# Patient Record
Sex: FEMALE | Race: WHITE | NOT HISPANIC OR LATINO | Employment: PART TIME | URBAN - NONMETROPOLITAN AREA
[De-identification: names, ages, dates, MRNs, and addresses within clinical notes are randomized per-mention and may not be internally consistent; named-entity substitution may affect disease eponyms.]

---

## 2019-07-10 ENCOUNTER — TELEPHONE (OUTPATIENT)
Dept: FAMILY MEDICINE CLINIC | Facility: CLINIC | Age: 54
End: 2019-07-10

## 2019-07-10 NOTE — TELEPHONE ENCOUNTER
Pt needs refills    lisinopril  40 mg  1x daily  prozak     20 mg  Daily or higher  zanax       0 5 mg  PRN      Carl Dates      Thank you

## 2019-07-11 DIAGNOSIS — I10 ESSENTIAL HYPERTENSION, BENIGN: Primary | ICD-10-CM

## 2019-07-11 DIAGNOSIS — F33.0 MAJOR DEPRESSIVE DISORDER, RECURRENT EPISODE, MILD (HCC): ICD-10-CM

## 2019-07-11 RX ORDER — FLUOXETINE 20 MG/1
20 TABLET, FILM COATED ORAL DAILY
Qty: 30 TABLET | Refills: 5 | Status: SHIPPED | OUTPATIENT
Start: 2019-07-11 | End: 2020-01-06 | Stop reason: SDUPTHER

## 2019-07-11 RX ORDER — LISINOPRIL 40 MG/1
40 TABLET ORAL DAILY
Qty: 30 TABLET | Refills: 5 | Status: SHIPPED | OUTPATIENT
Start: 2019-07-11 | End: 2020-01-06 | Stop reason: SDUPTHER

## 2020-01-06 DIAGNOSIS — J45.909 ASTHMA, UNSPECIFIED ASTHMA SEVERITY, UNSPECIFIED WHETHER COMPLICATED, UNSPECIFIED WHETHER PERSISTENT: Primary | ICD-10-CM

## 2020-01-06 DIAGNOSIS — F33.0 MAJOR DEPRESSIVE DISORDER, RECURRENT EPISODE, MILD (HCC): ICD-10-CM

## 2020-01-06 DIAGNOSIS — I10 ESSENTIAL HYPERTENSION, BENIGN: ICD-10-CM

## 2020-01-06 RX ORDER — FLUOXETINE 20 MG/1
20 TABLET, FILM COATED ORAL DAILY
Qty: 30 TABLET | Refills: 0 | Status: SHIPPED | OUTPATIENT
Start: 2020-01-06 | End: 2020-02-19 | Stop reason: SDUPTHER

## 2020-01-06 RX ORDER — LISINOPRIL 40 MG/1
40 TABLET ORAL DAILY
Qty: 30 TABLET | Refills: 0 | Status: SHIPPED | OUTPATIENT
Start: 2020-01-06 | End: 2020-02-19 | Stop reason: SDUPTHER

## 2020-01-06 RX ORDER — ALBUTEROL SULFATE 90 UG/1
2 AEROSOL, METERED RESPIRATORY (INHALATION) AS NEEDED
COMMUNITY
End: 2020-01-06 | Stop reason: SDUPTHER

## 2020-01-06 RX ORDER — ALBUTEROL SULFATE 90 UG/1
2 AEROSOL, METERED RESPIRATORY (INHALATION) AS NEEDED
Qty: 1 INHALER | Refills: 0 | Status: SHIPPED | OUTPATIENT
Start: 2020-01-06 | End: 2020-01-10

## 2020-01-08 DIAGNOSIS — J45.909 ASTHMA, UNSPECIFIED ASTHMA SEVERITY, UNSPECIFIED WHETHER COMPLICATED, UNSPECIFIED WHETHER PERSISTENT: ICD-10-CM

## 2020-01-10 DIAGNOSIS — J45.909 ASTHMA, UNSPECIFIED ASTHMA SEVERITY, UNSPECIFIED WHETHER COMPLICATED, UNSPECIFIED WHETHER PERSISTENT: ICD-10-CM

## 2020-01-10 RX ORDER — ALBUTEROL SULFATE 90 UG/1
2 AEROSOL, METERED RESPIRATORY (INHALATION) EVERY 4 HOURS PRN
Qty: 1 INHALER | Refills: 0 | Status: SHIPPED | OUTPATIENT
Start: 2020-01-10 | End: 2020-02-27 | Stop reason: SDUPTHER

## 2020-01-10 RX ORDER — ALBUTEROL SULFATE 90 UG/1
2 AEROSOL, METERED RESPIRATORY (INHALATION) EVERY 4 HOURS PRN
Qty: 9 G | Refills: 0 | Status: SHIPPED | OUTPATIENT
Start: 2020-01-10 | End: 2020-01-10 | Stop reason: SDUPTHER

## 2020-02-05 DIAGNOSIS — F33.0 MAJOR DEPRESSIVE DISORDER, RECURRENT EPISODE, MILD (HCC): ICD-10-CM

## 2020-02-05 DIAGNOSIS — I10 ESSENTIAL HYPERTENSION, BENIGN: ICD-10-CM

## 2020-02-05 DIAGNOSIS — J45.909 ASTHMA, UNSPECIFIED ASTHMA SEVERITY, UNSPECIFIED WHETHER COMPLICATED, UNSPECIFIED WHETHER PERSISTENT: ICD-10-CM

## 2020-02-05 RX ORDER — LISINOPRIL 40 MG/1
40 TABLET ORAL DAILY
Qty: 30 TABLET | Refills: 5 | Status: CANCELLED | OUTPATIENT
Start: 2020-02-05

## 2020-02-05 RX ORDER — ALBUTEROL SULFATE 90 UG/1
2 AEROSOL, METERED RESPIRATORY (INHALATION) EVERY 4 HOURS PRN
Qty: 1 INHALER | Refills: 4 | Status: CANCELLED | OUTPATIENT
Start: 2020-02-05

## 2020-02-05 RX ORDER — FLUOXETINE 20 MG/1
20 TABLET, FILM COATED ORAL DAILY
Qty: 30 TABLET | Refills: 5 | Status: CANCELLED | OUTPATIENT
Start: 2020-02-05

## 2020-02-06 ENCOUNTER — TELEPHONE (OUTPATIENT)
Dept: FAMILY MEDICINE CLINIC | Facility: CLINIC | Age: 55
End: 2020-02-06

## 2020-02-19 DIAGNOSIS — I10 ESSENTIAL HYPERTENSION, BENIGN: ICD-10-CM

## 2020-02-19 DIAGNOSIS — F33.0 MAJOR DEPRESSIVE DISORDER, RECURRENT EPISODE, MILD (HCC): ICD-10-CM

## 2020-02-19 RX ORDER — LISINOPRIL 40 MG/1
40 TABLET ORAL DAILY
Qty: 15 TABLET | Refills: 0 | Status: SHIPPED | OUTPATIENT
Start: 2020-02-19 | End: 2020-02-27 | Stop reason: SDUPTHER

## 2020-02-19 RX ORDER — FLUOXETINE 20 MG/1
20 TABLET, FILM COATED ORAL DAILY
Qty: 15 TABLET | Refills: 0 | Status: SHIPPED | OUTPATIENT
Start: 2020-02-19 | End: 2020-02-27 | Stop reason: SDUPTHER

## 2020-02-27 ENCOUNTER — OFFICE VISIT (OUTPATIENT)
Dept: FAMILY MEDICINE CLINIC | Facility: CLINIC | Age: 55
End: 2020-02-27

## 2020-02-27 VITALS
SYSTOLIC BLOOD PRESSURE: 148 MMHG | BODY MASS INDEX: 31.6 KG/M2 | HEART RATE: 81 BPM | HEIGHT: 68 IN | DIASTOLIC BLOOD PRESSURE: 90 MMHG | WEIGHT: 208.5 LBS | OXYGEN SATURATION: 94 %

## 2020-02-27 DIAGNOSIS — Z12.39 SCREENING FOR BREAST CANCER: ICD-10-CM

## 2020-02-27 DIAGNOSIS — Z12.11 SCREENING FOR COLON CANCER: ICD-10-CM

## 2020-02-27 DIAGNOSIS — J45.909 ASTHMA, UNSPECIFIED ASTHMA SEVERITY, UNSPECIFIED WHETHER COMPLICATED, UNSPECIFIED WHETHER PERSISTENT: ICD-10-CM

## 2020-02-27 DIAGNOSIS — Z12.4 SCREENING FOR CERVICAL CANCER: ICD-10-CM

## 2020-02-27 DIAGNOSIS — F33.0 MAJOR DEPRESSIVE DISORDER, RECURRENT EPISODE, MILD (HCC): ICD-10-CM

## 2020-02-27 DIAGNOSIS — I10 ESSENTIAL HYPERTENSION, BENIGN: Primary | ICD-10-CM

## 2020-02-27 PROCEDURE — 3080F DIAST BP >= 90 MM HG: CPT | Performed by: FAMILY MEDICINE

## 2020-02-27 PROCEDURE — 3008F BODY MASS INDEX DOCD: CPT | Performed by: FAMILY MEDICINE

## 2020-02-27 PROCEDURE — 1036F TOBACCO NON-USER: CPT | Performed by: FAMILY MEDICINE

## 2020-02-27 PROCEDURE — 3077F SYST BP >= 140 MM HG: CPT | Performed by: FAMILY MEDICINE

## 2020-02-27 PROCEDURE — 99213 OFFICE O/P EST LOW 20 MIN: CPT | Performed by: FAMILY MEDICINE

## 2020-02-27 RX ORDER — LISINOPRIL 40 MG/1
40 TABLET ORAL DAILY
Qty: 90 TABLET | Refills: 3 | Status: SHIPPED | OUTPATIENT
Start: 2020-02-27 | End: 2021-02-23

## 2020-02-27 RX ORDER — ALBUTEROL SULFATE 90 UG/1
2 AEROSOL, METERED RESPIRATORY (INHALATION) EVERY 4 HOURS PRN
Qty: 1 INHALER | Refills: 1 | Status: SHIPPED | OUTPATIENT
Start: 2020-02-27 | End: 2022-03-03 | Stop reason: SDUPTHER

## 2020-02-27 RX ORDER — FLUOXETINE 20 MG/1
20 TABLET, FILM COATED ORAL DAILY
Qty: 90 TABLET | Refills: 3 | Status: SHIPPED | OUTPATIENT
Start: 2020-02-27 | End: 2021-02-23

## 2020-02-27 RX ORDER — AMLODIPINE BESYLATE 5 MG/1
5 TABLET ORAL DAILY
Qty: 90 TABLET | Refills: 3 | Status: SHIPPED | OUTPATIENT
Start: 2020-02-27 | End: 2021-02-23

## 2020-02-27 NOTE — PATIENT INSTRUCTIONS
Low-Sodium Diet   AMBULATORY CARE:   A low-sodium diet  limits foods that are high in sodium (salt)  You will need to follow a low-sodium diet if you have high blood pressure, kidney disease, or heart failure  You may also need to follow this diet if you have a condition that is causing your body to retain (hold) extra fluid  You may need to limit the amount of sodium you eat to 1,500 mg  Ask your healthcare provider how much sodium you can have each day  How to use food labels to choose foods that are low in sodium:  Read food labels to find the amount of sodium they contain  The amount of sodium is listed in milligrams (mg)  The % Daily Value (DV) column tells you how much of your daily needs are met by 1 serving of the food for each nutrient listed  Choose foods that have less than 5% of the DV of sodium  These foods are considered low in sodium  Foods that have 20% or more of the DV of sodium are considered high in sodium  Some food labels may also list any of the following terms that tell you about the sodium content in the food:  · Sodium-free:  Less than 5 mg in each serving    · Very low sodium:  35 mg of sodium or less in each serving    · Low sodium:  140 mg of sodium or less in each serving    · Reduced sodium: At least 25% less sodium in each serving than the regular type    · Light in sodium:  50% less sodium in each serving    · Unsalted or no added salt:  No extra salt is added during processing (the food may still contain sodium)  Foods to avoid:  Salty foods are high in sodium   You should avoid the following:  · Processed foods:      ¨ Mixes for cornbread, biscuits, cake, and pudding     ¨ Instant foods, such as potatoes, cereals, noodles, and rice     ¨ Packaged foods, such as bread stuffing, rice and pasta mixes, snack dip mixes, and macaroni and cheese     ¨ Canned foods, such as canned vegetables, soups, broths, sauces, and vegetable or tomato juice    ¨ Snack foods, such as salted chips, popcorn, pretzels, pork rinds, salted crackers, and salted nuts    ¨ Frozen foods, such as dinners, entrees, vegetables with sauces, and breaded meats    ¨ Sauerkraut, pickled vegetables, and other foods prepared in brine    · Meats and cheeses:      ¨ Smoked or cured meat, such as corned beef, hull, ham, hot dogs, and sausage    ¨ Canned meats or spreads, such as potted meats, sardines, anchovies, and imitation seafood    ¨ Deli or lunch meats, such as bologna, ham, turkey, and roast beef    ¨ Processed cheese, such as American cheese and cheese spreads    · Condiments, sauces, and seasonings:      ¨ Salt (¼ teaspoon of salt contains 575 mg of sodium)    ¨ Seasonings made with salt, such as garlic salt, celery salt, onion salt, and seasoned salt    ¨ Regular soy sauce, barbecue sauce, teriyaki sauce, steak sauce, Worcestershire sauce, and most flavored vinegars    ¨ Canned gravy and mixes     ¨ Regular condiments, such as mustard, ketchup, and salad dressings    ¨ Pickles and olives    ¨ Meat tenderizers and monosodium glutamate (MSG)  Foods to include:  Read the food label to find the exact amount of sodium in each serving  · Bread and cereal:  Try to choose breads with less than 80 mg of sodium per serving  ¨ Bread, roll, matthew, tortilla, or unsalted crackers  ¨ Ready-to-eat cereals with less than 5% DV of sodium (examples include shredded wheat and puffed rice)    ¨ Pasta    · Vegetables and fruits:      ¨ Unsalted fresh, frozen, or canned vegetables    ¨ Fresh, frozen, or canned fruits    ¨ Fruit juice    · Dairy:  One serving has about 150 mg of sodium  ¨ Milk, all types    ¨ Yogurt    ¨ Hard cheese, such as cheddar, Swiss, [employer], or mozzarella    · Meat and other protein foods:  Some raw meats may have added sodium       ¨ Plain meats, fish, and poultry     ¨ Eggs    · Other foods:      ¨ Homemade pudding    ¨ Unsalted nuts, popcorn, or pretzels    ¨ Unsalted butter or margarine  Ways to decrease sodium:   · Add spices and herbs to foods instead of salt during cooking  Use salt-free seasonings to add flavor to foods  Examples include onion powder, garlic powder, basil, romero powder, paprika, and parsley  Try lemon or lime juice or vinegar to give foods a tart flavor  Use hot peppers, pepper, or cayenne pepper to add a spicy flavor to foods  · Do not keep a salt shaker at your kitchen table  This may help keep you from adding salt to food at the table  It may take time to get used to enjoying the natural flavor of food instead of adding salt  Talk to your healthcare provider before you use salt substitutes  Some salt substitutes have a high amount of potassium and need to be avoided if you have kidney disease  · Choose low-sodium foods at restaurants  Meals from restaurants are often high in sodium  Some restaurants have nutrition information on the menu that tells you the amount of sodium in their foods  If possible, ask for your food to be prepared with less, or no salt  · Shop for unsalted or low-sodium foods and snacks at the grocery store  Examples include unsalted or low-sodium broths, soups, and canned vegetables  Choose fresh or frozen vegetables instead  Choose unsalted nuts or seeds or fresh fruits or vegetables as snacks  Read food labels and choose salt-free, very low-sodium, or low-sodium foods  © 2017 [address] Information is for End User's use only and may not be sold, redistributed or otherwise used for commercial purposes  All illustrations and images included in CareNotes® are the copyrighted property of A D A M , Inc  or Chiki Puente  The above information is an  only  It is not intended as medical advice for individual conditions or treatments  Talk to your doctor, nurse or pharmacist before following any medical regimen to see if it is safe and effective for you

## 2020-02-27 NOTE — PROGRESS NOTES
Assessment/Plan:    Essential hypertension, benign  Patient reports she has been compliant with treatment for her hypertension  However, her blood pressure is noted to be elevated  I am going to add amlodipine to her regiment  She will begin 5 mg daily, in addition to the lisinopril 40 mg daily  I refilled her medication  I encouraged the patient to lose weight and follow a low-sodium diet  I have also encouraged the patient to begin an exercise program   I also discussed trying to get healthcare insurance  I told her her daughter can help her by checking online  I suggested a look into medical assistance  The patient only works 20 hours per week  She can also look into [employer]  Asthma  Patient has asthma  She did quit smoking 2 years ago  I encouraged her never to restart smoking  I refilled her inhaler  Major depressive disorder, recurrent episode, mild ([facility] [address] )  Patient has depression  Currently stable  She would like to continue her current dose of fluoxetine  Diagnoses and all orders for this visit:    Essential hypertension, benign  -     lisinopril (ZESTRIL) 40 mg tablet; Take 1 tablet (40 mg total) by mouth daily  -     amLODIPine (NORVASC) 5 mg tablet; Take 1 tablet (5 mg total) by mouth daily    Screening for breast cancer  -     Mammo screening bilateral w 3d & cad; Future    Screening for colon cancer  -     Cancel: Ambulatory referral to Gastroenterology; Future  -     Ambulatory referral to Gastroenterology; Future    Screening for cervical cancer  -     Cancel: Ambulatory referral to Gynecology; Future  -     Ambulatory referral to Gynecology; Future    Major depressive disorder, recurrent episode, mild (HCC)  -     FLUoxetine (PROzac) 20 MG tablet; Take 1 tablet (20 mg total) by mouth daily    Asthma, unspecified asthma severity, unspecified whether complicated, unspecified whether persistent  -     albuterol (PROVENTIL HFA,VENTOLIN HFA) 90 mcg/act inhaler;  Inhale 2 puffs every 4 (four) hours as needed for wheezing    Other orders  -     Omeprazole Magnesium (PRILOSEC OTC PO); Take 1 tablet by mouth daily        BMI Counseling: Body mass index is 31 7 kg/m²  The BMI is above normal  Nutrition recommendations include decreasing portion sizes, encouraging healthy choices of fruits and vegetables, decreasing fast food intake, consuming healthier snacks, limiting drinks that contain sugar and moderation in carbohydrate intake  Exercise recommendations include exercising 3-5 times per week  No pharmacotherapy was ordered  Depression Screening and Follow-up Plan: Patient's depression screening was positive with a PHQ-2 score of 4  Their PHQ-9 score was 19  Patient assessed for underlying major depression  Brief counseling provided and recommend additional follow-up/re-evaluation next office visit  Subjective:      Patient ID: Laurie Verdugo is a 49 y o  female  This is a 76-year-old white female with a known history of hypertension, asthma, and depression  She has no healthcare insurance  She presents to the office today for her routine checkup  It has been over 1 year since her last visit  Patient admits to having gained some weight since her last visit  She reports compliance with her medication  She tells me she did not run out of her blood pressure medication  She is working at [email or web address] approximately 20 hours per week  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  The following portions of the patient's history were reviewed and updated as appropriate: allergies, current medications, past family history, past medical history, past social history, past surgical history and problem list     Review of Systems   Cardiovascular: Negative for chest pain, palpitations and leg swelling  Gastrointestinal: Negative for abdominal distention, abdominal pain, blood in stool, constipation, diarrhea, nausea and vomiting     Psychiatric/Behavioral: Positive for dysphoric mood (Reports mild depressed mood, even with the antidepressant  )  Negative for sleep disturbance and suicidal ideas  The patient is not nervous/anxious  Objective:      /90 (BP Location: Left arm, Patient Position: Sitting, Cuff Size: Adult)   Pulse 81   Ht 5' 8" (1 727 m)   Wt 94 6 kg (208 lb 8 oz)   SpO2 94%   BMI 31 70 kg/m²          Physical Exam   Constitutional:   This is a 52-year-old white female who appears her stated age  She is neat in appearance, well-nourished, and in no distress  HENT:   Head: Normocephalic and atraumatic  Right Ear: External ear normal    Left Ear: External ear normal    Mouth/Throat: Oropharynx is clear and moist  No oropharyngeal exudate  Tympanic membranes are clear   Eyes: Pupils are equal, round, and reactive to light  Conjunctivae are normal  No scleral icterus  Neck: Neck supple  No tracheal deviation present  No thyromegaly present  Cardiovascular: Normal rate, regular rhythm and normal heart sounds  Exam reveals no gallop and no friction rub  No murmur heard  Pulmonary/Chest: Effort normal and breath sounds normal  No stridor  No respiratory distress  She has no wheezes  She has no rales  Abdominal: Soft  Bowel sounds are normal  She exhibits no distension and no mass  There is no tenderness  There is no guarding  There was no organomegaly noted   Lymphadenopathy:     She has no cervical adenopathy  Skin: Rash (Rashes present unchanged and involves the neck, trunk, and extremities) noted  Psychiatric: She has a normal mood and affect  Her behavior is normal  Judgment and thought content normal    Vitals reviewed      extremities:  Without cyanosis, clubbing, or edema

## 2020-02-28 NOTE — ASSESSMENT & PLAN NOTE
Patient reports she has been compliant with treatment for her hypertension  However, her blood pressure is noted to be elevated  I am going to add amlodipine to her regiment  She will begin 5 mg daily, in addition to the lisinopril 40 mg daily  I refilled her medication  I encouraged the patient to lose weight and follow a low-sodium diet  I have also encouraged the patient to begin an exercise program   I also discussed trying to get healthcare insurance  I told her her daughter can help her by checking online  I suggested a look into medical assistance  The patient only works 20 hours per week  She can also look into [employer]

## 2020-02-28 NOTE — ASSESSMENT & PLAN NOTE
Patient has asthma  She did quit smoking 2 years ago  I encouraged her never to restart smoking  I refilled her inhaler

## 2020-02-28 NOTE — ASSESSMENT & PLAN NOTE
Patient has depression  Currently stable  She would like to continue her current dose of fluoxetine

## 2020-02-29 ENCOUNTER — HOSPITAL ENCOUNTER (OUTPATIENT)
Dept: MAMMOGRAPHY | Facility: HOSPITAL | Age: 55
Discharge: HOME | End: 2020-02-29
Payer: COMMERCIAL

## 2020-02-29 VITALS — WEIGHT: 208 LBS | BODY MASS INDEX: 29.78 KG/M2 | HEIGHT: 70 IN

## 2020-02-29 DIAGNOSIS — Z12.39 SCREENING FOR BREAST CANCER: ICD-10-CM

## 2020-02-29 PROCEDURE — 77067 SCR MAMMO BI INCL CAD: CPT

## 2020-02-29 PROCEDURE — 77063 BREAST TOMOSYNTHESIS BI: CPT

## 2020-03-13 ENCOUNTER — HOSPITAL ENCOUNTER (OUTPATIENT)
Dept: ULTRASOUND IMAGING | Facility: HOSPITAL | Age: 55
Discharge: HOME | End: 2020-03-13
Payer: COMMERCIAL

## 2020-03-13 DIAGNOSIS — R92.8 ABNORMAL MAMMOGRAM: ICD-10-CM

## 2020-03-13 PROCEDURE — 76642 ULTRASOUND BREAST LIMITED: CPT

## 2020-04-27 ENCOUNTER — TRANSCRIBE ORDERS (OUTPATIENT)
Dept: ADMINISTRATIVE | Facility: HOSPITAL | Age: 55
End: 2020-04-27

## 2020-04-27 ENCOUNTER — TELEPHONE (OUTPATIENT)
Dept: INTERVENTIONAL RADIOLOGY/VASCULAR | Facility: HOSPITAL | Age: 55
End: 2020-04-27

## 2020-04-27 DIAGNOSIS — R92.8 ABNORMAL MAMMOGRAM: Primary | ICD-10-CM

## 2020-04-28 ENCOUNTER — HOSPITAL ENCOUNTER (OUTPATIENT)
Dept: INTERVENTIONAL RADIOLOGY/VASCULAR | Facility: HOSPITAL | Age: 55
Discharge: HOME | End: 2020-04-28
Admitting: RADIOLOGY

## 2020-04-28 DIAGNOSIS — R92.8 ABNORMAL MAMMOGRAM: ICD-10-CM

## 2020-04-28 PROCEDURE — 88364 INSITU HYBRIDIZATION (FISH): CPT | Performed by: PATHOLOGY

## 2020-04-28 PROCEDURE — 38505 NEEDLE BIOPSY LYMPH NODES: CPT

## 2020-04-28 PROCEDURE — 38505 NEEDLE BIOPSY LYMPH NODES: CPT | Performed by: RADIOLOGY

## 2020-04-28 PROCEDURE — 88305 TISSUE EXAM BY PATHOLOGIST: CPT | Performed by: PATHOLOGY

## 2020-04-28 PROCEDURE — 88342 IMHCHEM/IMCYTCHM 1ST ANTB: CPT | Performed by: PATHOLOGY

## 2020-04-28 PROCEDURE — 88185 FLOWCYTOMETRY/TC ADD-ON: CPT

## 2020-04-28 PROCEDURE — 88365 INSITU HYBRIDIZATION (FISH): CPT | Performed by: PATHOLOGY

## 2020-04-28 PROCEDURE — 76942 ECHO GUIDE FOR BIOPSY: CPT

## 2020-04-28 PROCEDURE — 88341 IMHCHEM/IMCYTCHM EA ADD ANTB: CPT | Performed by: PATHOLOGY

## 2020-04-28 PROCEDURE — 76942 ECHO GUIDE FOR BIOPSY: CPT | Performed by: RADIOLOGY

## 2020-04-28 PROCEDURE — 88184 FLOWCYTOMETRY/ TC 1 MARKER: CPT | Performed by: FAMILY MEDICINE

## 2020-04-28 RX ORDER — LIDOCAINE HYDROCHLORIDE 10 MG/ML
INJECTION, SOLUTION EPIDURAL; INFILTRATION; INTRACAUDAL; PERINEURAL CODE/TRAUMA/SEDATION MEDICATION
Status: COMPLETED | OUTPATIENT
Start: 2020-04-28 | End: 2020-04-28

## 2020-04-28 RX ADMIN — LIDOCAINE HYDROCHLORIDE 10 ML: 10 INJECTION, SOLUTION EPIDURAL; INFILTRATION; INTRACAUDAL; PERINEURAL at 08:45

## 2020-05-01 LAB — SCAN RESULT: NORMAL

## 2021-02-23 DIAGNOSIS — I10 ESSENTIAL HYPERTENSION, BENIGN: ICD-10-CM

## 2021-02-23 DIAGNOSIS — F33.0 MAJOR DEPRESSIVE DISORDER, RECURRENT EPISODE, MILD (HCC): ICD-10-CM

## 2021-02-23 RX ORDER — AMLODIPINE BESYLATE 5 MG/1
TABLET ORAL
Qty: 90 TABLET | Refills: 0 | Status: SHIPPED | OUTPATIENT
Start: 2021-02-23 | End: 2021-03-01 | Stop reason: DRUGHIGH

## 2021-02-23 RX ORDER — FLUOXETINE 20 MG/1
TABLET, FILM COATED ORAL
Qty: 90 TABLET | Refills: 0 | Status: SHIPPED | OUTPATIENT
Start: 2021-02-23 | End: 2021-03-01 | Stop reason: DRUGHIGH

## 2021-02-23 RX ORDER — LISINOPRIL 40 MG/1
TABLET ORAL
Qty: 90 TABLET | Refills: 0 | Status: SHIPPED | OUTPATIENT
Start: 2021-02-23 | End: 2021-05-19 | Stop reason: SDUPTHER

## 2021-03-01 ENCOUNTER — OFFICE VISIT (OUTPATIENT)
Dept: FAMILY MEDICINE CLINIC | Facility: CLINIC | Age: 56
End: 2021-03-01

## 2021-03-01 VITALS
HEIGHT: 68 IN | TEMPERATURE: 98.1 F | HEART RATE: 75 BPM | BODY MASS INDEX: 31.54 KG/M2 | DIASTOLIC BLOOD PRESSURE: 90 MMHG | WEIGHT: 208.1 LBS | SYSTOLIC BLOOD PRESSURE: 144 MMHG | OXYGEN SATURATION: 98 %

## 2021-03-01 DIAGNOSIS — F33.0 MAJOR DEPRESSIVE DISORDER, RECURRENT EPISODE, MILD (HCC): ICD-10-CM

## 2021-03-01 DIAGNOSIS — Z12.31 ENCOUNTER FOR SCREENING MAMMOGRAM FOR MALIGNANT NEOPLASM OF BREAST: ICD-10-CM

## 2021-03-01 DIAGNOSIS — I10 ESSENTIAL HYPERTENSION, BENIGN: Primary | ICD-10-CM

## 2021-03-01 PROCEDURE — 99213 OFFICE O/P EST LOW 20 MIN: CPT | Performed by: FAMILY MEDICINE

## 2021-03-01 RX ORDER — FLUOXETINE HYDROCHLORIDE 40 MG/1
40 CAPSULE ORAL DAILY
Qty: 90 CAPSULE | Refills: 3 | Status: SHIPPED | OUTPATIENT
Start: 2021-03-01 | End: 2022-03-03 | Stop reason: ALTCHOICE

## 2021-03-01 RX ORDER — AMLODIPINE BESYLATE 10 MG/1
10 TABLET ORAL DAILY
Qty: 90 TABLET | Refills: 3 | Status: SHIPPED | OUTPATIENT
Start: 2021-03-01 | End: 2022-03-03 | Stop reason: SDUPTHER

## 2021-03-01 NOTE — PROGRESS NOTES
Assessment/Plan:    Essential hypertension, benign   Patient has hypertension  Her blood pressure was elevated  She will continue lisinopril 40 mg daily  Increase amlodipine to 10 mg daily  I asked the patient to try to follow a low-sodium diet  She needs to try to lose weight and exercise  I asked the patient to check her blood pressure daily  Major depressive disorder, recurrent episode, mild ([facility] [address] )    Patient has mild depression which is persistent  I increased her fluoxetine to 40 mg daily  Patient denies any suicidal ideations  Diagnoses and all orders for this visit:    Essential hypertension, benign  -     amLODIPine (NORVASC) 10 mg tablet; Take 1 tablet (10 mg total) by mouth daily    Major depressive disorder, recurrent episode, mild ([facility])  -     FLUoxetine (PROzac) 40 MG capsule; Take 1 capsule (40 mg total) by mouth daily    Encounter for screening mammogram for malignant neoplasm of breast  -     Mammo screening bilateral w 3d & cad; Future          Subjective:      Patient ID: Roxy Segura is a 48 y o  female  This is a 78-year-old white female who presents to the office today for her routine checkup  She reports she still has depression  She admits to a sleep disturbance  She has history of hypertension  She does check her blood pressure occasionally at home  She tells me it has been high at home as well  She reports she has been compliant with her medication  She still works at [employer]  She has no healthcare insurance  The following portions of the patient's history were reviewed and updated as appropriate: allergies, current medications, past family history, past medical history, past social history, past surgical history and problem list     Review of Systems   Cardiovascular: Negative for chest pain, palpitations and leg swelling  Gastrointestinal: Negative for abdominal distention, abdominal pain, blood in stool, constipation, diarrhea and nausea  Psychiatric/Behavioral: Positive for dysphoric mood and sleep disturbance  Objective:      /90 (BP Location: Left arm, Patient Position: Sitting, Cuff Size: Large)   Pulse 75   Temp 98 1 °F (36 7 °C) (Tympanic)   Ht 5' 8" (1 727 m)   Wt 94 4 kg (208 lb 1 6 oz)   SpO2 98%   BMI 31 64 kg/m²          Physical Exam  Vitals signs reviewed  Constitutional:       Comments: This is a 60-year-old white female who appears her stated age  She is in no apparent distress   HENT:      Head: Normocephalic and atraumatic  Right Ear: Tympanic membrane, ear canal and external ear normal       Left Ear: Tympanic membrane, ear canal and external ear normal       Mouth/Throat:      Mouth: Mucous membranes are moist       Pharynx: Oropharynx is clear  No oropharyngeal exudate or posterior oropharyngeal erythema  Eyes:      General: No scleral icterus  Right eye: No discharge  Left eye: No discharge  Conjunctiva/sclera: Conjunctivae normal       Pupils: Pupils are equal, round, and reactive to light  Neck:      Musculoskeletal: Neck supple  Cardiovascular:      Rate and Rhythm: Normal rate and regular rhythm  Heart sounds: Normal heart sounds  No murmur  No friction rub  No gallop  Pulmonary:      Effort: Pulmonary effort is normal  No respiratory distress  Breath sounds: Normal breath sounds  No stridor  No wheezing, rhonchi or rales  Abdominal:      General: Bowel sounds are normal  There is no distension  Palpations: Abdomen is soft  There is no mass  Tenderness: There is no abdominal tenderness  There is no guarding  Lymphadenopathy:      Cervical: No cervical adenopathy  Skin:     Findings: Rash present  Psychiatric:         Mood and Affect: Mood normal          Behavior: Behavior normal          Thought Content:  Thought content normal          Judgment: Judgment normal          Extremities: Without cyanosis, clubbing, or edema

## 2021-03-01 NOTE — PATIENT INSTRUCTIONS
Low-Sodium Diet   AMBULATORY CARE:   A low-sodium diet  limits foods that are high in sodium (salt)  You will need to follow a low-sodium diet if you have high blood pressure, kidney disease, or heart failure  You may also need to follow this diet if you have a condition that is causing your body to retain (hold) extra fluid  You may need to limit the amount of sodium you eat in a day to 1,500 to 2,000 mg  Ask your healthcare provider how much sodium you can have each day  How to use food labels to choose foods that are low in sodium:  Read food labels to find the amount of sodium they contain  The amount of sodium is listed in milligrams (mg)  The % Daily Value (DV) column tells you how much of your daily needs are met by 1 serving of the food for each nutrient listed  Choose foods that have less than 5% of the DV of sodium  These foods are considered low in sodium  Foods that have 20% or more of the DV of sodium are considered high in sodium  Some food labels may also list any of the following terms that tell you about the sodium content in the food:  · Sodium-free:  Less than 5 mg in each serving    · Very low sodium:  35 mg of sodium or less in each serving    · Low sodium:  140 mg of sodium or less in each serving    · Reduced sodium: At least 25% less sodium in each serving than the regular type    · Light in sodium:  50% less sodium in each serving    · Unsalted or no added salt:  No extra salt is added during processing (the food may still contain sodium)     Foods to avoid:  Salty foods are high in sodium  You should avoid the following:  · Processed foods:      ? Mixes for cornbread, biscuits, cake, and pudding     ? Instant foods, such as potatoes, cereals, noodles, and rice     ? Packaged foods, such as bread stuffing, rice and pasta mixes, snack dip mixes, and macaroni and cheese     ? Canned foods, such as canned vegetables, soups, broths, sauces, and vegetable or tomato juice    ?  Snack foods, such as salted chips, popcorn, pretzels, pork rinds, salted crackers, and salted nuts    ? Frozen foods, such as dinners, entrees, vegetables with sauces, and breaded meats    ? Sauerkraut, pickled vegetables, and other foods prepared in brine    · Meats and cheeses:      ? Smoked or cured meat, such as corned beef, hull, ham, hot dogs, and sausage    ? Canned meats or spreads, such as potted meats, sardines, anchovies, and imitation seafood    ? Deli or lunch meats, such as bologna, ham, turkey, and roast beef    ? Processed cheese, such as American cheese and cheese spreads    · Condiments, sauces, and seasonings:      ? Salt (¼ teaspoon of salt contains 575 mg of sodium)    ? Seasonings made with salt, such as garlic salt, celery salt, onion salt, and seasoned salt    ? Regular soy sauce, barbecue sauce, teriyaki sauce, steak sauce, Worcestershire sauce, and most flavored vinegars    ? Canned gravy and mixes     ? Regular condiments, such as mustard, ketchup, and salad dressings    ? Pickles and olives    ? Meat tenderizers and monosodium glutamate (MSG)    Foods to include:  Read the food label to find the exact amount of sodium in each serving  · Bread and cereal:  Try to choose breads with less than 80 mg of sodium per serving  ? Bread, roll, matthew, tortilla, or unsalted crackers  ? Ready-to-eat cereals with less than 5% DV of sodium (examples include shredded wheat and puffed rice)    ? Pasta    · Vegetables and fruits:      ? Unsalted fresh, frozen, or canned vegetables    ? Fresh, frozen, or canned fruits    ? Fruit juice    · Dairy:  One serving has about 150 mg of sodium  ? Milk, all types    ? Yogurt    ? Hard cheese, such as cheddar, Swiss, [employer], or mozzarella    · Meat and other protein foods:  Some raw meats may have added sodium  ? Plain meats, fish, and poultry     ? Eggs    · Other foods:      ? Homemade pudding    ? Unsalted nuts, popcorn, or pretzels    ?  Unsalted butter or margarine    Ways to decrease sodium:   · Add spices and herbs to foods instead of salt during cooking  Use salt-free seasonings to add flavor to foods  Examples include onion powder, garlic powder, basil, romero powder, paprika, and parsley  Try lemon or lime juice or vinegar to give foods a tart flavor  Use hot peppers, pepper, or cayenne pepper to add a spicy flavor  · Do not keep a salt shaker at your kitchen table  This may help keep you from adding salt to food at the table  A teaspoon of salt has 2,300 mg of sodium  It may take time to get used to enjoying the natural flavor of food instead of adding salt  Talk to your healthcare provider before you use salt substitutes  Some salt substitutes have a high amount of potassium and need to be avoided if you have kidney disease  · Choose low-sodium foods at restaurants  Meals from restaurants are often high in sodium  Some restaurants have nutrition information on the menu that tells you the amount of sodium in their foods  If possible, ask for your food to be prepared with less, or no salt  · Shop for unsalted or low-sodium foods and snacks at the grocery store  Examples include unsalted or low-sodium broths, soups, and canned vegetables  Choose fresh or frozen vegetables instead  Choose unsalted nuts or seeds or fresh fruits or vegetables as snacks  Read food labels and choose salt-free, very low-sodium, or low-sodium foods  © Copyright [address] Information is for End User's use only and may not be sold, redistributed or otherwise used for commercial purposes  All illustrations and images included in CareNotes® are the copyrighted property of A D A M  Inc  or [facility] Ashok Vivas   The above information is an  only  It is not intended as medical advice for individual conditions or treatments  Talk to your doctor, nurse or pharmacist before following any medical regimen to see if it is safe and effective for you

## 2021-03-02 NOTE — ASSESSMENT & PLAN NOTE
Patient has hypertension  Her blood pressure was elevated  She will continue lisinopril 40 mg daily  Increase amlodipine to 10 mg daily  I asked the patient to try to follow a low-sodium diet  She needs to try to lose weight and exercise  I asked the patient to check her blood pressure daily

## 2021-03-02 NOTE — ASSESSMENT & PLAN NOTE
Patient has mild depression which is persistent  I increased her fluoxetine to 40 mg daily  Patient denies any suicidal ideations

## 2021-05-14 ENCOUNTER — HOSPITAL ENCOUNTER (OUTPATIENT)
Dept: MAMMOGRAPHY | Facility: HOSPITAL | Age: 56
Discharge: HOME | End: 2021-05-14

## 2021-05-14 VITALS — BODY MASS INDEX: 31.52 KG/M2 | WEIGHT: 208 LBS | HEIGHT: 68 IN

## 2021-05-14 DIAGNOSIS — Z12.31 ENCOUNTER FOR SCREENING MAMMOGRAM FOR MALIGNANT NEOPLASM OF BREAST: ICD-10-CM

## 2021-05-14 PROCEDURE — 77067 SCR MAMMO BI INCL CAD: CPT

## 2021-05-14 PROCEDURE — 77063 BREAST TOMOSYNTHESIS BI: CPT

## 2021-05-19 DIAGNOSIS — I10 ESSENTIAL HYPERTENSION, BENIGN: ICD-10-CM

## 2021-05-19 RX ORDER — LISINOPRIL 40 MG/1
40 TABLET ORAL DAILY
Qty: 90 TABLET | Refills: 2 | Status: SHIPPED | OUTPATIENT
Start: 2021-05-19 | End: 2022-02-24 | Stop reason: SDUPTHER

## 2021-09-02 ENCOUNTER — OFFICE VISIT (OUTPATIENT)
Dept: FAMILY MEDICINE CLINIC | Facility: CLINIC | Age: 56
End: 2021-09-02

## 2021-09-02 VITALS
SYSTOLIC BLOOD PRESSURE: 140 MMHG | OXYGEN SATURATION: 98 % | DIASTOLIC BLOOD PRESSURE: 88 MMHG | WEIGHT: 195.5 LBS | HEART RATE: 77 BPM | HEIGHT: 68 IN | TEMPERATURE: 98.4 F | BODY MASS INDEX: 29.63 KG/M2

## 2021-09-02 DIAGNOSIS — I10 ESSENTIAL HYPERTENSION, BENIGN: Primary | ICD-10-CM

## 2021-09-02 DIAGNOSIS — F33.0 MAJOR DEPRESSIVE DISORDER, RECURRENT EPISODE, MILD (HCC): ICD-10-CM

## 2021-09-02 DIAGNOSIS — J45.909 ASTHMA, UNSPECIFIED ASTHMA SEVERITY, UNSPECIFIED WHETHER COMPLICATED, UNSPECIFIED WHETHER PERSISTENT: ICD-10-CM

## 2021-09-02 PROCEDURE — 99213 OFFICE O/P EST LOW 20 MIN: CPT | Performed by: FAMILY MEDICINE

## 2021-09-02 NOTE — PROGRESS NOTES
Assessment/Plan:    Essential hypertension, benign   Patient has hypertension  I recheck her blood pressure and found to be borderline at 140/88  She is now on amlodipine 10 mg daily in addition to  Lisinopril 40 mg daily  I am  questioning whether she may have a component of white coat hypertension  I am going to have the patient begin doing ambulatory blood pressure readings  After 2 weeks, she will drop off her blood pressures to the office  If her blood pressures look like they are running high, I am going to have no recourse but to add another agent to her regiment  Patient has no healthcare insurance and therefore, has not had any blood work done  If I have to start her on a diuretic, she will need to get blood work done periodically  a urine dipstick was done in the office today which was negative  I advised the patient she should schedule an appointment to see an optometrist to have her eyes refracted    Major depressive disorder, recurrent episode, mild ([facility] [address] )   The patient will continue fluoxetine 40 mg daily  Diagnoses and all orders for this visit:    Essential hypertension, benign    Asthma, unspecified asthma severity, unspecified whether complicated, unspecified whether persistent    Major depressive disorder, recurrent episode, mild ([address] )          Subjective:      Patient ID: Tereso Molina is a 55 y o  female  Patient is a 60-year-old white female who presents to the office today for her routine checkup  The patient tells me that she occasionally checks her blood pressures at home  She does not check it often  She tells me sometimes it is good and sometimes it is high  She has been trying to watch her diet and she lost 13 lb since her last visit  She reports compliance with her medication    She tries to watch her sodium intake      The following portions of the patient's history were reviewed and updated as appropriate: allergies, current medications, past family history, past medical history, past social history, past surgical history and problem list     Review of Systems   Eyes: Positive for visual disturbance  Cardiovascular: Negative for chest pain, palpitations and leg swelling  Gastrointestinal: Negative for abdominal distention, abdominal pain, blood in stool, constipation and diarrhea  Psychiatric/Behavioral: Positive for sleep disturbance  Negative for dysphoric mood  Objective:      /88   Pulse 77   Temp 98 4 °F (36 9 °C) (Tympanic)   Ht 5' 8" (1 727 m)   Wt 88 7 kg (195 lb 8 oz)   SpO2 98%   BMI 29 73 kg/m²          Physical Exam  Vitals reviewed  HENT:      Head: Normocephalic and atraumatic  Right Ear: Tympanic membrane, ear canal and external ear normal  There is no impacted cerumen  Left Ear: Tympanic membrane, ear canal and external ear normal  There is no impacted cerumen  Mouth/Throat:      Mouth: Mucous membranes are moist       Pharynx: Oropharynx is clear  No oropharyngeal exudate or posterior oropharyngeal erythema  Eyes:      General: No scleral icterus  Right eye: No discharge  Left eye: No discharge  Conjunctiva/sclera: Conjunctivae normal       Pupils: Pupils are equal, round, and reactive to light  Cardiovascular:      Rate and Rhythm: Normal rate and regular rhythm  Heart sounds: Normal heart sounds  No murmur heard  No friction rub  No gallop  Pulmonary:      Effort: Pulmonary effort is normal  No respiratory distress  Breath sounds: Normal breath sounds  No stridor  No wheezing, rhonchi or rales  Abdominal:      General: Bowel sounds are normal  There is no distension  Palpations: Abdomen is soft  There is no mass  Tenderness: There is no abdominal tenderness  There is no guarding  Musculoskeletal:      Cervical back: Neck supple  Lymphadenopathy:      Cervical: No cervical adenopathy  Skin:     Findings: Rash present     Psychiatric:         Mood and Affect: Mood normal          Behavior: Behavior normal          Thought Content:  Thought content normal          Judgment: Judgment normal         Extremities: Without cyanosis, clubbing, or edema

## 2021-09-02 NOTE — ASSESSMENT & PLAN NOTE
Patient has hypertension  I recheck her blood pressure and found to be borderline at 140/88  She is now on amlodipine 10 mg daily in addition to  Lisinopril 40 mg daily  I am  questioning whether she may have a component of white coat hypertension  I am going to have the patient begin doing ambulatory blood pressure readings  After 2 weeks, she will drop off her blood pressures to the office  If her blood pressures look like they are running high, I am going to have no recourse but to add another agent to her regiment  Patient has no healthcare insurance and therefore, has not had any blood work done  If I have to start her on a diuretic, she will need to get blood work done periodically  a urine dipstick was done in the office today which was negative    I advised the patient she should schedule an appointment to see an optometrist to have her eyes refracted

## 2021-09-02 NOTE — PATIENT INSTRUCTIONS
COVID-19 Vaccine (2 Dose)   AMBULATORY CARE:   What you need to know about COVID-19 2-dose vaccines: The COVID-19 vaccine is a shot given to help prevent infection caused by the novel coronavirus  The vaccine does not contain the virus that causes COVID-19  This means you are not at risk for getting COVID-19 from the vaccine  Instead, the vaccine teaches your immune system to recognize the virus and produce antibodies to fight it  Several vaccines have emergency use authorization  Your healthcare provider will tell you when a vaccine is available to you  How the vaccine is given:  The vaccine is given as a shot in your shoulder area  Currently, 2 doses are needed for full protection  Make an appointment to get the second dose at the time recommended by your healthcare provider  Wait to get the vaccine if:   · You are sick or have a fever  · You took acetaminophen or ibuprofen the day you are to get the shot  · You had COVID-19 and received monoclonal antibodies or convalescent plasma  Wait at least 90 days after the end of treatment to get the vaccine  Before you get the vaccine, tell your healthcare provider if:   · You have thrombocytopenia, a blood clotting disorder, or are taking blood thinning medicines  · Your immune system is weakened from medicines such as chemotherapy or steroids  · You know or think you are pregnant  Your provider may recommend you get the vaccine during pregnancy if you are at high risk for COVID-19  He or she may instead want you to wait until after your baby is born  Do not get a COVID-19 vaccine until you and your provider decide it is right for you  · You are breastfeeding  · You have an allergy to any component (part) of the vaccine  · You have a history of allergies  After you get the vaccine: You will be considered fully vaccinated 2 weeks after you get the second dose    This is how long your immune system needs to build a response strong enough to protect you  The following are guidelines to follow when you are fully vaccinated :  · Continue being careful until experts are sure a fully vaccinated person cannot get infected or pass the virus to others  Wear a face covering and stay 6 feet away from others when you are in public  Remember to wash your hands often  · It is considered safe to gather indoors with others who are also fully vaccinated  Face coverings and social distancing are not needed in this case  · If you are exposed to the virus, you do not need to isolate or get tested unless you develop symptoms  Risks of the vaccine: This is a new vaccine  All side effects may not be known  · You may have an allergic reaction to the vaccine  · You may not be able to get a second dose if you had a severe allergic reaction to the first dose  It may be life-threatening  · The vaccine may cause mild symptoms, such as a fever, chills, headache, and muscle aches  · The area where you got the shot may be swollen, sore, or tender  This is usually mild and goes away in a few hours  · The vaccine may not be as helpful if your immune system is weak  · You may still get infected with the coronavirus after you receive the vaccine  Call your local emergency number (911 in the [address] [place] [address],[address]) if:   · You have signs of a severe allergic reaction, such as trouble breathing, hives, or wheezing  · Your mouth and throat are swollen  · You have chest pain or your heart is beating faster than normal for you  · Your face is red or swollen  · You have hives that spread over your body  Call your doctor or the place where you got the vaccine if:   · You have a fever  · You have any other signs or symptoms that concern you or do not go away  · You have increased pain, redness, or swelling around the area where the shot was given  · You have questions or concerns about the COVID-19 vaccine      Apply a warm compress  to the area where the shot was given to decrease pain and swelling  It may also help to move your arm around  Other ways to prevent coronavirus infection:  Droplets are the most common way all coronaviruses spread  The virus spreads from person to person through talking, coughing, and sneezing  The best way to prevent infection is to avoid anyone who is infected, but this can be hard to do  An infected person can spread the virus before signs or symptoms begin, or even if signs or symptoms never develop  Continue to do the following to keep yourself and others safe, even if you have had the vaccination:     · A face covering  is recommended when you are in public, such as grocery stores, pharmacies, and on mass transit  You may live in an area that has a face covering mandate  This means you must wear a covering in public  Check the laws in your area before you leave your home  Bring extra coverings with you  · Follow worldwide, national, and local social distancing guidelines  Social distancing means avoiding close physical contact so the virus cannot spread from one person to another  Keep at least 6 feet (2 meters) between you and others who do not live in your house  Also keep this distance from anyone who comes to your home, such as someone making a delivery  · Wash your hands often throughout the day  Use hand  that contains alcohol if soap and water are not available  Do not touch your eyes, nose, or mouth without washing your hands first  Teach children how to wash their hands and use hand   Follow up with your doctor as directed:  Write down your questions so you remember to ask them during your visits  © Copyright [employer] 2021 Information is for End User's use only and may not be sold, redistributed or otherwise used for commercial purposes   All illustrations and images included in CareNotes® are the copyrighted property of [employer]  or [employer]   The above information is an  only  It is not intended as medical advice for individual conditions or treatments  Talk to your doctor, nurse or pharmacist before following any medical regimen to see if it is safe and effective for you

## 2022-02-24 DIAGNOSIS — I10 ESSENTIAL HYPERTENSION, BENIGN: ICD-10-CM

## 2022-02-24 RX ORDER — LISINOPRIL 40 MG/1
40 TABLET ORAL DAILY
Qty: 90 TABLET | Refills: 0 | Status: SHIPPED | OUTPATIENT
Start: 2022-02-24 | End: 2022-03-03 | Stop reason: SDUPTHER

## 2022-03-03 ENCOUNTER — OFFICE VISIT (OUTPATIENT)
Dept: FAMILY MEDICINE CLINIC | Facility: CLINIC | Age: 57
End: 2022-03-03

## 2022-03-03 VITALS
BODY MASS INDEX: 31.3 KG/M2 | WEIGHT: 206.5 LBS | HEART RATE: 79 BPM | HEIGHT: 68 IN | DIASTOLIC BLOOD PRESSURE: 80 MMHG | TEMPERATURE: 96.9 F | SYSTOLIC BLOOD PRESSURE: 136 MMHG | OXYGEN SATURATION: 98 %

## 2022-03-03 DIAGNOSIS — Z12.31 ENCOUNTER FOR SCREENING MAMMOGRAM FOR MALIGNANT NEOPLASM OF BREAST: ICD-10-CM

## 2022-03-03 DIAGNOSIS — F33.0 MAJOR DEPRESSIVE DISORDER, RECURRENT EPISODE, MILD (HCC): ICD-10-CM

## 2022-03-03 DIAGNOSIS — I10 ESSENTIAL HYPERTENSION, BENIGN: Primary | ICD-10-CM

## 2022-03-03 DIAGNOSIS — R53.83 OTHER FATIGUE: ICD-10-CM

## 2022-03-03 DIAGNOSIS — J45.909 ASTHMA, UNSPECIFIED ASTHMA SEVERITY, UNSPECIFIED WHETHER COMPLICATED, UNSPECIFIED WHETHER PERSISTENT: ICD-10-CM

## 2022-03-03 PROCEDURE — 99214 OFFICE O/P EST MOD 30 MIN: CPT | Performed by: FAMILY MEDICINE

## 2022-03-03 RX ORDER — LISINOPRIL 40 MG/1
40 TABLET ORAL DAILY
Qty: 90 TABLET | Refills: 3 | Status: SHIPPED | OUTPATIENT
Start: 2022-03-03

## 2022-03-03 RX ORDER — ALBUTEROL SULFATE 90 UG/1
2 AEROSOL, METERED RESPIRATORY (INHALATION) EVERY 4 HOURS PRN
Qty: 18 G | Refills: 2 | Status: SHIPPED | OUTPATIENT
Start: 2022-03-03

## 2022-03-03 RX ORDER — BUPROPION HYDROCHLORIDE 150 MG/1
150 TABLET ORAL EVERY MORNING
Qty: 30 TABLET | Refills: 5 | Status: SHIPPED | OUTPATIENT
Start: 2022-03-03 | End: 2022-03-17 | Stop reason: SINTOL

## 2022-03-03 RX ORDER — AMLODIPINE BESYLATE 10 MG/1
10 TABLET ORAL DAILY
Qty: 90 TABLET | Refills: 3 | Status: SHIPPED | OUTPATIENT
Start: 2022-03-03

## 2022-03-03 NOTE — ASSESSMENT & PLAN NOTE
Asthma is currently stable  She has mild intermittent asthma  I refilled her prescription for albuterol inhaler to use as needed    She reports no sequelae from her COVID-19 virus infection

## 2022-03-03 NOTE — PROGRESS NOTES
Assessment/Plan:    Essential hypertension, benign  Patient has hypertension  Patient has fair blood pressure control  I recheck her blood pressure myself today and found to be 138/80  I refilled her prescription for lisinopril 40 mg daily and amlodipine 10 mg daily  I note the patient gained 11 lb since her last visit  I counseled the patient regarding weight loss and exercise  I suggested she start a walking program     Asthma  Asthma is currently stable  She has mild intermittent asthma  I refilled her prescription for albuterol inhaler to use as needed  She reports no sequelae from her COVID-19 virus infection    Major depressive disorder, recurrent episode, mild ([facility] [address] )  Patient has depression, despite taking 40 mg of fluoxetine  I suspect her chronic fatigue is secondary to her depression as well  We discussed this  She needs to get out and walk  I talked to her about getting a job again  She needs to find some purpose in her life  We discussed changing the Prozac to another agent  She was agreeable  I am going to start her on bupropion  mg once a day  We will consider increasing this to 300 mg once a day, depending on her clinical response    Other fatigue  Patient has no healthcare insurance  She reports fatigue  I recommended we at least check some labs  I will try to keep a to a bare minimum for the patient  I ordered a CBC with diff, TSH, and a BMP  Diagnoses and all orders for this visit:    Essential hypertension, benign  -     amLODIPine (NORVASC) 10 mg tablet; Take 1 tablet (10 mg total) by mouth daily  -     lisinopril (ZESTRIL) 40 mg tablet; Take 1 tablet (40 mg total) by mouth daily  -     Basic metabolic panel; Future    Asthma, unspecified asthma severity, unspecified whether complicated, unspecified whether persistent  -     albuterol (PROVENTIL HFA,VENTOLIN HFA) 90 mcg/act inhaler;  Inhale 2 puffs every 4 (four) hours as needed for wheezing    Major depressive disorder, recurrent episode, mild (H[facility])  -     buPROPion (Wellbutrin XL) 150 mg 24 hr tablet; Take 1 tablet (150 mg total) by mouth every morning    Other fatigue  -     TSH, 3rd generation with Free T4 reflex; Future  -     CBC and differential; Future    Encounter for screening mammogram for malignant neoplasm of breast  -     Mammo screening bilateral w 3d & cad; Future        BMI Counseling: Body mass index is 31 4 kg/m²  The BMI is above normal  Nutrition recommendations include reducing portion sizes and decreasing overall calorie intake  Exercise recommendations include exercising 3-5 times per week  Depression Screening Follow-up Plan: Patient's depression screening was positive with a PHQ-2 score of   Their PHQ-9 score was 13  Patient assessed for underlying major depression  They have no active suicidal ideations  Brief counseling provided and recommend additional follow-up/re-evaluation next office visit  Subjective:      Patient ID: Mayuri Jalloh is a 55 y o  female  Patient is a 60-year-old white female who presents to the office today for her routine checkup  The patient's main complaint today is fatigue  She tells me she simply has no energy  We discussed her work hours  She tells me she is no longer working  She quit work in November  She now takes care of her 6year-old grandson  Her grandson does attend school  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  In the summer, she will be required to watch him all day long  Patient tells me after she takes him to the bus stop, she typically comes home and sleeps  She believe she had COVID infection back in September  All of her coworkers had [employer]  She never got tested  She was ill  She does not think her fatigue is anything to do with her COVID infection  She admits to feeling depressed, despite taking Prozac 40 mg daily    She does not exercise  The following portions of the patient's history were reviewed and updated as appropriate: allergies, current medications, past family history, past medical history, past social history, past surgical history and problem list     Review of Systems   Constitutional: Positive for fatigue  Cardiovascular: Negative for chest pain, palpitations and leg swelling  Gastrointestinal: Positive for diarrhea  Negative for abdominal distention and abdominal pain  Psychiatric/Behavioral: Positive for dysphoric mood and sleep disturbance  Negative for suicidal ideas  Objective:      /80 (BP Location: Left arm, Patient Position: Sitting, Cuff Size: Large)   Pulse 79   Temp (!) 96 9 °F (36 1 °C) (Tympanic)   Ht 5' 8" (1 727 m)   Wt 93 7 kg (206 lb 8 oz)   SpO2 98%   BMI 31 40 kg/m²          Physical Exam  Vitals reviewed  Constitutional:       Comments: Patient is a 20-year-old white female who appears her stated age  She is obese and in no apparent distress  HENT:      Head: Normocephalic and atraumatic  Right Ear: Tympanic membrane, ear canal and external ear normal  There is no impacted cerumen  Left Ear: Tympanic membrane, ear canal and external ear normal  There is no impacted cerumen  Mouth/Throat:      Mouth: Mucous membranes are moist       Pharynx: Oropharynx is clear  No oropharyngeal exudate or posterior oropharyngeal erythema  Eyes:      General: No scleral icterus  Right eye: No discharge  Left eye: No discharge  Conjunctiva/sclera: Conjunctivae normal       Pupils: Pupils are equal, round, and reactive to light  Neck:      Vascular: No carotid bruit  Comments: There is no thyromegaly  Cardiovascular:      Rate and Rhythm: Normal rate and regular rhythm  Heart sounds: Normal heart sounds  No murmur heard  No friction rub  No gallop  Pulmonary:      Effort: Pulmonary effort is normal  No respiratory distress        Breath sounds: Normal breath sounds  No stridor  No wheezing, rhonchi or rales  Abdominal:      General: Bowel sounds are normal  There is no distension  Palpations: Abdomen is soft  There is no mass  Tenderness: There is no abdominal tenderness  There is no guarding  Comments: No organomegaly   Musculoskeletal:      Cervical back: Neck supple  Lymphadenopathy:      Cervical: No cervical adenopathy  Psychiatric:         Mood and Affect: Mood normal          Behavior: Behavior normal          Thought Content:  Thought content normal          Judgment: Judgment normal       Comments: Patient does have a blunted affect       extremities:  Without cyanosis, clubbing, or edema

## 2022-03-03 NOTE — PATIENT INSTRUCTIONS
Low-Sodium Diet   AMBULATORY CARE:   A low-sodium diet  limits foods that are high in sodium (salt)  You will need to follow a low-sodium diet if you have high blood pressure, kidney disease, or heart failure  You may also need to follow this diet if you have a condition that is causing your body to retain (hold) extra fluid  You may need to limit the amount of sodium you eat in a day to 1,500 to 2,000 mg  Ask your healthcare provider how much sodium you can have each day  How to use food labels to choose foods that are low in sodium:  Read food labels to find the amount of sodium they contain  The amount of sodium is listed in milligrams (mg)  The % Daily Value (DV) column tells you how much of your daily needs are met by 1 serving of the food for each nutrient listed  Choose foods that have less than 5% of the DV of sodium  These foods are considered low in sodium  Foods that have 20% or more of the DV of sodium are considered high in sodium  Some food labels may also list any of the following terms that tell you about the sodium content in the food:  · Sodium-free:  Less than 5 mg in each serving    · Very low sodium:  35 mg of sodium or less in each serving    · Low sodium:  140 mg of sodium or less in each serving    · Reduced sodium: At least 25% less sodium in each serving than the regular type    · Light in sodium:  50% less sodium in each serving    · Unsalted or no added salt:  No extra salt is added during processing (the food may still contain sodium)       Foods to avoid:  Salty foods are high in sodium  You should avoid the following:  · Processed foods:      ? Mixes for cornbread, biscuits, cake, and pudding     ? Instant foods, such as potatoes, cereals, noodles, and rice     ? Packaged foods, such as bread stuffing, rice and pasta mixes, snack dip mixes, and macaroni and cheese     ? Canned foods, such as canned vegetables, soups, broths, sauces, and vegetable or tomato juice    ?  Snack foods, such as salted chips, popcorn, pretzels, pork rinds, salted crackers, and salted nuts    ? Frozen foods, such as dinners, entrees, vegetables with sauces, and breaded meats    ? Sauerkraut, pickled vegetables, and other foods prepared in brine    · Meats and cheeses:      ? Smoked or cured meat, such as corned beef, hull, ham, hot dogs, and sausage    ? Canned meats or spreads, such as potted meats, sardines, anchovies, and imitation seafood    ? Deli or lunch meats, such as bologna, ham, turkey, and roast beef    ? Processed cheese, such as American cheese and cheese spreads    · Condiments, sauces, and seasonings:      ? Salt (¼ teaspoon of salt contains 575 mg of sodium)    ? Seasonings made with salt, such as garlic salt, celery salt, onion salt, and seasoned salt    ? Regular soy sauce, barbecue sauce, teriyaki sauce, steak sauce, Worcestershire sauce, and most flavored vinegars    ? Canned gravy and mixes     ? Regular condiments, such as mustard, ketchup, and salad dressings    ? Pickles and olives    ? Meat tenderizers and monosodium glutamate (MSG)    Foods to include:  Read the food label to find the exact amount of sodium in each serving  · Bread and cereal:  Try to choose breads with less than 80 mg of sodium per serving  ? Bread, roll, matthew, tortilla, or unsalted crackers  ? Ready-to-eat cereals with less than 5% DV of sodium (examples include shredded wheat and puffed rice)    ? Pasta    · Vegetables and fruits:      ? Unsalted fresh, frozen, or canned vegetables    ? Fresh, frozen, or canned fruits    ? Fruit juice    · Dairy:  One serving has about 150 mg of sodium  ? Milk, all types    ? Yogurt    ? Hard cheese, such as cheddar, Swiss, [employer], or mozzarella    · Meat and other protein foods:  Some raw meats may have added sodium  ? Plain meats, fish, and poultry     ? Eggs    · Other foods:      ? Homemade pudding    ? Unsalted nuts, popcorn, or pretzels    ?  Unsalted butter or margarine    Ways to decrease sodium:   · Add spices and herbs to foods instead of salt during cooking  Use salt-free seasonings to add flavor to foods  Examples include onion powder, garlic powder, basil, romero powder, paprika, and parsley  Try lemon or lime juice or vinegar to give foods a tart flavor  Use hot peppers, pepper, or cayenne pepper to add a spicy flavor  · Do not keep a salt shaker at your kitchen table  This may help keep you from adding salt to food at the table  A teaspoon of salt has 2,300 mg of sodium  It may take time to get used to enjoying the natural flavor of food instead of adding salt  Talk to your healthcare provider before you use salt substitutes  Some salt substitutes have a high amount of potassium and need to be avoided if you have kidney disease  · Choose low-sodium foods at restaurants  Meals from restaurants are often high in sodium  Some restaurants have nutrition information on the menu that tells you the amount of sodium in their foods  If possible, ask for your food to be prepared with less, or no salt  · Shop for unsalted or low-sodium foods and snacks at the grocery store  Examples include unsalted or low-sodium broths, soups, and canned vegetables  Choose fresh or frozen vegetables instead  Choose unsalted nuts or seeds or fresh fruits or vegetables as snacks  Read food labels and choose salt-free, very low-sodium, or low-sodium foods  © Copyright [employer] 2022 Information is for End User's use only and may not be sold, redistributed or otherwise used for commercial purposes  All illustrations and images included in CareNotes® are the copyrighted property of [employer]  or [employer]   The above information is an  only  It is not intended as medical advice for individual conditions or treatments  Talk to your doctor, nurse or pharmacist before following any medical regimen to see if it is safe and effective for you

## 2022-03-03 NOTE — ASSESSMENT & PLAN NOTE
Patient has no healthcare insurance  She reports fatigue  I recommended we at least check some labs  I will try to keep a to a bare minimum for the patient  I ordered a CBC with diff, TSH, and a BMP

## 2022-03-03 NOTE — ASSESSMENT & PLAN NOTE
Patient has depression, despite taking 40 mg of fluoxetine  I suspect her chronic fatigue is secondary to her depression as well  We discussed this  She needs to get out and walk  I talked to her about getting a job again  She needs to find some purpose in her life  We discussed changing the Prozac to another agent  She was agreeable  I am going to start her on bupropion  mg once a day    We will consider increasing this to 300 mg once a day, depending on her clinical response

## 2022-03-03 NOTE — ASSESSMENT & PLAN NOTE
Patient has hypertension  Patient has fair blood pressure control  I recheck her blood pressure myself today and found to be 138/80  I refilled her prescription for lisinopril 40 mg daily and amlodipine 10 mg daily  I note the patient gained 11 lb since her last visit  I counseled the patient regarding weight loss and exercise    I suggested she start a walking program

## 2022-03-09 ENCOUNTER — TELEPHONE (OUTPATIENT)
Dept: FAMILY MEDICINE CLINIC | Facility: CLINIC | Age: 57
End: 2022-03-09

## 2022-03-09 NOTE — TELEPHONE ENCOUNTER
Left message about Free Mammo Screening dates for [address] [place] [address] Breast care  And their scheduling number to call

## 2022-03-16 ENCOUNTER — APPOINTMENT (OUTPATIENT)
Dept: LAB | Facility: HOSPITAL | Age: 57
End: 2022-03-16
Attending: FAMILY MEDICINE

## 2022-03-16 DIAGNOSIS — I10 ESSENTIAL HYPERTENSION, BENIGN: ICD-10-CM

## 2022-03-16 DIAGNOSIS — R53.83 OTHER FATIGUE: ICD-10-CM

## 2022-03-16 LAB
ANION GAP SERPL CALCULATED.3IONS-SCNC: 10 MMOL/L (ref 4–13)
BASOPHILS # BLD AUTO: 0.02 THOUSANDS/ΜL (ref 0–0.1)
BASOPHILS NFR BLD AUTO: 1 % (ref 0–1)
BUN SERPL-MCNC: 14 MG/DL (ref 5–25)
CALCIUM SERPL-MCNC: 9.3 MG/DL (ref 8.3–10.1)
CHLORIDE SERPL-SCNC: 105 MMOL/L (ref 100–108)
CO2 SERPL-SCNC: 26 MMOL/L (ref 21–32)
CREAT SERPL-MCNC: 0.73 MG/DL (ref 0.6–1.3)
EOSINOPHIL # BLD AUTO: 0.15 THOUSAND/ΜL (ref 0–0.61)
EOSINOPHIL NFR BLD AUTO: 3 % (ref 0–6)
ERYTHROCYTE [DISTWIDTH] IN BLOOD BY AUTOMATED COUNT: 12.3 % (ref 11.6–15.1)
GFR SERPL CREATININE-BSD FRML MDRD: 93 ML/MIN/1.73SQ M
GLUCOSE P FAST SERPL-MCNC: 86 MG/DL (ref 65–99)
HCT VFR BLD AUTO: 38.9 % (ref 34.8–46.1)
HGB BLD-MCNC: 13.1 G/DL (ref 11.5–15.4)
IMM GRANULOCYTES # BLD AUTO: 0.01 THOUSAND/UL (ref 0–0.2)
IMM GRANULOCYTES NFR BLD AUTO: 0 % (ref 0–2)
LYMPHOCYTES # BLD AUTO: 1.47 THOUSANDS/ΜL (ref 0.6–4.47)
LYMPHOCYTES NFR BLD AUTO: 34 % (ref 14–44)
MCH RBC QN AUTO: 29.8 PG (ref 26.8–34.3)
MCHC RBC AUTO-ENTMCNC: 33.7 G/DL (ref 31.4–37.4)
MCV RBC AUTO: 89 FL (ref 82–98)
MONOCYTES # BLD AUTO: 0.41 THOUSAND/ΜL (ref 0.17–1.22)
MONOCYTES NFR BLD AUTO: 9 % (ref 4–12)
NEUTROPHILS # BLD AUTO: 2.32 THOUSANDS/ΜL (ref 1.85–7.62)
NEUTS SEG NFR BLD AUTO: 53 % (ref 43–75)
NRBC BLD AUTO-RTO: 0 /100 WBCS
PLATELET # BLD AUTO: 303 THOUSANDS/UL (ref 149–390)
PMV BLD AUTO: 9.5 FL (ref 8.9–12.7)
POTASSIUM SERPL-SCNC: 3.5 MMOL/L (ref 3.5–5.3)
RBC # BLD AUTO: 4.39 MILLION/UL (ref 3.81–5.12)
SODIUM SERPL-SCNC: 141 MMOL/L (ref 136–145)
TSH SERPL DL<=0.05 MIU/L-ACNC: 1.54 UIU/ML (ref 0.36–3.74)
WBC # BLD AUTO: 4.38 THOUSAND/UL (ref 4.31–10.16)

## 2022-03-16 PROCEDURE — 85025 COMPLETE CBC W/AUTO DIFF WBC: CPT

## 2022-03-16 PROCEDURE — 36415 COLL VENOUS BLD VENIPUNCTURE: CPT

## 2022-03-16 PROCEDURE — 84443 ASSAY THYROID STIM HORMONE: CPT

## 2022-03-16 PROCEDURE — 80048 BASIC METABOLIC PNL TOTAL CA: CPT

## 2022-03-17 ENCOUNTER — TELEPHONE (OUTPATIENT)
Dept: FAMILY MEDICINE CLINIC | Facility: CLINIC | Age: 57
End: 2022-03-17

## 2022-03-17 DIAGNOSIS — F33.0 MAJOR DEPRESSIVE DISORDER, RECURRENT EPISODE, MILD (HCC): Primary | ICD-10-CM

## 2022-03-17 RX ORDER — FLUOXETINE HYDROCHLORIDE 20 MG/1
20 CAPSULE ORAL DAILY
Qty: 14 CAPSULE | Refills: 0 | Status: SHIPPED | OUTPATIENT
Start: 2022-03-17 | End: 2022-04-01 | Stop reason: DRUGHIGH

## 2022-03-17 NOTE — TELEPHONE ENCOUNTER
Pt stats she wants to stop taking BuPROPion 150mg  Stats she is having muscle aches, feeling irritable and brief chest pains      Pt asking if she can go back on FLUoxetine 20 mg

## 2022-03-17 NOTE — PROGRESS NOTES
I spoke to the patient  He was having increased anxiety while on the bupropion  She wants to go back on Prozac  I started her back on Prozac at 20 mg daily  I will have her take this dose for 2 weeks and then I will increase her back to the 40 mg dose

## 2022-04-01 ENCOUNTER — TELEPHONE (OUTPATIENT)
Dept: FAMILY MEDICINE CLINIC | Facility: CLINIC | Age: 57
End: 2022-04-01

## 2022-04-01 NOTE — TELEPHONE ENCOUNTER
PT CALLED ASKING FOR A REFILL OF HER FLUOXETINE  SHE WOULD LIKE IT INCREASED TO 40 MG NOW AS PER YOUR CONVERSATION  SHE WOULD ALSO LIKE A 90 DAY SUPPLY SENT TO [facility] IN [place]

## 2022-05-20 ENCOUNTER — HOSPITAL ENCOUNTER (OUTPATIENT)
Dept: MAMMOGRAPHY | Facility: HOSPITAL | Age: 57
Discharge: HOME | End: 2022-05-20

## 2022-05-20 VITALS — WEIGHT: 215 LBS | HEIGHT: 68 IN | BODY MASS INDEX: 32.58 KG/M2

## 2022-05-20 DIAGNOSIS — Z12.31 ENCOUNTER FOR SCREENING MAMMOGRAM FOR MALIGNANT NEOPLASM OF BREAST: ICD-10-CM

## 2022-05-20 PROCEDURE — 77067 SCR MAMMO BI INCL CAD: CPT

## 2022-05-20 PROCEDURE — 77063 BREAST TOMOSYNTHESIS BI: CPT

## 2022-08-01 ENCOUNTER — DOCUMENTATION (OUTPATIENT)
Dept: FAMILY MEDICINE CLINIC | Facility: CLINIC | Age: 57
End: 2022-08-01

## 2022-09-30 ENCOUNTER — CONSULT (OUTPATIENT)
Dept: FAMILY MEDICINE CLINIC | Facility: CLINIC | Age: 57
End: 2022-09-30

## 2022-09-30 DIAGNOSIS — H25.9 AGE-RELATED CATARACT OF BOTH EYES, UNSPECIFIED AGE-RELATED CATARACT TYPE: Primary | ICD-10-CM

## 2022-09-30 DIAGNOSIS — I10 ESSENTIAL HYPERTENSION, BENIGN: ICD-10-CM

## 2022-09-30 DIAGNOSIS — F33.0 MAJOR DEPRESSIVE DISORDER, RECURRENT EPISODE, MILD (HCC): ICD-10-CM

## 2022-09-30 DIAGNOSIS — J45.909 ASTHMA, UNSPECIFIED ASTHMA SEVERITY, UNSPECIFIED WHETHER COMPLICATED, UNSPECIFIED WHETHER PERSISTENT: ICD-10-CM

## 2022-09-30 PROCEDURE — 99214 OFFICE O/P EST MOD 30 MIN: CPT | Performed by: FAMILY MEDICINE

## 2022-09-30 RX ORDER — PREDNISOLONE ACETATE 10 MG/ML
SUSPENSION/ DROPS OPHTHALMIC
COMMUNITY
Start: 2022-09-29

## 2022-09-30 RX ORDER — OFLOXACIN 3 MG/ML
SOLUTION/ DROPS OPHTHALMIC
COMMUNITY
Start: 2022-09-29

## 2022-10-05 VITALS
OXYGEN SATURATION: 99 % | TEMPERATURE: 97.1 F | BODY MASS INDEX: 31.39 KG/M2 | HEART RATE: 75 BPM | HEIGHT: 68 IN | SYSTOLIC BLOOD PRESSURE: 128 MMHG | DIASTOLIC BLOOD PRESSURE: 82 MMHG | WEIGHT: 207.1 LBS

## 2022-10-05 PROBLEM — H25.9 AGE-RELATED CATARACT OF BOTH EYES: Status: ACTIVE | Noted: 2022-10-05

## 2022-10-05 NOTE — ASSESSMENT & PLAN NOTE
I did recheck her blood pressure today and found her blood pressure to be 128/82  I will have the patient continue her current regiment

## 2022-10-05 NOTE — PROGRESS NOTES
Name: Izzy Stallworth      : 1965      MRN: [id number]  Encounter Provider: Eliseo Pedraza DO  Encounter Date: 2022   Encounter department: [facility]    Assessment & Plan     1  Age-related cataract of both eyes, unspecified age-related cataract type  Assessment & Plan:  The patient presented to the office today for preop medical consultation for cataract surgery  I completed her forms and cleared the patient for her surgery  A copy of her form was faxed to Dr Rizwana Sanabria  I did discussed with the patient her lack of healthcare  I do not know how she has pain for this surgery  I would suspect that she would qualify for medical assistance  I discussed this with her  I recommended that she apply  I did offer to put in a consultation with the  to try to help her get some type of insurance but she declined  She tells me she will contact medical assistance on her own  2  Essential hypertension, benign  Assessment & Plan:  I did recheck her blood pressure today and found her blood pressure to be 128/82  I will have the patient continue her current regiment  3  Major depressive disorder, recurrent episode, mild (HCC)  Assessment & Plan:  Depression is currently stable  The patient will continue fluoxetine 40 mg daily      4  Asthma, unspecified asthma severity, unspecified whether complicated, unspecified whether persistent  Assessment & Plan:  Patient has asthma which is stable  She will continue albuterol inhaler p r n  Subjective      This is a 56-year-old white female presents to the office today for preoperative medical clearance  The patient is scheduled undergo cataract surgery  She is scheduled for left eye cataract surgery on  by Dr Radha Rapp  She will have right eye cataract surgery on   The patient is unemployed  She tells me she baby-sits her grandchildren  She has no healthcare insurance        Review of Systems   Constitutional: Negative for activity change and appetite change  Respiratory: Negative for cough and shortness of breath  Cardiovascular: Negative for chest pain, palpitations and leg swelling  Gastrointestinal: Negative for abdominal distention, abdominal pain, blood in stool, constipation, diarrhea and nausea  Current Outpatient Medications on File Prior to Visit   Medication Sig    albuterol (PROVENTIL HFA,VENTOLIN HFA) 90 mcg/act inhaler Inhale 2 puffs every 4 (four) hours as needed for wheezing    amLODIPine (NORVASC) 10 mg tablet Take 1 tablet (10 mg total) by mouth daily    FLUoxetine (PROzac) 40 MG capsule Take 1 capsule (40 mg total) by mouth daily    lisinopril (ZESTRIL) 40 mg tablet Take 1 tablet (40 mg total) by mouth daily    ofloxacin (OCUFLOX) 0 3 % ophthalmic solution INSTILL 1 DROP 4 TIMES DAILY INTO THE LEFT EYE START 3 DAYS PRIOR     (REFER TO PRESCRIPTION NOTES)   Omeprazole Magnesium (PRILOSEC OTC PO) Take 1 tablet by mouth daily    prednisoLONE acetate (PRED FORTE) 1 % ophthalmic suspension PUT 1 DROP INTO LEFT EYE 6 TIMES DAILY TO BE STARTED AFTER SURGERY IS COMPLETE  LEFT EYE SX 8/8/22       Objective     /82   Pulse 75   Temp (!) 97 1 °F (36 2 °C) (Tympanic)   Ht 5' 8" (1 727 m)   Wt 93 9 kg (207 lb 1 6 oz)   SpO2 99%   BMI 31 49 kg/m²     Physical Exam  Vitals reviewed  Constitutional:       Comments: This is a 60-year-old white female who appears her stated age  She is pleasant, cooperative, and in no distress  HENT:      Head: Normocephalic and atraumatic  Right Ear: Tympanic membrane, ear canal and external ear normal  There is no impacted cerumen  Left Ear: Tympanic membrane, ear canal and external ear normal       Mouth/Throat:      Mouth: Mucous membranes are moist       Pharynx: Oropharynx is clear  No oropharyngeal exudate or posterior oropharyngeal erythema  Eyes:      General: No scleral icterus          Right eye: No discharge  Left eye: No discharge  Conjunctiva/sclera: Conjunctivae normal       Pupils: Pupils are equal, round, and reactive to light  Neck:      Vascular: No carotid bruit  Comments: There is no thyromegaly  Cardiovascular:      Rate and Rhythm: Normal rate and regular rhythm  Heart sounds: Normal heart sounds  No murmur heard  No friction rub  No gallop  Pulmonary:      Effort: Pulmonary effort is normal  No respiratory distress  Breath sounds: Normal breath sounds  No stridor  No wheezing, rhonchi or rales  Abdominal:      General: Bowel sounds are normal  There is no distension  Palpations: Abdomen is soft  There is no mass  Tenderness: There is no abdominal tenderness  There is no guarding  Comments: There was no hepatosplenomegaly   Musculoskeletal:      Cervical back: Neck supple  Lymphadenopathy:      Cervical: No cervical adenopathy  Psychiatric:         Mood and Affect: Mood normal          Behavior: Behavior normal          Thought Content:  Thought content normal          Judgment: Judgment normal      Extremities:  Without cyanosis, clubbing, or edema  Joy Needy, DO

## 2022-10-05 NOTE — ASSESSMENT & PLAN NOTE
The patient presented to the office today for preop medical consultation for cataract surgery  I completed her forms and cleared the patient for her surgery  A copy of her form was faxed to Dr Kruse Fraction  I did discussed with the patient her lack of healthcare  I do not know how she has pain for this surgery  I would suspect that she would qualify for medical assistance  I discussed this with her  I recommended that she apply  I did offer to put in a consultation with the  to try to help her get some type of insurance but she declined  She tells me she will contact medical assistance on her own

## 2023-06-23 DIAGNOSIS — J45.909 ASTHMA, UNSPECIFIED ASTHMA SEVERITY, UNSPECIFIED WHETHER COMPLICATED, UNSPECIFIED WHETHER PERSISTENT: ICD-10-CM

## 2023-06-23 DIAGNOSIS — I10 ESSENTIAL HYPERTENSION, BENIGN: ICD-10-CM

## 2023-06-23 DIAGNOSIS — F33.0 MAJOR DEPRESSIVE DISORDER, RECURRENT EPISODE, MILD (HCC): ICD-10-CM

## 2023-06-23 RX ORDER — FLUOXETINE HYDROCHLORIDE 40 MG/1
40 CAPSULE ORAL DAILY
Qty: 90 CAPSULE | Refills: 0 | OUTPATIENT
Start: 2023-06-23

## 2023-06-23 RX ORDER — LISINOPRIL 40 MG/1
40 TABLET ORAL DAILY
Qty: 90 TABLET | Refills: 0 | OUTPATIENT
Start: 2023-06-23

## 2023-06-23 RX ORDER — ALBUTEROL SULFATE 90 UG/1
2 AEROSOL, METERED RESPIRATORY (INHALATION) EVERY 4 HOURS PRN
Qty: 18 G | Refills: 0 | OUTPATIENT
Start: 2023-06-23

## 2023-06-23 RX ORDER — AMLODIPINE BESYLATE 10 MG/1
10 TABLET ORAL DAILY
Qty: 90 TABLET | Refills: 0 | OUTPATIENT
Start: 2023-06-23

## 2023-07-05 ENCOUNTER — OFFICE VISIT (OUTPATIENT)
Dept: FAMILY MEDICINE CLINIC | Facility: CLINIC | Age: 58
End: 2023-07-05

## 2023-07-05 VITALS
HEART RATE: 90 BPM | TEMPERATURE: 97.7 F | DIASTOLIC BLOOD PRESSURE: 100 MMHG | SYSTOLIC BLOOD PRESSURE: 152 MMHG | OXYGEN SATURATION: 99 % | WEIGHT: 226.6 LBS | HEIGHT: 68 IN | BODY MASS INDEX: 34.34 KG/M2

## 2023-07-05 DIAGNOSIS — I10 ESSENTIAL HYPERTENSION, BENIGN: ICD-10-CM

## 2023-07-05 DIAGNOSIS — F33.0 MAJOR DEPRESSIVE DISORDER, RECURRENT EPISODE, MILD (HCC): Primary | ICD-10-CM

## 2023-07-05 DIAGNOSIS — J45.20 MILD INTERMITTENT ASTHMA WITHOUT COMPLICATION: ICD-10-CM

## 2023-07-05 DIAGNOSIS — F41.0 PANIC DISORDER: ICD-10-CM

## 2023-07-05 PROCEDURE — 99214 OFFICE O/P EST MOD 30 MIN: CPT | Performed by: FAMILY MEDICINE

## 2023-07-05 RX ORDER — FLUOXETINE HYDROCHLORIDE 20 MG/1
20 CAPSULE ORAL DAILY
Qty: 30 CAPSULE | Refills: 0 | Status: SHIPPED | OUTPATIENT
Start: 2023-07-05

## 2023-07-05 RX ORDER — ALPRAZOLAM 0.5 MG/1
0.5 TABLET ORAL 3 TIMES DAILY PRN
Qty: 20 TABLET | Refills: 0 | Status: SHIPPED | OUTPATIENT
Start: 2023-07-05

## 2023-07-05 RX ORDER — LISINOPRIL 10 MG/1
10 TABLET ORAL DAILY
Qty: 30 TABLET | Refills: 0 | Status: SHIPPED | OUTPATIENT
Start: 2023-07-05

## 2023-07-05 RX ORDER — AMLODIPINE BESYLATE 5 MG/1
5 TABLET ORAL DAILY
Qty: 30 TABLET | Refills: 0 | Status: SHIPPED | OUTPATIENT
Start: 2023-07-05
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
## 2023-07-05 NOTE — ASSESSMENT & PLAN NOTE
Patient has depression as well as panic attacks. Again, she was on 40 mg of Prozac before. I cannot start her on the 40 mg dose right off the bat. I started her on Prozac 20 mg once a day. I will increase the dose after 4 weeks.

## 2023-07-05 NOTE — ASSESSMENT & PLAN NOTE
Patient has uncontrolled hypertension secondary to medication noncompliance. I explained to the patient that she was taking amlodipine 10 mg daily and lisinopril 40 mg daily. These are high doses. She cannot start on this dose immediately. I am going to start her on amlodipine 5 mg daily and lisinopril 10 mg daily and titrate the dose up after a month. Patient will call me for her refills. She was advised to try to follow a low-sodium diet and try to lose weight.
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
## 2023-07-05 NOTE — ASSESSMENT & PLAN NOTE
Requested a prescription for something to take as needed for the panic attacks until the fluoxetine kicks in. I queried the [place] prescription drug monitoring program.  There were no red flags and it was safe to proceed. I filled a prescription for her for alprazolam 0.5 mg to take as needed. Patient was advised that she should not drive after taking this medication. She should also never combine this medication with alcohol.

## 2023-08-03 DIAGNOSIS — J45.909 ASTHMA, UNSPECIFIED ASTHMA SEVERITY, UNSPECIFIED WHETHER COMPLICATED, UNSPECIFIED WHETHER PERSISTENT: ICD-10-CM

## 2023-08-03 DIAGNOSIS — F33.0 MAJOR DEPRESSIVE DISORDER, RECURRENT EPISODE, MILD (HCC): ICD-10-CM

## 2023-08-03 DIAGNOSIS — I10 ESSENTIAL HYPERTENSION, BENIGN: ICD-10-CM

## 2023-08-03 DIAGNOSIS — F41.0 PANIC DISORDER: ICD-10-CM

## 2023-08-03 RX ORDER — LISINOPRIL 10 MG/1
10 TABLET ORAL DAILY
Qty: 90 TABLET | Refills: 1 | Status: SHIPPED | OUTPATIENT
Start: 2023-08-03

## 2023-08-03 RX ORDER — ALBUTEROL SULFATE 90 UG/1
2 AEROSOL, METERED RESPIRATORY (INHALATION) EVERY 4 HOURS PRN
Qty: 18 G | Refills: 2 | Status: SHIPPED | OUTPATIENT
Start: 2023-08-03

## 2023-08-03 RX ORDER — ALPRAZOLAM 0.5 MG/1
0.5 TABLET ORAL 3 TIMES DAILY PRN
Qty: 60 TABLET | Refills: 5 | Status: SHIPPED | OUTPATIENT
Start: 2023-08-03

## 2023-08-03 RX ORDER — FLUOXETINE HYDROCHLORIDE 20 MG/1
20 CAPSULE ORAL DAILY
Qty: 90 CAPSULE | Refills: 1 | Status: SHIPPED | OUTPATIENT
Start: 2023-08-03

## 2023-08-03 RX ORDER — AMLODIPINE BESYLATE 5 MG/1
5 TABLET ORAL DAILY
Qty: 90 TABLET | Refills: 1 | Status: SHIPPED | OUTPATIENT
Start: 2023-08-03

## 2023-08-03 NOTE — PROGRESS NOTES
The [place] prescription drug monitoring program was queried. There were no red flags. Safe to proceed.

## 2023-08-07 ENCOUNTER — PATIENT MESSAGE (OUTPATIENT)
Dept: FAMILY MEDICINE CLINIC | Facility: CLINIC | Age: 58
End: 2023-08-07

## 2023-10-05 ENCOUNTER — OFFICE VISIT (OUTPATIENT)
Dept: FAMILY MEDICINE CLINIC | Facility: CLINIC | Age: 58
End: 2023-10-05

## 2023-10-05 ENCOUNTER — TELEPHONE (OUTPATIENT)
Dept: FAMILY MEDICINE CLINIC | Facility: CLINIC | Age: 58
End: 2023-10-05

## 2023-10-05 VITALS
WEIGHT: 212.6 LBS | SYSTOLIC BLOOD PRESSURE: 146 MMHG | OXYGEN SATURATION: 98 % | HEIGHT: 68 IN | BODY MASS INDEX: 32.22 KG/M2 | HEART RATE: 71 BPM | DIASTOLIC BLOOD PRESSURE: 76 MMHG | TEMPERATURE: 97.3 F

## 2023-10-05 DIAGNOSIS — F33.0 MAJOR DEPRESSIVE DISORDER, RECURRENT EPISODE, MILD (HCC): Primary | ICD-10-CM

## 2023-10-05 DIAGNOSIS — Z59.9 FINANCIAL DIFFICULTIES: ICD-10-CM

## 2023-10-05 DIAGNOSIS — I10 ESSENTIAL HYPERTENSION, BENIGN: ICD-10-CM

## 2023-10-05 DIAGNOSIS — K52.9 GASTROENTERITIS: ICD-10-CM

## 2023-10-05 DIAGNOSIS — Z12.31 ENCOUNTER FOR SCREENING MAMMOGRAM FOR MALIGNANT NEOPLASM OF BREAST: ICD-10-CM

## 2023-10-05 PROCEDURE — 99214 OFFICE O/P EST MOD 30 MIN: CPT | Performed by: FAMILY MEDICINE

## 2023-10-05 RX ORDER — AMLODIPINE BESYLATE 10 MG/1
10 TABLET ORAL DAILY
Qty: 90 TABLET | Refills: 3 | Status: SHIPPED | OUTPATIENT
Start: 2023-10-05

## 2023-10-05 RX ORDER — FLUOXETINE HYDROCHLORIDE 40 MG/1
40 CAPSULE ORAL DAILY
Qty: 90 CAPSULE | Refills: 1 | Status: SHIPPED | OUTPATIENT
Start: 2023-10-05

## 2023-10-05 SDOH — ECONOMIC STABILITY - INCOME SECURITY: PROBLEM RELATED TO HOUSING AND ECONOMIC CIRCUMSTANCES, UNSPECIFIED: Z59.9

## 2023-10-05 NOTE — TELEPHONE ENCOUNTER
Spoke with Gold Juarez at Radiology and she will call the patient to schedule a mammogram appointment

## 2023-10-05 NOTE — PROGRESS NOTES
Name: Jaki Chaudhry      : 1965      MRN: [id number]  Encounter Provider: Erica Guajardo DO  Encounter Date: 10/5/2023   Encounter department: [facility] PRIMARY CARE    Assessment & Plan     1. Major depressive disorder, recurrent episode, mild ([address] )  Assessment & Plan:  Patient has persistent depression. I increased her Prozac to 40 mg daily. He denies any suicidal ideations. Orders:  -     FLUoxetine (PROzac) 40 MG capsule; Take 1 capsule (40 mg total) by mouth daily    2. Essential hypertension, benign  Assessment & Plan:  Patient has hypertension. Blood pressure is still elevated. I increased her amlodipine to 10 mg daily. Continue lisinopril 10 mg daily. I advised the patient to follow a low-sodium diet. I do note she lost 14 pounds since her last visit. I asked her to continue to try to lose weight. Orders:  -     amLODIPine (NORVASC) 10 mg tablet; Take 1 tablet (10 mg total) by mouth daily    3. Encounter for screening mammogram for malignant neoplasm of breast  -     Mammo screening bilateral w 3d & cad; Future; Expected date: 10/05/2023    4. Gastroenteritis  Assessment & Plan:  Patient has symptoms compatible with gastroenteritis. However, with her underlying depression, and the fact that she looks well, makes me think that she may have irritable bowel syndrome. I offered to prescribe her something for nausea but she declined. 5. Financial difficulties  Assessment & Plan:  I spent a long time discussing the patient's lack of healthcare insurance with her as well as her financial difficulties. I offered to place a referral with . I do feel the patient would likely qualify for medical assistance. I told her that the  can certainly help her with this application. After a lengthy discussion, the patient declined and states she will try to do it on her own.              Subjective      This patient is a 25-year-old white female who presents to the office today for a follow-up visit. Her main complaint today is nausea and vomiting. She tells me it comes and goes and she has had this now over the past few days. She has tried Dramamine as well as ginger ale. She tells me that sometimes, the Dramamine works. Sometimes, it does not. Review of Systems   Respiratory:  Negative for cough and shortness of breath. Cardiovascular:  Negative for chest pain, palpitations and leg swelling. Gastrointestinal:  Positive for diarrhea, nausea and vomiting. Negative for abdominal distention, abdominal pain, blood in stool and constipation. Psychiatric/Behavioral:  Positive for dysphoric mood. Current Outpatient Medications on File Prior to Visit   Medication Sig    albuterol (PROVENTIL HFA,VENTOLIN HFA) 90 mcg/act inhaler Inhale 2 puffs every 4 (four) hours as needed for wheezing    ALPRAZolam (XANAX) 0.5 mg tablet Take 1 tablet (0.5 mg total) by mouth 3 (three) times a day as needed (panic attack)    lisinopril (ZESTRIL) 10 mg tablet Take 1 tablet (10 mg total) by mouth daily    Omeprazole Magnesium (PRILOSEC OTC PO) Take 1 tablet by mouth daily       Objective     /76   Pulse 71   Temp (!) 97.3 °F (36.3 °C) (Tympanic)   Ht 5' 8" (1.727 m)   Wt 96.4 kg (212 lb 9.6 oz)   SpO2 98%   BMI 32.33 kg/m²     Physical Exam  Vitals reviewed. Constitutional:       Comments: Patient is a 81-year-old white female who appears her stated age. The patient is nonseptic in appearance and in no apparent distress   HENT:      Head: Normocephalic and atraumatic. Right Ear: Tympanic membrane, ear canal and external ear normal. There is no impacted cerumen. Left Ear: Tympanic membrane, ear canal and external ear normal. There is no impacted cerumen. Mouth/Throat:      Mouth: Mucous membranes are moist.      Pharynx: Oropharynx is clear. No oropharyngeal exudate or posterior oropharyngeal erythema. Eyes:      General: No scleral icterus. Right eye: No discharge. Left eye: No discharge. Conjunctiva/sclera: Conjunctivae normal.      Pupils: Pupils are equal, round, and reactive to light. Cardiovascular:      Rate and Rhythm: Normal rate and regular rhythm. Heart sounds: Normal heart sounds. No murmur heard. No friction rub. No gallop. Pulmonary:      Effort: Pulmonary effort is normal. No respiratory distress. Breath sounds: Normal breath sounds. No stridor. No wheezing, rhonchi or rales. Abdominal:      General: Bowel sounds are normal. There is no distension. Palpations: Abdomen is soft. There is no mass. Tenderness: There is no abdominal tenderness. There is no guarding or rebound. Comments: There was no hepatosplenomegaly   Musculoskeletal:      Cervical back: Neck supple. Lymphadenopathy:      Cervical: No cervical adenopathy. Skin:     Findings: Rash present. Psychiatric:         Mood and Affect: Mood normal.         Behavior: Behavior normal.         Thought Content:  Thought content normal.         Judgment: Judgment normal.     Extremities: Without cyanosis, clubbing, or edema  Fiona Pineda DO

## 2023-10-14 PROBLEM — K52.9 GASTROENTERITIS: Status: ACTIVE | Noted: 2023-10-14

## 2023-10-14 PROBLEM — Z59.9 FINANCIAL DIFFICULTIES: Status: ACTIVE | Noted: 2023-10-14

## 2023-10-14 NOTE — ASSESSMENT & PLAN NOTE
Patient has persistent depression. I increased her Prozac to 40 mg daily. He denies any suicidal ideations.

## 2023-10-14 NOTE — ASSESSMENT & PLAN NOTE
I spent a long time discussing the patient's lack of healthcare insurance with her as well as her financial difficulties. I offered to place a referral with . I do feel the patient would likely qualify for medical assistance. I told her that the  can certainly help her with this application. After a lengthy discussion, the patient declined and states she will try to do it on her own.

## 2023-10-14 NOTE — ASSESSMENT & PLAN NOTE
Patient has symptoms compatible with gastroenteritis. However, with her underlying depression, and the fact that she looks well, makes me think that she may have irritable bowel syndrome. I offered to prescribe her something for nausea but she declined.

## 2023-10-14 NOTE — ASSESSMENT & PLAN NOTE
Patient has hypertension. Blood pressure is still elevated. I increased her amlodipine to 10 mg daily. Continue lisinopril 10 mg daily. I advised the patient to follow a low-sodium diet. I do note she lost 14 pounds since her last visit. I asked her to continue to try to lose weight.

## 2023-12-13 PROBLEM — K52.9 GASTROENTERITIS: Status: RESOLVED | Noted: 2023-10-14 | Resolved: 2023-12-13

## 2024-01-10 ENCOUNTER — OFFICE VISIT (OUTPATIENT)
Dept: FAMILY MEDICINE CLINIC | Facility: CLINIC | Age: 59
End: 2024-01-10

## 2024-01-10 VITALS
SYSTOLIC BLOOD PRESSURE: 134 MMHG | HEART RATE: 81 BPM | OXYGEN SATURATION: 98 % | HEIGHT: 68 IN | BODY MASS INDEX: 31.13 KG/M2 | WEIGHT: 205.4 LBS | DIASTOLIC BLOOD PRESSURE: 68 MMHG | TEMPERATURE: 96.7 F

## 2024-01-10 DIAGNOSIS — I10 ESSENTIAL HYPERTENSION, BENIGN: Primary | ICD-10-CM

## 2024-01-10 DIAGNOSIS — Z59.9 FINANCIAL DIFFICULTIES: ICD-10-CM

## 2024-01-10 DIAGNOSIS — R11.0 NAUSEA: ICD-10-CM

## 2024-01-10 DIAGNOSIS — F33.0 MAJOR DEPRESSIVE DISORDER, RECURRENT EPISODE, MILD (HCC): ICD-10-CM

## 2024-01-10 PROCEDURE — 99214 OFFICE O/P EST MOD 30 MIN: CPT | Performed by: FAMILY MEDICINE

## 2024-01-10 SDOH — ECONOMIC STABILITY - INCOME SECURITY: PROBLEM RELATED TO HOUSING AND ECONOMIC CIRCUMSTANCES, UNSPECIFIED: Z59.9

## 2024-01-10 NOTE — PROGRESS NOTES
Name: Ewa Hernandes      : 1965      MRN: [id number]  Encounter Provider: Rudolph Shaffer DO  Encounter Date: 1/10/2024   Encounter department: [facility] PRIMARY CARE    Assessment & Plan     1. Essential hypertension, benign  Assessment & Plan:  Patient's blood pressure is now well-controlled.  I am going to have the patient continue amlodipine 10 mg daily and lisinopril 10 mg daily.  I advised the patient to follow a low-sodium diet.  Patient will return to the office in 6 months.      2. Major depressive disorder, recurrent episode, mild (HCC)  Assessment & Plan:  Patient has depression.  She reports improvement.  I am going to have the patient continue Prozac 40 mg daily.  She may continue alprazolam as needed.      3. Financial difficulties  Assessment & Plan:  Again offered to refer the patient to social service to help her try to get healthcare insurance.  I suspect the patient would qualify for medical assistance.  Unfortunately, the patient declines.  She does not want any help from the .  She tells me she will look into securing healthcare insurance on her own.  I asked the patient to try to get her daughter to help her.  She needs to have healthcare insurance so that we can run the necessary test to further evaluate her symptoms.      4. Nausea  Assessment & Plan:  Patient has nausea.  Symptoms seem to be related to gastroesophageal reflux disease.  However, I do think patient really should have a complete GI workup.  We again discussed this.  I note she lost 7 pounds since her last visit.  Unfortunately, the patient still does not have healthcare insurance.  I think she should have a complete GI workup but she declined.  I asked the patient to try taking the omeprazole 1/2-hour before breakfast and 1/2-hour before supper.  If symptoms persist the patient needs to contact me.          Depression Screening and Follow-up Plan: Patient's depression screening was positive with  "a PHQ-9 score of 14. Patient with underlying depression and was advised to continue current medications as prescribed.         Subjective      This patient is a 58-year-old white female who presents to the office today for her follow-up visit.  The patient reports compliance with her medication.  She tells me her depression is much better.  She continues to experience nausea.  She found that if she takes omeprazole twice a day that her symptoms resolved.  She tried cutting back to once a day and the symptoms came back.  Patient still has no healthcare insurance.  She did not try to obtain health insurance as previously instructed.      Review of Systems   Constitutional:  Negative for activity change and appetite change.   Respiratory:  Negative for cough and shortness of breath.    Cardiovascular:  Negative for chest pain, palpitations and leg swelling.   Gastrointestinal:  Positive for nausea. Negative for abdominal distention, abdominal pain, blood in stool, diarrhea and vomiting.       Current Outpatient Medications on File Prior to Visit   Medication Sig    albuterol (PROVENTIL HFA,VENTOLIN HFA) 90 mcg/act inhaler Inhale 2 puffs every 4 (four) hours as needed for wheezing    ALPRAZolam (XANAX) 0.5 mg tablet Take 1 tablet (0.5 mg total) by mouth 3 (three) times a day as needed (panic attack)    amLODIPine (NORVASC) 10 mg tablet Take 1 tablet (10 mg total) by mouth daily    FLUoxetine (PROzac) 40 MG capsule Take 1 capsule (40 mg total) by mouth daily    lisinopril (ZESTRIL) 10 mg tablet Take 1 tablet (10 mg total) by mouth daily    Omeprazole Magnesium (PRILOSEC OTC PO) Take 1 tablet by mouth daily       Objective     /68   Pulse 81   Temp (!) 96.7 °F (35.9 °C) (Tympanic)   Ht 5' 8\" (1.727 m)   Wt 93.2 kg (205 lb 6.4 oz)   SpO2 98%   BMI 31.23 kg/m²     Physical Exam  Vitals reviewed.   Constitutional:       Comments: Patient is a 58-year-old white female who appears her stated age.  She is pleasant, " cooperative, and in no distress.   HENT:      Head: Normocephalic and atraumatic.      Right Ear: Tympanic membrane, ear canal and external ear normal. There is no impacted cerumen.      Left Ear: Tympanic membrane, ear canal and external ear normal. There is no impacted cerumen.      Mouth/Throat:      Mouth: Mucous membranes are moist.      Pharynx: Oropharynx is clear. No oropharyngeal exudate or posterior oropharyngeal erythema.   Eyes:      General: No scleral icterus.        Right eye: No discharge.         Left eye: No discharge.      Conjunctiva/sclera: Conjunctivae normal.      Pupils: Pupils are equal, round, and reactive to light.   Neck:      Comments: There is no thyromegaly  Cardiovascular:      Rate and Rhythm: Normal rate and regular rhythm.      Heart sounds: Normal heart sounds. No murmur heard.     No friction rub. No gallop.   Pulmonary:      Effort: Pulmonary effort is normal. No respiratory distress.      Breath sounds: Normal breath sounds. No stridor. No wheezing, rhonchi or rales.   Abdominal:      General: Bowel sounds are normal. There is no distension.      Palpations: Abdomen is soft. There is no mass.      Tenderness: There is no abdominal tenderness. There is no guarding.      Comments: No hepatosplenomegaly   Musculoskeletal:      Cervical back: Neck supple.   Lymphadenopathy:      Cervical: No cervical adenopathy.   Psychiatric:         Mood and Affect: Mood normal.         Behavior: Behavior normal.         Thought Content: Thought content normal.         Judgment: Judgment normal.     Extremities: Without cyanosis, clubbing, or edema    Rudolph Shaffer DO

## 2024-01-11 NOTE — ASSESSMENT & PLAN NOTE
Patient has depression.  She reports improvement.  I am going to have the patient continue Prozac 40 mg daily.  She may continue alprazolam as needed.

## 2024-01-11 NOTE — ASSESSMENT & PLAN NOTE
Patient has nausea.  Symptoms seem to be related to gastroesophageal reflux disease.  However, I do think patient really should have a complete GI workup.  We again discussed this.  I note she lost 7 pounds since her last visit.  Unfortunately, the patient still does not have healthcare insurance.  I think she should have a complete GI workup but she declined.  I asked the patient to try taking the omeprazole 1/2-hour before breakfast and 1/2-hour before supper.  If symptoms persist the patient needs to contact me.

## 2024-01-11 NOTE — ASSESSMENT & PLAN NOTE
Again offered to refer the patient to social service to help her try to get healthcare insurance.  I suspect the patient would qualify for medical assistance.  Unfortunately, the patient declines.  She does not want any help from the .  She tells me she will look into securing healthcare insurance on her own.  I asked the patient to try to get her daughter to help her.  She needs to have healthcare insurance so that we can run the necessary test to further evaluate her symptoms.

## 2024-01-11 NOTE — ASSESSMENT & PLAN NOTE
Patient's blood pressure is now well-controlled.  I am going to have the patient continue amlodipine 10 mg daily and lisinopril 10 mg daily.  I advised the patient to follow a low-sodium diet.  Patient will return to the office in 6 months.

## 2024-02-08 DIAGNOSIS — F41.0 PANIC DISORDER: ICD-10-CM

## 2024-02-08 DIAGNOSIS — J45.909 ASTHMA, UNSPECIFIED ASTHMA SEVERITY, UNSPECIFIED WHETHER COMPLICATED, UNSPECIFIED WHETHER PERSISTENT: ICD-10-CM

## 2024-02-08 DIAGNOSIS — I10 ESSENTIAL HYPERTENSION, BENIGN: ICD-10-CM

## 2024-02-09 RX ORDER — ALBUTEROL SULFATE 90 UG/1
2 AEROSOL, METERED RESPIRATORY (INHALATION) EVERY 4 HOURS PRN
Qty: 18 G | Refills: 1 | Status: SHIPPED | OUTPATIENT
Start: 2024-02-09

## 2024-02-09 RX ORDER — ALPRAZOLAM 0.5 MG/1
0.5 TABLET ORAL 3 TIMES DAILY PRN
Qty: 60 TABLET | Refills: 5 | Status: SHIPPED | OUTPATIENT
Start: 2024-02-09

## 2024-02-09 RX ORDER — LISINOPRIL 10 MG/1
10 TABLET ORAL DAILY
Qty: 90 TABLET | Refills: 1 | Status: SHIPPED | OUTPATIENT
Start: 2024-02-09

## 2024-02-21 PROBLEM — Z12.39 SCREENING FOR BREAST CANCER: Status: RESOLVED | Noted: 2020-02-27 | Resolved: 2024-02-21

## 2024-02-21 PROBLEM — Z12.4 SCREENING FOR CERVICAL CANCER: Status: RESOLVED | Noted: 2020-02-27 | Resolved: 2024-02-21

## 2024-02-21 PROBLEM — Z12.11 SCREENING FOR COLON CANCER: Status: RESOLVED | Noted: 2020-02-27 | Resolved: 2024-02-21

## 2024-03-28 DIAGNOSIS — F33.0 MAJOR DEPRESSIVE DISORDER, RECURRENT EPISODE, MILD (HCC): ICD-10-CM

## 2024-03-28 RX ORDER — FLUOXETINE HYDROCHLORIDE 40 MG/1
40 CAPSULE ORAL DAILY
Qty: 90 CAPSULE | Refills: 0 | Status: SHIPPED | OUTPATIENT
Start: 2024-03-28

## 2024-04-12 DIAGNOSIS — J45.909 ASTHMA, UNSPECIFIED ASTHMA SEVERITY, UNSPECIFIED WHETHER COMPLICATED, UNSPECIFIED WHETHER PERSISTENT: ICD-10-CM

## 2024-04-12 RX ORDER — ALBUTEROL SULFATE 90 UG/1
AEROSOL, METERED RESPIRATORY (INHALATION)
Qty: 18 G | Refills: 2 | Status: SHIPPED | OUTPATIENT
Start: 2024-04-12

## 2024-05-24 DIAGNOSIS — J45.909 ASTHMA, UNSPECIFIED ASTHMA SEVERITY, UNSPECIFIED WHETHER COMPLICATED, UNSPECIFIED WHETHER PERSISTENT: ICD-10-CM

## 2024-05-24 RX ORDER — ALBUTEROL SULFATE 90 UG/1
AEROSOL, METERED RESPIRATORY (INHALATION)
Qty: 18 G | Refills: 0 | Status: SHIPPED | OUTPATIENT
Start: 2024-05-24

## 2024-06-23 DIAGNOSIS — F33.0 MAJOR DEPRESSIVE DISORDER, RECURRENT EPISODE, MILD (HCC): ICD-10-CM

## 2024-06-23 RX ORDER — FLUOXETINE HYDROCHLORIDE 40 MG/1
40 CAPSULE ORAL DAILY
Qty: 90 CAPSULE | Refills: 1 | Status: SHIPPED | OUTPATIENT
Start: 2024-06-23

## 2024-07-17 ENCOUNTER — OFFICE VISIT (OUTPATIENT)
Dept: FAMILY MEDICINE CLINIC | Facility: CLINIC | Age: 59
End: 2024-07-17

## 2024-07-17 VITALS
BODY MASS INDEX: 29.66 KG/M2 | WEIGHT: 195.7 LBS | TEMPERATURE: 97.7 F | DIASTOLIC BLOOD PRESSURE: 75 MMHG | HEIGHT: 68 IN | OXYGEN SATURATION: 99 % | HEART RATE: 81 BPM | SYSTOLIC BLOOD PRESSURE: 131 MMHG

## 2024-07-17 DIAGNOSIS — J45.20 MILD INTERMITTENT ASTHMA WITHOUT COMPLICATION: ICD-10-CM

## 2024-07-17 DIAGNOSIS — F33.0 MAJOR DEPRESSIVE DISORDER, RECURRENT EPISODE, MILD (HCC): ICD-10-CM

## 2024-07-17 DIAGNOSIS — I10 ESSENTIAL HYPERTENSION, BENIGN: Primary | ICD-10-CM

## 2024-07-17 PROCEDURE — 99214 OFFICE O/P EST MOD 30 MIN: CPT | Performed by: FAMILY MEDICINE

## 2024-07-17 NOTE — PROGRESS NOTES
Ambulatory Visit  Name: Ewa Hernandes      : 1965      MRN: [id number]  Encounter Provider: Rudolph Shaffer DO  Encounter Date: 2024   Encounter department: [facility] PRIMARY CARE    Assessment & Plan   1. Essential hypertension, benign  Assessment & Plan:  Patient has hypertension which is well-controlled.  I congratulated her on her 10 pound weight loss.  Patient will continue amlodipine 10 mg daily and lisinopril 10 mg daily.  2. Major depressive disorder, recurrent episode, mild (HCC)  Assessment & Plan:  Depression is currently well-controlled.  Anxiety is stable.  Patient will continue fluoxetine 40 mg daily and alprazolam 0.5 mg 3 times per day as needed.  3. Mild intermittent asthma without complication  Assessment & Plan:  Asthma is currently stable.  Patient will continue albuterol inhaler as needed.  She is using her albuterol inhaler daily.  She is likely a candidate for ICS.  However, patient does not have insurance so we will continue her on albuterol inhaler at this time.       History of Present Illness     The patient is a 58-year-old white female who presents to the office today for her 6-month checkup.  Patient is doing well.  She tells me that a few weeks ago she was having problems with her asthma.  She was awakening frequently at night and having to use her inhaler.  After allergy season, her asthma settled down again.  She currently finds she uses her rescue inhaler about once a day.  She tells me she keeps busy by watching her grandchildren.  She does not formally exercise.  She does not work.  She has been compliant with her medication.  She has a little bit of anxiety and denies any depression.  Overall she feels her symptoms are controlled with her medication.        Review of Systems   Respiratory:  Positive for wheezing.    Cardiovascular:  Negative for chest pain, palpitations and leg swelling.   Gastrointestinal:  Positive for abdominal pain. Negative for blood  "in stool, constipation and diarrhea.        Patient does occasionally get epigastric abdominal pain.  She notes an intolerance to dairy food as well.   Psychiatric/Behavioral:  Negative for dysphoric mood and sleep disturbance. The patient is nervous/anxious.        Objective     /75   Pulse 81   Temp 97.7 °F (36.5 °C) (Tympanic)   Ht 5' 8\" (1.727 m)   Wt 88.8 kg (195 lb 11.2 oz)   SpO2 99%   BMI 29.76 kg/m²     Physical Exam  Vitals reviewed.   Constitutional:       Comments: The patient is a 58-year-old white female who appears her stated age.  She is pleasant, cooperative, and in no distress.   HENT:      Head: Normocephalic and atraumatic.      Right Ear: Tympanic membrane, ear canal and external ear normal. There is no impacted cerumen.      Left Ear: Tympanic membrane, ear canal and external ear normal. There is no impacted cerumen.      Mouth/Throat:      Mouth: Mucous membranes are moist.      Pharynx: Oropharynx is clear. No oropharyngeal exudate or posterior oropharyngeal erythema.   Eyes:      General: No scleral icterus.        Right eye: No discharge.         Left eye: No discharge.      Conjunctiva/sclera: Conjunctivae normal.      Pupils: Pupils are equal, round, and reactive to light.   Cardiovascular:      Rate and Rhythm: Normal rate and regular rhythm.      Heart sounds: Normal heart sounds. No murmur heard.     No friction rub. No gallop.   Pulmonary:      Effort: Pulmonary effort is normal. No respiratory distress.      Breath sounds: Normal breath sounds. No stridor. No wheezing, rhonchi or rales.   Abdominal:      General: Bowel sounds are normal. There is no distension.      Palpations: Abdomen is soft. There is no mass.      Tenderness: There is no abdominal tenderness. There is no guarding.   Musculoskeletal:      Cervical back: Neck supple.   Lymphadenopathy:      Cervical: No cervical adenopathy.   Psychiatric:         Mood and Affect: Mood normal.         Behavior: Behavior " normal.         Thought Content: Thought content normal.         Judgment: Judgment normal.     Extremities: Without cyanosis, clubbing, or edema    Administrative Statements

## 2024-07-17 NOTE — ASSESSMENT & PLAN NOTE
Depression is currently well-controlled.  Anxiety is stable.  Patient will continue fluoxetine 40 mg daily and alprazolam 0.5 mg 3 times per day as needed.

## 2024-07-17 NOTE — ASSESSMENT & PLAN NOTE
Asthma is currently stable.  Patient will continue albuterol inhaler as needed.  She is using her albuterol inhaler daily.  She is likely a candidate for ICS.  However, patient does not have insurance so we will continue her on albuterol inhaler at this time.

## 2024-07-17 NOTE — ASSESSMENT & PLAN NOTE
Patient has hypertension which is well-controlled.  I congratulated her on her 10 pound weight loss.  Patient will continue amlodipine 10 mg daily and lisinopril 10 mg daily.

## 2024-07-30 DIAGNOSIS — I10 ESSENTIAL HYPERTENSION, BENIGN: ICD-10-CM

## 2024-07-31 RX ORDER — LISINOPRIL 10 MG/1
10 TABLET ORAL DAILY
Qty: 30 TABLET | Refills: 0 | Status: SHIPPED | OUTPATIENT
Start: 2024-07-31

## 2024-08-28 DIAGNOSIS — I10 ESSENTIAL HYPERTENSION, BENIGN: ICD-10-CM

## 2024-08-28 RX ORDER — LISINOPRIL 10 MG/1
10 TABLET ORAL DAILY
Qty: 30 TABLET | Refills: 5 | Status: SHIPPED | OUTPATIENT
Start: 2024-08-28

## 2024-09-17 DIAGNOSIS — I10 ESSENTIAL HYPERTENSION, BENIGN: ICD-10-CM

## 2024-09-18 DIAGNOSIS — F41.0 PANIC DISORDER: ICD-10-CM

## 2024-09-18 DIAGNOSIS — J45.909 ASTHMA, UNSPECIFIED ASTHMA SEVERITY, UNSPECIFIED WHETHER COMPLICATED, UNSPECIFIED WHETHER PERSISTENT: ICD-10-CM

## 2024-09-18 RX ORDER — AMLODIPINE BESYLATE 10 MG/1
10 TABLET ORAL DAILY
Qty: 90 TABLET | Refills: 1 | Status: SHIPPED | OUTPATIENT
Start: 2024-09-18

## 2024-09-19 RX ORDER — ALBUTEROL SULFATE 90 UG/1
2 INHALANT RESPIRATORY (INHALATION) EVERY 4 HOURS PRN
Qty: 18 G | Refills: 0 | Status: SHIPPED | OUTPATIENT
Start: 2024-09-19

## 2024-09-19 RX ORDER — ALPRAZOLAM 0.5 MG
0.5 TABLET ORAL 3 TIMES DAILY PRN
Qty: 60 TABLET | Refills: 5 | Status: SHIPPED | OUTPATIENT
Start: 2024-09-19

## 2024-10-19 DIAGNOSIS — J45.909 ASTHMA, UNSPECIFIED ASTHMA SEVERITY, UNSPECIFIED WHETHER COMPLICATED, UNSPECIFIED WHETHER PERSISTENT: ICD-10-CM

## 2024-10-20 RX ORDER — ALBUTEROL SULFATE 90 UG/1
INHALANT RESPIRATORY (INHALATION)
Qty: 18 G | Refills: 1 | Status: SHIPPED | OUTPATIENT
Start: 2024-10-20

## 2024-11-18 DIAGNOSIS — J45.909 ASTHMA, UNSPECIFIED ASTHMA SEVERITY, UNSPECIFIED WHETHER COMPLICATED, UNSPECIFIED WHETHER PERSISTENT: ICD-10-CM

## 2024-11-19 RX ORDER — ALBUTEROL SULFATE 90 UG/1
INHALANT RESPIRATORY (INHALATION)
Qty: 18 G | Refills: 0 | Status: SHIPPED | OUTPATIENT
Start: 2024-11-19

## 2024-12-01 DIAGNOSIS — J45.909 ASTHMA, UNSPECIFIED ASTHMA SEVERITY, UNSPECIFIED WHETHER COMPLICATED, UNSPECIFIED WHETHER PERSISTENT: ICD-10-CM

## 2024-12-03 RX ORDER — ALBUTEROL SULFATE 90 UG/1
INHALANT RESPIRATORY (INHALATION)
Qty: 18 G | Refills: 2 | Status: SHIPPED | OUTPATIENT
Start: 2024-12-03

## 2024-12-14 DIAGNOSIS — F33.0 MAJOR DEPRESSIVE DISORDER, RECURRENT EPISODE, MILD (HCC): ICD-10-CM

## 2024-12-14 RX ORDER — FLUOXETINE 40 MG/1
40 CAPSULE ORAL DAILY
Qty: 90 CAPSULE | Refills: 0 | Status: SHIPPED | OUTPATIENT
Start: 2024-12-14

## 2025-01-14 DIAGNOSIS — J45.909 ASTHMA, UNSPECIFIED ASTHMA SEVERITY, UNSPECIFIED WHETHER COMPLICATED, UNSPECIFIED WHETHER PERSISTENT: ICD-10-CM

## 2025-01-15 RX ORDER — ALBUTEROL SULFATE 90 UG/1
INHALANT RESPIRATORY (INHALATION)
Qty: 18 G | Refills: 5 | Status: SHIPPED | OUTPATIENT
Start: 2025-01-15

## 2025-01-22 ENCOUNTER — TELEPHONE (OUTPATIENT)
Dept: OTHER | Facility: OTHER | Age: 60
End: 2025-01-22

## 2025-01-22 ENCOUNTER — OFFICE VISIT (OUTPATIENT)
Dept: FAMILY MEDICINE CLINIC | Facility: CLINIC | Age: 60
End: 2025-01-22

## 2025-01-22 VITALS
BODY MASS INDEX: 26.61 KG/M2 | SYSTOLIC BLOOD PRESSURE: 149 MMHG | TEMPERATURE: 97 F | DIASTOLIC BLOOD PRESSURE: 75 MMHG | HEIGHT: 68 IN | WEIGHT: 175.6 LBS | OXYGEN SATURATION: 95 % | HEART RATE: 107 BPM

## 2025-01-22 DIAGNOSIS — F41.0 PANIC DISORDER: ICD-10-CM

## 2025-01-22 DIAGNOSIS — J45.31 MILD PERSISTENT ASTHMA WITH (ACUTE) EXACERBATION: Primary | ICD-10-CM

## 2025-01-22 DIAGNOSIS — R19.7 DIARRHEA, UNSPECIFIED TYPE: ICD-10-CM

## 2025-01-22 DIAGNOSIS — F33.0 MAJOR DEPRESSIVE DISORDER, RECURRENT EPISODE, MILD (HCC): ICD-10-CM

## 2025-01-22 DIAGNOSIS — I10 ESSENTIAL HYPERTENSION, BENIGN: ICD-10-CM

## 2025-01-22 PROCEDURE — 99214 OFFICE O/P EST MOD 30 MIN: CPT | Performed by: FAMILY MEDICINE

## 2025-01-22 RX ORDER — PREDNISONE 10 MG/1
TABLET ORAL
Qty: 30 TABLET | Refills: 0 | Status: SHIPPED | OUTPATIENT
Start: 2025-01-22

## 2025-01-22 NOTE — ASSESSMENT & PLAN NOTE
Patient has mild persistent asthma.  She is currently having an acute exacerbation.  I started the patient on prednisone 10 mg tablets.  She will take 5 daily for 2 days, 4 daily for 2 days, 3 daily for 2 days, 2 daily for 2 days, 1 daily for 2 days and stop.  She will continue her albuterol inhaler every 4 hours as needed.  I gave the patient 3 sample packs of Trelegy 100/62.5/25.  She will use 1 puff daily.  She was advised to rinse her mouth out after each use.  Patient was advised to return to office if no improvement or worsens.  Orders:    predniSONE 10 mg tablet; Take 5 daily x 2 days, 4 daily x 2 days, 3 daily x 2 days, 2 daily x 2 days, 1 daily x 2 days, then stop

## 2025-01-22 NOTE — TELEPHONE ENCOUNTER
Patient called in due to having a missed call, she has an appointment tonight, shows it is still active. No notes found on a missed call.

## 2025-01-22 NOTE — ASSESSMENT & PLAN NOTE
Depression Screening Follow-up Plan: Patient's depression screening was positive with a PHQ-9 score of 15. Patient with underlying depression and was advised to continue current medications as prescribed.  Patient does have depression.  She is still symptomatic despite medication.  We discussed perhaps a change in her medication but the patient declined.  She tells me she thinks she is just feeling depressed because she has not been feeling well lately with her asthma.  She did not want to change medications or consider seeing a psychiatrist.

## 2025-01-22 NOTE — PROGRESS NOTES
Name: Ewa Hernandes      : 1965      MRN: [id number]  Encounter Provider: Rudolph Shaffer DO  Encounter Date: 2025   Encounter department: [facility] PRIMARY CARE  :  Assessment & Plan  Mild persistent asthma with (acute) exacerbation  Patient has mild persistent asthma.  She is currently having an acute exacerbation.  I started the patient on prednisone 10 mg tablets.  She will take 5 daily for 2 days, 4 daily for 2 days, 3 daily for 2 days, 2 daily for 2 days, 1 daily for 2 days and stop.  She will continue her albuterol inhaler every 4 hours as needed.  I gave the patient 3 sample packs of Trelegy 100/62.5/25.  She will use 1 puff daily.  She was advised to rinse her mouth out after each use.  Patient was advised to return to office if no improvement or worsens.  Orders:    predniSONE 10 mg tablet; Take 5 daily x 2 days, 4 daily x 2 days, 3 daily x 2 days, 2 daily x 2 days, 1 daily x 2 days, then stop    Essential hypertension, benign  Patient's blood pressure was noted to be elevated today.  I discussed that with her.  The patient does check her blood pressures periodically at home and has found them to be well-controlled.  I note that when she was last here in July her blood pressure was normal.  As such, I did not make any change with her medication.  Maybe because she is having acute asthma that her blood pressure is elevated.  I did tell the patient that I will reevaluate her blood pressure at the time of her next office visit.  Blood pressure remains elevated then were going to need to increase the dose of her lisinopril.         Major depressive disorder, recurrent episode, mild (HCC)  Depression Screening Follow-up Plan: Patient's depression screening was positive with a PHQ-9 score of 15. Patient with underlying depression and was advised to continue current medications as prescribed.  Patient does have depression.  She is still symptomatic despite medication.  We discussed perhaps  "a change in her medication but the patient declined.  She tells me she thinks she is just feeling depressed because she has not been feeling well lately with her asthma.  She did not want to change medications or consider seeing a psychiatrist.         Panic disorder  Patient will continue alprazolam 0.5 mg 3 times a day as needed for anxiety.         Diarrhea, unspecified type  Patient had a diarrheal illness about a month ago, likely viral.  However, she does get chronic diarrhea on and off.  She may simply have irritable bowel syndrome.  However, she really needs to have a screening colonoscopy.  We discussed this.  Patient declined.  She still does not have healthcare insurance.  It should also be noted that the patient never followed up and got the mammogram.                History of Present Illness   This is a 59-year-old white female who presents to the office today for routine checkup.  The patient's main complaint today is asthma.  Her asthma has been acting up now for a month.  She tells me she has been using her inhaler \"too much\".  In speaking with her it sounds like she may have had a viral illness a month ago that triggered it.  However, she has exacerbations every time she goes out into the cold now.  She tells me she has been coughing.  She even has to sit up at night to stop from coughing.  She tells me she has been coughing so much that she hurt her lower right ribs.  She still has not gotten any type of healthcare insurance.      Review of Systems   Constitutional:  Negative for chills and fever.   Respiratory:  Positive for cough, shortness of breath and wheezing.    Cardiovascular:  Negative for chest pain, palpitations and leg swelling.   Gastrointestinal:  Positive for diarrhea. Negative for abdominal distention, abdominal pain, blood in stool and nausea.   Psychiatric/Behavioral:  Positive for dysphoric mood. Negative for suicidal ideas.        Objective   /75   Pulse (!) 107   Temp " "(!) 97 °F (36.1 °C) (Tympanic)   Ht 5' 8\" (1.727 m)   Wt 79.7 kg (175 lb 9.6 oz)   SpO2 95%   BMI 26.70 kg/m²      Physical Exam  Vitals reviewed.   Constitutional:       Comments: This is a 59-year-old white female who appears her stated age.  The patient is nonseptic in appearance and in no apparent distress   HENT:      Head: Normocephalic and atraumatic.      Right Ear: Tympanic membrane, ear canal and external ear normal. There is no impacted cerumen.      Left Ear: Tympanic membrane, ear canal and external ear normal. There is no impacted cerumen.      Mouth/Throat:      Mouth: Mucous membranes are moist.      Pharynx: Oropharynx is clear. No oropharyngeal exudate or posterior oropharyngeal erythema.   Eyes:      General: No scleral icterus.        Right eye: No discharge.         Left eye: No discharge.      Conjunctiva/sclera: Conjunctivae normal.      Pupils: Pupils are equal, round, and reactive to light.   Cardiovascular:      Rate and Rhythm: Normal rate and regular rhythm.      Heart sounds: Normal heart sounds. No murmur heard.     No friction rub. No gallop.   Pulmonary:      Effort: Pulmonary effort is normal. No respiratory distress.      Breath sounds: No stridor. Wheezing (There was wheezing noted throughout) present. No rhonchi or rales.   Abdominal:      General: Bowel sounds are normal. There is no distension.      Palpations: Abdomen is soft. There is no mass.      Tenderness: There is no abdominal tenderness. There is no guarding.   Lymphadenopathy:      Cervical: No cervical adenopathy.   Skin:     Findings: Rash present.   Psychiatric:         Mood and Affect: Mood normal.         Behavior: Behavior normal.         Thought Content: Thought content normal.         Judgment: Judgment normal.     Extremities: Without cyanosis, clubbing, or edema    "

## 2025-01-23 NOTE — ASSESSMENT & PLAN NOTE
Patient's blood pressure was noted to be elevated today.  I discussed that with her.  The patient does check her blood pressures periodically at home and has found them to be well-controlled.  I note that when she was last here in July her blood pressure was normal.  As such, I did not make any change with her medication.  Maybe because she is having acute asthma that her blood pressure is elevated.  I did tell the patient that I will reevaluate her blood pressure at the time of her next office visit.  Blood pressure remains elevated then were going to need to increase the dose of her lisinopril.

## 2025-01-23 NOTE — ASSESSMENT & PLAN NOTE
Patient had a diarrheal illness about a month ago, likely viral.  However, she does get chronic diarrhea on and off.  She may simply have irritable bowel syndrome.  However, she really needs to have a screening colonoscopy.  We discussed this.  Patient declined.  She still does not have healthcare insurance.  It should also be noted that the patient never followed up and got the mammogram.

## 2025-02-04 ENCOUNTER — APPOINTMENT (EMERGENCY)
Dept: CT IMAGING | Facility: HOSPITAL | Age: 60
End: 2025-02-04

## 2025-02-04 ENCOUNTER — HOSPITAL ENCOUNTER (EMERGENCY)
Facility: HOSPITAL | Age: 60
End: 2025-02-04
Attending: EMERGENCY MEDICINE | Admitting: INTERNAL MEDICINE

## 2025-02-04 ENCOUNTER — HOSPITAL ENCOUNTER (INPATIENT)
Facility: HOSPITAL | Age: 60
LOS: 2 days | Discharge: HOME | DRG: 532 | End: 2025-02-06
Attending: OBSTETRICS & GYNECOLOGY | Admitting: OBSTETRICS & GYNECOLOGY
Payer: COMMERCIAL

## 2025-02-04 VITALS
HEART RATE: 79 BPM | DIASTOLIC BLOOD PRESSURE: 73 MMHG | RESPIRATION RATE: 22 BRPM | TEMPERATURE: 98.1 F | SYSTOLIC BLOOD PRESSURE: 134 MMHG | OXYGEN SATURATION: 94 %

## 2025-02-04 DIAGNOSIS — N83.202 CYSTS OF BOTH OVARIES: Primary | ICD-10-CM

## 2025-02-04 DIAGNOSIS — Z59.9 FINANCIAL DIFFICULTIES: ICD-10-CM

## 2025-02-04 DIAGNOSIS — N83.201 CYSTS OF BOTH OVARIES: Primary | ICD-10-CM

## 2025-02-04 DIAGNOSIS — R19.00 PELVIC MASS: Primary | ICD-10-CM

## 2025-02-04 DIAGNOSIS — E87.6 HYPOKALEMIA: ICD-10-CM

## 2025-02-04 DIAGNOSIS — E83.42 HYPOMAGNESEMIA: ICD-10-CM

## 2025-02-04 DIAGNOSIS — R10.9 ABDOMINAL PAIN: Primary | ICD-10-CM

## 2025-02-04 DIAGNOSIS — R18.0 MALIGNANT ASCITES: ICD-10-CM

## 2025-02-04 DIAGNOSIS — N83.209 CYST OF OVARY, UNSPECIFIED LATERALITY: ICD-10-CM

## 2025-02-04 DIAGNOSIS — R93.5 ABNORMAL CT OF THE ABDOMEN: ICD-10-CM

## 2025-02-04 PROBLEM — E87.20 LACTIC ACIDOSIS: Status: ACTIVE | Noted: 2025-02-04

## 2025-02-04 PROBLEM — R18.8 ASCITES: Status: ACTIVE | Noted: 2025-02-04

## 2025-02-04 PROBLEM — R79.89 ELEVATED PLATELET COUNT: Status: ACTIVE | Noted: 2025-02-04

## 2025-02-04 LAB
ALBUMIN SERPL BCG-MCNC: 3.7 G/DL (ref 3.5–5)
ALP SERPL-CCNC: 75 U/L (ref 34–104)
ALT SERPL W P-5'-P-CCNC: 9 U/L (ref 7–52)
ANION GAP SERPL CALCULATED.3IONS-SCNC: 14 MMOL/L (ref 4–13)
AST SERPL W P-5'-P-CCNC: 21 U/L (ref 13–39)
BACTERIA UR QL AUTO: NORMAL /HPF
BASOPHILS # BLD AUTO: 0.02 THOUSANDS/ΜL (ref 0–0.1)
BASOPHILS NFR BLD AUTO: 0 % (ref 0–1)
BILIRUB SERPL-MCNC: 1.09 MG/DL (ref 0.2–1)
BILIRUB UR QL STRIP: NEGATIVE
BUN SERPL-MCNC: 11 MG/DL (ref 5–25)
CALCIUM SERPL-MCNC: 8.6 MG/DL (ref 8.4–10.2)
CANCER AG125 SERPL-ACNC: 6483 U/ML (ref 0–35)
CHLORIDE SERPL-SCNC: 100 MMOL/L (ref 96–108)
CLARITY UR: ABNORMAL
CO2 SERPL-SCNC: 28 MMOL/L (ref 21–32)
COLOR UR: YELLOW
CREAT SERPL-MCNC: 0.77 MG/DL (ref 0.6–1.3)
EOSINOPHIL # BLD AUTO: 0.01 THOUSAND/ΜL (ref 0–0.61)
EOSINOPHIL NFR BLD AUTO: 0 % (ref 0–6)
ERYTHROCYTE [DISTWIDTH] IN BLOOD BY AUTOMATED COUNT: 14.2 % (ref 11.6–15.1)
FLUAV AG UPPER RESP QL IA.RAPID: NEGATIVE
FLUBV AG UPPER RESP QL IA.RAPID: NEGATIVE
GFR SERPL CREATININE-BSD FRML MDRD: 85 ML/MIN/1.73SQ M
GLUCOSE SERPL-MCNC: 113 MG/DL (ref 65–140)
GLUCOSE UR STRIP-MCNC: NEGATIVE MG/DL
HCT VFR BLD AUTO: 40.7 % (ref 34.8–46.1)
HGB BLD-MCNC: 13 G/DL (ref 11.5–15.4)
HGB UR QL STRIP.AUTO: ABNORMAL
IMM GRANULOCYTES # BLD AUTO: 0.06 THOUSAND/UL (ref 0–0.2)
IMM GRANULOCYTES NFR BLD AUTO: 0 % (ref 0–2)
KETONES UR STRIP-MCNC: NEGATIVE MG/DL
LACTATE SERPL-SCNC: 1.1 MMOL/L (ref 0.5–2)
LACTATE SERPL-SCNC: 2.6 MMOL/L (ref 0.5–2)
LEUKOCYTE ESTERASE UR QL STRIP: NEGATIVE
LIPASE SERPL-CCNC: 33 U/L (ref 11–82)
LYMPHOCYTES # BLD AUTO: 1.36 THOUSANDS/ΜL (ref 0.6–4.47)
LYMPHOCYTES NFR BLD AUTO: 8 % (ref 14–44)
MAGNESIUM SERPL-MCNC: 1.1 MG/DL (ref 1.9–2.7)
MCH RBC QN AUTO: 28.4 PG (ref 26.8–34.3)
MCHC RBC AUTO-ENTMCNC: 31.9 G/DL (ref 31.4–37.4)
MCV RBC AUTO: 89 FL (ref 82–98)
MONOCYTES # BLD AUTO: 1.49 THOUSAND/ΜL (ref 0.17–1.22)
MONOCYTES NFR BLD AUTO: 9 % (ref 4–12)
NEUTROPHILS # BLD AUTO: 13.43 THOUSANDS/ΜL (ref 1.85–7.62)
NEUTS SEG NFR BLD AUTO: 83 % (ref 43–75)
NITRITE UR QL STRIP: NEGATIVE
NON-SQ EPI CELLS URNS QL MICRO: NORMAL /HPF
NRBC BLD AUTO-RTO: 0 /100 WBCS
PH UR STRIP.AUTO: 7 [PH]
PLATELET # BLD AUTO: 426 THOUSANDS/UL (ref 149–390)
PMV BLD AUTO: 11.3 FL (ref 8.9–12.7)
POTASSIUM SERPL-SCNC: 3.1 MMOL/L (ref 3.5–5.3)
PROT SERPL-MCNC: 7.4 G/DL (ref 6.4–8.4)
PROT UR STRIP-MCNC: ABNORMAL MG/DL
RBC # BLD AUTO: 4.58 MILLION/UL (ref 3.81–5.12)
RBC #/AREA URNS AUTO: NORMAL /HPF
SARS-COV+SARS-COV-2 AG RESP QL IA.RAPID: NEGATIVE
SODIUM SERPL-SCNC: 142 MMOL/L (ref 135–147)
SP GR UR STRIP.AUTO: <1.005 (ref 1–1.03)
UROBILINOGEN UR STRIP-ACNC: <2 MG/DL
WBC # BLD AUTO: 16.37 THOUSAND/UL (ref 4.31–10.16)
WBC #/AREA URNS AUTO: NORMAL /HPF

## 2025-02-04 PROCEDURE — 83735 ASSAY OF MAGNESIUM: CPT | Performed by: PHYSICIAN ASSISTANT

## 2025-02-04 PROCEDURE — 96367 TX/PROPH/DG ADDL SEQ IV INF: CPT

## 2025-02-04 PROCEDURE — 96375 TX/PRO/DX INJ NEW DRUG ADDON: CPT

## 2025-02-04 PROCEDURE — 99285 EMERGENCY DEPT VISIT HI MDM: CPT | Performed by: PHYSICIAN ASSISTANT

## 2025-02-04 PROCEDURE — NC001 PR NO CHARGE: Performed by: STUDENT IN AN ORGANIZED HEALTH CARE EDUCATION/TRAINING PROGRAM

## 2025-02-04 PROCEDURE — 85025 COMPLETE CBC W/AUTO DIFF WBC: CPT | Performed by: PHYSICIAN ASSISTANT

## 2025-02-04 PROCEDURE — 36415 COLL VENOUS BLD VENIPUNCTURE: CPT | Performed by: PHYSICIAN ASSISTANT

## 2025-02-04 PROCEDURE — 81001 URINALYSIS AUTO W/SCOPE: CPT | Performed by: PHYSICIAN ASSISTANT

## 2025-02-04 PROCEDURE — 87804 INFLUENZA ASSAY W/OPTIC: CPT | Performed by: PHYSICIAN ASSISTANT

## 2025-02-04 PROCEDURE — 96368 THER/DIAG CONCURRENT INF: CPT

## 2025-02-04 PROCEDURE — 83690 ASSAY OF LIPASE: CPT | Performed by: PHYSICIAN ASSISTANT

## 2025-02-04 PROCEDURE — 74177 CT ABD & PELVIS W/CONTRAST: CPT

## 2025-02-04 PROCEDURE — 99284 EMERGENCY DEPT VISIT MOD MDM: CPT

## 2025-02-04 PROCEDURE — 87811 SARS-COV-2 COVID19 W/OPTIC: CPT | Performed by: PHYSICIAN ASSISTANT

## 2025-02-04 PROCEDURE — 80053 COMPREHEN METABOLIC PANEL: CPT | Performed by: PHYSICIAN ASSISTANT

## 2025-02-04 PROCEDURE — 83605 ASSAY OF LACTIC ACID: CPT | Performed by: PHYSICIAN ASSISTANT

## 2025-02-04 PROCEDURE — 96365 THER/PROPH/DIAG IV INF INIT: CPT

## 2025-02-04 PROCEDURE — 86304 IMMUNOASSAY TUMOR CA 125: CPT | Performed by: STUDENT IN AN ORGANIZED HEALTH CARE EDUCATION/TRAINING PROGRAM

## 2025-02-04 RX ORDER — HEPARIN SODIUM 5000 [USP'U]/ML
5000 INJECTION, SOLUTION INTRAVENOUS; SUBCUTANEOUS EVERY 8 HOURS SCHEDULED
Status: COMPLETED | OUTPATIENT
Start: 2025-02-04 | End: 2025-02-04

## 2025-02-04 RX ORDER — ACETAMINOPHEN 325 MG/1
975 TABLET ORAL EVERY 8 HOURS PRN
Status: DISCONTINUED | OUTPATIENT
Start: 2025-02-04 | End: 2025-02-06 | Stop reason: HOSPADM

## 2025-02-04 RX ORDER — POTASSIUM CHLORIDE 14.9 MG/ML
20 INJECTION INTRAVENOUS
Status: COMPLETED | OUTPATIENT
Start: 2025-02-04 | End: 2025-02-04

## 2025-02-04 RX ORDER — LISINOPRIL 10 MG/1
10 TABLET ORAL DAILY
Status: DISCONTINUED | OUTPATIENT
Start: 2025-02-05 | End: 2025-02-06 | Stop reason: HOSPADM

## 2025-02-04 RX ORDER — OXYCODONE HYDROCHLORIDE 5 MG/1
5 TABLET ORAL EVERY 4 HOURS PRN
Refills: 0 | Status: DISCONTINUED | OUTPATIENT
Start: 2025-02-04 | End: 2025-02-06 | Stop reason: HOSPADM

## 2025-02-04 RX ORDER — MAGNESIUM SULFATE HEPTAHYDRATE 40 MG/ML
4 INJECTION, SOLUTION INTRAVENOUS ONCE
Status: COMPLETED | OUTPATIENT
Start: 2025-02-04 | End: 2025-02-04

## 2025-02-04 RX ORDER — MORPHINE SULFATE 4 MG/ML
4 INJECTION, SOLUTION INTRAMUSCULAR; INTRAVENOUS ONCE
Status: COMPLETED | OUTPATIENT
Start: 2025-02-04 | End: 2025-02-04

## 2025-02-04 RX ORDER — AMLODIPINE BESYLATE 10 MG/1
10 TABLET ORAL DAILY
Status: DISCONTINUED | OUTPATIENT
Start: 2025-02-05 | End: 2025-02-06 | Stop reason: HOSPADM

## 2025-02-04 RX ORDER — ALBUTEROL SULFATE 90 UG/1
2 INHALANT RESPIRATORY (INHALATION) 4 TIMES DAILY
Status: DISCONTINUED | OUTPATIENT
Start: 2025-02-04 | End: 2025-02-06 | Stop reason: HOSPADM

## 2025-02-04 RX ORDER — FAMOTIDINE 20 MG/1
20 TABLET, FILM COATED ORAL 2 TIMES DAILY
Status: DISCONTINUED | OUTPATIENT
Start: 2025-02-04 | End: 2025-02-06 | Stop reason: HOSPADM

## 2025-02-04 RX ORDER — ONDANSETRON 2 MG/ML
4 INJECTION INTRAMUSCULAR; INTRAVENOUS ONCE
Status: COMPLETED | OUTPATIENT
Start: 2025-02-04 | End: 2025-02-04

## 2025-02-04 RX ORDER — ONDANSETRON 2 MG/ML
4 INJECTION INTRAMUSCULAR; INTRAVENOUS EVERY 8 HOURS PRN
Status: DISCONTINUED | OUTPATIENT
Start: 2025-02-04 | End: 2025-02-06 | Stop reason: HOSPADM

## 2025-02-04 RX ORDER — SODIUM CHLORIDE, SODIUM GLUCONATE, SODIUM ACETATE, POTASSIUM CHLORIDE, MAGNESIUM CHLORIDE, SODIUM PHOSPHATE, DIBASIC, AND POTASSIUM PHOSPHATE .53; .5; .37; .037; .03; .012; .00082 G/100ML; G/100ML; G/100ML; G/100ML; G/100ML; G/100ML; G/100ML
1000 INJECTION, SOLUTION INTRAVENOUS ONCE
Status: COMPLETED | OUTPATIENT
Start: 2025-02-04 | End: 2025-02-04

## 2025-02-04 RX ORDER — SODIUM CHLORIDE, SODIUM LACTATE, POTASSIUM CHLORIDE, CALCIUM CHLORIDE 600; 310; 30; 20 MG/100ML; MG/100ML; MG/100ML; MG/100ML
80 INJECTION, SOLUTION INTRAVENOUS CONTINUOUS
Status: DISCONTINUED | OUTPATIENT
Start: 2025-02-04 | End: 2025-02-06 | Stop reason: HOSPADM

## 2025-02-04 RX ADMIN — SODIUM CHLORIDE, SODIUM LACTATE, POTASSIUM CHLORIDE, AND CALCIUM CHLORIDE 80 ML/HR: .6; .31; .03; .02 INJECTION, SOLUTION INTRAVENOUS at 21:35

## 2025-02-04 RX ADMIN — SODIUM CHLORIDE, SODIUM GLUCONATE, SODIUM ACETATE, POTASSIUM CHLORIDE, MAGNESIUM CHLORIDE, SODIUM PHOSPHATE, DIBASIC, AND POTASSIUM PHOSPHATE 1000 ML: .53; .5; .37; .037; .03; .012; .00082 INJECTION, SOLUTION INTRAVENOUS at 11:53

## 2025-02-04 RX ADMIN — OXYCODONE 5 MG: 5 TABLET ORAL at 21:51

## 2025-02-04 RX ADMIN — IOHEXOL 100 ML: 350 INJECTION, SOLUTION INTRAVENOUS at 12:36

## 2025-02-04 RX ADMIN — MORPHINE SULFATE 4 MG: 4 INJECTION, SOLUTION INTRAMUSCULAR; INTRAVENOUS at 18:57

## 2025-02-04 RX ADMIN — ONDANSETRON 4 MG: 2 INJECTION INTRAMUSCULAR; INTRAVENOUS at 18:57

## 2025-02-04 RX ADMIN — HEPARIN SODIUM 5000 UNITS: 5000 INJECTION INTRAVENOUS; SUBCUTANEOUS at 21:52

## 2025-02-04 RX ADMIN — POTASSIUM CHLORIDE 20 MEQ: 200 INJECTION, SOLUTION INTRAVENOUS at 15:28

## 2025-02-04 RX ADMIN — MAGNESIUM SULFATE HEPTAHYDRATE 4 G: 40 INJECTION, SOLUTION INTRAVENOUS at 13:29

## 2025-02-04 RX ADMIN — FAMOTIDINE 20 MG: 20 TABLET, FILM COATED ORAL at 21:51

## 2025-02-04 RX ADMIN — POTASSIUM CHLORIDE 20 MEQ: 200 INJECTION, SOLUTION INTRAVENOUS at 13:05

## 2025-02-04 RX ADMIN — ONDANSETRON 4 MG: 2 INJECTION INTRAMUSCULAR; INTRAVENOUS at 11:53

## 2025-02-04 SDOH — ECONOMIC STABILITY - INCOME SECURITY: PROBLEM RELATED TO HOUSING AND ECONOMIC CIRCUMSTANCES, UNSPECIFIED: Z59.9

## 2025-02-04 NOTE — EMTALA/ACUTE CARE TRANSFER
[facility] EMERGENCY DEPARTMENT  [address] [facility] [place] [address]  Dept: [phone]      EMTALA TRANSFER CONSENT    NAME Ewa Hernandes                                         1965                              MRN [id number]    I have been informed of my rights regarding examination, treatment, and transfer   by Dr. Bethel Monreal PA-C / Ron Jimenez,*    Benefits:  Inpt GYN    Risks:  Transport Risks      Consent for Transfer:  I acknowledge that my medical condition has been evaluated and explained to me by the emergency department physician or other qualified medical person and/or my attending physician, who has recommended that I be transferred to the service of   Dr. Augusto De Souza at  [facility]. The above potential benefits of such transfer, the potential risks associated with such transfer, and the probable risks of not being transferred have been explained to me, and I fully understand them.  The doctor has explained that, in my case, the benefits of transfer outweigh the risks.  I agree to be transferred.    I authorize the performance of emergency medical procedures and treatments upon me in both transit and upon arrival at the receiving facility.  Additionally, I authorize the release of any and all medical records to the receiving facility and request they be transported with me, if possible.  I understand that the safest mode of transportation during a medical emergency is an ambulance and that the Hospital advocates the use of this mode of transport. Risks of traveling to the receiving facility by car, including absence of medical control, life sustaining equipment, such as oxygen, and medical personnel has been explained to me and I fully understand them.    (RONNIE CORRECT BOX BELOW)  [  ]  I consent to the stated transfer and to be transported by ambulance/helicopter.  [  ]  I consent to the stated transfer, but refuse transportation by ambulance and accept  full responsibility for my transportation by car.  I understand the risks of non-ambulance transfers and I exonerate the Hospital and its staff from any deterioration in my condition that results from this refusal.    X___________________________________________    DATE  25  TIME________  Signature of patient or legally responsible individual signing on patient behalf           RELATIONSHIP TO PATIENT_________________________          Provider Certification    NAME Ewa Hernandes                                         1965                              MRN [id number]    A medical screening exam was performed on the above named patient.  Based on the examination:    Condition Necessitating Transfer The primary encounter diagnosis was Abdominal pain. Diagnoses of Hypomagnesemia, Hypokalemia, and Abnormal CT of the abdomen were also pertinent to this visit.    Patient Condition:  Stable    Reason for Transfer:  GYN Oncology    Transfer Requirements: Facility  SLA   Space available and qualified personnel available for treatment as acknowledged by    Agreed to accept transfer and to provide appropriate medical treatment as acknowledged by          Appropriate medical records of the examination and treatment of the patient are provided at the time of transfer   STAFF INITIAL WHEN COMPLETED _______  Transfer will be performed by qualified personnel from    and appropriate transfer equipment as required, including the use of necessary and appropriate life support measures.    Provider Certification: I have examined the patient and explained the following risks and benefits of being transferred/refusing transfer to the patient/family:         Based on these reasonable risks and benefits to the patient and/or the unborn child(toby), and based upon the information available at the time of the patient’s examination, I certify that the medical benefits reasonably to be expected from the provision of appropriate  medical treatments at another medical facility outweigh the increasing risks, if any, to the individual’s medical condition, and in the case of labor to the unborn child, from effecting the transfer.    X____________________________________________ DATE 02/04/25        TIME_______      ORIGINAL - SEND TO MEDICAL RECORDS   COPY - SEND WITH PATIENT DURING TRANSFER

## 2025-02-04 NOTE — ED PROVIDER NOTES
Time reflects when diagnosis was documented in both MDM as applicable and the Disposition within this note       Time User Action Codes Description Comment    2/4/2025 12:55 PM Bethel Monreal Add [R10.9] Abdominal pain     2/4/2025 12:55 PM Bethel Monreal Add [E83.42] Hypomagnesemia     2/4/2025 12:55 PM Bethel Monreal Add [E87.6] Hypokalemia     2/4/2025  2:40 PM Bethel Monreal Add [R93.5] Abnormal CT of the abdomen           ED Disposition       ED Disposition   Transfer to Another Facility-In Network    Condition   --    Date/Time   Tue Feb 4, 2025  2:40 PM    Comment   Ewa Hernandes should be transferred out to [facility].               Assessment & Plan       Medical Decision Making  59-year-old female here for evaluation of abdominal pain nausea vomiting diarrhea and inability to keep down any liquids.  Differential diagnosis in this patient includes appendicitis, bowel obstruction, diverticulitis, mesenteric adenitis, viral gastroenteritis, acute kidney injury, electrolyte disturbance.    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  She also has profound magnesium deficiency this was replenished in the ER here.  After speaking with on-call gynecologic oncology recommend transfer to [place] under their service.  Patient agreeable.    Amount and/or Complexity of Data Reviewed  Labs: ordered. Decision-making details documented in ED Course.  Radiology: ordered.    Risk  Prescription drug management.        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        Medications   multi-electrolyte (ISOLYTE-S PH 7.4) bolus 1,000 mL (0 mL Intravenous Stopped 2/4/25 1300)   ondansetron (ZOFRAN) injection 4 mg (4 mg Intravenous Given 2/4/25 1153)   iohexol (OMNIPAQUE) 350 MG/ML injection (MULTI-DOSE) 100 mL (100 mL Intravenous Given 2/4/25 1236)   magnesium sulfate 4 g/100 mL IVPB (premix) 4 g (0 g Intravenous Stopped 2/4/25 1529)   potassium chloride 20 mEq IVPB (premix) (0 mEq Intravenous Stopped 2/4/25 1722)   ondansetron (ZOFRAN) injection 4 mg (4 mg Intravenous Given 2/4/25 1857)   morphine injection 4 mg (4 mg Intravenous Given 2/4/25 1857)       ED Risk Strat Scores                          SBIRT 20yo+      Flowsheet Row Most Recent Value   Initial Alcohol Screen: US AUDIT-C     1. How often do you have a drink containing alcohol? 0 Filed at: 02/04/2025 1132   2. How many drinks containing alcohol do you have on a typical day you are drinking?  0 Filed at: 02/04/2025 1132   3a. Male UNDER 65: How often do you have five or more drinks on one occasion? 0 Filed at: 02/04/2025 1132   3b. FEMALE Any Age, or MALE 65+: How often do you have 4 or more drinks on one occassion? 0 Filed at: 02/04/2025 1132   Audit-C Score 0 Filed at: 02/04/2025 1132   CORINE: How many times in the past year have you...    Used an illegal drug or used a prescription medication for non-medical reasons? Never Filed at: 02/04/2025 1132                            History of Present Illness       Chief Complaint   Patient presents with    Abdominal Pain     Pt states that she started with abd pain, N/V/D a few days ago- states she is unable to keep anything PO down at this time.        Past Medical History:   Diagnosis Date    Anxiety     Asthma     Depression     Eczema     Hypertension     Inflammatory bowel disease       Past Surgical History:   Procedure Laterality Date    BREAST CYST EXCISION Right     benign    CATARACT EXTRACTION, BILATERAL Bilateral     CHOLECYSTECTOMY      EYE SURGERY      IR  BIOPSY LYMPH NODE  2020      Family History   Problem Relation Age of Onset    Breast cancer Mother         in her 70's    Heart disease Mother     Glaucoma Mother     Parkinsonism Father     No Known Problems Sister     No Known Problems Daughter     No Known Problems Maternal Grandmother     No Known Problems Maternal Grandfather     No Known Problems Paternal Grandmother     No Known Problems Paternal Grandfather       Social History     Tobacco Use    Smoking status: Former     Current packs/day: 0.00     Average packs/day: 0.5 packs/day for 29.0 years (14.5 ttl pk-yrs)     Types: Cigarettes     Start date: 1982     Quit date:      Years since quittin.1     Passive exposure: Past    Smokeless tobacco: Never   Vaping Use    Vaping status: Never Used   Substance Use Topics    Alcohol use: Not Currently     Alcohol/week: 3.0 standard drinks of alcohol    Drug use: Never      E-Cigarette/Vaping    E-Cigarette Use Never User       E-Cigarette/Vaping Substances    Nicotine No     THC No     CBD No     Flavoring No     Other No     Unknown No       I have reviewed and agree with the history as documented.     This is a 59-year-old female presenting to the emergency department today for evaluation of abdominal pain bilaterally associated with nausea vomiting diarrhea inability to keep down any liquids solids or daily medication.  Symptom onset was about 3 days ago.  No fever.  Denies urinary urgency frequency burning.  Denies any black or red stool.  No blood in vomitus.  Vital signs reviewed here within reasonable limits.  Past surgical history positive for cholecystectomy.      Abdominal Pain  Associated symptoms: diarrhea, nausea and vomiting    Associated symptoms: no chest pain, no chills, no cough, no dysuria, no fever, no hematuria, no shortness of breath and no sore throat        Review of Systems   Constitutional:  Negative for chills and fever.   HENT:  Negative for ear pain and sore throat.     Eyes:  Negative for pain and visual disturbance.   Respiratory:  Negative for cough and shortness of breath.    Cardiovascular:  Negative for chest pain and palpitations.   Gastrointestinal:  Positive for abdominal pain, diarrhea, nausea and vomiting.   Genitourinary:  Negative for dysuria and hematuria.   Musculoskeletal:  Negative for arthralgias and back pain.   Skin:  Negative for color change and rash.   Neurological:  Negative for seizures and syncope.   All other systems reviewed and are negative.          Objective       ED Triage Vitals [02/04/25 1135]   Temperature Pulse Blood Pressure Respirations SpO2 Patient Position - Orthostatic VS   98.9 °F (37.2 °C) 91 116/72 15 96 % Sitting      Temp Source Heart Rate Source BP Location FiO2 (%) Pain Score    Temporal Monitor Right arm -- 9      Vitals      Date and Time Temp Pulse SpO2 Resp BP Pain Score FACES Pain Rating User   02/04/25 1857 -- -- -- -- -- 7 --    02/04/25 1830 98.1 °F (36.7 °C) 79 94 % 22 134/73 8 -- BW   02/04/25 1630 98.2 °F (36.8 °C) 80 94 % 20 123/68 8 -- BW   02/04/25 1517 -- -- -- -- -- 8 -- BW   02/04/25 1515 97.9 °F (36.6 °C) 83 94 % 22 124/74 8 -- BW   02/04/25 1400 98.6 °F (37 °C) 79 93 % 16 111/66 8 -- CLS   02/04/25 1300 -- 88 97 % 16 125/74 9 -- CLS   02/04/25 1135 98.9 °F (37.2 °C) 91 96 % 15 116/72 9 -- AB            Physical Exam  Vitals reviewed.   Constitutional:       General: She is not in acute distress.     Appearance: Normal appearance. She is not ill-appearing, toxic-appearing or diaphoretic.   HENT:      Head: Normocephalic and atraumatic.      Right Ear: External ear normal.      Left Ear: External ear normal.   Eyes:      General: No scleral icterus.        Right eye: No discharge.         Left eye: No discharge.      Extraocular Movements: Extraocular movements intact.      Conjunctiva/sclera: Conjunctivae normal.   Cardiovascular:      Rate and Rhythm: Normal rate.      Pulses: Normal pulses.   Pulmonary:       Effort: Pulmonary effort is normal. No respiratory distress.      Breath sounds: No stridor.   Abdominal:      General: There is distension. There are no signs of injury.      Palpations: There is no shifting dullness, fluid wave, hepatomegaly, splenomegaly, mass or pulsatile mass.      Tenderness: There is abdominal tenderness in the right upper quadrant, epigastric area and left upper quadrant.   Musculoskeletal:         General: No deformity or signs of injury.      Cervical back: Normal range of motion. No rigidity.   Skin:     General: Skin is warm.      Coloration: Skin is not jaundiced.      Findings: No lesion or rash.   Neurological:      General: No focal deficit present.      Mental Status: She is alert and oriented to person, place, and time. Mental status is at baseline.      Gait: Gait normal.   Psychiatric:         Mood and Affect: Mood normal.         Thought Content: Thought content normal.         Judgment: Judgment normal.         Results Reviewed       Procedure Component Value Units Date/Time     [[id number]] Collected: 02/04/25 1152    Lab Status: In process Specimen: Blood Updated: 02/04/25 1609    Lactic acid 2 Hours [[id number]]  (Normal) Collected: 02/04/25 1417    Lab Status: Final result Specimen: Blood from Arm, Right Updated: 02/04/25 1453     LACTIC ACID 1.1 mmol/L     Narrative:      Result may be elevated if tourniquet was used during collection.    Urine Microscopic [[id number]]  (Normal) Collected: 02/04/25 1302    Lab Status: Final result Specimen: Urine, Clean Catch Updated: 02/04/25 1342     RBC, UA 0-1 /hpf      WBC, UA 2-4 /hpf      Epithelial Cells Occasional /hpf      Bacteria, UA Occasional /hpf     UA (URINE) with reflex to Scope [[id number]]  (Abnormal) Collected: 02/04/25 1302    Lab Status: Final result Specimen: Urine, Clean Catch Updated: 02/04/25 1312     Color, UA Yellow     Clarity, UA Slightly Cloudy     Specific Gravity, UA <1.005     pH, UA 7.0     Leukocytes,  UA Negative     Nitrite, UA Negative     Protein, UA 30 (1+) mg/dl      Glucose, UA Negative mg/dl      Ketones, UA Negative mg/dl      Urobilinogen, UA <2.0 mg/dl      Bilirubin, UA Negative     Occult Blood, UA Trace    Lactic acid, plasma (w/reflex if result > 2.0) [[id number]]  (Abnormal) Collected: 02/04/25 1152    Lab Status: Final result Specimen: Blood from Arm, Left Updated: 02/04/25 1227     LACTIC ACID 2.6 mmol/L     Narrative:      Result may be elevated if tourniquet was used during collection.    Comprehensive metabolic panel [[id number]]  (Abnormal) Collected: 02/04/25 1152    Lab Status: Final result Specimen: Blood from Arm, Left Updated: 02/04/25 1226     Sodium 142 mmol/L      Potassium 3.1 mmol/L      Chloride 100 mmol/L      CO2 28 mmol/L      ANION GAP 14 mmol/L      BUN 11 mg/dL      Creatinine 0.77 mg/dL      Glucose 113 mg/dL      Calcium 8.6 mg/dL      AST 21 U/L      ALT 9 U/L      Alkaline Phosphatase 75 U/L      Total Protein 7.4 g/dL      Albumin 3.7 g/dL      Total Bilirubin 1.09 mg/dL      eGFR 85 ml/min/1.73sq m     Narrative:      National Kidney Disease Foundation guidelines for Chronic Kidney Disease (CKD):     Stage 1 with normal or high GFR (GFR > 90 mL/min/1.73 square meters)    Stage 2 Mild CKD (GFR = 60-89 mL/min/1.73 square meters)    Stage 3A Moderate CKD (GFR = 45-59 mL/min/1.73 square meters)    Stage 3B Moderate CKD (GFR = 30-44 mL/min/1.73 square meters)    Stage 4 Severe CKD (GFR = 15-29 mL/min/1.73 square meters)    Stage 5 End Stage CKD (GFR <15 mL/min/1.73 square meters)  Note: GFR calculation is accurate only with a steady state creatinine    Magnesium [[id number]]  (Abnormal) Collected: 02/04/25 1152    Lab Status: Final result Specimen: Blood from Arm, Left Updated: 02/04/25 1226     Magnesium 1.1 mg/dL     Lipase [[id number]]  (Normal) Collected: 02/04/25 1152    Lab Status: Final result Specimen: Blood from Arm, Left Updated: 02/04/25 1226     Lipase 33 u/L      FLU/COVID Rapid Antigen (30 min. TAT) - Preferred screening test in ED [[id number]]  (Normal) Collected: 02/04/25 1152    Lab Status: Final result Specimen: Nares from Nose Updated: 02/04/25 1224     SARS COV Rapid Antigen Negative     Influenza A Rapid Antigen Negative     Influenza B Rapid Antigen Negative    Narrative:      This test has been performed using the Quidel Pooja 2 FLU+SARS Antigen test under the Emergency Use Authorization (EUA). This test has been validated by the  and verified by the performing laboratory. The Pooja uses lateral flow immunofluorescent sandwich assay to detect SARS-COV, Influenza A and Influenza B Antigen.     The Quidel Pooja 2 SARS Antigen test does not differentiate between SARS-CoV and SARS-CoV-2.     Negative results are presumptive and may be confirmed with a molecular assay, if necessary, for patient management. Negative results do not rule out SARS-CoV-2 or influenza infection and should not be used as the sole basis for treatment or patient management decisions. A negative test result may occur if the level of antigen in a sample is below the limit of detection of this test.     Positive results are indicative of the presence of viral antigens, but do not rule out bacterial infection or co-infection with other viruses.     All test results should be used as an adjunct to clinical observations and other information available to the provider.    FOR PEDIATRIC PATIENTS - copy/paste COVID Guidelines URL to browser: [email or web address]    CBC and differential [[id number]]  (Abnormal) Collected: 02/04/25 1152    Lab Status: Final result Specimen: Blood from Arm, Left Updated: 02/04/25 1205     WBC 16.37 Thousand/uL      RBC 4.58 Million/uL      Hemoglobin 13.0 g/dL      Hematocrit 40.7 %      MCV 89 fL      MCH 28.4 pg      MCHC 31.9 g/dL      RDW 14.2 %      MPV 11.3 fL      Platelets 426 Thousands/uL      nRBC 0  /100 WBCs      Segmented % 83 %      Immature Grans % 0 %      Lymphocytes % 8 %      Monocytes % 9 %      Eosinophils Relative 0 %      Basophils Relative 0 %      Absolute Neutrophils 13.43 Thousands/µL      Absolute Immature Grans 0.06 Thousand/uL      Absolute Lymphocytes 1.36 Thousands/µL      Absolute Monocytes 1.49 Thousand/µL      Eosinophils Absolute 0.01 Thousand/µL      Basophils Absolute 0.02 Thousands/µL             CT abdomen pelvis with contrast   Final Interpretation by Rizwan Quesada MD (02/04 2473)      1. 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 Gynecology-oncology consultation recommended.      2. Mild direct extension of peritoneal disease into the lower-anterior mediastinum and epicardiac region. No hematogenous metastases demonstrated, though.      3.  Small bilateral pleural effusions (left greater than right).         I personally discussed this study with SELMA MEDEL on 2/4/2025 2:00 PM.      Resident: KURT DOLAN I, the attending radiologist, have reviewed the images and agree with the final report above.      Workstation performed: [id number]             Procedures    ED Medication and Procedure Management   Prior to Admission Medications   Prescriptions Last Dose Informant Patient Reported? Taking?   ALPRAZolam (XANAX) 0.5 mg tablet 2/3/2025 Self No Yes   Sig: Take 1 tablet (0.5 mg total) by mouth 3 (three) times a day as needed (panic attack)   FLUoxetine (PROzac) 40 MG capsule 2/3/2025 Self No Yes   Sig: Take 1 capsule by mouth once daily   Omeprazole Magnesium (PRILOSEC OTC PO) 2/3/2025 Self Yes Yes   Sig: Take 1 tablet by mouth 2 (two) times a day   albuterol (PROVENTIL HFA,VENTOLIN HFA) 90 mcg/act inhaler 2/3/2025 Self No Yes   Sig: INHALE 2 PUFFS BY MOUTH EVERY 4 HOURS AS NEEDED FOR WHEEZING   amLODIPine (NORVASC) 10 mg tablet 2/3/2025 Self No Yes   Sig: Take 1 tablet by mouth once daily   lisinopril (ZESTRIL)  10 mg tablet 2/3/2025 Self No Yes   Sig: Take 1 tablet by mouth once daily   predniSONE 10 mg tablet 2/3/2025  No Yes   Sig: Take 5 daily x 2 days, 4 daily x 2 days, 3 daily x 2 days, 2 daily x 2 days, 1 daily x 2 days, then stop      Facility-Administered Medications: None     Discharge Medication List as of 2/4/2025  7:24 PM        CONTINUE these medications which have NOT CHANGED    Details   albuterol (PROVENTIL HFA,VENTOLIN HFA) 90 mcg/act inhaler INHALE 2 PUFFS BY MOUTH EVERY 4 HOURS AS NEEDED FOR WHEEZING, Normal      ALPRAZolam (XANAX) 0.5 mg tablet Take 1 tablet (0.5 mg total) by mouth 3 (three) times a day as needed (panic attack), Starting Thu 9/19/2024, Normal      amLODIPine (NORVASC) 10 mg tablet Take 1 tablet by mouth once daily, Starting Wed 9/18/2024, Normal      FLUoxetine (PROzac) 40 MG capsule Take 1 capsule by mouth once daily, Starting Sat 12/14/2024, Normal      lisinopril (ZESTRIL) 10 mg tablet Take 1 tablet by mouth once daily, Starting Wed 8/28/2024, Normal      Omeprazole Magnesium (PRILOSEC OTC PO) Take 1 tablet by mouth 2 (two) times a day, Historical Med      predniSONE 10 mg tablet Take 5 daily x 2 days, 4 daily x 2 days, 3 daily x 2 days, 2 daily x 2 days, 1 daily x 2 days, then stop, Normal           No discharge procedures on file.  ED SEPSIS DOCUMENTATION   Time reflects when diagnosis was documented in both MDM as applicable and the Disposition within this note       Time User Action Codes Description Comment    2/4/2025 12:55 PM Bethel Monreal Add [R10.9] Abdominal pain     2/4/2025 12:55 PM Bethel Monreal Add [E83.42] Hypomagnesemia     2/4/2025 12:55 PM Bethel Monreal Add [E87.6] Hypokalemia     2/4/2025  2:40 PM Bethel Monreal Add [R93.5] Abnormal CT of the abdomen                  Bethel Monreal PA-C  02/04/25 1955

## 2025-02-05 ENCOUNTER — APPOINTMENT (INPATIENT)
Dept: CT IMAGING | Facility: HOSPITAL | Age: 60
DRG: 532 | End: 2025-02-05
Payer: COMMERCIAL

## 2025-02-05 ENCOUNTER — TRANSITIONAL CARE MANAGEMENT (OUTPATIENT)
Dept: FAMILY MEDICINE CLINIC | Facility: CLINIC | Age: 60
End: 2025-02-05

## 2025-02-05 ENCOUNTER — PATIENT OUTREACH (OUTPATIENT)
Dept: CASE MANAGEMENT | Facility: HOSPITAL | Age: 60
End: 2025-02-05

## 2025-02-05 DIAGNOSIS — I10 ESSENTIAL HYPERTENSION, BENIGN: ICD-10-CM

## 2025-02-05 PROBLEM — E83.42 HYPOMAGNESEMIA: Status: RESOLVED | Noted: 2025-02-04 | Resolved: 2025-02-05

## 2025-02-05 PROBLEM — K21.9 GERD (GASTROESOPHAGEAL REFLUX DISEASE): Status: ACTIVE | Noted: 2025-02-05

## 2025-02-05 LAB
ANION GAP SERPL CALCULATED.3IONS-SCNC: 9 MMOL/L (ref 4–13)
BASOPHILS # BLD AUTO: 0.03 THOUSANDS/ΜL (ref 0–0.1)
BASOPHILS NFR BLD AUTO: 0 % (ref 0–1)
BUN SERPL-MCNC: 10 MG/DL (ref 5–25)
CALCIUM SERPL-MCNC: 8.6 MG/DL (ref 8.4–10.2)
CEA SERPL-MCNC: 2 NG/ML (ref 0–3)
CHLORIDE SERPL-SCNC: 106 MMOL/L (ref 96–108)
CO2 SERPL-SCNC: 26 MMOL/L (ref 21–32)
CREAT SERPL-MCNC: 0.54 MG/DL (ref 0.6–1.3)
EOSINOPHIL # BLD AUTO: 0.05 THOUSAND/ΜL (ref 0–0.61)
EOSINOPHIL NFR BLD AUTO: 0 % (ref 0–6)
ERYTHROCYTE [DISTWIDTH] IN BLOOD BY AUTOMATED COUNT: 14.6 % (ref 11.6–15.1)
GFR SERPL CREATININE-BSD FRML MDRD: 104 ML/MIN/1.73SQ M
GLUCOSE SERPL-MCNC: 92 MG/DL (ref 65–140)
HCT VFR BLD AUTO: 35.9 % (ref 34.8–46.1)
HGB BLD-MCNC: 11.5 G/DL (ref 11.5–15.4)
IMM GRANULOCYTES # BLD AUTO: 0.04 THOUSAND/UL (ref 0–0.2)
IMM GRANULOCYTES NFR BLD AUTO: 0 % (ref 0–2)
INR PPP: 1.23 (ref 0.85–1.19)
LYMPHOCYTES # BLD AUTO: 1.37 THOUSANDS/ΜL (ref 0.6–4.47)
LYMPHOCYTES NFR BLD AUTO: 11 % (ref 14–44)
MAGNESIUM SERPL-MCNC: 2.1 MG/DL (ref 1.9–2.7)
MCH RBC QN AUTO: 28.1 PG (ref 26.8–34.3)
MCHC RBC AUTO-ENTMCNC: 32 G/DL (ref 31.4–37.4)
MCV RBC AUTO: 88 FL (ref 82–98)
MONOCYTES # BLD AUTO: 1.14 THOUSAND/ΜL (ref 0.17–1.22)
MONOCYTES NFR BLD AUTO: 9 % (ref 4–12)
NEUTROPHILS # BLD AUTO: 9.48 THOUSANDS/ΜL (ref 1.85–7.62)
NEUTS SEG NFR BLD AUTO: 80 % (ref 43–75)
NRBC BLD AUTO-RTO: 0 /100 WBCS
PHOSPHATE SERPL-MCNC: 3 MG/DL (ref 2.7–4.5)
PLATELET # BLD AUTO: 340 THOUSANDS/UL (ref 149–390)
PMV BLD AUTO: 12.2 FL (ref 8.9–12.7)
POTASSIUM SERPL-SCNC: 3.4 MMOL/L (ref 3.5–5.3)
PROTHROMBIN TIME: 15.6 SECONDS (ref 12.3–15)
RBC # BLD AUTO: 4.09 MILLION/UL (ref 3.81–5.12)
SODIUM SERPL-SCNC: 141 MMOL/L (ref 135–147)
WBC # BLD AUTO: 12.11 THOUSAND/UL (ref 4.31–10.16)

## 2025-02-05 PROCEDURE — 85025 COMPLETE CBC W/AUTO DIFF WBC: CPT

## 2025-02-05 PROCEDURE — 82378 CARCINOEMBRYONIC ANTIGEN: CPT

## 2025-02-05 PROCEDURE — 84100 ASSAY OF PHOSPHORUS: CPT

## 2025-02-05 PROCEDURE — 0DBU3ZX EXCISION OF OMENTUM, PERCUTANEOUS APPROACH, DIAGNOSTIC: ICD-10-PCS | Performed by: RADIOLOGY

## 2025-02-05 PROCEDURE — 99233 SBSQ HOSP IP/OBS HIGH 50: CPT | Performed by: OBSTETRICS & GYNECOLOGY

## 2025-02-05 PROCEDURE — 80048 BASIC METABOLIC PNL TOTAL CA: CPT

## 2025-02-05 PROCEDURE — 85610 PROTHROMBIN TIME: CPT | Performed by: PHYSICIAN ASSISTANT

## 2025-02-05 PROCEDURE — 0W9G3ZZ DRAINAGE OF PERITONEAL CAVITY, PERCUTANEOUS APPROACH: ICD-10-PCS | Performed by: RADIOLOGY

## 2025-02-05 PROCEDURE — 86301 IMMUNOASSAY TUMOR CA 19-9: CPT

## 2025-02-05 PROCEDURE — 71260 CT THORAX DX C+: CPT

## 2025-02-05 PROCEDURE — 83735 ASSAY OF MAGNESIUM: CPT

## 2025-02-05 PROCEDURE — NC001 PR NO CHARGE: Performed by: PHYSICIAN ASSISTANT

## 2025-02-05 RX ORDER — POTASSIUM CHLORIDE 29.8 MG/ML
40 INJECTION INTRAVENOUS ONCE
Status: DISCONTINUED | OUTPATIENT
Start: 2025-02-05 | End: 2025-02-05 | Stop reason: SDUPTHER

## 2025-02-05 RX ORDER — POTASSIUM CHLORIDE 14.9 MG/ML
20 INJECTION INTRAVENOUS ONCE
Status: COMPLETED | OUTPATIENT
Start: 2025-02-05 | End: 2025-02-05

## 2025-02-05 RX ORDER — LISINOPRIL 10 MG/1
10 TABLET ORAL DAILY
Qty: 30 TABLET | Refills: 5 | Status: SHIPPED | OUTPATIENT
Start: 2025-02-05

## 2025-02-05 RX ADMIN — IOHEXOL 85 ML: 350 INJECTION, SOLUTION INTRAVENOUS at 13:49

## 2025-02-05 RX ADMIN — ALBUTEROL SULFATE 2 PUFF: 90 AEROSOL, METERED RESPIRATORY (INHALATION) at 22:02

## 2025-02-05 RX ADMIN — OXYCODONE 5 MG: 5 TABLET ORAL at 07:55

## 2025-02-05 RX ADMIN — POTASSIUM CHLORIDE 20 MEQ: 14.9 INJECTION, SOLUTION INTRAVENOUS at 11:32

## 2025-02-05 RX ADMIN — POTASSIUM CHLORIDE 20 MEQ: 14.9 INJECTION, SOLUTION INTRAVENOUS at 08:06

## 2025-02-05 RX ADMIN — SODIUM CHLORIDE, SODIUM LACTATE, POTASSIUM CHLORIDE, AND CALCIUM CHLORIDE 80 ML/HR: .6; .31; .03; .02 INJECTION, SOLUTION INTRAVENOUS at 09:44

## 2025-02-05 RX ADMIN — OXYCODONE 5 MG: 5 TABLET ORAL at 20:06

## 2025-02-05 RX ADMIN — FAMOTIDINE 20 MG: 20 TABLET, FILM COATED ORAL at 17:15

## 2025-02-05 NOTE — CASE MANAGEMENT
Case Management Assessment & Discharge Planning Note    Patient name Ewa Hernandes  Location [facility] 2 /E2 -* MRN [id number]  : 1965 Date 2025       Current Admission Date: 2025  Current Admission Diagnosis:Ascites   Patient Active Problem List    Diagnosis Date Noted Date Diagnosed    GERD (gastroesophageal reflux disease) 2025     Hypokalemia 2025     Lactic acidosis 2025     Ovarian cystic mass 2025     Ascites 2025     Elevated platelet count 2025     Mild persistent asthma with (acute) exacerbation 2025     Diarrhea 2025     Nausea 01/10/2024     Financial difficulties 10/14/2023     Panic disorder 2023     Age-related cataract of both eyes 10/05/2022     Other fatigue 2022     Essential hypertension, benign 2020     Major depressive disorder, recurrent episode, mild (HCC) 2020     Asthma 2020       LOS (days): 1  Geometric Mean LOS (GMLOS) (days):   Days to GMLOS:     OBJECTIVE:  PATIENT READMITTED TO HOSPITAL  Risk of Unplanned Readmission Score: 11.52         Current admission status: Inpatient       Preferred Pharmacy:   [facility] - [address]  [address] [address]  Phone: [phone] Fax: [phone]    Primary Care Provider: Rudolph Shaffer DO    Primary Insurance: ENRIQUE MARIE PENDING  Secondary Insurance:     ASSESSMENT:  Active Health Care Proxies    There are no active Health Care Proxies on file.       Advance Directives  Does patient have a Health Care POA?: No  Was patient offered paperwork?: Yes (Provided POA/Advance Directives paperwork)  Does patient have Advance Directives?: No  Was patient offered paperwork?: Yes (Provided POA/Advance Directives paperwork)  Primary Contact: Diogo Slaughter (Significant Other)  [phone] (Home Phone)    Readmission Root Cause  30 Day Readmission: Yes  During your hospital stay, did  someone (provider, nurse, ) explain your care to you in a way you could understand?: Yes  Did you feel medically stable to leave the hospital?: Yes  Did you have reliable transportation to take you to your appointments?: Yes  During previous admission, was a post-acute recommendation made?: Yes  What post-acute resources were offered?: Other (Transfer from [facility] to [facility])  Patient was readmitted due to: intra-abdominal malignancy  Action Plan: transferred for [facility] to [facility] for gynecologic oncology service    Patient Information  Admitted from:: Facility  Mental Status: Alert  During Assessment patient was accompanied by: Spouse  Assessment information provided by:: Patient  Primary Caregiver: Self  Support Systems: Spouse/significant other  County of Residence: [place]  What city do you live in?: [place]  Home entry access options. Select all that apply.: No steps to enter home  Type of Current Residence: 2 story home  Living Arrangements: Lives w/ Spouse/significant other  Is patient a ?: No    Activities of Daily Living Prior to Admission  Functional Status: Independent  Completes ADLs independently?: Yes  Ambulates independently?: Yes  Does patient use assisted devices?: No  Does patient currently own DME?: Yes  What DME does the patient currently own?: Other (Comment) (inhaler)  Does patient have a history of Outpatient Therapy (PT/OT)?: No  Does the patient have a history of Short-Term Rehab?: No  Does patient have a history of HHC?: No  Does patient currently have HHC?: No    Patient Information Continued  Income Source: Employed  Does patient have prescription coverage?: No  Does patient receive dialysis treatments?: No  Does patient have a history of substance abuse?: No  Does patient have a history of Mental Health Diagnosis?: Yes (depression)  Is patient receiving treatment for mental health?: Yes (medication management through PCP)  Has patient received inpatient  treatment related to mental health in the last 2 years?: No    Means of Transportation  Means of Transport to Appts:: Drives Self    DISCHARGE DETAILS:    Discharge planning discussed with:: Patient  Freedom of Choice: Yes  Comments - Freedom of Choice: Pt declined CM making referral to [facility]     Contacts  Patient Contacts: Diogo Slaughter (Significant Other)  [phone] (Home Phone)    CM met with Pt to introduce self, explain role, complete CM assessment, and discuss DC planning.     Pt lives in 2 story home with SO. Pt is independent with ADLs. Pt drives. Pt does not use any DME except for an inhaler.     CM discussed with Pt having no insurance and asked if she was in the process of getting insurance. Pt states she is. CM offered making a referral to [facility]. Pt declined.     Pt does not anticipate any CM needs at time of DC. CM will continue to follow for DC planning needs    NOTE: It does appear that Pt is now MA Pending.

## 2025-02-05 NOTE — CONSULTS
e-Consult (IPC)  - Interventional Radiology  Ewa Hernandes 59 y.o. female MRN: [id number]  Unit/Bed#: E2 -01 Encounter: [id number]          Interventional Radiology has been consulted to evaluate Ewa Hernandes    We were consulted concerning this patient with b/l complex ovarian masses with associated omental caking and ascites.    Inpatient Consult to IR  Consult performed by: Emy Sawyer PA-C  Consult ordered by: Mariela Dunn MD        02/05/25    Assessment/Recommendation:   Patient is a 58 y/o female with HTN, asthma, GERd, depressive disorder who presented to the ED yesterday with abdominal pain N/V/D for a few days. Patient hemodynamically stable with leukocytosis. 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 The patient was transferred to [facility] for gyn onc evaluation. Patient with plan for possible expedited chemo pending biopsy. IR consulted to evaluate patient for omental biopsy and paracentesis.    -Case discussed with Dr. Sandoval  -Plan for IR paracentesis, followed by omental biopsy, timing pending IR schedule  -Tentative plan for today, but may be postponed  -Please keep NPO  -Patient needs INR. Target </= 1.5. Orders placed  -Remainder of care per primary team    UPDATE 1510: Due to urgent IR case requests, must postpone biopsy until tomorrow. Patient may eat today from IR perspective.  -NPO at midnight  -In order to optimize biopsy, will plan to do paracentesis just prior to biopsy, also tomorrow  -Please hold prophylactic subQ heparin tomorrow morning  -Please reach out to IR with any questions/concerns      11-20 minutes, >50% of the total time devoted to medical consultative verbal/EMR discussion between providers. Written report will be generated in the EMR.     Thank you for allowing Interventional Radiology to participate in the care of Ewa Hernandes. Please don't hesitate to call or  TigerText us with any questions.     Emy Sawyer PA-C

## 2025-02-05 NOTE — ASSESSMENT & PLAN NOTE
2/4/25 CT A/P:  Moderate volume abdominopelvic ascites with thickened appearance of the parietal peritoneum, right greater than left.      Plan   -IR consult for possible paracentesis pending

## 2025-02-05 NOTE — ASSESSMENT & PLAN NOTE
59 y.o. 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, pseudomyxoma, Mild direct extension of peritoneal disease into the lower-anterior mediastinum and epicardiac region     -02/05/25: transferred from [facility] to [place] for GynOnc consultation, possible inpatient paracentesis/biopsy as well as possible expedited chemotherapy    Plan:  - IR consult for paracentesis and possible omental biopsy for tissue sampling pending  - Labs: monitor daily labs, f/u CEA and CA 19-9  - F/u CT chest to complete staging  - FEN: NPO diet for poss biopsy, Zofran PRN for N/V  - Pain: Tylenol, oxycodone PRN  - Hx of financial difficulties: CM consulted

## 2025-02-05 NOTE — ASSESSMENT & PLAN NOTE
2/4/25 CT A/P:  Moderate volume abdominopelvic ascites with thickened appearance of the parietal peritoneum, right greater than left.     Plan for IR consultation for consideration of paracentesis and analysis of fluid  NPO at midnight for possible IR procedures tomorrow

## 2025-02-05 NOTE — ASSESSMENT & PLAN NOTE
Presented to [facility] ED w/ complaint of Diarrhea    Plan:   Monitor symptoms: 2/5 no diarrhea overnight  - Continue IV fluids while NPO for procedure

## 2025-02-05 NOTE — UTILIZATION REVIEW
Initial Clinical Review    Admission: Date/Time/Statement:   Admission Orders (From admission, onward)       Ordered        02/04/25 2041  INPATIENT ADMISSION  Once                          Orders Placed This Encounter   Procedures    INPATIENT ADMISSION     Standing Status:   Standing     Number of Occurrences:   1     Level of Care:   Med Surg [16]     Estimated length of stay:   More than 2 Midnights     Certification:   I certify that inpatient services are medically necessary for this patient for a duration of greater than two midnights. See H&P and MD Progress Notes for additional information about the patient's course of treatment.   2/4/2025 (7 hours)  [facility] Emergency Department  Abdominal pain  CA 6483, MAG 1.1, LA 2.6  Treated :iv Mag, iv Kcl, iv Zofran, iv Mso4, iv multi electrolyte 1L bolus.    Transfer to [facility] for higher level of care      Initial Presentation: 59 y.o. female received from [facility] ed for inpatient med surg admission to further evaluate and treat complex cystic ovarian masses, omental caking, ascites, and elevated  concerning for ovarian neoplasm. Abdomen is distended and rigid.  There very mild tenderness.    Plan includes:  Consult IR for paracentesis and possible omental biopsy.          Date: 2- 5-25    Day 2: inpatient med surg  Patient reports abdominal pain with nausea.  WBC 12.11.  On exam : distension. Plan for IR paracentesis today , followed by omental biopsy, timing pending IR schedule. Currently NPO.  INR target <=1.5.   received iv Kcl 20 meq x 2.     ED Treatment-Medication Administration - No Administrations Displayed (No Start Event Found)       None            Scheduled Medications:  albuterol, 2 puff, Inhalation, 4x Daily  amLODIPine, 10 mg, Oral, Daily  famotidine, 20 mg, Oral, BID  FLUoxetine, 40 mg, Oral, Daily  lisinopril, 10 mg, Oral, Daily      Continuous IV Infusions:  lactated ringers, 80 mL/hr, Intravenous,  Continuous      PRN Meds:  acetaminophen, 975 mg, Oral, Q8H PRN  morphine injection, 2 mg, Intravenous, Q4H PRN  ondansetron, 4 mg, Intravenous, Q8H PRN  oxyCODONE, 5 mg, Oral, Q4H PRN      ED Triage Vitals   Temperature Pulse Respirations Blood Pressure SpO2 Pain Score   02/04/25 2022 02/04/25 2022 02/04/25 2022 02/04/25 2022 02/04/25 2022 02/04/25 2024   99.1 °F (37.3 °C) 80 20 119/73 95 % 6     Weight (last 2 days)       Date/Time Weight    02/04/25 2024 79 (174.16)            Vital Signs (last 3 days)       Date/Time Temp Pulse Resp BP MAP (mmHg) SpO2 O2 Device [place] Coma Scale Score Pain    02/05/25 1044 98.1 °F (36.7 °C) 74 18 116/78 85 94 % -- -- --    02/05/25 0900 -- -- -- -- -- -- -- 15 --    02/05/25 0755 -- -- -- -- -- -- -- -- 7    02/05/25 0706 98.2 °F (36.8 °C) 76 14 117/76 85 98 % -- -- 7    02/04/25 2318 98.4 °F (36.9 °C) 79 20 123/71 79 96 % None (Room air) -- --    02/04/25 2151 -- -- -- -- -- -- -- -- 6    02/04/25 2024 -- -- -- -- -- -- -- 15 6    02/04/25 2022 99.1 °F (37.3 °C) 80 20 119/73 84 95 % None (Room air) -- --       Pertinent Labs/Diagnostic Test Results:     Radiology:    CT chest w contrast    (Results Pending)   IR INPATIENT Paracentesis    (Results Pending)   IR biopsy omentum    (Results Pending)     Cardiology:  No orders to display     GI:  No orders to display           Results from last 7 days   Lab Units 02/05/25  0503 02/04/25  1152   WBC Thousand/uL 12.11* 16.37*   HEMOGLOBIN g/dL 11.5 13.0   HEMATOCRIT % 35.9 40.7   PLATELETS Thousands/uL 340 426*   TOTAL NEUT ABS Thousands/µL 9.48* 13.43*         Results from last 7 days   Lab Units 02/05/25  0503 02/04/25  1152   SODIUM mmol/L 141 142   POTASSIUM mmol/L 3.4* 3.1*   CHLORIDE mmol/L 106 100   CO2 mmol/L 26 28   ANION GAP mmol/L 9 14*   BUN mg/dL 10 11   CREATININE mg/dL 0.54* 0.77   EGFR ml/min/1.73sq m 104 85   CALCIUM mg/dL 8.6 8.6   MAGNESIUM mg/dL 2.1 1.1*   PHOSPHORUS mg/dL 3.0  --      Results from last 7 days    Lab Units 02/04/25  1152   AST U/L 21   ALT U/L 9   ALK PHOS U/L 75   TOTAL PROTEIN g/dL 7.4   ALBUMIN g/dL 3.7   TOTAL BILIRUBIN mg/dL 1.09*         Results from last 7 days   Lab Units 02/05/25  0503 02/04/25  1152   GLUCOSE RANDOM mg/dL 92 113       Results from last 7 days   Lab Units 02/05/25  0902   PROTIME seconds 15.6*   INR  1.23*       Results from last 7 days   Lab Units 02/04/25  1417 02/04/25  1152   LACTIC ACID mmol/L 1.1 2.6*     Results from last 7 days   Lab Units 02/04/25  1152   LIPASE u/L 33         Results from last 7 days   Lab Units 02/05/25  0503   CEA ng/mL 2.0         Results from last 7 days   Lab Units 02/04/25  1302   CLARITY UA  Slightly Cloudy   COLOR UA  Yellow   SPEC GRAV UA  <1.005*   PH UA  7.0   GLUCOSE UA mg/dl Negative   KETONES UA mg/dl Negative   BLOOD UA  Trace*   PROTEIN UA mg/dl 30 (1+)*   NITRITE UA  Negative   BILIRUBIN UA  Negative   UROBILINOGEN UA (BE) mg/dl <2.0   LEUKOCYTES UA  Negative   WBC UA /hpf 2-4   RBC UA /hpf 0-1   BACTERIA UA /hpf Occasional   EPITHELIAL CELLS WET PREP /hpf Occasional     Past Medical History:   Diagnosis Date    Anxiety     Asthma     Depression     Eczema     Hypertension     Inflammatory bowel disease      Present on Admission:   Ascites   Asthma   Essential hypertension, benign   Ovarian cystic mass   Major depressive disorder, recurrent episode, mild (HCC)   Lactic acidosis   (Resolved) Hypomagnesemia   Diarrhea   Hypokalemia      Admitting Diagnosis: Ovarian cystic mass [N83.209]    Age/Sex: 59 y.o. female    Network Utilization Review Department  ATTENTION: Please call with any questions or concerns to [phone] and carefully listen to the prompts so that you are directed to the right person. All voicemails are confidential.   For Discharge needs, contact Care Management DC Support Team at [phone] opt. 2  Send all requests for admission clinical reviews, approved or denied determinations and any other requests to dedicated  fax number below belonging to the campus where the patient is receiving treatment. List of dedicated fax numbers for the Facilities:  FACILITY NAME UR FAX NUMBER   ADMISSION DENIALS (Administrative/Medical Necessity) [phone]   DISCHARGE SUPPORT TEAM (NETWORK) [phone]   PARENT CHILD HEALTH (Maternity/NICU/Pediatrics) [phone]   [facility] [phone]   [facility] [phone]   [facility] [phone]   [facility] [phone]   [facility] [phone]   [facility] [phone]   [facility] [phone]   [facility] [phone]   [facility] [phone]   [facility] [phone]   [facility] [phone]   [facility] [phone]

## 2025-02-05 NOTE — ASSESSMENT & PLAN NOTE
Potassium   Date Value Ref Range Status   02/05/2025 3.4 (L) 3.5 - 5.3 mmol/L Final   02/04/2025 3.1 (L) 3.5 - 5.3 mmol/L Final     Continue to replete PRN

## 2025-02-05 NOTE — H&P
For questions/concerns on this patient, please reach out to the following:  [facility]- GynOn Resident   Gyn Oncology H&P  Ewa Hernandes 59 y.o. female MRN: [id number]  Unit/Bed#: E2 -01 Encounter: [id number]    Assessment/Plan:    59 y.o. female with complex cystic ovarian masses, omental caking, ascites, and elevated  concerning for ovarian neoplasm. She was transferred from [facility] for expedited workup and treatment by gynecologic oncology.     Ovarian cystic mass  Assessment & Plan  59 y.o. female with complex cystic ovarian masses, omental caking, ascites, and markedly elevated  at 6483 concerning for ovarian neoplasm     - IR consult for paracentesis and possible omental biopsy for tissue sampling  - f/u CEA and CA 19-9  - CT chest to complete staging  - further treatment planning to come when diagnosis is made  - Zofran PRN for N/V  - Tylenol, oxycodone PRN for pain       GERD (gastroesophageal reflux disease)  Assessment & Plan  Pepcid BID    Ascites  Assessment & Plan  25 CT A/P:  Moderate volume abdominopelvic ascites with thickened appearance of the parietal peritoneum, right greater than left.     Plan for IR consultation for consideration of paracentesis and analysis of fluid  NPO at midnight for possible IR procedures tomorrow      Asthma  Assessment & Plan  Home albuterol ordered PRN    Essential hypertension, benign  Assessment & Plan  Home lisinopril and amlodipine ordered with hold parameters while inpatient             Subjective:    Ewa Hernandes is a 58 yo  who presented to the [facility] ED on 2025 for abdominal pain, nausea, vomiting, diarrhea, and inability to tolerate p.o.  CT scan was done and revealed complex cystic ovarian masses, omental caking, and ascites which were concerning for malignancy with a likely gynecologic source.  She was transferred to [facility] for gynecologic oncology evaluation and workup.  Currently she states her pain is well-controlled.  She  "received morphine prior to the ambulance ride and denies pain at this time.  Antiemetics have been effective in treating her nausea and vomiting and she has not vomited since arriving.  She understands that her imaging is concerning for cancer and that she will require further test to determine where that cancer started and what the best treatment plan will be.  She states the symptoms that brought her in yesterday had an insidious onset.  Over the last few months she has episodic bloating, abdominal pain, GI upset but always resolved so she thought nothing of them.  Yesterday things just got bad enough that she sought care.  She states she went through menopause years ago but does not remember the exact date.  She has 1 daughter which she delivered via vaginal delivery.   She endorses about 15 pound weight loss over the last few months and episodic bloating, early satiety, chills at night.    Objective:  /71 (BP Location: Right arm)   Pulse 79   Temp 98.4 °F (36.9 °C) (Oral)   Resp 20   Ht 5' 2\" (1.575 m)   Wt 79 kg (174 lb 2.6 oz)   SpO2 96%   BMI 31.85 kg/m²     No intake/output data recorded.  No intake/output data recorded.    Lab Results   Component Value Date    WBC 12.11 (H) 02/05/2025    HGB 11.5 02/05/2025    HCT 35.9 02/05/2025    MCV 88 02/05/2025     02/05/2025       Lab Results   Component Value Date    CALCIUM 8.6 02/04/2025    K 3.1 (L) 02/04/2025    CO2 28 02/04/2025     02/04/2025    BUN 11 02/04/2025    CREATININE 0.77 02/04/2025           Physical Exam  Vitals and nursing note reviewed.   Constitutional:       General: She is not in acute distress.     Appearance: Normal appearance. She is not ill-appearing.   HENT:      Head: Normocephalic and atraumatic.      Mouth/Throat:      Mouth: Mucous membranes are moist.      Pharynx: Oropharynx is clear.   Eyes:      General: No scleral icterus.     Extraocular Movements: Extraocular movements intact.   Cardiovascular:      " Rate and Rhythm: Normal rate.      Pulses: Normal pulses.   Pulmonary:      Effort: Pulmonary effort is normal. No respiratory distress.   Abdominal:      General: There is distension.      Tenderness: There is no guarding or rebound.      Comments: Abdomen is distended and rigid  There very mild tenderness   No masses palpable  Skin is dry and flaky with patchy erythema   Genitourinary:     Comments: deferred  Musculoskeletal:         General: No tenderness.      Right lower leg: No edema.      Left lower leg: No edema.   Skin:     General: Skin is warm and dry.      Findings: No erythema, lesion or rash.   Neurological:      General: No focal deficit present.      Mental Status: She is alert and oriented to person, place, and time.   Psychiatric:         Mood and Affect: Mood normal.         Behavior: Behavior normal.           Arlene Hoffman MD  2/5/2025  6:01 AM

## 2025-02-05 NOTE — PROGRESS NOTES
[facility] received referral from interdisciplinary team for patient who is uninsured. Sent referral information to [facility] financial advocacy for review as well.     No other needs identified at this time.

## 2025-02-05 NOTE — PLAN OF CARE
Problem: PAIN - ADULT  Goal: Verbalizes/displays adequate comfort level or baseline comfort level  Description: Interventions:  - Encourage patient to monitor pain and request assistance  - Assess pain using appropriate pain scale  - Administer analgesics based on type and severity of pain and evaluate response  - Implement non-pharmacological measures as appropriate and evaluate response  - Consider cultural and social influences on pain and pain management  - Notify physician/advanced practitioner if interventions unsuccessful or patient reports new pain  2/5/2025 1028 by Kenny Hogan RN  Outcome: Progressing  2/5/2025 1027 by Kenny Hogan RN  Outcome: Progressing     Problem: METABOLIC, FLUID AND ELECTROLYTES - ADULT  Goal: Electrolytes maintained within normal limits  Description: INTERVENTIONS:  - Monitor labs and assess patient for signs and symptoms of electrolyte imbalances  - Administer electrolyte replacement as ordered  - Monitor response to electrolyte replacements, including repeat lab results as appropriate  - Instruct patient on fluid and nutrition as appropriate  Outcome: Progressing  Goal: Fluid balance maintained  Description: INTERVENTIONS:  - Monitor labs   - Monitor I/O and WT  - Instruct patient on fluid and nutrition as appropriate  - Assess for signs & symptoms of volume excess or deficit  Outcome: Progressing  Goal: Glucose maintained within target range  Description: INTERVENTIONS:  - Monitor Blood Glucose as ordered  - Assess for signs and symptoms of hyperglycemia and hypoglycemia  - Administer ordered medications to maintain glucose within target range  - Assess nutritional intake and initiate nutrition service referral as needed  Outcome: Progressing       Problem: MUSCULOSKELETAL - ADULT  Goal: Maintain or return mobility to safest level of function  Description: INTERVENTIONS:  - Assess patient's ability to carry out ADLs; assess patient's baseline for ADL function and identify  physical deficits which impact ability to perform ADLs (bathing, care of mouth/teeth, toileting, grooming, dressing, etc.)  - Assess/evaluate cause of self-care deficits   - Assess range of motion  - Assess patient's mobility  - Assess patient's need for assistive devices and provide as appropriate  - Encourage maximum independence but intervene and supervise when necessary  - Involve family in performance of ADLs  - Assess for home care needs following discharge   - Consider OT consult to assist with ADL evaluation and planning for discharge  - Provide patient education as appropriate  Outcome: Progressing  Goal: Maintain proper alignment of affected body part  Description: INTERVENTIONS:  - Support, maintain and protect limb and body alignment  - Provide patient/ family with appropriate education  Outcome: Progressing     Problem: GASTROINTESTINAL - ADULT  Goal: Minimal or absence of nausea and/or vomiting  Description: INTERVENTIONS:  - Administer IV fluids if ordered to ensure adequate hydration  - Maintain NPO status until nausea and vomiting are resolved  - Nasogastric tube if ordered  - Administer ordered antiemetic medications as needed  - Provide nonpharmacologic comfort measures as appropriate  - Advance diet as tolerated, if ordered  - Consider nutrition services referral to assist patient with adequate nutrition and appropriate food choices  Outcome: Progressing  Goal: Maintains or returns to baseline bowel function  Description: INTERVENTIONS:  - Assess bowel function  - Encourage oral fluids to ensure adequate hydration  - Administer IV fluids if ordered to ensure adequate hydration  - Administer ordered medications as needed  - Encourage mobilization and activity  - Consider nutritional services referral to assist patient with adequate nutrition and appropriate food choices  Outcome: Progressing  Goal: Maintains adequate nutritional intake  Description: INTERVENTIONS:  - Monitor percentage of each  meal consumed  - Identify factors contributing to decreased intake, treat as appropriate  - Assist with meals as needed  - Monitor I&O, weight, and lab values if indicated  - Obtain nutrition services referral as needed  Outcome: Progressing  Goal: Establish and maintain optimal ostomy function  Description: INTERVENTIONS:  - Assess bowel function  - Encourage oral fluids to ensure adequate hydration  - Administer IV fluids if ordered to ensure adequate hydration   - Administer ordered medications as needed  - Encourage mobilization and activity  - Nutrition services referral to assist patient with appropriate food choices  - Assess stoma site  - Consider wound care consult   Outcome: Progressing  Goal: Oral mucous membranes remain intact  Description: INTERVENTIONS  - Assess oral mucosa and hygiene practices  - Implement preventative oral hygiene regimen  - Implement oral medicated treatments as ordered  - Initiate Nutrition services referral as needed  Outcome: Progressing

## 2025-02-05 NOTE — ASSESSMENT & PLAN NOTE
59 y.o. female with complex cystic ovarian masses, omental caking, ascites, and markedly elevated  at 6483 concerning for ovarian neoplasm     - IR consult for paracentesis and possible omental biopsy for tissue sampling  - f/u CEA and CA 19-9  - CT chest to complete staging  - further treatment planning to come when diagnosis is made  - Zofran PRN for N/V  - Tylenol, oxycodone PRN for pain

## 2025-02-05 NOTE — PROGRESS NOTES
Progress Note - GYN Oncology   Name: Ewa Hernandes 59 y.o. female I MRN: [id number]  Unit/Bed#: E2 -01 I Date of Admission: 2/4/2025   Date of Service: 2/5/2025 I Hospital Day: 1     Assessment & Plan  Essential hypertension, benign  Home lisinopril and amlodipine ordered with hold parameters while inpatient    Asthma  Continue home albuterol   Ovarian cystic mass  59 y.o. 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, pseudomyxoma, Mild direct extension of peritoneal disease into the lower-anterior mediastinum and epicardiac region     -02/05/25: transferred from [facility] to [place] for GynOnc consultation, possible inpatient paracentesis/biopsy as well as possible expedited chemotherapy    Plan:  - IR consult for paracentesis and possible omental biopsy for tissue sampling pending  - Labs: monitor daily labs, f/u CEA and CA 19-9  - F/u CT chest to complete staging  - FEN: NPO diet for poss biopsy, Zofran PRN for N/V  - Pain: Tylenol, oxycodone PRN  - Hx of financial difficulties: CM consulted      Ascites  2/4/25 CT A/P:  Moderate volume abdominopelvic ascites with thickened appearance of the parietal peritoneum, right greater than left.      Plan   -IR consult for possible paracentesis pending        GERD (gastroesophageal reflux disease)  Continue Pepcid BID  Major depressive disorder, recurrent episode, mild (HCC)  Continue home Fluoxetine  Lactic acidosis          Hypomagnesemia (Resolved: 2/5/2025)  S/P repletion  2/5 AM resolved  Diarrhea  Presented to [facility] ED w/ complaint of Diarrhea    Plan:   Monitor symptoms: 2/5 no diarrhea overnight  - Continue IV fluids while NPO for procedure   Hypokalemia  Potassium   Date Value Ref Range Status   02/05/2025 3.4 (L) 3.5 - 5.3 mmol/L Final   02/04/2025 3.1 (L) 3.5 - 5.3 mmol/L  "Final     Continue to replete PRN               Subjective:    Ewa Hernandes reports that she has abdominal pain but her nausea has improved since receiving antiemetics. Denies emesis overnight. She denies diarrhea since transfer to [place]. Patient denies fever, chills, chest pain, shortness of breath, or calf tenderness.     Objective:  /76 (BP Location: Right arm)   Pulse 76   Temp 98.2 °F (36.8 °C) (Oral)   Resp 14   Ht 5' 2\" (1.575 m)   Wt 79 kg (174 lb 2.6 oz)   SpO2 98%   BMI 31.85 kg/m²     I/O last 3 completed shifts:  In: 60 [P.O.:60]  Out: -   No intake/output data recorded.    Lab Results   Component Value Date    WBC 12.11 (H) 02/05/2025    HGB 11.5 02/05/2025    HCT 35.9 02/05/2025    MCV 88 02/05/2025     02/05/2025       Lab Results   Component Value Date    CALCIUM 8.6 02/05/2025    K 3.4 (L) 02/05/2025    CO2 26 02/05/2025     02/05/2025    BUN 10 02/05/2025    CREATININE 0.54 (L) 02/05/2025           Physical Exam  Constitutional:       Appearance: Normal appearance.   HENT:      Head: Normocephalic and atraumatic.   Eyes:      Extraocular Movements: Extraocular movements intact.   Cardiovascular:      Pulses: Normal pulses.   Pulmonary:      Effort: Pulmonary effort is normal.   Abdominal:      General: There is distension.      Tenderness: There is abdominal tenderness (diffuse on all quadrants). There is no guarding or rebound.   Musculoskeletal:         General: No tenderness or deformity. Normal range of motion.      Cervical back: Normal range of motion.      Right lower leg: No edema.      Left lower leg: No edema.   Skin:     General: Skin is warm and dry.      Findings: No bruising or lesion.   Neurological:      General: No focal deficit present.      Mental Status: She is alert.   Psychiatric:         Mood and Affect: Mood normal.         Behavior: Behavior normal.           Mariela Dunn MD  2/5/2025  7:42 AM      "

## 2025-02-06 ENCOUNTER — APPOINTMENT (INPATIENT)
Dept: RADIOLOGY | Facility: HOSPITAL | Age: 60
DRG: 532 | End: 2025-02-06
Payer: COMMERCIAL

## 2025-02-06 VITALS
WEIGHT: 174.16 LBS | DIASTOLIC BLOOD PRESSURE: 71 MMHG | TEMPERATURE: 98.3 F | SYSTOLIC BLOOD PRESSURE: 110 MMHG | OXYGEN SATURATION: 96 % | HEIGHT: 62 IN | BODY MASS INDEX: 32.05 KG/M2 | HEART RATE: 79 BPM | RESPIRATION RATE: 16 BRPM

## 2025-02-06 LAB
ALBUMIN FLD-MCNC: 2.4 G/DL
ANION GAP SERPL CALCULATED.3IONS-SCNC: 9 MMOL/L (ref 4–13)
APPEARANCE FLD: CLEAR
BASOPHILS # BLD AUTO: 0.02 THOUSANDS/ΜL (ref 0–0.1)
BASOPHILS NFR BLD AUTO: 0 % (ref 0–1)
BUN SERPL-MCNC: 9 MG/DL (ref 5–25)
CALCIUM SERPL-MCNC: 8.8 MG/DL (ref 8.4–10.2)
CANCER AG19-9 SERPL-ACNC: 12 U/ML (ref 0–35)
CHLORIDE SERPL-SCNC: 105 MMOL/L (ref 96–108)
CO2 SERPL-SCNC: 25 MMOL/L (ref 21–32)
COLOR FLD: YELLOW
CREAT SERPL-MCNC: 0.53 MG/DL (ref 0.6–1.3)
EOSINOPHIL # BLD AUTO: 0.07 THOUSAND/ΜL (ref 0–0.61)
EOSINOPHIL NFR BLD AUTO: 1 % (ref 0–6)
EOSINOPHIL NFR FLD MANUAL: 1 %
ERYTHROCYTE [DISTWIDTH] IN BLOOD BY AUTOMATED COUNT: 14.6 % (ref 11.6–15.1)
GFR SERPL CREATININE-BSD FRML MDRD: 104 ML/MIN/1.73SQ M
GLUCOSE FLD-MCNC: 54 MG/DL
GLUCOSE SERPL-MCNC: 74 MG/DL (ref 65–140)
HCT VFR BLD AUTO: 33.2 % (ref 34.8–46.1)
HGB BLD-MCNC: 10.5 G/DL (ref 11.5–15.4)
HISTIOCYTES NFR FLD: 46 %
IMM GRANULOCYTES # BLD AUTO: 0.02 THOUSAND/UL (ref 0–0.2)
IMM GRANULOCYTES NFR BLD AUTO: 0 % (ref 0–2)
LDH FLD L TO P-CCNC: 354 U/L
LYMPHOCYTES # BLD AUTO: 1.25 THOUSANDS/ΜL (ref 0.6–4.47)
LYMPHOCYTES NFR BLD AUTO: 13 % (ref 14–44)
LYMPHOCYTES NFR BLD AUTO: 26 %
MAGNESIUM SERPL-MCNC: 2 MG/DL (ref 1.9–2.7)
MCH RBC QN AUTO: 28.8 PG (ref 26.8–34.3)
MCHC RBC AUTO-ENTMCNC: 31.6 G/DL (ref 31.4–37.4)
MCV RBC AUTO: 91 FL (ref 82–98)
MONOCYTES # BLD AUTO: 0.94 THOUSAND/ΜL (ref 0.17–1.22)
MONOCYTES NFR BLD AUTO: 10 % (ref 4–12)
MONOCYTES NFR BLD AUTO: 3 %
NEUTROPHILS # BLD AUTO: 7.39 THOUSANDS/ΜL (ref 1.85–7.62)
NEUTS SEG NFR BLD AUTO: 24 %
NEUTS SEG NFR BLD AUTO: 76 % (ref 43–75)
NRBC BLD AUTO-RTO: 0 /100 WBCS
PHOSPHATE SERPL-MCNC: 3.2 MG/DL (ref 2.7–4.5)
PLATELET # BLD AUTO: 355 THOUSANDS/UL (ref 149–390)
PMV BLD AUTO: 12.1 FL (ref 8.9–12.7)
POTASSIUM SERPL-SCNC: 3.8 MMOL/L (ref 3.5–5.3)
PROT FLD-MCNC: 4 G/DL
RBC # BLD AUTO: 3.65 MILLION/UL (ref 3.81–5.12)
SITE: NORMAL
SODIUM SERPL-SCNC: 139 MMOL/L (ref 135–147)
TOTAL CELLS COUNTED SPEC: 100
WBC # BLD AUTO: 9.69 THOUSAND/UL (ref 4.31–10.16)
WBC # FLD MANUAL: 402 /UL

## 2025-02-06 PROCEDURE — 84157 ASSAY OF PROTEIN OTHER: CPT | Performed by: PHYSICIAN ASSISTANT

## 2025-02-06 PROCEDURE — 99152 MOD SED SAME PHYS/QHP 5/>YRS: CPT | Performed by: RADIOLOGY

## 2025-02-06 PROCEDURE — 88305 TISSUE EXAM BY PATHOLOGIST: CPT | Performed by: PATHOLOGY

## 2025-02-06 PROCEDURE — 88341 IMHCHEM/IMCYTCHM EA ADD ANTB: CPT | Performed by: PATHOLOGY

## 2025-02-06 PROCEDURE — 85025 COMPLETE CBC W/AUTO DIFF WBC: CPT

## 2025-02-06 PROCEDURE — 83615 LACTATE (LD) (LDH) ENZYME: CPT | Performed by: PHYSICIAN ASSISTANT

## 2025-02-06 PROCEDURE — 99238 HOSP IP/OBS DSCHRG MGMT 30/<: CPT | Performed by: OBSTETRICS & GYNECOLOGY

## 2025-02-06 PROCEDURE — 87205 SMEAR GRAM STAIN: CPT | Performed by: PHYSICIAN ASSISTANT

## 2025-02-06 PROCEDURE — 49083 ABD PARACENTESIS W/IMAGING: CPT | Performed by: RADIOLOGY

## 2025-02-06 PROCEDURE — 49180 BIOPSY ABDOMINAL MASS: CPT

## 2025-02-06 PROCEDURE — 87070 CULTURE OTHR SPECIMN AEROBIC: CPT | Performed by: PHYSICIAN ASSISTANT

## 2025-02-06 PROCEDURE — 80048 BASIC METABOLIC PNL TOTAL CA: CPT

## 2025-02-06 PROCEDURE — 88342 IMHCHEM/IMCYTCHM 1ST ANTB: CPT | Performed by: PATHOLOGY

## 2025-02-06 PROCEDURE — 82042 OTHER SOURCE ALBUMIN QUAN EA: CPT | Performed by: PHYSICIAN ASSISTANT

## 2025-02-06 PROCEDURE — 88361 TUMOR IMMUNOHISTOCHEM/COMPUT: CPT | Performed by: PATHOLOGY

## 2025-02-06 PROCEDURE — 84100 ASSAY OF PHOSPHORUS: CPT

## 2025-02-06 PROCEDURE — 83735 ASSAY OF MAGNESIUM: CPT

## 2025-02-06 PROCEDURE — 49180 BIOPSY ABDOMINAL MASS: CPT | Performed by: RADIOLOGY

## 2025-02-06 PROCEDURE — 76942 ECHO GUIDE FOR BIOPSY: CPT

## 2025-02-06 PROCEDURE — 89051 BODY FLUID CELL COUNT: CPT | Performed by: PHYSICIAN ASSISTANT

## 2025-02-06 PROCEDURE — 82945 GLUCOSE OTHER FLUID: CPT | Performed by: PHYSICIAN ASSISTANT

## 2025-02-06 PROCEDURE — NC001 PR NO CHARGE: Performed by: OBSTETRICS & GYNECOLOGY

## 2025-02-06 PROCEDURE — 49083 ABD PARACENTESIS W/IMAGING: CPT

## 2025-02-06 PROCEDURE — 76942 ECHO GUIDE FOR BIOPSY: CPT | Performed by: RADIOLOGY

## 2025-02-06 PROCEDURE — 88112 CYTOPATH CELL ENHANCE TECH: CPT | Performed by: PATHOLOGY

## 2025-02-06 RX ORDER — LIDOCAINE WITH 8.4% SOD BICARB 0.9%(10ML)
SYRINGE (ML) INJECTION AS NEEDED
Status: COMPLETED | OUTPATIENT
Start: 2025-02-06 | End: 2025-02-06

## 2025-02-06 RX ORDER — MIDAZOLAM HYDROCHLORIDE 2 MG/2ML
INJECTION, SOLUTION INTRAMUSCULAR; INTRAVENOUS AS NEEDED
Status: COMPLETED | OUTPATIENT
Start: 2025-02-06 | End: 2025-02-06

## 2025-02-06 RX ORDER — FENTANYL CITRATE 50 UG/ML
INJECTION, SOLUTION INTRAMUSCULAR; INTRAVENOUS AS NEEDED
Status: COMPLETED | OUTPATIENT
Start: 2025-02-06 | End: 2025-02-06

## 2025-02-06 RX ORDER — ACETAMINOPHEN 325 MG/1
650 TABLET ORAL EVERY 6 HOURS PRN
Start: 2025-02-06

## 2025-02-06 RX ADMIN — FAMOTIDINE 20 MG: 20 TABLET, FILM COATED ORAL at 11:48

## 2025-02-06 RX ADMIN — FENTANYL CITRATE 50 MCG: 50 INJECTION INTRAMUSCULAR; INTRAVENOUS at 10:57

## 2025-02-06 RX ADMIN — Medication 10 ML: at 11:08

## 2025-02-06 RX ADMIN — FLUOXETINE HYDROCHLORIDE 40 MG: 20 CAPSULE ORAL at 11:49

## 2025-02-06 RX ADMIN — FENTANYL CITRATE 50 MCG: 50 INJECTION INTRAMUSCULAR; INTRAVENOUS at 11:10

## 2025-02-06 RX ADMIN — SODIUM CHLORIDE, SODIUM LACTATE, POTASSIUM CHLORIDE, AND CALCIUM CHLORIDE 80 ML/HR: .6; .31; .03; .02 INJECTION, SOLUTION INTRAVENOUS at 00:15

## 2025-02-06 RX ADMIN — MIDAZOLAM 1 MG: 1 INJECTION INTRAMUSCULAR; INTRAVENOUS at 11:04

## 2025-02-06 RX ADMIN — MIDAZOLAM 1 MG: 1 INJECTION INTRAMUSCULAR; INTRAVENOUS at 10:57

## 2025-02-06 NOTE — ASSESSMENT & PLAN NOTE
Potassium   Date Value Ref Range Status   02/06/2025 3.8 3.5 - 5.3 mmol/L Final   02/05/2025 3.4 (L) 3.5 - 5.3 mmol/L Final     Continue to replete PRN

## 2025-02-06 NOTE — PROGRESS NOTES
Progress Note - GYN Oncology   Name: Ewa Hernandes 59 y.o. female I MRN: [id number]  Unit/Bed#: E2 -01 I Date of Admission: 2/4/2025   Date of Service: 2/6/2025 Hospital Day: 2     Assessment & Plan  Essential hypertension, benign  Home lisinopril and amlodipine ordered with hold parameters while inpatient    Asthma  Continue home albuterol   Ovarian cystic mass  59 y.o. 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, pseudomyxoma, Mild direct extension of peritoneal disease into the lower-anterior mediastinum and epicardiac region     -02/04/25: transferred from [facility] to [place] for GynOnc consultation, possible inpatient paracentesis/biopsy as well as possible expedited chemotherapy  -02/05/2025: due to overbooked scheduling, IR rescheduled paracentesis and omental biopsy for 02/06/2025  - 2/5/2025 CT Chest: showing no evidence of pulmonary metastasis    Plan:  - IR consulted, paracentesis and possible omental biopsy for tissue sampling scheduled today   - Labs: monitor daily labs, CEA nl at 2.0 and CA 19-9 pending  - FEN: NPO diet for paracentesis and omental biopsy, Zofran PRN for N/V  - Pain: Tylenol, oxycodone PRN  - Hx of financial difficulties: CM consulted      Ascites  2/4/25 CT A/P:  Moderate volume abdominopelvic ascites with thickened appearance of the parietal peritoneum, right greater than left.      Plan   - Scheduled for paracentesis and possible omental biopsy        GERD (gastroesophageal reflux disease)  Continue Pepcid BID  Major depressive disorder, recurrent episode, mild (HCC)  Continue home Fluoxetine  Lactic acidosis          Diarrhea  Presented to [facility] ED w/ complaint of Diarrhea    Plan:   Monitor symptoms: 2/5 no diarrhea overnight  - Continue IV fluids while NPO for procedure    Hypokalemia  Potassium   Date Value Ref Range Status   02/06/2025 3.8 3.5 - 5.3 mmol/L Final   02/05/2025 3.4 (L) 3.5 - 5.3 mmol/L Final     Continue to replete PRN    24 Hour Events : No acute events.   Subjective : Patient is doing well. She was able to eat meal before being NPO for her IR procedures. She reports having 8/10 abdominal pain after eating meals, but no pain at rest. She is voiding spontaneously. She is passing flatus. She has ambulated. She denies fever/chills. She denies chest pain and shortness of breath.     Objective :  Temp:  [97.9 °F (36.6 °C)-98.2 °F (36.8 °C)] 98.2 °F (36.8 °C)  HR:  [73-76] 76  BP: (116-137)/(76-88) 137/88  Resp:  [14-18] 18  SpO2:  [94 %-98 %] 94 %  O2 Device: None (Room air)    Physical Exam  Vitals and nursing note reviewed.   Constitutional:       General: She is not in acute distress.     Appearance: Normal appearance. She is not ill-appearing.   HENT:      Head: Normocephalic and atraumatic.   Eyes:      Extraocular Movements: Extraocular movements intact.   Cardiovascular:      Rate and Rhythm: Normal rate and regular rhythm.      Pulses: Normal pulses.      Heart sounds: Normal heart sounds.   Pulmonary:      Effort: Pulmonary effort is normal. No respiratory distress.      Breath sounds: Normal breath sounds.   Abdominal:      General: Bowel sounds are normal. There is distension.      Palpations: Abdomen is soft.      Tenderness: There is no abdominal tenderness.   Musculoskeletal:         General: Normal range of motion.      Cervical back: Normal range of motion.   Skin:     General: Skin is warm.      Capillary Refill: Capillary refill takes less than 2 seconds.   Neurological:      General: No focal deficit present.      Mental Status: She is alert. Mental status is at baseline.   Psychiatric:         Mood and Affect: Mood normal.         Behavior: Behavior normal.         Lab Results: I have reviewed the following results:CBC/BMP:   .     02/06/25  0424    WBC 9.69   HGB 10.5*   HCT 33.2*      SODIUM 139   K 3.8      CO2 25   BUN 9   CREATININE 0.53*   GLUC 74   MG 2.0   PHOS 3.2        Imaging Results Review: I reviewed radiology reports from this admission including: CT chest.  Other Study Results Review: No additional pertinent studies reviewed.    VTE Pharmacologic Prophylaxis: Reason for no pharmacologic prophylaxis NPO for IR procedure  VTE Mechanical Prophylaxis: sequential compression device    Mack St MD  Obstetrics & Gynecology PGY-1  2/6/2025  6:37 AM

## 2025-02-06 NOTE — ASSESSMENT & PLAN NOTE
59 y.o. 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, pseudomyxoma, Mild direct extension of peritoneal disease into the lower-anterior mediastinum and epicardiac region     -02/04/25: transferred from [facility] to [place] for GynOnc consultation, possible inpatient paracentesis/biopsy as well as possible expedited chemotherapy  -02/05/2025: due to overbooked scheduling, IR rescheduled paracentesis and omental biopsy for 02/06/2025  - 2/5/2025 CT Chest: showing no evidence of pulmonary metastasis    Plan:  - IR consulted, paracentesis and possible omental biopsy for tissue sampling scheduled today   - Labs: monitor daily labs, CEA nl at 2.0 and CA 19-9 pending  - FEN: NPO diet for paracentesis and omental biopsy, Zofran PRN for N/V  - Pain: Tylenol, oxycodone PRN  - Hx of financial difficulties: CM consulted

## 2025-02-06 NOTE — PLAN OF CARE
Problem: PAIN - ADULT  Goal: Verbalizes/displays adequate comfort level or baseline comfort level  Description: Interventions:  - Encourage patient to monitor pain and request assistance  - Assess pain using appropriate pain scale  - Administer analgesics based on type and severity of pain and evaluate response  - Implement non-pharmacological measures as appropriate and evaluate response  - Consider cultural and social influences on pain and pain management  - Notify physician/advanced practitioner if interventions unsuccessful or patient reports new pain  Outcome: Progressing     Problem: METABOLIC, FLUID AND ELECTROLYTES - ADULT  Goal: Electrolytes maintained within normal limits  Description: INTERVENTIONS:  - Monitor labs and assess patient for signs and symptoms of electrolyte imbalances  - Administer electrolyte replacement as ordered  - Monitor response to electrolyte replacements, including repeat lab results as appropriate  - Instruct patient on fluid and nutrition as appropriate  Outcome: Progressing  Goal: Fluid balance maintained  Description: INTERVENTIONS:  - Monitor labs   - Monitor I/O and WT  - Instruct patient on fluid and nutrition as appropriate  - Assess for signs & symptoms of volume excess or deficit  Outcome: Progressing  Goal: Glucose maintained within target range  Description: INTERVENTIONS:  - Monitor Blood Glucose as ordered  - Assess for signs and symptoms of hyperglycemia and hypoglycemia  - Administer ordered medications to maintain glucose within target range  - Assess nutritional intake and initiate nutrition service referral as needed  Outcome: Progressing     Problem: MUSCULOSKELETAL - ADULT  Goal: Maintain or return mobility to safest level of function  Description: INTERVENTIONS:  - Assess patient's ability to carry out ADLs; assess patient's baseline for ADL function and identify physical deficits which impact ability to perform ADLs (bathing, care of mouth/teeth, toileting,  grooming, dressing, etc.)  - Assess/evaluate cause of self-care deficits   - Assess range of motion  - Assess patient's mobility  - Assess patient's need for assistive devices and provide as appropriate  - Encourage maximum independence but intervene and supervise when necessary  - Involve family in performance of ADLs  - Assess for home care needs following discharge   - Consider OT consult to assist with ADL evaluation and planning for discharge  - Provide patient education as appropriate  Outcome: Progressing  Goal: Maintain proper alignment of affected body part  Description: INTERVENTIONS:  - Support, maintain and protect limb and body alignment  - Provide patient/ family with appropriate education  Outcome: Progressing     Problem: GASTROINTESTINAL - ADULT  Goal: Minimal or absence of nausea and/or vomiting  Description: INTERVENTIONS:  - Administer IV fluids if ordered to ensure adequate hydration  - Maintain NPO status until nausea and vomiting are resolved  - Nasogastric tube if ordered  - Administer ordered antiemetic medications as needed  - Provide nonpharmacologic comfort measures as appropriate  - Advance diet as tolerated, if ordered  - Consider nutrition services referral to assist patient with adequate nutrition and appropriate food choices  Outcome: Progressing  Goal: Maintains or returns to baseline bowel function  Description: INTERVENTIONS:  - Assess bowel function  - Encourage oral fluids to ensure adequate hydration  - Administer IV fluids if ordered to ensure adequate hydration  - Administer ordered medications as needed  - Encourage mobilization and activity  - Consider nutritional services referral to assist patient with adequate nutrition and appropriate food choices  Outcome: Progressing  Goal: Maintains adequate nutritional intake  Description: INTERVENTIONS:  - Monitor percentage of each meal consumed  - Identify factors contributing to decreased intake, treat as appropriate  - Assist  with meals as needed  - Monitor I&O, weight, and lab values if indicated  - Obtain nutrition services referral as needed  Outcome: Progressing  Goal: Establish and maintain optimal ostomy function  Description: INTERVENTIONS:  - Assess bowel function  - Encourage oral fluids to ensure adequate hydration  - Administer IV fluids if ordered to ensure adequate hydration   - Administer ordered medications as needed  - Encourage mobilization and activity  - Nutrition services referral to assist patient with appropriate food choices  - Assess stoma site  - Consider wound care consult   Outcome: Progressing  Goal: Oral mucous membranes remain intact  Description: INTERVENTIONS  - Assess oral mucosa and hygiene practices  - Implement preventative oral hygiene regimen  - Implement oral medicated treatments as ordered  - Initiate Nutrition services referral as needed  Outcome: Progressing

## 2025-02-06 NOTE — DISCHARGE SUMMARY
"Discharge Summary - Gynecologic Oncology  Ewa Hernandes 59 y.o. female MRN: [id number]  Unit/Bed#: E2 -01 Encounter: [id number]    Admission Date: 2/4/2025   Discharge Date: 02/06/25    Attending Physician: Ap    Consulting Physician(s): IR    Admitting Diagnosis:   Ovarian cystic mass [N83.209]  Ascites  Asthma  Essential hypertension      Discharge Diagnosis: Same    Procedures Performed: paracentesis and omental biopsy    Hospital Course:   The patient was admitted as a transfer from the [facility] ED for expedited gynecologic oncology workup for  bilateral complex ovarian cysts, omental caking, and ascites noted on CT imaging. She originally presented with abdominal pain, nausea/vomiting, diarrhea. She was originally scheduled for IR paracentesis and omental biopsy, but was postponed to the next day due to scheduling. She was sent for CT chest to assess for pulmonary metastasis, which there was no evidence for.     On hospital day 2, she had gotten the paracentesis and omental biopsy done.     The patient was discharged home on HD 2 in stable condition.  She was asked to follow up with Dr. De Souza this following week to review results of the paracentesis and omental biopsy, as well as to consent for chemotherapy.     Lab Results:   Lab Results   Component Value Date    WBC 9.69 02/06/2025    HGB 10.5 (L) 02/06/2025    HCT 33.2 (L) 02/06/2025    MCV 91 02/06/2025     02/06/2025     Lab Results   Component Value Date    CALCIUM 8.8 02/06/2025    K 3.8 02/06/2025    CO2 25 02/06/2025     02/06/2025    BUN 9 02/06/2025    CREATININE 0.53 (L) 02/06/2025     No results found for: \"POCGLU\"  No results found for: \"PTT\"  Lab Results   Component Value Date    INR 1.23 (H) 02/05/2025    PROTIME 15.6 (H) 02/05/2025       Pathology: N/A    Imaging:     CT AP w/ contrast (2/4/25) from [facility]:   1. Bilateral complex. cystic ovarian masses with associated omental caking and " pseudomyxoma. Gynecology-oncology consultation recommended.     2. Mild direct extension of peritoneal disease into the lower-anterior mediastinum and epicardiac region. No hematogenous metastases demonstrated, though.     3.  Small bilateral pleural effusions (left greater than right).    CT Chest w/ contrast (2/5/25):   Small left greater than right pleural effusions.     No pulmonary metastases.      Condition at Discharge: good     Discharge Medications: See after visit summary for reconciled discharge medications provided to patient and family.      Discharge instructions/Information to patient and family: See after visit summary for information provided to patient and family.      Provisions for Follow-Up Care: See after visit summary for information related to follow-up care and any pertinent home health orders.      Disposition: Home    Planned Readmission: No    Code Status: no recorded code status      Mack St MD  Obstetrics & Gynecology PGY-1  2/6/2025  3:48 PM

## 2025-02-06 NOTE — RESTORATIVE TECHNICIAN NOTE
Restorative Technician Note      Patient Name: Ewa Hernandes     Restorative Tech Visit Date: 02/06/25  Note Type: Mobility  Patient Position Upon Consult: Bedside chair  Activity Performed: Ambulated; Range of motion  Patient Position at End of Consult: All needs within reach; Bedside chair

## 2025-02-06 NOTE — CASE MANAGEMENT
Case Management Discharge Planning Note    Patient name Ewa Hernandes  Location East 2 /E2 -* MRN [id number]  : 1965 Date 2025       Current Admission Date: 2025  Current Admission Diagnosis:Ascites   Patient Active Problem List    Diagnosis Date Noted Date Diagnosed    GERD (gastroesophageal reflux disease) 2025     Hypokalemia 2025     Lactic acidosis 2025     Ovarian cystic mass 2025     Ascites 2025     Elevated platelet count 2025     Mild persistent asthma with (acute) exacerbation 2025     Diarrhea 2025     Nausea 01/10/2024     Financial difficulties 10/14/2023     Panic disorder 2023     Age-related cataract of both eyes 10/05/2022     Other fatigue 2022     Essential hypertension, benign 2020     Major depressive disorder, recurrent episode, mild (HCC) 2020     Asthma 2020       LOS (days): 2  Geometric Mean LOS (GMLOS) (days):   Days to GMLOS:     OBJECTIVE:  Risk of Unplanned Readmission Score: 11.66         Current admission status: Inpatient   Preferred Pharmacy:   [facility] - [address]  [address] [address]  Phone: [phone] Fax: [phone]    Primary Care Provider: Rudolph Shaffer DO    Primary Insurance: ENRIQUE MARIE PENDING  Secondary Insurance:     DISCHARGE DETAILS:                                                                                                 Additional Comments: Received call from provider that patient going home on Eliquis, called patient's pharmacy and provided information for 30-day free Eliquis -- pharmacy attempted to run information, was denied.  Provider updated, they will consider alternate NOAC.

## 2025-02-06 NOTE — BRIEF OP NOTE (RAD/CATH)
INTERVENTIONAL RADIOLOGY PROCEDURE NOTE    Date: 2/6/2025    Procedure:   IR PARACENTESIS  IR BIOPSY  Procedure Summary       Date:  Room / Location:     Anesthesia Start:  Anesthesia Stop:     Procedure:  Diagnosis:     Scheduled Providers:  Responsible Provider:     Anesthesia Type: Not recorded ASA Status: Not recorded            Preoperative diagnosis:   1. Cysts of both ovaries    2. Financial difficulties    3. Cyst of ovary, unspecified laterality    4. Malignant ascites         Postoperative diagnosis: Same.    Surgeon: Thuy Ruano MD     Assistant: None. No qualified resident was available.    Blood loss: 0    Specimens: 18g cores x5 formalin  ascites     Findings:     Anterior omentum biopsy 18g cores    Paracentesis R abdomen 2500cc    Gelfoam tract hemostasis    Complications: None immediate.    Anesthesia: conscious sedation

## 2025-02-06 NOTE — ASSESSMENT & PLAN NOTE
2/4/25 CT A/P:  Moderate volume abdominopelvic ascites with thickened appearance of the parietal peritoneum, right greater than left.      Plan   - Scheduled for paracentesis and possible omental biopsy

## 2025-02-06 NOTE — INTERVAL H&P NOTE
Update: (This section must be completed Update: (This section must be completed if the H&P was completed greater than 24 hrs to procedure or admission)    H&P reviewed. After examining the patient, I find no changed to the H&P since it had been written.    Abdominal distention and bloating found to have ovarian masses omental caking ascites and elevated tumor markers    This is all concerning for ovarian cancer    Image guided biopsy indicated    Paracentesis indicated    Risks benefits alternatives reviewed informed consent obtained risks of bleeding and damage to adjacent organs and bowel discussed        Has been suffering from acid reflux for 15 years, I discussed that I do not expect our procedures today to improve this    Mp2 asa3    Patient re-evaluated. Accept as history and physical.    Thuy Ruano MD PHD

## 2025-02-07 ENCOUNTER — PATIENT OUTREACH (OUTPATIENT)
Dept: CASE MANAGEMENT | Facility: OTHER | Age: 60
End: 2025-02-07

## 2025-02-07 ENCOUNTER — TELEPHONE (OUTPATIENT)
Dept: HEMATOLOGY ONCOLOGY | Facility: CLINIC | Age: 60
End: 2025-02-07

## 2025-02-07 ENCOUNTER — TRANSITIONAL CARE MANAGEMENT (OUTPATIENT)
Dept: FAMILY MEDICINE CLINIC | Facility: CLINIC | Age: 60
End: 2025-02-07

## 2025-02-07 ENCOUNTER — TELEPHONE (OUTPATIENT)
Dept: INFUSION CENTER | Facility: HOSPITAL | Age: 60
End: 2025-02-07

## 2025-02-07 DIAGNOSIS — N83.201 CYSTS OF BOTH OVARIES: Primary | ICD-10-CM

## 2025-02-07 DIAGNOSIS — T45.1X5A CHEMOTHERAPY-INDUCED NAUSEA: ICD-10-CM

## 2025-02-07 DIAGNOSIS — Z59.71 DOES NOT HAVE HEALTH INSURANCE: Primary | ICD-10-CM

## 2025-02-07 DIAGNOSIS — R11.0 CHEMOTHERAPY-INDUCED NAUSEA: ICD-10-CM

## 2025-02-07 DIAGNOSIS — R19.00 PELVIC MASS: ICD-10-CM

## 2025-02-07 DIAGNOSIS — N83.202 CYSTS OF BOTH OVARIES: Primary | ICD-10-CM

## 2025-02-07 RX ORDER — ONDANSETRON 8 MG/1
8 TABLET, FILM COATED ORAL EVERY 8 HOURS PRN
Qty: 30 TABLET | Refills: 1 | Status: SHIPPED | OUTPATIENT
Start: 2025-02-07

## 2025-02-07 NOTE — PROGRESS NOTES
MAYA STOCK received a referral as patient was identified via HRR. Patient is uninsured. MAYA STOCK reviewed patient's chart and called patient ([phone]). Patient answered and MAYA STOCK introduced role and reason for calling.     Patient stated that a financial counselor/ [employer] contacted her and she is expecting an MA application in the mail by Wednesday. MAYA STOCK reviewed income guidelines with patient. Patient not wishing for additional information or to complete an assessment with MAYA STOCK. MAYA STOCK will close. Please re consult MAYA STOCK as needed.

## 2025-02-07 NOTE — TELEPHONE ENCOUNTER
----- Message from SOLANGE Carlos sent at 2/7/2025  8:40 AM EST -----  New patient with likely ovarian cancer. Biopsy is in process. She will be seeing Dr. FREEMAN on 2/11 for chemo consent and hopefully her pathology will be resulted by then. Can we get her scheduled for 4 cycles? Will take earliest available appt. Thanks!

## 2025-02-07 NOTE — TELEPHONE ENCOUNTER
Arunar spoke with PT on 2/7/25 @ 12:44 PM for introductions and to open dialogue regarding her current financial situation. PT stated adamantly that she does not want PA MA. Writer inquired as to the reason behind this, as it would be the best option of obtaining insurance for the PT. PT stated that the process to acquire PA MA requires her to answer too many personal questions that are too intrusive. Writer respected the PT feelings towards this and presented the idea of FA. PT seemed more open to this idea but still was not convinced this would not also make her too uncomfortable to provide required information. Writer proposed he have the application be mailed to her for review, at which point she could decide if she wishes to pursue. Writer provided PT with his contact information for future use and encouraged her to connect with him if anything were to change.          Lei Jensen  Phone:[phone]  Email:[email or web address]

## 2025-02-09 LAB
BACTERIA SPEC BFLD CULT: NO GROWTH
GRAM STN SPEC: NORMAL
GRAM STN SPEC: NORMAL

## 2025-02-09 PROCEDURE — 88305 TISSUE EXAM BY PATHOLOGIST: CPT | Performed by: PATHOLOGY

## 2025-02-09 PROCEDURE — 88341 IMHCHEM/IMCYTCHM EA ADD ANTB: CPT | Performed by: PATHOLOGY

## 2025-02-09 PROCEDURE — 88342 IMHCHEM/IMCYTCHM 1ST ANTB: CPT | Performed by: PATHOLOGY

## 2025-02-10 DIAGNOSIS — R18.0 MALIGNANT ASCITES: Primary | ICD-10-CM

## 2025-02-11 ENCOUNTER — TELEPHONE (OUTPATIENT)
Dept: GYNECOLOGIC ONCOLOGY | Facility: CLINIC | Age: 60
End: 2025-02-11

## 2025-02-11 ENCOUNTER — TELEPHONE (OUTPATIENT)
Dept: SURGICAL ONCOLOGY | Facility: CLINIC | Age: 60
End: 2025-02-11

## 2025-02-11 ENCOUNTER — CONSULT (OUTPATIENT)
Dept: GYNECOLOGIC ONCOLOGY | Facility: CLINIC | Age: 60
End: 2025-02-11

## 2025-02-11 VITALS
DIASTOLIC BLOOD PRESSURE: 78 MMHG | OXYGEN SATURATION: 97 % | HEART RATE: 104 BPM | SYSTOLIC BLOOD PRESSURE: 132 MMHG | HEIGHT: 62 IN | WEIGHT: 173.4 LBS | RESPIRATION RATE: 16 BRPM | TEMPERATURE: 98 F | BODY MASS INDEX: 31.91 KG/M2

## 2025-02-11 DIAGNOSIS — C56.3 MALIGNANT NEOPLASM OF BOTH OVARIES (HCC): Primary | ICD-10-CM

## 2025-02-11 DIAGNOSIS — C56.9 MALIGNANT NEOPLASM OF OVARY, UNSPECIFIED LATERALITY (HCC): Primary | ICD-10-CM

## 2025-02-11 DIAGNOSIS — C56.9 MALIGNANT NEOPLASM OF OVARY, UNSPECIFIED LATERALITY (HCC): ICD-10-CM

## 2025-02-11 DIAGNOSIS — R18.0 MALIGNANT ASCITES: ICD-10-CM

## 2025-02-11 PROBLEM — R53.83 OTHER FATIGUE: Status: RESOLVED | Noted: 2022-03-03 | Resolved: 2025-02-11

## 2025-02-11 PROBLEM — E87.20 LACTIC ACIDOSIS: Status: RESOLVED | Noted: 2025-02-04 | Resolved: 2025-02-11

## 2025-02-11 PROBLEM — R19.7 DIARRHEA: Status: RESOLVED | Noted: 2025-01-22 | Resolved: 2025-02-11

## 2025-02-11 PROBLEM — R11.0 NAUSEA: Status: RESOLVED | Noted: 2024-01-10 | Resolved: 2025-02-11

## 2025-02-11 PROBLEM — R18.8 ASCITES: Status: RESOLVED | Noted: 2025-02-04 | Resolved: 2025-02-11

## 2025-02-11 PROCEDURE — 99215 OFFICE O/P EST HI 40 MIN: CPT | Performed by: OBSTETRICS & GYNECOLOGY

## 2025-02-11 NOTE — ASSESSMENT & PLAN NOTE
Dependently obtained history from patient today.  I have reviewed prior imaging results including CT scan of the abdomen and pelvis and chest.  I have reviewed cytology/biopsy.    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  I discussed with her standard treatment recommendations.  Given volume of ascites,  level and oral performance status I have recommended starting with neoadjuvant chemotherapy.  She verbalized understanding and agrees.  She has consented to proceed with carboplatin AUC 5, Taxol 175 mg/m² and bevacizumab 15 mg/kg all of which will be administered on day 1 of a 21-day cycle.  Plan to follow response with  trend to cycles 3-4.  We will then obtain CT scan to consider intervals or reductive surgery possibly in combination with HIPEC.      I discussed with patient rationale, logistics, common side effects and toxicities associated with chemotherapy regimen.  She understands toxicities include but are not limited to alopecia, fatigue, decreased appetite, nausea and vomiting, peripheral neuropathy, bone marrow suppression ( anemia, neutropenia and or thrombocytopenia) with subsequent risk of life-threatening infection or hemorrhage, kidney and or liver damage, etc. We discussed specific toxicities associated with bevacizumab including hypertension, venous/arterial thrombosis, bowel perforation, posterior reversible encephalopathy syndrome, nephropathy, etc.  Patient consents for blood transfusions if those are deemed necessary during the course of care.  She understands risks include but are not limited to hypersensitivity reactions and infection with blood-borne pathogens.  Patient verbalizes understanding, agrees and wants to proceed.  Informed consent form signed.    Patient has reasonable peripheral IV access.  We will avoid Port-A-Cath placement at this time not to delay initiation/periodic administration of bevacizumab given significant  ascites.    Genomic testing/Caris requested.  Referral for genetic counseling placed.    Orders:  •  Ambulatory Referral to Oncology Genetics; Future

## 2025-02-11 NOTE — ASSESSMENT & PLAN NOTE
Orders:  •  Ambulatory Referral to Oncology Genetics; Future  •  Ambulatory Referral to Oncology Genetics; Future

## 2025-02-11 NOTE — PROGRESS NOTES
Name: Ewa Hernandes      : 1965      MRN: [id number]  Encounter Provider: Augusto De Souza MD  Encounter Date: 2025   Encounter department: [facility]  :  Assessment & Plan  Malignant neoplasm of ovary, unspecified laterality (HCC)    Orders:  •  Ambulatory Referral to Oncology Genetics; Future  •  Ambulatory Referral to Oncology Genetics; Future    Malignant ascites  Patient had diagnostic paracentesis and biopsy while in the hospital.  Today in the office, 2025 I performed therapeutic paracentesis.  Patient experienced significant relief and 1.6 L of serous fluid was drained.  We anticipate needing periodic paracenteses through first few cycles of chemotherapy.  Will set her up for weekly IR scheduled paracentesis accordingly.         Malignant neoplasm of both ovaries (HCC)  Dependently obtained history from patient today.  I have reviewed prior imaging results including CT scan of the abdomen and pelvis and chest.  I have reviewed cytology/biopsy.    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  I discussed with her standard treatment recommendations.  Given volume of ascites,  level and oral performance status I have recommended starting with neoadjuvant chemotherapy.  She verbalized understanding and agrees.  She has consented to proceed with carboplatin AUC 5, Taxol 175 mg/m² and bevacizumab 15 mg/kg all of which will be administered on day 1 of a 21-day cycle.  Plan to follow response with  trend to cycles 3-4.  We will then obtain CT scan to consider intervals or reductive surgery possibly in combination with HIPEC.      I discussed with patient rationale, logistics, common side effects and toxicities associated with chemotherapy regimen.  She understands toxicities include but are not limited to alopecia, fatigue, decreased appetite, nausea and vomiting, peripheral neuropathy, bone marrow  suppression ( anemia, neutropenia and or thrombocytopenia) with subsequent risk of life-threatening infection or hemorrhage, kidney and or liver damage, etc. We discussed specific toxicities associated with bevacizumab including hypertension, venous/arterial thrombosis, bowel perforation, posterior reversible encephalopathy syndrome, nephropathy, etc.  Patient consents for blood transfusions if those are deemed necessary during the course of care.  She understands risks include but are not limited to hypersensitivity reactions and infection with blood-borne pathogens.  Patient verbalizes understanding, agrees and wants to proceed.  Informed consent form signed.    Patient has reasonable peripheral IV access.  We will avoid Port-A-Cath placement at this time not to delay initiation/periodic administration of bevacizumab given significant ascites.    Genomic testing/Caris requested.  Referral for genetic counseling placed.    Orders:  •  Ambulatory Referral to Oncology Genetics; Future            History of Present Illness   Reason for Visit / CC: Treatment counseling for newly diagnosed ovarian cancer.    Ewa Hernandes is a 59 y.o. female   Patient with newly diagnosed at least stage IIIc ovarian cancer.  During recent hospital admission she underwent diagnostic/therapeutic paracentesis and omental core biopsy.  Final pathology demonstrates high-grade serous ovarian cancer.  She presents today to discuss results.  Of note,  was markedly elevated at 6,483 international units per milliliter.  Reports recurrent increasing abdominal girth and discomfort.  Some nausea and early satiety.  Occasional diarrhea.  No vomiting.      Pertinent Medical History   Hypertension, reactive airway disease, anxiety disorder, GERD.     Oncology History   Cancer Staging   Malignant neoplasm of both ovaries (HCC)  Staging form: Ovary, Fallopian Tube, Primary Peritoneal, AJCC 8th Edition  - Clinical stage from 2/6/2025: FIGO Stage  "IIIC (cT3c, cNX, cM0) - Signed by Augusto De Souza MD on 2/11/2025  Histopathologic type: Serous cystadenocarcinoma, NOS  Stage prefix: Initial diagnosis  Histologic grade (G): G3  Histologic grading system: 4 grade system  Oncology History   Malignant neoplasm of both ovaries (HCC)   2/4/2025 Initial Diagnosis    Malignant neoplasm of both ovaries (HCC)     2/6/2025 -  Cancer Staged    Staging form: Ovary, Fallopian Tube, Primary Peritoneal, AJCC 8th Edition  - Clinical stage from 2/6/2025: FIGO Stage IIIC (cT3c, cNX, cM0) - Signed by Augusto De Souza MD on 2/11/2025  Histopathologic type: Serous cystadenocarcinoma, NOS  Stage prefix: Initial diagnosis  Histologic grade (G): G3  Histologic grading system: 4 grade system       2/18/2025 -  Chemotherapy    alteplase (CATHFLO), 2 mg, Intracatheter, Every 1 Minute as needed, 0 of 7 cycles  palonosetron (ALOXI), 0.25 mg, Intravenous, Once, 0 of 7 cycles  fosaprepitant (EMEND) IVPB, 150 mg, Intravenous, Once, 0 of 7 cycles  CARBOplatin (PARAPLATIN) IVPB (GOG AUC DOSING), 661.8 mg, Intravenous, Once, 0 of 7 cycles  bevacizumab (AVASTIN) IVPB, , Intravenous, Once, 0 of 7 cycles  PACLItaxel (TAXOL) chemo IVPB, 175 mg/m2 = 315 mg, Intravenous, Once, 0 of 7 cycles     Ovarian cancer (HCC) (Resolved)   2/6/2025 -  Cancer Staged    Staging form: Ovary, Fallopian Tube, Primary Peritoneal, AJCC 8th Edition  - Clinical stage from 2/6/2025: FIGO Stage IIIC (cT3c, cNX, cM0) - Signed by Augusto De Souza MD on 2/11/2025  Histopathologic type: Serous cystadenocarcinoma, NOS  Histologic grade (G): G3  Histologic grading system: 4 grade system       2/11/2025 Initial Diagnosis    Ovarian cancer (HCC)        Review of Systems A complete review of systems is negative other than that noted above in the HPI.       Objective   /78 (BP Location: Right arm, Patient Position: Sitting, Cuff Size: Large)   Pulse 104   Temp 98 °F (36.7 °C) (Temporal)   Resp 16   Ht 5' 2\" (1.575 m)  "  Wt 78.7 kg (173 lb 6.4 oz)   SpO2 97%   BMI 31.72 kg/m²     Body mass index is 31.72 kg/m².  Pain Screening:  Pain Score:   7 (Patient is experiencing abdominal pain.)  ECOG ECOG Performance Status: 1 - Restricted in physically strenuous activity but ambulatory and able to carry out work of a light or sedentary nature, e.g., light house work, office work   Physical Exam  Vitals reviewed. Exam conducted with a chaperone present.   Constitutional:       General: She is not in acute distress.     Appearance: Normal appearance. She is not toxic-appearing or diaphoretic.   Cardiovascular:      Rate and Rhythm: Normal rate and regular rhythm.   Pulmonary:      Effort: Pulmonary effort is normal. No respiratory distress.      Breath sounds: Normal breath sounds. No wheezing.   Abdominal:      General: There is distension.      Palpations: There is no mass.      Tenderness: There is no rebound.      Hernia: No hernia is present.   Genitourinary:     Comments: Deferred  Musculoskeletal:      Right lower leg: No edema.      Left lower leg: No edema.            Labs: I have reviewed pertinent labs.   Lab Results   Component Value Date/Time     6,483.0 (H) 02/04/2025 11:52 AM     Lab Results   Component Value Date/Time    Potassium 3.8 02/06/2025 04:29 AM    Chloride 105 02/06/2025 04:29 AM    CO2 25 02/06/2025 04:29 AM    BUN 9 02/06/2025 04:29 AM    Creatinine 0.53 (L) 02/06/2025 04:29 AM    Calcium 8.8 02/06/2025 04:29 AM    AST 21 02/04/2025 11:52 AM    ALT 9 02/04/2025 11:52 AM    Alkaline Phosphatase 75 02/04/2025 11:52 AM    eGFR 104 02/06/2025 04:29 AM     Lab Results   Component Value Date/Time    WBC 9.69 02/06/2025 04:29 AM    Hemoglobin 10.5 (L) 02/06/2025 04:29 AM    Hematocrit 33.2 (L) 02/06/2025 04:29 AM    MCV 91 02/06/2025 04:29 AM    Platelets 355 02/06/2025 04:29 AM     Lab Results   Component Value Date/Time    Absolute Neutrophils 7.39 02/06/2025 04:29 AM        Trend:  Lab Results    Component Value Date     6,483.0 (H) 02/04/2025     Procedures  Ultrasound-guided paracentesis  Indication: Malignant ascites, symptomatic.  Procedure: Ultrasound-guided therapeutic paracentesis.  : ASHLEY De Souza.  Procedure:  The patient was counseled and informed consent was obtained.  She understood risks of bleeding, infection, pain and injury to intra-abdominal organs.  Timeout performed and patient identified per protocol.  Bedside ultrasound images were used to identify deepest pocket in the right flank/lower quadrant.  The area was prepped and draped in the usual fashion.  Using sterile technique the skin and abdominal wall was infiltrated with a total of 5 mL of 1% lidocaine.  Using this injection needle, ascites could be freely drawn from this location without difficulty.  A 2 mm skin incision was made with an 11 blade scalpel.  Using a Veress needle the peritoneal cavity was accessed and fluid drawn without difficulty.  The needle was removed and catheter advanced over needle using Seldinger's technique.  Then, the system was connected to vacuum bottles and a total of 1.6 L of serous fluid was drained without difficulty.  Patient tolerated well without complications.  At the completion, catheter was removed and Band-Aid was applied.    Augusto De Souza MD, JUAN PABLO, FACOG, FACS  2/11/2025  3:38 PM       No

## 2025-02-11 NOTE — TELEPHONE ENCOUNTER
Intake Basket  Patient Calls message received:    Soraya Bui  Oncology Financial Advocacy6 days ago       Hello team! I received a referral from inpatient provider for this patient who is most likely new GYN cancer and has no insurance. Wanted to pass the info along in case she wasn't on your radar.     Soraya Bui6 days ago       ONC SW received referral from interdisciplinary team for patient who is uninsured. Sent referral information to ONC financial advocacy for review as well.      No other needs identified at this time.         Reviewed and saw that the patient had been in the hospital but refused to cooperate with PATHS.    02/11/25    Once I saw that she was discharged I placed a call to the patient today but she has a VM that has not been set up and the message states to try again later. When I'm able to speak with the patient I will discuss the status of her obtaining either insurance or continuing to work with PATHS for ENRIQUE MARIE.

## 2025-02-11 NOTE — TELEPHONE ENCOUNTER
Oncology Finance Advocacy Intake and Intervention  Oncology Finance Counselor/Advocate placed call to patient. This writer informed patient that this writer is here to assist patient with billing questions, financial assistance, payment/payment plans, quotes, copayment assistance, insurance optimization, and insurance navigation.    This writer conducted a thorough benefit review of copayment, deductible, and out of pocket cost. This information is documented below and has been reviewed with patient.     Copayment:  Deductible:  Out of Pocket Cost:  Insurance optimization (Limited benefit vs self-pay):  Patient assistance status:Outreach patient regarding insurance coverage  Free Drug Applications:N/A  Oral Chemo Application:N/A  BIN#:  PCN#:  GRP#:  Copay:$  Interventions:    In Basket Message was received to follow up with the patient:    Hello team! I received a referral from inpatient provider for this patient who is most likely new GYN cancer and has no insurance. Wanted to pass the info along in case she wasn't on your radar.     Soraya Bui6 days ago       ONC SW received referral from interdisciplinary team for patient who is uninsured. Sent referral information to ONC financial advocacy for review as well.      No other needs identified at this time.       Sensitive       I placed a call to the patient to follow up on her efforts to obtain insurance coverage or assistance with PATHS. In speaking with the patient she stated that when she was in the hospital someone said they would be sending her papers in the mail to complete for assistance. It sounds like PATHS and I will follow up with my contact there to see if a referral has been submitted on behalf of the patient for possible ENRIQUE MARIE.      Information above was review thoroughly with patient and patient was advise of possible assistance programs/interventions. If any question arise patient can contact this writer at below information. This information  was given to patient at time of contact.      Unruly Conner  Phone:[phone]  Email: [email or web address]

## 2025-02-11 NOTE — TELEPHONE ENCOUNTER
Scheduled the first of four IR paracentesis for 2/17 at 8:30. Patient advised and will self schedule the remaining appointments.

## 2025-02-11 NOTE — ASSESSMENT & PLAN NOTE
Patient had diagnostic paracentesis and biopsy while in the hospital.  Today in the office, February 11, 2025 I performed therapeutic paracentesis.  Patient experienced significant relief and 1.6 L of serous fluid was drained.  We anticipate needing periodic paracenteses through first few cycles of chemotherapy.  Will set her up for weekly IR scheduled paracentesis accordingly.

## 2025-02-12 ENCOUNTER — DOCUMENTATION (OUTPATIENT)
Age: 60
End: 2025-02-12

## 2025-02-12 ENCOUNTER — TELEPHONE (OUTPATIENT)
Dept: GYNECOLOGIC ONCOLOGY | Facility: CLINIC | Age: 60
End: 2025-02-12

## 2025-02-12 PROCEDURE — 88342 IMHCHEM/IMCYTCHM 1ST ANTB: CPT | Performed by: PATHOLOGY

## 2025-02-12 PROCEDURE — 88112 CYTOPATH CELL ENHANCE TECH: CPT | Performed by: PATHOLOGY

## 2025-02-12 PROCEDURE — 88305 TISSUE EXAM BY PATHOLOGIST: CPT | Performed by: PATHOLOGY

## 2025-02-12 PROCEDURE — 88341 IMHCHEM/IMCYTCHM EA ADD ANTB: CPT | Performed by: PATHOLOGY

## 2025-02-12 NOTE — PROGRESS NOTES
This writer call patient via phone after reviewing recent conversation pertaining to MA and willingness to provide necessary information. This writers conversation consisted of explaining that need to apply for PA MA as her ongoing cancer treatment is going to be excessive and substantial. This writer informed patient that JEN MCCLELLAND is coming from a good place, attempting to support her through this process and reduce financial burden of treatment. This writer further the conversation with patient explaining that not every wants to use MA however, the real purpose of the program is to be a stepping stone for patients who really needed it. This writer explained to patient that she is in a current situation in which she needs this given the extensive cost of treatment. This writer also explained that sarah care david she received will not be of use until she is able to apply for MA and receives a determination of either approval (covered under MA) or denial (process sarah care david). Patient was very receptive and appreciated the support. Patient is going to look into MA further. This writer asked patient if JEN MCCLELLAND can follow up with her next week and see how she is doing at which point patient was agreeable and thankful. At this time, patient appears to be in good place and JEN MCCLELLAND will follow up next week to see status of david process. OFA team will continue to follow and support patient.

## 2025-02-12 NOTE — TELEPHONE ENCOUNTER
Outgoing call x 2 attempting to reach patient related to chemo teach.   No answer and unable to leave a VM.      Specific written information related to her treatment  as well as other resource information was placed in today's mail via [employer] envelope.

## 2025-02-13 NOTE — PROGRESS NOTES
New patient call made.  Spoke with :Ewa Hernandes    Advised patient of all items below.      · Patients’ appointment date and time : 2/18/25 @900  Address: [address] [place] [address]    · We strive for a fragrance-free zone, please do not use perfumes, colognes or essential oils when coming for your infusion appointment    · Cell phones and electronic devices are permitted in the unit as well as free WIFI and TVs in each bay.    · We offer light snacks and drinks. Feel free to bring your own breakfast/lunch. We would be more than happy to refrigerate/heat it for you.    · Please bring any medicines with you that you may need while you are getting your treatment. For example: pain medication, a glucose meter and insulin.    · If labs were not done, where and when to get labs done prior to their appointment : pt states she will go to [facility] one or 2 days prior.     · Review the visitation policy. (1 visitor allowed and no children under the age of 14).    · Review the no show policy. (If they need to cancel, they need to call 24 hours in advance of their appointment. If canceled in less than 24 hours, it will be counted as a no show and if they accumulate 4 no shows in 6 months, they will be discharge from the infusion center).    Patient verbalized understanding of above. Yes

## 2025-02-14 ENCOUNTER — APPOINTMENT (OUTPATIENT)
Dept: LAB | Facility: HOSPITAL | Age: 60
End: 2025-02-14

## 2025-02-14 DIAGNOSIS — N83.201 CYSTS OF BOTH OVARIES: ICD-10-CM

## 2025-02-14 DIAGNOSIS — N83.202 CYSTS OF BOTH OVARIES: ICD-10-CM

## 2025-02-14 LAB
ALBUMIN SERPL BCG-MCNC: 4 G/DL (ref 3.5–5)
ALP SERPL-CCNC: 81 U/L (ref 34–104)
ALT SERPL W P-5'-P-CCNC: 16 U/L (ref 7–52)
ANION GAP SERPL CALCULATED.3IONS-SCNC: 9 MMOL/L (ref 4–13)
AST SERPL W P-5'-P-CCNC: 34 U/L (ref 13–39)
BASOPHILS # BLD AUTO: 0.03 THOUSANDS/ÂΜL (ref 0–0.1)
BASOPHILS NFR BLD AUTO: 0 % (ref 0–1)
BILIRUB SERPL-MCNC: 0.4 MG/DL (ref 0.2–1)
BUN SERPL-MCNC: 10 MG/DL (ref 5–25)
CALCIUM SERPL-MCNC: 9.4 MG/DL (ref 8.4–10.2)
CANCER AG125 SERPL-ACNC: 6003 U/ML (ref 0–35)
CHLORIDE SERPL-SCNC: 101 MMOL/L (ref 96–108)
CO2 SERPL-SCNC: 28 MMOL/L (ref 21–32)
CREAT SERPL-MCNC: 0.64 MG/DL (ref 0.6–1.3)
CREAT UR-MCNC: 188.5 MG/DL
EOSINOPHIL # BLD AUTO: 0.08 THOUSAND/ÂΜL (ref 0–0.61)
EOSINOPHIL NFR BLD AUTO: 1 % (ref 0–6)
ERYTHROCYTE [DISTWIDTH] IN BLOOD BY AUTOMATED COUNT: 14.6 % (ref 11.6–15.1)
GFR SERPL CREATININE-BSD FRML MDRD: 98 ML/MIN/1.73SQ M
GLUCOSE P FAST SERPL-MCNC: 89 MG/DL (ref 65–99)
HCT VFR BLD AUTO: 39.3 % (ref 34.8–46.1)
HGB BLD-MCNC: 12.3 G/DL (ref 11.5–15.4)
IMM GRANULOCYTES # BLD AUTO: 0.03 THOUSAND/UL (ref 0–0.2)
IMM GRANULOCYTES NFR BLD AUTO: 0 % (ref 0–2)
LYMPHOCYTES # BLD AUTO: 1.1 THOUSANDS/ÂΜL (ref 0.6–4.47)
LYMPHOCYTES NFR BLD AUTO: 15 % (ref 14–44)
MAGNESIUM SERPL-MCNC: 1.4 MG/DL (ref 1.9–2.7)
MCH RBC QN AUTO: 28.4 PG (ref 26.8–34.3)
MCHC RBC AUTO-ENTMCNC: 31.3 G/DL (ref 31.4–37.4)
MCV RBC AUTO: 91 FL (ref 82–98)
MONOCYTES # BLD AUTO: 0.54 THOUSAND/ÂΜL (ref 0.17–1.22)
MONOCYTES NFR BLD AUTO: 8 % (ref 4–12)
NEUTROPHILS # BLD AUTO: 5.44 THOUSANDS/ÂΜL (ref 1.85–7.62)
NEUTS SEG NFR BLD AUTO: 76 % (ref 43–75)
NRBC BLD AUTO-RTO: 0 /100 WBCS
PLATELET # BLD AUTO: 562 THOUSANDS/UL (ref 149–390)
PMV BLD AUTO: 10.6 FL (ref 8.9–12.7)
POTASSIUM SERPL-SCNC: 3.8 MMOL/L (ref 3.5–5.3)
PROT SERPL-MCNC: 7.3 G/DL (ref 6.4–8.4)
PROT UR-MCNC: 65.9 MG/DL
PROT/CREAT UR: 0.3 MG/G{CREAT} (ref 0–0.1)
RBC # BLD AUTO: 4.33 MILLION/UL (ref 3.81–5.12)
SODIUM SERPL-SCNC: 138 MMOL/L (ref 135–147)
WBC # BLD AUTO: 7.22 THOUSAND/UL (ref 4.31–10.16)

## 2025-02-14 PROCEDURE — 83735 ASSAY OF MAGNESIUM: CPT

## 2025-02-14 PROCEDURE — 82570 ASSAY OF URINE CREATININE: CPT

## 2025-02-14 PROCEDURE — 85025 COMPLETE CBC W/AUTO DIFF WBC: CPT

## 2025-02-14 PROCEDURE — 84156 ASSAY OF PROTEIN URINE: CPT

## 2025-02-14 PROCEDURE — 80053 COMPREHEN METABOLIC PANEL: CPT

## 2025-02-14 PROCEDURE — 36415 COLL VENOUS BLD VENIPUNCTURE: CPT

## 2025-02-14 PROCEDURE — 86304 IMMUNOASSAY TUMOR CA 125: CPT

## 2025-02-14 RX ORDER — PALONOSETRON 0.05 MG/ML
0.25 INJECTION, SOLUTION INTRAVENOUS ONCE
Status: CANCELLED | OUTPATIENT
Start: 2025-02-18

## 2025-02-14 RX ORDER — SODIUM CHLORIDE 9 MG/ML
20 INJECTION, SOLUTION INTRAVENOUS ONCE
Status: CANCELLED | OUTPATIENT
Start: 2025-02-18

## 2025-02-17 ENCOUNTER — HOSPITAL ENCOUNTER (OUTPATIENT)
Dept: RADIOLOGY | Facility: HOSPITAL | Age: 60
Discharge: HOME | End: 2025-02-17

## 2025-02-17 ENCOUNTER — TELEPHONE (OUTPATIENT)
Dept: HEMATOLOGY ONCOLOGY | Facility: CLINIC | Age: 60
End: 2025-02-17

## 2025-02-17 VITALS
RESPIRATION RATE: 12 BRPM | HEART RATE: 72 BPM | SYSTOLIC BLOOD PRESSURE: 108 MMHG | OXYGEN SATURATION: 98 % | DIASTOLIC BLOOD PRESSURE: 71 MMHG

## 2025-02-17 DIAGNOSIS — R18.0 MALIGNANT ASCITES: ICD-10-CM

## 2025-02-17 DIAGNOSIS — C56.9 MALIGNANT NEOPLASM OF OVARY, UNSPECIFIED LATERALITY (HCC): ICD-10-CM

## 2025-02-17 PROCEDURE — 49083 ABD PARACENTESIS W/IMAGING: CPT

## 2025-02-17 PROCEDURE — 49083 ABD PARACENTESIS W/IMAGING: CPT | Performed by: PHYSICIAN ASSISTANT

## 2025-02-17 RX ORDER — LIDOCAINE WITH 8.4% SOD BICARB 0.9%(10ML)
SYRINGE (ML) INJECTION AS NEEDED
Status: COMPLETED | OUTPATIENT
Start: 2025-02-17 | End: 2025-02-17

## 2025-02-17 RX ORDER — MAGNESIUM SULFATE HEPTAHYDRATE 40 MG/ML
4 INJECTION, SOLUTION INTRAVENOUS ONCE
Status: CANCELLED
Start: 2025-02-18

## 2025-02-17 RX ADMIN — Medication 10 ML: at 08:37

## 2025-02-17 NOTE — TELEPHONE ENCOUNTER
Arunar spoke with PT on 2/17/25 @ 11:45 AM to reconnect and obtain updates on her PA MA process. PT informed writer that she applied for PA MA online through [facility] on 2/14/25. Writer applauded the PT efforts in completing this. PT inquired on how long it would take to receive a determination on her application. Writer informed PT the PA MA process can take some time but assured her that if approved, PA MA coverage would backdate to at least the day of submission.Writer informed PT that she can always call the [facility] to check on the status of her application. Writer also informed PT that if PA MA is denied sarah care could then also be pursued until alternative insurance options can be obtained. PT was grateful for the call and the support. Writer requested PT keep him updated on her PA MA status. PT stated she would do so. Arunar provided PT with all of his contact information for future use.          Lei Jensen  Phone:[phone]  Email:[email or web address]

## 2025-02-17 NOTE — BRIEF OP NOTE (RAD/CATH)
IR PARACENTESIS Procedure Note    PATIENT NAME: Ewa Hernandes  : 1965  MRN: [id number]    Pre-op Diagnosis:   1. Malignant neoplasm of ovary, unspecified laterality (HCC)    2. Malignant ascites      Post-op Diagnosis:   1. Malignant neoplasm of ovary, unspecified laterality (HCC)    2. Malignant ascites        Provider:   Emy Sawyer PA-C  Assistants:     No qualified resident was available, Resident is only observing    Estimated Blood Loss: None    Findings: 1000mL clear yellow ascites drained from RLQ    Specimens: None    Complications: None immediately    Anesthesia: local    Emy Sawyer PA-C     Date: 2025  Time: 8:46 AM

## 2025-02-17 NOTE — DISCHARGE INSTRUCTIONS
Abdominal Paracentesis     WHAT YOU NEED TO KNOW:   Abdominal paracentesis is a procedure to remove abnormal fluid buildup in your abdomen. Fluid builds up because of liver problems, such as swelling and scarring. Heart failure, kidney disease, a mass, or problems with your pancreas may also cause fluid buildup.     DISCHARGE INSTRUCTIONS:     Follow up with your healthcare provider as directed: Write down your questions so you remember to ask them during your visits.     Wound care: Remove dressing after 24 hours. Leave glue in place.    Return to your normal activities    Contact Interventional Radiology at [phone] (SHYANNE PATIENTS: Contact Interventional Radiology at [phone]) (TRAN PATIENTS: Contact Interventional Radiology at [phone]) if:  You have a fever and your wound is red and swollen.   You have yellow, green, or bad-smelling discharge coming from your wound.   You have pain or swelling in your abdomen.   You have an upset stomach or you vomit.   You have sudden, sharp pain in your abdomen.   You urinate very little or not at all.   You feel confused and more tired than usual.   Your arm or leg feels warm, tender, and painful. It may look swollen and red.   You suddenly feel lightheaded and have trouble breathing.

## 2025-02-18 ENCOUNTER — HOSPITAL ENCOUNTER (OUTPATIENT)
Dept: INFUSION CENTER | Facility: HOSPITAL | Age: 60
Discharge: HOME | End: 2025-02-18

## 2025-02-18 VITALS
TEMPERATURE: 97 F | WEIGHT: 167.5 LBS | RESPIRATION RATE: 16 BRPM | OXYGEN SATURATION: 97 % | SYSTOLIC BLOOD PRESSURE: 115 MMHG | DIASTOLIC BLOOD PRESSURE: 63 MMHG | HEART RATE: 89 BPM | BODY MASS INDEX: 24.81 KG/M2 | HEIGHT: 69 IN

## 2025-02-18 DIAGNOSIS — C56.3 MALIGNANT NEOPLASM OF BOTH OVARIES (HCC): Primary | ICD-10-CM

## 2025-02-18 DIAGNOSIS — C56.9 MALIGNANT NEOPLASM OF OVARY, UNSPECIFIED LATERALITY (HCC): Primary | ICD-10-CM

## 2025-02-18 PROCEDURE — 96375 TX/PRO/DX INJ NEW DRUG ADDON: CPT

## 2025-02-18 PROCEDURE — 96417 CHEMO IV INFUS EACH ADDL SEQ: CPT

## 2025-02-18 PROCEDURE — 96368 THER/DIAG CONCURRENT INF: CPT

## 2025-02-18 PROCEDURE — 96367 TX/PROPH/DG ADDL SEQ IV INF: CPT

## 2025-02-18 PROCEDURE — 96415 CHEMO IV INFUSION ADDL HR: CPT

## 2025-02-18 PROCEDURE — 96413 CHEMO IV INFUSION 1 HR: CPT

## 2025-02-18 RX ORDER — MAGNESIUM SULFATE HEPTAHYDRATE 40 MG/ML
4 INJECTION, SOLUTION INTRAVENOUS ONCE
Status: COMPLETED | OUTPATIENT
Start: 2025-02-18 | End: 2025-02-18

## 2025-02-18 RX ORDER — SODIUM CHLORIDE 9 MG/ML
20 INJECTION, SOLUTION INTRAVENOUS ONCE
Status: COMPLETED | OUTPATIENT
Start: 2025-02-18 | End: 2025-02-18

## 2025-02-18 RX ORDER — PALONOSETRON 0.05 MG/ML
0.25 INJECTION, SOLUTION INTRAVENOUS ONCE
Status: COMPLETED | OUTPATIENT
Start: 2025-02-18 | End: 2025-02-18

## 2025-02-18 RX ADMIN — BEVACIZUMAB-AWWB 1100 MG: 400 INJECTION, SOLUTION INTRAVENOUS at 16:43

## 2025-02-18 RX ADMIN — MAGNESIUM SULFATE HEPTAHYDRATE 4 G: 40 INJECTION, SOLUTION INTRAVENOUS at 11:43

## 2025-02-18 RX ADMIN — PALONOSETRON HYDROCHLORIDE 0.25 MG: 0.25 INJECTION INTRAVENOUS at 09:57

## 2025-02-18 RX ADMIN — FAMOTIDINE 20 MG: 10 INJECTION, SOLUTION INTRAVENOUS at 10:02

## 2025-02-18 RX ADMIN — DEXAMETHASONE SODIUM PHOSPHATE 20 MG: 100 INJECTION INTRAMUSCULAR; INTRAVENOUS at 11:01

## 2025-02-18 RX ADMIN — PACLITAXEL 336 MG: 6 INJECTION, SOLUTION INTRAVENOUS at 11:38

## 2025-02-18 RX ADMIN — DIPHENHYDRAMINE HYDROCHLORIDE 25 MG: 50 INJECTION INTRAMUSCULAR; INTRAVENOUS at 10:24

## 2025-02-18 RX ADMIN — FOSAPREPITANT 150 MG: 150 INJECTION, POWDER, LYOPHILIZED, FOR SOLUTION INTRAVENOUS at 09:20

## 2025-02-18 RX ADMIN — CARBOPLATIN 650.5 MG: 600 INJECTION, SOLUTION INTRAVENOUS at 15:39

## 2025-02-18 RX ADMIN — SODIUM CHLORIDE 20 ML/HR: 0.9 INJECTION, SOLUTION INTRAVENOUS at 09:15

## 2025-02-18 NOTE — PROGRESS NOTES
Referral made to Oncology Social Worker.  Patient needs help with getting medical assistance.  Form that patient brought in emailed to Susie Jo NP and Soraya Bui LCSW.    New patient intake completed.  No show policy signed.  Recent labs reviewed.  Patient tolerated IV Magnesium, Taxol, Carboplatin, and Avastin chemotherapy treatment without reaction or issues.      Ewa Hernandes is aware of future appt on 3/11/25 at 0800.     AVS printed and given to Ewa Hernandes:  Yes.      Patient ambulated off unit without incident.  All personal belongings taken with patient.

## 2025-02-19 ENCOUNTER — PATIENT OUTREACH (OUTPATIENT)
Dept: CASE MANAGEMENT | Facility: HOSPITAL | Age: 60
End: 2025-02-19

## 2025-02-19 ENCOUNTER — TELEPHONE (OUTPATIENT)
Dept: NUTRITION | Facility: CLINIC | Age: 60
End: 2025-02-19

## 2025-02-19 NOTE — TELEPHONE ENCOUNTER
Contacted Ewa to set up nutrition consult after receiving notification by infusion RN (Kalyani CRAFT) on 2/18/25 that pt is appropriate for oncology nutrition care (reason for referral:  decreased appetite, nausea, ascites. ). Spoke with Ewa, introduced self and oncology nutrition services available. Initial oncology nutrition phone consult set up for Monday 2/24/25 10am.

## 2025-02-19 NOTE — PROGRESS NOTES
ONC SW received referral for patient who has medication form for MA application that needs to be completed. Form was e-mailed to both LCSW and physician. Will await physician signature and then will return to client for submission to local county assistance office.     Attempted to call patient to discuss financial needs - voicemail box not set up. Left MyChart message with contact information and will continue to follow patient.

## 2025-02-20 ENCOUNTER — TELEPHONE (OUTPATIENT)
Dept: HEMATOLOGY ONCOLOGY | Facility: CLINIC | Age: 60
End: 2025-02-20

## 2025-02-20 NOTE — TELEPHONE ENCOUNTER
spoke with PT on 2/20/25 @ 11:45 AM to offer support with her Eliquis medication expenses. Writer informed PT that a free drug program does exist for Eliquis that could be pursued. PT wished to pursue and provided the writer with household income information to proceed with the application. Writer informed Pt that he would inform her of a decision on her application as soon as it becomes available. Pt stated she has not heard any update on her PA MA application yet but would update writer once she does. Arunar provided all contact information to PT for future use.         submitted PT free drug application of Eliquis to [facility] on 2/20/25              Lei Jensen  Phone:[phone]  Email:[email or web address]

## 2025-02-21 ENCOUNTER — APPOINTMENT (OUTPATIENT)
Dept: LAB | Facility: HOSPITAL | Age: 60
End: 2025-02-21
Payer: COMMERCIAL

## 2025-02-21 DIAGNOSIS — N83.202 CYSTS OF BOTH OVARIES: ICD-10-CM

## 2025-02-21 DIAGNOSIS — N83.201 CYSTS OF BOTH OVARIES: ICD-10-CM

## 2025-02-21 LAB
ALBUMIN SERPL BCG-MCNC: 4.1 G/DL (ref 3.5–5)
ALP SERPL-CCNC: 74 U/L (ref 34–104)
ALT SERPL W P-5'-P-CCNC: 13 U/L (ref 7–52)
ANION GAP SERPL CALCULATED.3IONS-SCNC: 10 MMOL/L (ref 4–13)
AST SERPL W P-5'-P-CCNC: 34 U/L (ref 13–39)
BASOPHILS # BLD AUTO: 0.01 THOUSANDS/ΜL (ref 0–0.1)
BASOPHILS NFR BLD AUTO: 0 % (ref 0–1)
BILIRUB SERPL-MCNC: 0.49 MG/DL (ref 0.2–1)
BUN SERPL-MCNC: 13 MG/DL (ref 5–25)
CALCIUM SERPL-MCNC: 9.7 MG/DL (ref 8.4–10.2)
CHLORIDE SERPL-SCNC: 103 MMOL/L (ref 96–108)
CO2 SERPL-SCNC: 23 MMOL/L (ref 21–32)
CREAT SERPL-MCNC: 0.68 MG/DL (ref 0.6–1.3)
EOSINOPHIL # BLD AUTO: 0.01 THOUSAND/ΜL (ref 0–0.61)
EOSINOPHIL NFR BLD AUTO: 0 % (ref 0–6)
ERYTHROCYTE [DISTWIDTH] IN BLOOD BY AUTOMATED COUNT: 14.3 % (ref 11.6–15.1)
GFR SERPL CREATININE-BSD FRML MDRD: 96 ML/MIN/1.73SQ M
GLUCOSE SERPL-MCNC: 102 MG/DL (ref 65–140)
HCT VFR BLD AUTO: 41 % (ref 34.8–46.1)
HGB BLD-MCNC: 13 G/DL (ref 11.5–15.4)
IMM GRANULOCYTES # BLD AUTO: 0.02 THOUSAND/UL (ref 0–0.2)
IMM GRANULOCYTES NFR BLD AUTO: 0 % (ref 0–2)
LYMPHOCYTES # BLD AUTO: 1.43 THOUSANDS/ΜL (ref 0.6–4.47)
LYMPHOCYTES NFR BLD AUTO: 18 % (ref 14–44)
MAGNESIUM SERPL-MCNC: 1.8 MG/DL (ref 1.9–2.7)
MCH RBC QN AUTO: 28.3 PG (ref 26.8–34.3)
MCHC RBC AUTO-ENTMCNC: 31.7 G/DL (ref 31.4–37.4)
MCV RBC AUTO: 89 FL (ref 82–98)
MONOCYTES # BLD AUTO: 0.1 THOUSAND/ΜL (ref 0.17–1.22)
MONOCYTES NFR BLD AUTO: 1 % (ref 4–12)
NEUTROPHILS # BLD AUTO: 6.34 THOUSANDS/ΜL (ref 1.85–7.62)
NEUTS SEG NFR BLD AUTO: 81 % (ref 43–75)
NRBC BLD AUTO-RTO: 0 /100 WBCS
PLATELET # BLD AUTO: 590 THOUSANDS/UL (ref 149–390)
PMV BLD AUTO: 10.1 FL (ref 8.9–12.7)
POTASSIUM SERPL-SCNC: 4.3 MMOL/L (ref 3.5–5.3)
PROT SERPL-MCNC: 7.5 G/DL (ref 6.4–8.4)
RBC # BLD AUTO: 4.59 MILLION/UL (ref 3.81–5.12)
SODIUM SERPL-SCNC: 136 MMOL/L (ref 135–147)
WBC # BLD AUTO: 7.91 THOUSAND/UL (ref 4.31–10.16)

## 2025-02-21 PROCEDURE — 80053 COMPREHEN METABOLIC PANEL: CPT

## 2025-02-21 PROCEDURE — 83735 ASSAY OF MAGNESIUM: CPT

## 2025-02-21 PROCEDURE — 85025 COMPLETE CBC W/AUTO DIFF WBC: CPT

## 2025-02-21 PROCEDURE — 36415 COLL VENOUS BLD VENIPUNCTURE: CPT

## 2025-02-24 ENCOUNTER — PATIENT OUTREACH (OUTPATIENT)
Dept: CASE MANAGEMENT | Facility: HOSPITAL | Age: 60
End: 2025-02-24

## 2025-02-24 ENCOUNTER — NUTRITION (OUTPATIENT)
Dept: NUTRITION | Facility: CLINIC | Age: 60
End: 2025-02-24

## 2025-02-24 ENCOUNTER — DOCUMENTATION (OUTPATIENT)
Dept: HEMATOLOGY ONCOLOGY | Facility: CLINIC | Age: 60
End: 2025-02-24

## 2025-02-24 DIAGNOSIS — Z71.3 NUTRITIONAL COUNSELING: Primary | ICD-10-CM

## 2025-02-24 NOTE — PATIENT INSTRUCTIONS
Nutrition Rx & Recommendations:  Diet: High Calorie, High Protein (for high calorie foods see pages 52-53, and for high protein foods see pages 49-51 in your Eating Hints book)  Include a variety of foods (as tolerated/allowed by diet).  Incorporate physical activity as able/allowed.  Stay hydrated by sipping fluids of choice/tolerance throughout the day.  Liquid nutrition may be better tolerated than solids at times.  Alter food choices and eating patterns to accommodate changing needs.  Refer to Eating Hints book for other meal/snack ideas and symptom management.  For diarrhea: drink plenty of fluids (>64 oz/day), avoid carbonation; eat 5-6 small meals/day; include high sodium & potassium foods/liquids (Sodium: soup/broth;  Potassium: bananas, canned apricots, potatoes); eat low-fiber foods; choose foods/liquids that are room temperature; avoid foods/drinks that can make diarrhea worse (ex. high fiber, high sugar, hot/cold drinks, greasy/fatty foods, gas forming foods such as beans, raw produce, milk products, alcohol, spicy foods, caffeine, sugar alcohols (xylitol, sorbitol), and apple juice (high in sorbitol) (see pages 15-16 in your Eating Hints book).  If diarrhea is severe, consider adding Banatrol (banana flakes) 1-3 times per day mixed in applesauce (can be purchased online from [employer]).  Try adding soluble fiber: applesauce, banana, oatmeal, potatoes, rice, etc. to help thicken stools.  Weigh yourself regularly. If you notice weight loss, make an effort to increase your daily food/calorie intake. If you continue to notice loss after these efforts, reach out to your dietitian to establish a plan to stabilize weight.  For nausea, suggestions include: Eat 5-6 smaller meals throughout the day instead of 3 large meals; Sip on calorie containing fluids throughout the day; Eat bland foods and foods easy on the stomach: clear broth (chicken, vegetable, bone, mushroom, beef), juice (caution with acidic juices),  potatoes, pretzels, crackers, cereal (Corn Flakes, Rice Chex, Rice Crispies, Cheerios), oatmeal or cream of wheat, white bread or toast, white rice, cooked vegetables (caution with gas producing vegetables), plain pasta, eggs, peanut butter, boiled chicken or turkey, soft cheeses; Consume foods and drinks at room temperature. Try to avoid eating/drinking anything too hot or cold; Try to avoid foods with strong odors; Suck on tart candies such as lemon drops or ginger chews to help relieve nausea; Ginger tea or flat ginger ale; Foods to avoid: High sugar foods (soda, candies, desserts), Greasy/fried foods, Gas producing foods (broccoli, cabbage, [place] sprouts, raw fruits/vegetables, beans), Caution with diary products unless they are low lactose or lactose free, Alcohol, Spicy foods, Acidic foods (coffee, tea, citrus, tomato), Avoid eating your favorite foods when you feel nauseous so you don't develop a dislike of those foods. Refer to pages 21-22 in Eating Hints Book for additional suggestions.    Ways to increase calorie and protein intake: (refer to pages 49-53 of Eating Hints Book for ways to add protein and calories)  Eat when feeling most hungry.   Choose foods that are easy to eat: yogurt, oatmeal, eggs, soup, cereal, muffins, granola bars.   Keep non perishable snacks nearby (pretzels, crackers, trail mix).  5-6 small meals/snacks daily  Protein at all meals/snacks: eggs (scrambled, hard boiled, pan fried, egg whites), fish (salmon, tuna steal, swordfish, rachel, shrimp, cod, trent), beans (chickpeas, black beans, lentils), nuts/nut butters, quinoa, peas, oatmeal, seeds (mike, flax, pumpkin), lentil pasta, soy milk, cheese, Greek yogurt, cottage cheese, chicken, lean ground beef, lean cuts of beef (loin, round, flank), turkey breast.   Add high calorie foods to meals: cheese, whole milk, heavy cream, olive oil, avocado, butter, peanut butter, extra sauces/gravies, creamy salad dressings, cream cheese      Choose liquids with calories: whole milk, Fairlife milk (higher protein/lactose-free milk), chocolate milk, 100% fruit juice, diluted juice, bone broth (higher protein broth), creamy soups, sports drinks (Gatorade, Poweraide, Pedialyte, etc.), Italian ice, popsicles, milkshakes, smoothies, oral nutrition supplements (Ensure, Boost, Orgain, Cascade Instant Breakfast, etc.), gelatin/Jello, etc.  Use oral nutrition supplements or protein powder to make homemade smoothies or milkshakes. Add high calorie foods like peanut butter, whole milk, heavy cream, ice cream.   Choose oral nutrition supplements with >300 calories per serving.        Follow Up Plan: PRN per patient request  Recommend Referral to Other Providers: none at this time

## 2025-02-24 NOTE — PROGRESS NOTES
spoke with PT on 2/24/25 @ 9 AM to inform her that she was approved for free drug of Eliquis through [employer]. Arunar informed PT that the pharmacy should be giving her a call to confirm shipping details.  provided the [employer] phone number ([phone]) to PT to call to set up shipment if the pharmacy does not outreach her by EOD. PT stated that she just received paperwork from [employer] to complete. Writer encouraged PT to work on completing the paperwork for [employer] in hopes to obtain MA and/or apply for FA. PT verbalized understanding and stated she would do so. PT was grateful for the call and provided with the writers contact information for future use.            Lei Jensen  Phone:[phone]  Email:[email or web address]

## 2025-02-24 NOTE — PROGRESS NOTES
PT is approved for free drug of Eliquis through [employer]. Approval confirmed by rep Johnson on 2/24/25 @ 8:45 AM during phone call with writer. Details below                Pharmacy    [facility] - [address]  [address], [place] [address]  Phone: [phone]  Fax: [phone]             Lei Jensen  Phone:[phone]  Email:[email or web address]

## 2025-02-24 NOTE — PROGRESS NOTES
Outpatient Oncology Nutrition Consultation   Type of Consult: Initial Consult  Care Location: Telephone Call    Reason for referral: notification by infusion RN (Kalyani CRAFT) on 2/18/25 that pt is appropriate for oncology nutrition care (reason for referral:  decreased appetite, nausea, ascites).     Nutrition Assessment:   Oncology Diagnosis & Treatments:   Recent diagnoses of cancer of both ovaries.  Began chemotherapy 2/18/25; paclitaxel + carboplatin  Oncology History   Malignant neoplasm of both ovaries (HCC)   2/4/2025 Initial Diagnosis    Malignant neoplasm of both ovaries (HCC)     2/6/2025 -  Cancer Staged    Staging form: Ovary, Fallopian Tube, Primary Peritoneal, AJCC 8th Edition  - Clinical stage from 2/6/2025: FIGO Stage IIIC (cT3c, cNX, cM0) - Signed by Augusto De Souza MD on 2/11/2025  Histopathologic type: Serous cystadenocarcinoma, NOS  Stage prefix: Initial diagnosis  Histologic grade (G): G3  Histologic grading system: 4 grade system       2/18/2025 -  Chemotherapy    alteplase (CATHFLO), 2 mg, Intracatheter, Every 1 Minute as needed, 1 of 7 cycles  palonosetron (ALOXI), 0.25 mg, Intravenous, Once, 1 of 7 cycles  Administration: 0.25 mg (2/18/2025)  fosaprepitant (EMEND) IVPB, 150 mg, Intravenous, Once, 1 of 7 cycles  Administration: 150 mg (2/18/2025)  CARBOplatin (PARAPLATIN) IVPB (GOG AUC DOSING), 650.5 mg, Intravenous, Once, 1 of 7 cycles  Administration: 650.5 mg (2/18/2025)  PACLItaxel (TAXOL) chemo IVPB, 175 mg/m2 = 336 mg, Intravenous, Once, 1 of 7 cycles  Administration: 336 mg (2/18/2025)  bevacizumab-awwb (MVASI) IVPB, 1,140 mg (100 % of original dose 15 mg/kg), Intravenous, Once, 1 of 7 cycles  Dose modification: 15 mg/kg (original dose 15 mg/kg, Cycle 1)  Administration: 1,100 mg (2/18/2025)     Ovarian cancer (HCC) (Resolved)   2/6/2025 -  Cancer Staged    Staging form: Ovary, Fallopian Tube, Primary Peritoneal, AJCC 8th Edition  - Clinical stage from 2/6/2025: FIGO Stage IIIC (cT3c,  Lisandra, cM0) - Signed by Augusto De Souza MD on 2/11/2025  Histopathologic type: Serous cystadenocarcinoma, NOS  Histologic grade (G): G3  Histologic grading system: 4 grade system       2/11/2025 Initial Diagnosis    Ovarian cancer (HCC)       Past Medical & Surgical Hx:   Patient Active Problem List   Diagnosis    Essential hypertension, benign    Major depressive disorder, recurrent episode, mild (HCC)    Asthma    Age-related cataract of both eyes    Panic disorder    Financial difficulties    Mild persistent asthma with (acute) exacerbation    Hypokalemia    Malignant neoplasm of both ovaries (HCC)    Elevated platelet count    GERD (gastroesophageal reflux disease)    Malignant ascites     Past Medical History:   Diagnosis Date    Anxiety     Asthma     Depression     Eczema     Hypertension     Inflammatory bowel disease      Past Surgical History:   Procedure Laterality Date    BREAST CYST EXCISION Right     benign    CATARACT EXTRACTION, BILATERAL Bilateral     CHOLECYSTECTOMY      EYE SURGERY      IR BIOPSY LYMPH NODE  04/28/2020    IR BIOPSY OMENTUM  2/6/2025    IR PARACENTESIS  2/6/2025    IR PARACENTESIS  2/17/2025       Review of Medications:   Vitamins, Supplements and Herbals: No, pt denies taking supplements    Current Outpatient Medications:     acetaminophen (TYLENOL) 325 mg tablet, Take 2 tablets (650 mg total) by mouth every 6 (six) hours as needed for mild pain, moderate pain or headaches, Disp: , Rfl:     albuterol (PROVENTIL HFA,VENTOLIN HFA) 90 mcg/act inhaler, INHALE 2 PUFFS BY MOUTH EVERY 4 HOURS AS NEEDED FOR WHEEZING, Disp: 18 g, Rfl: 5    ALPRAZolam (XANAX) 0.5 mg tablet, Take 1 tablet (0.5 mg total) by mouth 3 (three) times a day as needed (panic attack), Disp: 60 tablet, Rfl: 5    amLODIPine (NORVASC) 10 mg tablet, Take 1 tablet by mouth once daily, Disp: 90 tablet, Rfl: 1    apixaban (Eliquis) 2.5 mg, Take 1 tablet (2.5 mg total) by mouth 2 (two) times a day (Patient not taking:  "Reported on 2/18/2025), Disp: 60 tablet, Rfl: 5    FLUoxetine (PROzac) 40 MG capsule, Take 1 capsule by mouth once daily, Disp: 90 capsule, Rfl: 0    lisinopril (ZESTRIL) 10 mg tablet, Take 1 tablet by mouth once daily, Disp: 30 tablet, Rfl: 5    Omeprazole Magnesium (PRILOSEC OTC PO), Take 1 tablet by mouth 2 (two) times a day, Disp: , Rfl:     ondansetron (ZOFRAN) 8 mg tablet, Take 1 tablet (8 mg total) by mouth every 8 (eight) hours as needed for nausea or vomiting, Disp: 30 tablet, Rfl: 1    Most Recent Lab Results:   Lab Results   Component Value Date    WBC 7.91 02/21/2025    NEUTROABS 6.34 02/21/2025    ALT 13 02/21/2025    AST 34 02/21/2025    ALB 4.1 02/21/2025    SODIUM 136 02/21/2025    SODIUM 138 02/14/2025    K 4.3 02/21/2025    K 3.8 02/14/2025     02/21/2025    BUN 13 02/21/2025    BUN 10 02/14/2025    CREATININE 0.68 02/21/2025    CREATININE 0.64 02/14/2025    EGFR 96 02/21/2025    PHOS 3.2 02/06/2025    PHOS 3.0 02/05/2025    GLUF 89 02/14/2025    GLUF 86 03/16/2022    GLUC 102 02/21/2025    CALCIUM 9.7 02/21/2025    MG 1.8 (L) 02/21/2025       Anthropometric Measurements:   Height: 69\"  Ht Readings from Last 1 Encounters:   02/18/25 5' 9\" (1.753 m)     Wt Readings from Last 20 Encounters:   02/18/25 76 kg (167 lb 8 oz)   02/11/25 78.7 kg (173 lb 6.4 oz)   02/04/25 79 kg (174 lb 2.6 oz)   01/22/25 79.7 kg (175 lb 9.6 oz)   07/17/24 88.8 kg (195 lb 11.2 oz)   01/10/24 93.2 kg (205 lb 6.4 oz)   10/05/23 96.4 kg (212 lb 9.6 oz)   07/05/23 103 kg (226 lb 9.6 oz)   09/30/22 93.9 kg (207 lb 1.6 oz)   05/20/22 97.5 kg (215 lb)   03/03/22 93.7 kg (206 lb 8 oz)   09/02/21 88.7 kg (195 lb 8 oz)   05/14/21 94.3 kg (208 lb)   03/01/21 94.4 kg (208 lb 1.6 oz)   02/29/20 94.3 kg (208 lb)   02/27/20 94.6 kg (208 lb 8 oz)   12/30/15 83.9 kg (185 lb)       Weight History:   Usual Weight: 195#  Varian: n/a  Home Scale: none     Oncology Nutrition-Anthropometrics      Flowsheet Row Nutrition from 2/24/2025 in [facility]" "[facility] [place]   Patient age (years): 59 years   Patient (female) height (in): 69 in   Current Weight to be used for anthropometric calculations (lbs) 167.5 lbs   Current Weight to be used for anthropometric calculations (kg) 76.1 kg   BMI: 24.7   IBW female: 145 lbs   IBW (kg) female: 65.9 kg   IBW % (female) 115.5 %   Adjusted BW (female): 150.6 lbs   Adjusted BW kg (female): 68.5 kg   % weight change after 1 week: -3.4 %   Weight change after 1 week (lbs) -5.9 lbs   % weight change after 1 month: -4.6 %   Weight change after 1 month (lbs) -8.1 lbs   % weight change after 6 months: -14.5 %   Weight change after 6 months (lbs) -28.3 lbs            Nutrition-Focused Physical Findings: n/a due to telephone call    Food/Nutrition-Related History & Client/Social History:    Current Nutrition Impact Symptoms:  [x] Nausea   -zofran helps  [x] Reduced Appetite  [] Acid Reflux    [x] Vomiting -1x this week  [x] Unintended Wt Loss   -significant x1 week and x6 months  [] Malabsorption    [x] Diarrhea -sometimes takes imodium  [] Unintended Wt Gain  [] Dumping Syndrome    [] Constipation  [] Thick Mucous/Secretions  [] Abdominal Pain    [] Dysgeusia (Altered Taste)  [] Xerostomia (Dry Mouth)  [] Gas    [] Dysosmia (Altered Smell)  [] Thrush  [] Difficulty Chewing    [] Oral Mucositis (Sore Mouth)  [] Fatigue  [] Hyperglycemia   No results found for: \"HGBA1C\"   [] Odynophagia  [] Esophagitis  [] Other:   -paracentesis 2/17/25; 1L of fluid removed    [] Dysphagia  [] Early Satiety  [] No Problems Eating      Food Allergies & Intolerances: no    Current Diet: Regular Diet, No Restrictions  Nutrition Route: PO  Activity level: ADL    24 Hr Diet Recall:   Snack: crackers  Snack: vanilla wafer cookies   Dinner: 1/2 grilled chicken sandwich     Beverages: water (sipping throughout the day), hot tea w/ sugar (12oz x1-2)  Supplements:   None      Oncology Nutrition-Estimated Needs      Flowsheet Row Nutrition " from 2025 in [facility] [place]   Weight type used Actual weight   Weight in kilograms (kg) used for estimated needs 76.1 kg   Energy needs formula:  35-40 kcal/kg   Energy needs based on 35 kcal/k kcal   Energy needs based on 40 kcal/k kcal   Protein needs formula: 1.5-2 g/kg   Protein needs based on 1.5 g/kg 114 g   Protein needs based on 2 g/kg 152 g   Fluid needs formula: 30-35 mL/kg   Fluid needs based on 30 mL/kg 2292 mL   Fluid needs in ounces 77 oz   Fluid needs based on 35 mL/kg 2674 mL   Fluid needs in ounces 90 oz             Discussion & Intervention:   Ewa was evaluated today for an initial RD consultation regarding unintended weight loss, poor po intake, and nutrition impact sx management.  Ewa is currently undergoing tx for ovarian cancer .  She explains today that she has been experiencing nausea and decreased appetite. Zofran is helping her nausea, but she is unable to eat full meals. She is picking on small foods like crackers throughout the day.    Reviewed 24 hour recall, which revealed an inadequate po intake, and discussed ways to increase kcal, protein, and fluid intakes.  Also reviewed the importance of wt management throughout the tx process and the role of a high kcal/ high protein diet in managing wt and overall health.  Based on today's assessment, discussion included: MNT for: nausea, decreased appetite, weight loss, how to modify foods for anticipated nutrition impact symptoms pt may experience during CA tx, a bland/easy on the stomach diet & food choices to include at all meals & snacks, fortifying foods for added kcal and protein (examples include: adding cheese to foods such as eggs, mashed potatoes, casseroles, etc.; Making oatmeal with whole milk rather than water; Making fortified mashed potatoes with cream, butter, dry milk powder, plain Greek yogurt, and cheese.), adequate hydration & fluid choices, sipping on calorie containing  beverages (examples include: adding whole milk or cream to coffee, oral nutrition supplements, juice, electrolyte replacement beverages, milk, etc.), eating smaller more frequent meals every 2-3 hours (5-6 small meals/day), having consistent and planned snacks between meals, keeping non-perishable foods nearby to snack on, eating when feeling most hungry, and utilizing oral nutrition supplements.   Moving forward, Ewa was encouraged to increase kcal, protein, and fluid intakes. She would like to follow up prn.   Materials Provided: [facility] Eating Hints book, High-Calorie, High-Protein Diet handout, Commercial Nutrition Supplements handout, Protein Dense Foods handout, Nutrition During Your Cancer Treatment handout, and Nausea/vomiting  - all mailed to pts home   All questions and concerns addressed during today’s visit.  Ewa has RD contact information.    Nutrition Diagnosis:   Inadequate Energy Intake related to physiological causes, disease state and treatment related issues as evidenced by food recall, wt loss and discussion with pt and/or family.  Increased Nutrient Needs (kcal & pro) related to increased demand for nutrients and disease state as evidenced by cancer dx and pt undergoing tx for cancer.  Patient has clinical indicators (or ASPEN criteria) consistent with severe protein-calorie malnutrition in the context of Chronic Illness as evidenced by >10% wt loss in 6 months and </=75% energy intake vs. Estimated needs for >/=1 month.  Monitoring & Evaluation:   Goals:  weight maintenance/stabilization  adequate nutrition impact symptom management  pt to meet >/=75% estimated nutrition needs daily  increase protein intake  increase fluid intake  increase calorie intake    Progress Towards Goals: Initiated    Nutrition Rx & Recommendations:  Diet: High Calorie, High Protein (for high calorie foods see pages 52-53, and for high protein foods see pages 49-51 in your Eating Hints book)  Include a variety  of foods (as tolerated/allowed by diet).  Incorporate physical activity as able/allowed.  Stay hydrated by sipping fluids of choice/tolerance throughout the day.  Liquid nutrition may be better tolerated than solids at times.  Alter food choices and eating patterns to accommodate changing needs.  Refer to Eating Hints book for other meal/snack ideas and symptom management.  For diarrhea: drink plenty of fluids (>64 oz/day), avoid carbonation; eat 5-6 small meals/day; include high sodium & potassium foods/liquids (Sodium: soup/broth;  Potassium: bananas, canned apricots, potatoes); eat low-fiber foods; choose foods/liquids that are room temperature; avoid foods/drinks that can make diarrhea worse (ex. high fiber, high sugar, hot/cold drinks, greasy/fatty foods, gas forming foods such as beans, raw produce, milk products, alcohol, spicy foods, caffeine, sugar alcohols (xylitol, sorbitol), and apple juice (high in sorbitol) (see pages 15-16 in your Eating Hints book).  If diarrhea is severe, consider adding Banatrol (banana flakes) 1-3 times per day mixed in applesauce (can be purchased online from [employer]).  Try adding soluble fiber: applesauce, banana, oatmeal, potatoes, rice, etc. to help thicken stools.  Weigh yourself regularly. If you notice weight loss, make an effort to increase your daily food/calorie intake. If you continue to notice loss after these efforts, reach out to your dietitian to establish a plan to stabilize weight.  For nausea, suggestions include: Eat 5-6 smaller meals throughout the day instead of 3 large meals; Sip on calorie containing fluids throughout the day; Eat bland foods and foods easy on the stomach: clear broth (chicken, vegetable, bone, mushroom, beef), juice (caution with acidic juices), potatoes, pretzels, crackers, cereal (Corn Flakes, Rice Chex, Rice Crispies, Cheerios), oatmeal or cream of wheat, white bread or toast, white rice, cooked vegetables (caution with gas producing  vegetables), plain pasta, eggs, peanut butter, boiled chicken or turkey, soft cheeses; Consume foods and drinks at room temperature. Try to avoid eating/drinking anything too hot or cold; Try to avoid foods with strong odors; Suck on tart candies such as lemon drops or ginger chews to help relieve nausea; Ginger tea or flat ginger ale; Foods to avoid: High sugar foods (soda, candies, desserts), Greasy/fried foods, Gas producing foods (broccoli, cabbage, [place] sprouts, raw fruits/vegetables, beans), Caution with diary products unless they are low lactose or lactose free, Alcohol, Spicy foods, Acidic foods (coffee, tea, citrus, tomato), Avoid eating your favorite foods when you feel nauseous so you don't develop a dislike of those foods. Refer to pages 21-22 in Eating Hints Book for additional suggestions.    Ways to increase calorie and protein intake: (refer to pages 49-53 of Eating Hints Book for ways to add protein and calories)  Eat when feeling most hungry.   Choose foods that are easy to eat: yogurt, oatmeal, eggs, soup, cereal, muffins, granola bars.   Keep non perishable snacks nearby (pretzels, crackers, trail mix).  5-6 small meals/snacks daily  Protein at all meals/snacks: eggs (scrambled, hard boiled, pan fried, egg whites), fish (salmon, tuna steal, swordfish, rachel, shrimp, cod, trent), beans (chickpeas, black beans, lentils), nuts/nut butters, quinoa, peas, oatmeal, seeds (mike, flax, pumpkin), lentil pasta, soy milk, cheese, Greek yogurt, cottage cheese, chicken, lean ground beef, lean cuts of beef (loin, round, flank), turkey breast.   Add high calorie foods to meals: cheese, whole milk, heavy cream, olive oil, avocado, butter, peanut butter, extra sauces/gravies, creamy salad dressings, cream cheese     Choose liquids with calories: whole milk, Fairlife milk (higher protein/lactose-free milk), chocolate milk, 100% fruit juice, diluted juice, bone broth (higher protein broth), creamy soups,  sports drinks (Gatorade, Poweraide, Pedialyte, etc.), Italian ice, popsicles, milkshakes, smoothies, oral nutrition supplements (Ensure, Boost, Orgain, [place] Instant Breakfast, etc.), gelatin/Jello, etc.  Use oral nutrition supplements or protein powder to make homemade smoothies or milkshakes. Add high calorie foods like peanut butter, whole milk, heavy cream, ice cream.   Choose oral nutrition supplements with >300 calories per serving.        Follow Up Plan: PRN per patient request  Recommend Referral to Other Providers: none at this time

## 2025-02-24 NOTE — PROGRESS NOTES
ONC MAYA called patient for follow-up. Patient reports that she received completed MA documentation that was e-mailed to her and submitted it to her [facility] office.     Patient also interested in applying for [facility] [employer]. ONC SW e-mailed financial counselor team to request [employer] [facility] david be mailed to patient.     No other needs identified.

## 2025-02-27 ENCOUNTER — DOCUMENTATION (OUTPATIENT)
Dept: GYNECOLOGIC ONCOLOGY | Facility: CLINIC | Age: 60
End: 2025-02-27

## 2025-02-27 NOTE — PROGRESS NOTES
Received Paperwork   What type of form Other (Enter type in comments)   Scanned blank form into patient's Epic chart Yes   Method received form  In Person drop off   Provider responsible for form    Informed patient our office turn around time for completing patient forms is 5-7 business days. Yes, informed patient of turn around time     Comments Medication assistance form   Dr. FREEMAN patient          Name of company: [employer]   Address of company : [address] [place] [id number]  Telephone : [phone]  Fax : [phone]     Patient would like the form faxed to the company as well as a call back once completed

## 2025-02-28 ENCOUNTER — HOSPITAL ENCOUNTER (OUTPATIENT)
Dept: INTERVENTIONAL RADIOLOGY/VASCULAR | Facility: HOSPITAL | Age: 60
End: 2025-02-28

## 2025-02-28 ENCOUNTER — APPOINTMENT (OUTPATIENT)
Dept: LAB | Facility: HOSPITAL | Age: 60
End: 2025-02-28

## 2025-02-28 VITALS
DIASTOLIC BLOOD PRESSURE: 69 MMHG | OXYGEN SATURATION: 98 % | HEART RATE: 102 BPM | SYSTOLIC BLOOD PRESSURE: 134 MMHG | RESPIRATION RATE: 18 BRPM

## 2025-02-28 DIAGNOSIS — R18.0 MALIGNANT ASCITES: ICD-10-CM

## 2025-02-28 DIAGNOSIS — N83.201 CYSTS OF BOTH OVARIES: ICD-10-CM

## 2025-02-28 DIAGNOSIS — N83.202 CYSTS OF BOTH OVARIES: ICD-10-CM

## 2025-02-28 DIAGNOSIS — C56.9 MALIGNANT NEOPLASM OF OVARY, UNSPECIFIED LATERALITY (HCC): ICD-10-CM

## 2025-02-28 LAB
ALBUMIN SERPL BCG-MCNC: 4.1 G/DL (ref 3.5–5)
ALP SERPL-CCNC: 79 U/L (ref 34–104)
ALT SERPL W P-5'-P-CCNC: 13 U/L (ref 7–52)
ANION GAP SERPL CALCULATED.3IONS-SCNC: 10 MMOL/L (ref 4–13)
AST SERPL W P-5'-P-CCNC: 26 U/L (ref 13–39)
BASOPHILS # BLD AUTO: 0.02 THOUSANDS/ÂΜL (ref 0–0.1)
BASOPHILS NFR BLD AUTO: 1 % (ref 0–1)
BILIRUB SERPL-MCNC: 0.25 MG/DL (ref 0.2–1)
BUN SERPL-MCNC: 12 MG/DL (ref 5–25)
CALCIUM SERPL-MCNC: 9.4 MG/DL (ref 8.4–10.2)
CHLORIDE SERPL-SCNC: 104 MMOL/L (ref 96–108)
CO2 SERPL-SCNC: 25 MMOL/L (ref 21–32)
CREAT SERPL-MCNC: 0.57 MG/DL (ref 0.6–1.3)
EOSINOPHIL # BLD AUTO: 0.03 THOUSAND/ÂΜL (ref 0–0.61)
EOSINOPHIL NFR BLD AUTO: 1 % (ref 0–6)
ERYTHROCYTE [DISTWIDTH] IN BLOOD BY AUTOMATED COUNT: 14.2 % (ref 11.6–15.1)
GFR SERPL CREATININE-BSD FRML MDRD: 102 ML/MIN/1.73SQ M
GLUCOSE P FAST SERPL-MCNC: 85 MG/DL (ref 65–99)
HCT VFR BLD AUTO: 37.8 % (ref 34.8–46.1)
HGB BLD-MCNC: 11.8 G/DL (ref 11.5–15.4)
IMM GRANULOCYTES # BLD AUTO: 0 THOUSAND/UL (ref 0–0.2)
IMM GRANULOCYTES NFR BLD AUTO: 0 % (ref 0–2)
LYMPHOCYTES # BLD AUTO: 1.41 THOUSANDS/ÂΜL (ref 0.6–4.47)
LYMPHOCYTES NFR BLD AUTO: 36 % (ref 14–44)
MAGNESIUM SERPL-MCNC: 1.6 MG/DL (ref 1.9–2.7)
MCH RBC QN AUTO: 27.9 PG (ref 26.8–34.3)
MCHC RBC AUTO-ENTMCNC: 31.2 G/DL (ref 31.4–37.4)
MCV RBC AUTO: 89 FL (ref 82–98)
MONOCYTES # BLD AUTO: 0.41 THOUSAND/ÂΜL (ref 0.17–1.22)
MONOCYTES NFR BLD AUTO: 11 % (ref 4–12)
NEUTROPHILS # BLD AUTO: 2.05 THOUSANDS/ÂΜL (ref 1.85–7.62)
NEUTS SEG NFR BLD AUTO: 51 % (ref 43–75)
NRBC BLD AUTO-RTO: 0 /100 WBCS
PLATELET # BLD AUTO: 378 THOUSANDS/UL (ref 149–390)
PMV BLD AUTO: 10.6 FL (ref 8.9–12.7)
POTASSIUM SERPL-SCNC: 3.6 MMOL/L (ref 3.5–5.3)
PROT SERPL-MCNC: 7.4 G/DL (ref 6.4–8.4)
RBC # BLD AUTO: 4.23 MILLION/UL (ref 3.81–5.12)
SODIUM SERPL-SCNC: 139 MMOL/L (ref 135–147)
WBC # BLD AUTO: 3.92 THOUSAND/UL (ref 4.31–10.16)

## 2025-02-28 PROCEDURE — 85025 COMPLETE CBC W/AUTO DIFF WBC: CPT

## 2025-02-28 PROCEDURE — 83735 ASSAY OF MAGNESIUM: CPT

## 2025-02-28 PROCEDURE — 76705 ECHO EXAM OF ABDOMEN: CPT | Performed by: PHYSICIAN ASSISTANT

## 2025-02-28 PROCEDURE — 80053 COMPREHEN METABOLIC PANEL: CPT

## 2025-02-28 PROCEDURE — 36415 COLL VENOUS BLD VENIPUNCTURE: CPT

## 2025-02-28 PROCEDURE — 76937 US GUIDE VASCULAR ACCESS: CPT

## 2025-02-28 NOTE — DISCHARGE INSTRUCTIONS
[facility]  Interventional Radiology  [phone]    Abdominal Paracentesis     WHAT YOU NEED TO KNOW:   Abdominal paracentesis is a procedure to remove abnormal fluid buildup in your abdomen. Fluid builds up because of liver problems, such as swelling and scarring. Heart failure, kidney disease, a mass, or problems with your pancreas may also cause fluid buildup.     DISCHARGE INSTRUCTIONS:     Follow up with your healthcare provider as directed: Write down your questions so you remember to ask them during your visits.     Wound care: Remove dressing after 24 hours. Leave glue in place.    Return to your normal activities    Contact Interventional Radiology at [phone] if:  You have a fever and your wound is red and swollen.   You have yellow, green, or bad-smelling discharge coming from your wound.   You have pain or swelling in your abdomen.   You have an upset stomach or you vomit.   You have sudden, sharp pain in your abdomen.   You urinate very little or not at all.   You feel confused and more tired than usual.   Your arm or leg feels warm, tender, and painful. It may look swollen and red.   You suddenly feel lightheaded and have trouble breathing.

## 2025-02-28 NOTE — PROGRESS NOTES
Paperwork Update   What type of form Other (Enter type in comments)   Method of returning completed form Fax   FMLA/Disability requested start date 2/27/2025   Date need completed form(s) by ASAP   Intermittent or Continuous FMLA?  Does not apply   If returning via fax, enter fax number [phone]   Additional Comments       Medication Assistance Form

## 2025-02-28 NOTE — PROGRESS NOTES
Paperwork Complete   What type of form  Other (Enter type in comments)   Method returned to patient MyChart   Communicated to patient forms are completed Yes   Scanned completed form(s) into patient's Epic chart Yes   Additional Comments:      Medication Assistance Form

## 2025-03-04 ENCOUNTER — TELEMEDICINE (OUTPATIENT)
Dept: GYNECOLOGIC ONCOLOGY | Facility: CLINIC | Age: 60
End: 2025-03-04

## 2025-03-04 DIAGNOSIS — C56.3 MALIGNANT NEOPLASM OF BOTH OVARIES (HCC): Primary | ICD-10-CM

## 2025-03-04 PROCEDURE — 99214 OFFICE O/P EST MOD 30 MIN: CPT | Performed by: NURSE PRACTITIONER

## 2025-03-04 NOTE — PATIENT INSTRUCTIONS
1. Return to the office as per chemotherapy calendar.  2. Call with any new complaints or concerns.

## 2025-03-04 NOTE — PROGRESS NOTES
Virtual Regular VisitName: Ewa Hernandes      : 1965      MRN: [id number]  Encounter Provider: SOLANGE Cobian  Encounter Date: 3/4/2025   Encounter department: [facility] [place]  :  Assessment & Plan  Malignant neoplasm of both ovaries (HCC)  59-year-old with at least stage IIIC high-grade serous ovarian cancer. She is s/p her first cycle of neoadjuvant chemotherapy with Taxol 175mg/m2, Carboplatin AUC 5, and Avastin 15mg/kg IV Q21 days. She tolerated her first cycle well, without significant side effects. Notes transient nausea and diarrhea that lasted a few days post-infusion. She had a recent appointment for a paracentesis, which was not performed due to lack of ascites. Her PS is 0.    Patient will proceed with cycle 2 next week, pending labs. RTO as per chemo calendar.             History of Present Illness     This is a 59 y.o. female who presents to the office for pre-chemotherapy evaluation. She has been tolerating chemotherapy well and is without acute complaints. She has been afebrile. She had mild nausea post-treatment, which was relieved with Zofran. No vomiting. Her appetite is appropriate. She is voiding and moving her bowels without difficulty. Diarrhea noted for ~3 days post-infusion, and has now resolved. She denies abdominal or pelvic pain. The patient is without vaginal bleeding or discharge. She denies chemotherapy-induced neuropathy. She is ambulatory.    Patient has applied for medical assistance. Application is still in process at this time.      Review of Systems   Constitutional:  Positive for fatigue. Negative for activity change, appetite change, chills, fever and unexpected weight change.   HENT:  Negative for mouth sores and nosebleeds.    Eyes: Negative.    Respiratory:  Negative for cough, chest tightness, shortness of breath and wheezing.    Cardiovascular:  Negative for chest pain, palpitations and leg swelling.   Gastrointestinal:   Negative for abdominal distention, abdominal pain, anal bleeding, blood in stool, constipation, diarrhea, nausea, rectal pain and vomiting.   Endocrine: Negative.    Genitourinary:  Negative for difficulty urinating, dysuria, flank pain, frequency, hematuria, pelvic pain, urgency, vaginal bleeding, vaginal discharge and vaginal pain.   Musculoskeletal:  Negative for arthralgias, joint swelling and myalgias.   Skin:  Negative for color change, pallor and rash.   Neurological:  Negative for dizziness, weakness, light-headedness, numbness and headaches.   Hematological: Negative.    Psychiatric/Behavioral: Negative.  The patient is not nervous/anxious.        Objective   There were no vitals taken for this visit.    Physical Exam  Constitutional:       General: She is not in acute distress.     Appearance: Normal appearance. She is not ill-appearing.   HENT:      Head: Normocephalic and atraumatic.   Eyes:      General:         Right eye: No discharge.         Left eye: No discharge.      Conjunctiva/sclera: Conjunctivae normal.   Pulmonary:      Effort: Pulmonary effort is normal. No respiratory distress.   Neurological:      Mental Status: She is alert.   Psychiatric:         Mood and Affect: Mood normal.         Behavior: Behavior normal.         Thought Content: Thought content normal.         Administrative Statements   Encounter provider SOLANGE Cobian    The Patient is located at Home and in the following state in which I hold an active license [place].    The patient was identified by name and date of birth. Ewajean calros Hernandes was informed that this is a telemedicine visit and that the visit is being conducted through the Epic Embedded platform. She agrees to proceed..  My office door was closed. No one else was in the room.  She acknowledged consent and understanding of privacy and security of the video platform. The patient has agreed to participate and understands they can discontinue the visit at any  time.    I have spent a total time of 15 minutes in caring for this patient on the day of the visit/encounter including Instructions for management, Patient and family education, Impressions, Counseling / Coordination of care, and Documenting in the medical record, not including the time spent for establishing the audio/video connection.

## 2025-03-04 NOTE — ASSESSMENT & PLAN NOTE
59-year-old with at least stage IIIC high-grade serous ovarian cancer. She is s/p her first cycle of neoadjuvant chemotherapy with Taxol 175mg/m2, Carboplatin AUC 5, and Avastin 15mg/kg IV Q21 days. She tolerated her first cycle well, without significant side effects. Notes transient nausea and diarrhea that lasted a few days post-infusion. She had a recent appointment for a paracentesis, which was not performed due to lack of ascites. Her PS is 0.    Patient will proceed with cycle 2 next week, pending labs. RTO as per chemo calendar.

## 2025-03-07 ENCOUNTER — APPOINTMENT (OUTPATIENT)
Dept: LAB | Facility: HOSPITAL | Age: 60
End: 2025-03-07

## 2025-03-07 DIAGNOSIS — N83.201 CYSTS OF BOTH OVARIES: ICD-10-CM

## 2025-03-07 DIAGNOSIS — N83.202 CYSTS OF BOTH OVARIES: ICD-10-CM

## 2025-03-07 LAB
ALBUMIN SERPL BCG-MCNC: 4.4 G/DL (ref 3.5–5)
ALP SERPL-CCNC: 81 U/L (ref 34–104)
ALT SERPL W P-5'-P-CCNC: 16 U/L (ref 7–52)
ANION GAP SERPL CALCULATED.3IONS-SCNC: 10 MMOL/L (ref 4–13)
AST SERPL W P-5'-P-CCNC: 26 U/L (ref 13–39)
BASOPHILS # BLD AUTO: 0.03 THOUSANDS/ÂΜL (ref 0–0.1)
BASOPHILS NFR BLD AUTO: 1 % (ref 0–1)
BILIRUB SERPL-MCNC: 0.37 MG/DL (ref 0.2–1)
BUN SERPL-MCNC: 14 MG/DL (ref 5–25)
CALCIUM SERPL-MCNC: 10 MG/DL (ref 8.4–10.2)
CANCER AG125 SERPL-ACNC: 3772.2 U/ML (ref 0–35)
CHLORIDE SERPL-SCNC: 103 MMOL/L (ref 96–108)
CO2 SERPL-SCNC: 25 MMOL/L (ref 21–32)
CREAT SERPL-MCNC: 0.68 MG/DL (ref 0.6–1.3)
CREAT UR-MCNC: 138.4 MG/DL
EOSINOPHIL # BLD AUTO: 0.02 THOUSAND/ÂΜL (ref 0–0.61)
EOSINOPHIL NFR BLD AUTO: 1 % (ref 0–6)
ERYTHROCYTE [DISTWIDTH] IN BLOOD BY AUTOMATED COUNT: 15.2 % (ref 11.6–15.1)
GFR SERPL CREATININE-BSD FRML MDRD: 96 ML/MIN/1.73SQ M
GLUCOSE P FAST SERPL-MCNC: 90 MG/DL (ref 65–99)
HCT VFR BLD AUTO: 41.8 % (ref 34.8–46.1)
HGB BLD-MCNC: 13.2 G/DL (ref 11.5–15.4)
IMM GRANULOCYTES # BLD AUTO: 0.01 THOUSAND/UL (ref 0–0.2)
IMM GRANULOCYTES NFR BLD AUTO: 0 % (ref 0–2)
LYMPHOCYTES # BLD AUTO: 1.56 THOUSANDS/ÂΜL (ref 0.6–4.47)
LYMPHOCYTES NFR BLD AUTO: 38 % (ref 14–44)
MAGNESIUM SERPL-MCNC: 2 MG/DL (ref 1.9–2.7)
MCH RBC QN AUTO: 28 PG (ref 26.8–34.3)
MCHC RBC AUTO-ENTMCNC: 31.6 G/DL (ref 31.4–37.4)
MCV RBC AUTO: 89 FL (ref 82–98)
MONOCYTES # BLD AUTO: 0.66 THOUSAND/ÂΜL (ref 0.17–1.22)
MONOCYTES NFR BLD AUTO: 16 % (ref 4–12)
NEUTROPHILS # BLD AUTO: 1.86 THOUSANDS/ÂΜL (ref 1.85–7.62)
NEUTS SEG NFR BLD AUTO: 44 % (ref 43–75)
NRBC BLD AUTO-RTO: 0 /100 WBCS
PLATELET # BLD AUTO: 317 THOUSANDS/UL (ref 149–390)
PMV BLD AUTO: 10.2 FL (ref 8.9–12.7)
POTASSIUM SERPL-SCNC: 4.3 MMOL/L (ref 3.5–5.3)
PROT SERPL-MCNC: 7.8 G/DL (ref 6.4–8.4)
PROT UR-MCNC: 30.8 MG/DL
PROT/CREAT UR: 0.2 MG/G{CREAT} (ref 0–0.1)
RBC # BLD AUTO: 4.72 MILLION/UL (ref 3.81–5.12)
SODIUM SERPL-SCNC: 138 MMOL/L (ref 135–147)
WBC # BLD AUTO: 4.14 THOUSAND/UL (ref 4.31–10.16)

## 2025-03-07 PROCEDURE — 82570 ASSAY OF URINE CREATININE: CPT

## 2025-03-07 PROCEDURE — 84156 ASSAY OF PROTEIN URINE: CPT

## 2025-03-07 PROCEDURE — 36415 COLL VENOUS BLD VENIPUNCTURE: CPT

## 2025-03-07 PROCEDURE — 86304 IMMUNOASSAY TUMOR CA 125: CPT

## 2025-03-07 PROCEDURE — 80053 COMPREHEN METABOLIC PANEL: CPT

## 2025-03-07 PROCEDURE — 83735 ASSAY OF MAGNESIUM: CPT

## 2025-03-07 PROCEDURE — 85025 COMPLETE CBC W/AUTO DIFF WBC: CPT

## 2025-03-10 DIAGNOSIS — I10 ESSENTIAL HYPERTENSION, BENIGN: ICD-10-CM

## 2025-03-10 DIAGNOSIS — F33.0 MAJOR DEPRESSIVE DISORDER, RECURRENT EPISODE, MILD (HCC): ICD-10-CM

## 2025-03-10 RX ORDER — FLUOXETINE HYDROCHLORIDE 40 MG/1
40 CAPSULE ORAL DAILY
Qty: 90 CAPSULE | Refills: 1 | Status: SHIPPED | OUTPATIENT
Start: 2025-03-10

## 2025-03-10 RX ORDER — AMLODIPINE BESYLATE 10 MG/1
10 TABLET ORAL DAILY
Qty: 90 TABLET | Refills: 1 | Status: SHIPPED | OUTPATIENT
Start: 2025-03-10

## 2025-03-10 RX ORDER — PALONOSETRON 0.05 MG/ML
0.25 INJECTION, SOLUTION INTRAVENOUS ONCE
Status: CANCELLED | OUTPATIENT
Start: 2025-03-11

## 2025-03-10 RX ORDER — SODIUM CHLORIDE 9 MG/ML
20 INJECTION, SOLUTION INTRAVENOUS ONCE
Status: CANCELLED | OUTPATIENT
Start: 2025-03-11

## 2025-03-11 ENCOUNTER — HOSPITAL ENCOUNTER (OUTPATIENT)
Dept: INFUSION CENTER | Facility: HOSPITAL | Age: 60
Discharge: HOME | End: 2025-03-11
Payer: COMMERCIAL

## 2025-03-11 VITALS
BODY MASS INDEX: 24.16 KG/M2 | DIASTOLIC BLOOD PRESSURE: 77 MMHG | HEIGHT: 69 IN | TEMPERATURE: 96.9 F | WEIGHT: 163.14 LBS | OXYGEN SATURATION: 98 % | HEART RATE: 85 BPM | SYSTOLIC BLOOD PRESSURE: 120 MMHG | RESPIRATION RATE: 16 BRPM

## 2025-03-11 DIAGNOSIS — C56.3 MALIGNANT NEOPLASM OF BOTH OVARIES (HCC): Primary | ICD-10-CM

## 2025-03-11 PROCEDURE — 96417 CHEMO IV INFUS EACH ADDL SEQ: CPT

## 2025-03-11 PROCEDURE — 96375 TX/PRO/DX INJ NEW DRUG ADDON: CPT

## 2025-03-11 PROCEDURE — 96413 CHEMO IV INFUSION 1 HR: CPT

## 2025-03-11 PROCEDURE — 96376 TX/PRO/DX INJ SAME DRUG ADON: CPT

## 2025-03-11 PROCEDURE — 96415 CHEMO IV INFUSION ADDL HR: CPT

## 2025-03-11 PROCEDURE — 96367 TX/PROPH/DG ADDL SEQ IV INF: CPT

## 2025-03-11 RX ORDER — SODIUM CHLORIDE 9 MG/ML
20 INJECTION, SOLUTION INTRAVENOUS ONCE
Status: COMPLETED | OUTPATIENT
Start: 2025-03-11 | End: 2025-03-11

## 2025-03-11 RX ORDER — HYDROCORTISONE SODIUM SUCCINATE 100 MG/2ML
100 INJECTION INTRAMUSCULAR; INTRAVENOUS ONCE
Status: COMPLETED | OUTPATIENT
Start: 2025-03-11 | End: 2025-03-11

## 2025-03-11 RX ORDER — PALONOSETRON 0.05 MG/ML
0.25 INJECTION, SOLUTION INTRAVENOUS ONCE
Status: COMPLETED | OUTPATIENT
Start: 2025-03-11 | End: 2025-03-11

## 2025-03-11 RX ORDER — FAMOTIDINE 10 MG/ML
20 INJECTION, SOLUTION INTRAVENOUS ONCE
Status: COMPLETED | OUTPATIENT
Start: 2025-03-11 | End: 2025-03-11

## 2025-03-11 RX ORDER — DIPHENHYDRAMINE HYDROCHLORIDE 50 MG/ML
50 INJECTION, SOLUTION INTRAMUSCULAR; INTRAVENOUS ONCE
Status: COMPLETED | OUTPATIENT
Start: 2025-03-11 | End: 2025-03-11

## 2025-03-11 RX ADMIN — DIPHENHYDRAMINE HYDROCHLORIDE 25 MG: 50 INJECTION INTRAMUSCULAR; INTRAVENOUS at 09:21

## 2025-03-11 RX ADMIN — HYDROCORTISONE SODIUM SUCCINATE 100 MG: 100 INJECTION, POWDER, FOR SOLUTION INTRAMUSCULAR; INTRAVENOUS at 10:57

## 2025-03-11 RX ADMIN — DIPHENHYDRAMINE HYDROCHLORIDE 50 MG: 50 INJECTION, SOLUTION INTRAMUSCULAR; INTRAVENOUS at 10:56

## 2025-03-11 RX ADMIN — FAMOTIDINE 20 MG: 10 INJECTION, SOLUTION INTRAVENOUS at 10:59

## 2025-03-11 RX ADMIN — CARBOPLATIN 650.5 MG: 600 INJECTION, SOLUTION INTRAVENOUS at 14:51

## 2025-03-11 RX ADMIN — SODIUM CHLORIDE 20 ML/HR: 0.9 INJECTION, SOLUTION INTRAVENOUS at 08:26

## 2025-03-11 RX ADMIN — FOSAPREPITANT 150 MG: 150 INJECTION, POWDER, LYOPHILIZED, FOR SOLUTION INTRAVENOUS at 08:27

## 2025-03-11 RX ADMIN — PALONOSETRON HYDROCHLORIDE 0.25 MG: 0.25 INJECTION INTRAVENOUS at 10:29

## 2025-03-11 RX ADMIN — DEXAMETHASONE SODIUM PHOSPHATE 20 MG: 10 INJECTION, SOLUTION INTRAMUSCULAR; INTRAVENOUS at 09:47

## 2025-03-11 RX ADMIN — FAMOTIDINE 20 MG: 10 INJECTION, SOLUTION INTRAVENOUS at 08:59

## 2025-03-11 RX ADMIN — PACLITAXEL 336 MG: 6 INJECTION, SOLUTION INTRAVENOUS at 10:34

## 2025-03-11 RX ADMIN — BEVACIZUMAB-AWWB 1100 MG: 400 INJECTION, SOLUTION INTRAVENOUS at 15:53

## 2025-03-11 NOTE — PROGRESS NOTES
"At 1054 pt called on bell and stated she \"felt weird\" face and body noted to be flushed. Face red. Pt also having back pain. Infusion immediately stopped  Vitals 159/97  temp 98.2 86% RA, (3L placed on 93%)   1056 50mg benadryl given.  1057 hydrocortisone 100mg given  1059 Pepcid 20mg given    1100 vitals 127/78 84 HR 96% 3L NC face flushing improved, pt stated that the only thing she feels now is back pain, but improved.  1104 111/74 HR 86 99% 3L NC    1106 back pain has resolved.  Pt stated feeling back to baseline at this time.     Pt had total of 10.5 ml of taxol at time of reaction.     Messaged sent to dilan Jo on how to proceed.     1115 pt feeling still back to baseline no complaints.     1130 Pt back on RA pulse ox WNL      "

## 2025-03-11 NOTE — PROGRESS NOTES
Ewa Hernandes  tolerated the rest of treatment with no further reactions. Blood return noted throughout treatment.   Pt left feeling well.     Ewa Hernandes is aware of future appt on 4/1 at 8a at [facility].     AVS printed and given to Ewa Hernandes:  No (Declined by Ewa Hernandes)

## 2025-03-11 NOTE — PROGRESS NOTES
Per dilan Jo restart pt taxol on post reaction protocol. Slow 3 hr titration. Order in treatment plan to restart.

## 2025-03-12 ENCOUNTER — TELEPHONE (OUTPATIENT)
Dept: HEMATOLOGY ONCOLOGY | Facility: CLINIC | Age: 60
End: 2025-03-12

## 2025-03-12 NOTE — TELEPHONE ENCOUNTER
Arunar spoke with Pt on 3/12/25 @ 9:45 AM to check in and see if any updates could be provided regarding her PA MA status. PT informed writer that she is still awaiting determination on PA MA. PT also informed writer that she had a reaction during her 2nd infusion treatment yesterday (3/11/25) but was able to get through with help from the nurses. Writer made sure PT felt heard and requested that she keep him updated if she receives any news on her PA MA. PT stated she would do so and was grateful for the call. Arunar provided all contact information to PT for future use.  will continue to monitor accordingly.            Lei Jensen  Phone:[phone]  Email:[email or web address]

## 2025-03-14 ENCOUNTER — APPOINTMENT (OUTPATIENT)
Dept: LAB | Facility: HOSPITAL | Age: 60
End: 2025-03-14
Payer: COMMERCIAL

## 2025-03-14 DIAGNOSIS — N83.202 CYSTS OF BOTH OVARIES: ICD-10-CM

## 2025-03-14 DIAGNOSIS — N83.201 CYSTS OF BOTH OVARIES: ICD-10-CM

## 2025-03-14 LAB
ALBUMIN SERPL BCG-MCNC: 4.4 G/DL (ref 3.5–5)
ALP SERPL-CCNC: 67 U/L (ref 34–104)
ALT SERPL W P-5'-P-CCNC: 15 U/L (ref 7–52)
ANION GAP SERPL CALCULATED.3IONS-SCNC: 9 MMOL/L (ref 4–13)
AST SERPL W P-5'-P-CCNC: 26 U/L (ref 13–39)
BASOPHILS # BLD AUTO: 0.01 THOUSANDS/ÂΜL (ref 0–0.1)
BASOPHILS NFR BLD AUTO: 0 % (ref 0–1)
BILIRUB SERPL-MCNC: 0.57 MG/DL (ref 0.2–1)
BUN SERPL-MCNC: 21 MG/DL (ref 5–25)
CALCIUM SERPL-MCNC: 9.7 MG/DL (ref 8.4–10.2)
CHLORIDE SERPL-SCNC: 103 MMOL/L (ref 96–108)
CO2 SERPL-SCNC: 23 MMOL/L (ref 21–32)
CREAT SERPL-MCNC: 0.73 MG/DL (ref 0.6–1.3)
EOSINOPHIL # BLD AUTO: 0.03 THOUSAND/ÂΜL (ref 0–0.61)
EOSINOPHIL NFR BLD AUTO: 1 % (ref 0–6)
ERYTHROCYTE [DISTWIDTH] IN BLOOD BY AUTOMATED COUNT: 15.3 % (ref 11.6–15.1)
GFR SERPL CREATININE-BSD FRML MDRD: 91 ML/MIN/1.73SQ M
GLUCOSE SERPL-MCNC: 110 MG/DL (ref 65–140)
HCT VFR BLD AUTO: 37.6 % (ref 34.8–46.1)
HGB BLD-MCNC: 12.3 G/DL (ref 11.5–15.4)
IMM GRANULOCYTES # BLD AUTO: 0.02 THOUSAND/UL (ref 0–0.2)
IMM GRANULOCYTES NFR BLD AUTO: 0 % (ref 0–2)
LYMPHOCYTES # BLD AUTO: 1.45 THOUSANDS/ÂΜL (ref 0.6–4.47)
LYMPHOCYTES NFR BLD AUTO: 27 % (ref 14–44)
MAGNESIUM SERPL-MCNC: 1.7 MG/DL (ref 1.9–2.7)
MCH RBC QN AUTO: 28.8 PG (ref 26.8–34.3)
MCHC RBC AUTO-ENTMCNC: 32.7 G/DL (ref 31.4–37.4)
MCV RBC AUTO: 88 FL (ref 82–98)
MONOCYTES # BLD AUTO: 0.14 THOUSAND/ÂΜL (ref 0.17–1.22)
MONOCYTES NFR BLD AUTO: 3 % (ref 4–12)
NEUTROPHILS # BLD AUTO: 3.77 THOUSANDS/ÂΜL (ref 1.85–7.62)
NEUTS SEG NFR BLD AUTO: 69 % (ref 43–75)
NRBC BLD AUTO-RTO: 0 /100 WBCS
PLATELET # BLD AUTO: 141 THOUSANDS/UL (ref 149–390)
PMV BLD AUTO: 10.1 FL (ref 8.9–12.7)
POTASSIUM SERPL-SCNC: 4.5 MMOL/L (ref 3.5–5.3)
PROT SERPL-MCNC: 7.5 G/DL (ref 6.4–8.4)
RBC # BLD AUTO: 4.27 MILLION/UL (ref 3.81–5.12)
SODIUM SERPL-SCNC: 135 MMOL/L (ref 135–147)
WBC # BLD AUTO: 5.42 THOUSAND/UL (ref 4.31–10.16)

## 2025-03-14 PROCEDURE — 80053 COMPREHEN METABOLIC PANEL: CPT

## 2025-03-14 PROCEDURE — 83735 ASSAY OF MAGNESIUM: CPT

## 2025-03-14 PROCEDURE — 85025 COMPLETE CBC W/AUTO DIFF WBC: CPT

## 2025-03-14 PROCEDURE — 36415 COLL VENOUS BLD VENIPUNCTURE: CPT

## 2025-03-17 ENCOUNTER — HOSPITAL ENCOUNTER (OUTPATIENT)
Dept: INFUSION CENTER | Facility: HOSPITAL | Age: 60
End: 2025-03-17
Attending: OBSTETRICS & GYNECOLOGY

## 2025-03-17 DIAGNOSIS — C56.3 MALIGNANT NEOPLASM OF BOTH OVARIES (HCC): Primary | ICD-10-CM

## 2025-03-21 ENCOUNTER — APPOINTMENT (OUTPATIENT)
Dept: LAB | Facility: HOSPITAL | Age: 60
End: 2025-03-21
Payer: COMMERCIAL

## 2025-03-21 DIAGNOSIS — N83.201 CYSTS OF BOTH OVARIES: ICD-10-CM

## 2025-03-21 DIAGNOSIS — N83.202 CYSTS OF BOTH OVARIES: ICD-10-CM

## 2025-03-21 LAB
ALBUMIN SERPL BCG-MCNC: 4.2 G/DL (ref 3.5–5)
ALP SERPL-CCNC: 68 U/L (ref 34–104)
ALT SERPL W P-5'-P-CCNC: 12 U/L (ref 7–52)
ANION GAP SERPL CALCULATED.3IONS-SCNC: 7 MMOL/L (ref 4–13)
AST SERPL W P-5'-P-CCNC: 20 U/L (ref 13–39)
BASOPHILS # BLD AUTO: 0.02 THOUSANDS/ÂΜL (ref 0–0.1)
BASOPHILS NFR BLD AUTO: 1 % (ref 0–1)
BILIRUB SERPL-MCNC: 0.46 MG/DL (ref 0.2–1)
BUN SERPL-MCNC: 20 MG/DL (ref 5–25)
CALCIUM SERPL-MCNC: 9.7 MG/DL (ref 8.4–10.2)
CHLORIDE SERPL-SCNC: 102 MMOL/L (ref 96–108)
CO2 SERPL-SCNC: 25 MMOL/L (ref 21–32)
CREAT SERPL-MCNC: 0.65 MG/DL (ref 0.6–1.3)
EOSINOPHIL # BLD AUTO: 0.02 THOUSAND/ÂΜL (ref 0–0.61)
EOSINOPHIL NFR BLD AUTO: 1 % (ref 0–6)
ERYTHROCYTE [DISTWIDTH] IN BLOOD BY AUTOMATED COUNT: 15.3 % (ref 11.6–15.1)
GFR SERPL CREATININE-BSD FRML MDRD: 98 ML/MIN/1.73SQ M
GLUCOSE SERPL-MCNC: 89 MG/DL (ref 65–140)
HCT VFR BLD AUTO: 35.3 % (ref 34.8–46.1)
HGB BLD-MCNC: 11.4 G/DL (ref 11.5–15.4)
IMM GRANULOCYTES # BLD AUTO: 0.01 THOUSAND/UL (ref 0–0.2)
IMM GRANULOCYTES NFR BLD AUTO: 0 % (ref 0–2)
LYMPHOCYTES # BLD AUTO: 1.03 THOUSANDS/ÂΜL (ref 0.6–4.47)
LYMPHOCYTES NFR BLD AUTO: 28 % (ref 14–44)
MAGNESIUM SERPL-MCNC: 1.7 MG/DL (ref 1.9–2.7)
MCH RBC QN AUTO: 28.4 PG (ref 26.8–34.3)
MCHC RBC AUTO-ENTMCNC: 32.3 G/DL (ref 31.4–37.4)
MCV RBC AUTO: 88 FL (ref 82–98)
MONOCYTES # BLD AUTO: 0.19 THOUSAND/ÂΜL (ref 0.17–1.22)
MONOCYTES NFR BLD AUTO: 5 % (ref 4–12)
NEUTROPHILS # BLD AUTO: 2.36 THOUSANDS/ÂΜL (ref 1.85–7.62)
NEUTS SEG NFR BLD AUTO: 65 % (ref 43–75)
NRBC BLD AUTO-RTO: 0 /100 WBCS
PLATELET # BLD AUTO: 167 THOUSANDS/UL (ref 149–390)
PMV BLD AUTO: 10 FL (ref 8.9–12.7)
POTASSIUM SERPL-SCNC: 4.2 MMOL/L (ref 3.5–5.3)
PROT SERPL-MCNC: 7.1 G/DL (ref 6.4–8.4)
RBC # BLD AUTO: 4.02 MILLION/UL (ref 3.81–5.12)
SODIUM SERPL-SCNC: 134 MMOL/L (ref 135–147)
WBC # BLD AUTO: 3.63 THOUSAND/UL (ref 4.31–10.16)

## 2025-03-21 PROCEDURE — 85025 COMPLETE CBC W/AUTO DIFF WBC: CPT

## 2025-03-21 PROCEDURE — 83735 ASSAY OF MAGNESIUM: CPT

## 2025-03-21 PROCEDURE — 36415 COLL VENOUS BLD VENIPUNCTURE: CPT

## 2025-03-21 PROCEDURE — 80053 COMPREHEN METABOLIC PANEL: CPT

## 2025-03-27 ENCOUNTER — TELEMEDICINE (OUTPATIENT)
Dept: GYNECOLOGIC ONCOLOGY | Facility: CLINIC | Age: 60
End: 2025-03-27
Payer: COMMERCIAL

## 2025-03-27 DIAGNOSIS — C56.3 MALIGNANT NEOPLASM OF BOTH OVARIES (HCC): Primary | ICD-10-CM

## 2025-03-27 DIAGNOSIS — K59.03 DRUG INDUCED CONSTIPATION: ICD-10-CM

## 2025-03-27 DIAGNOSIS — T45.1X5A CHEMOTHERAPY-INDUCED NAUSEA: ICD-10-CM

## 2025-03-27 DIAGNOSIS — R11.0 CHEMOTHERAPY-INDUCED NAUSEA: ICD-10-CM

## 2025-03-27 PROBLEM — R79.89 ELEVATED PLATELET COUNT: Status: RESOLVED | Noted: 2025-02-04 | Resolved: 2025-03-27

## 2025-03-27 PROCEDURE — 99214 OFFICE O/P EST MOD 30 MIN: CPT | Performed by: NURSE PRACTITIONER

## 2025-03-27 NOTE — ASSESSMENT & PLAN NOTE
59-year-old with at least stage IIIC high-grade serous ovarian cancer. She is receiving neoadjuvant chemotherapy with Taxol 175mg/m2, Carboplatin AUC 5, and Avastin 15mg/kg IV Q21 days. She had nausea post-treatment, which was managed with Zofran. No other issues. Her PS is 0.    Patient will proceed with cycle 3 next week, pending labs. RTO as per chemo calendar with pre-visit CT for consideration of interval debulking.      Orders:    CT chest abdomen pelvis w contrast; Future

## 2025-03-27 NOTE — PROGRESS NOTES
Virtual Regular VisitName: Ewa Hernandes      : 1965      MRN: [id number]  Encounter Provider: SOLANGE Cobian  Encounter Date: 3/27/2025   [facility]  :  Assessment & Plan  Malignant neoplasm of both ovaries (HCC)  59-year-old with at least stage IIIC high-grade serous ovarian cancer. She is receiving neoadjuvant chemotherapy with Taxol 175mg/m2, Carboplatin AUC 5, and Avastin 15mg/kg IV Q21 days. She had nausea post-treatment, which was managed with Zofran. No other issues. Her PS is 0.    Patient will proceed with cycle 3 next week, pending labs. RTO as per chemo calendar with pre-visit CT for consideration of interval debulking.      Orders:    CT chest abdomen pelvis w contrast; Future    Chemotherapy-induced nausea  Patient experienced post-treatment nausea for several days. Zofran was helpful. With next cycle, I've encouraged her to take Zofran ATC for ~5+ days post chemo.         Drug induced constipation  Recommended colace TID + Miralax daily as needed.             History of Present Illness     Ewa has been well in the interim and is without acute complaints. Nausea was more pronounced with this cycle. Zofran was effective. No vomiting. She is fatigued. Her appetite is appropriate. She is voiding without difficulty. She has intermittent constipation. She denies abdominal or pelvic pain. The patient is without vaginal bleeding or discharge. She is ambulatory.          Review of Systems   Constitutional:  Positive for fatigue. Negative for activity change, appetite change, chills, fever and unexpected weight change.   HENT:  Negative for mouth sores.    Eyes: Negative.    Respiratory:  Negative for cough, chest tightness, shortness of breath and wheezing.    Cardiovascular:  Negative for chest pain, palpitations and leg swelling.   Gastrointestinal:  Positive for constipation and nausea. Negative for abdominal distention,  abdominal pain, anal bleeding, blood in stool, diarrhea and vomiting.   Endocrine: Negative.    Genitourinary:  Negative for difficulty urinating, dysuria, flank pain, frequency, hematuria, pelvic pain, urgency, vaginal bleeding, vaginal discharge and vaginal pain.   Musculoskeletal:  Negative for arthralgias and myalgias.   Skin:  Negative for color change, pallor and rash.   Neurological:  Negative for dizziness, weakness, numbness and headaches.   Hematological: Negative.    Psychiatric/Behavioral:  The patient is not nervous/anxious.        Objective   There were no vitals taken for this visit.    Physical Exam  Constitutional:       General: She is not in acute distress.     Appearance: Normal appearance. She is not ill-appearing.   HENT:      Head: Normocephalic and atraumatic.   Eyes:      General:         Right eye: No discharge.         Left eye: No discharge.      Conjunctiva/sclera: Conjunctivae normal.   Pulmonary:      Effort: Pulmonary effort is normal. No respiratory distress.   Neurological:      Mental Status: She is alert.   Psychiatric:         Mood and Affect: Mood normal.         Behavior: Behavior normal.         Thought Content: Thought content normal.         Administrative Statements   Encounter provider SOLANGE Cobian    The Patient is located at Home and in the following state in which I hold an active license [place].    The patient was identified by name and date of birth. Ewa Hernandes was informed that this is a telemedicine visit and that the visit is being conducted through the Epic Embedded platform. She agrees to proceed..  My office door was closed. No one else was in the room.  She acknowledged consent and understanding of privacy and security of the video platform. The patient has agreed to participate and understands they can discontinue the visit at any time.    I have spent a total time of 15 minutes in caring for this patient on the day of the visit/encounter including  Instructions for management, Patient and family education, Impressions, Counseling / Coordination of care, Documenting in the medical record, and Reviewing/placing orders in the medical record (including tests, medications, and/or procedures), not including the time spent for establishing the audio/video connection.

## 2025-03-27 NOTE — ASSESSMENT & PLAN NOTE
Patient experienced post-treatment nausea for several days. Zofran was helpful. With next cycle, I've encouraged her to take Zofran ATC for ~5+ days post chemo.

## 2025-03-28 ENCOUNTER — HOSPITAL ENCOUNTER (OUTPATIENT)
Dept: INFUSION CENTER | Facility: HOSPITAL | Age: 60
End: 2025-03-28
Payer: COMMERCIAL

## 2025-03-28 ENCOUNTER — RESULTS FOLLOW-UP (OUTPATIENT)
Dept: GYNECOLOGIC ONCOLOGY | Facility: CLINIC | Age: 60
End: 2025-03-28

## 2025-03-28 ENCOUNTER — TELEPHONE (OUTPATIENT)
Age: 60
End: 2025-03-28

## 2025-03-28 ENCOUNTER — APPOINTMENT (OUTPATIENT)
Dept: LAB | Facility: HOSPITAL | Age: 60
End: 2025-03-28
Payer: COMMERCIAL

## 2025-03-28 VITALS — TEMPERATURE: 97 F

## 2025-03-28 DIAGNOSIS — D70.1 CHEMOTHERAPY-INDUCED NEUTROPENIA (HCC): Primary | ICD-10-CM

## 2025-03-28 DIAGNOSIS — C56.3 MALIGNANT NEOPLASM OF BOTH OVARIES (HCC): Primary | ICD-10-CM

## 2025-03-28 DIAGNOSIS — T45.1X5A CHEMOTHERAPY-INDUCED NEUTROPENIA (HCC): ICD-10-CM

## 2025-03-28 DIAGNOSIS — N83.202 CYSTS OF BOTH OVARIES: ICD-10-CM

## 2025-03-28 DIAGNOSIS — C56.3 MALIGNANT NEOPLASM OF BOTH OVARIES (HCC): ICD-10-CM

## 2025-03-28 DIAGNOSIS — D70.1 CHEMOTHERAPY-INDUCED NEUTROPENIA (HCC): ICD-10-CM

## 2025-03-28 DIAGNOSIS — N83.201 CYSTS OF BOTH OVARIES: ICD-10-CM

## 2025-03-28 DIAGNOSIS — T45.1X5A CHEMOTHERAPY-INDUCED NEUTROPENIA (HCC): Primary | ICD-10-CM

## 2025-03-28 PROBLEM — D70.9 NEUTROPENIA (HCC): Status: ACTIVE | Noted: 2025-03-28

## 2025-03-28 LAB
ALBUMIN SERPL BCG-MCNC: 4.1 G/DL (ref 3.5–5)
ALP SERPL-CCNC: 73 U/L (ref 34–104)
ALT SERPL W P-5'-P-CCNC: 11 U/L (ref 7–52)
ANION GAP SERPL CALCULATED.3IONS-SCNC: 8 MMOL/L (ref 4–13)
AST SERPL W P-5'-P-CCNC: 19 U/L (ref 13–39)
BASOPHILS # BLD AUTO: 0.01 THOUSANDS/ÂΜL (ref 0–0.1)
BASOPHILS NFR BLD AUTO: 1 % (ref 0–1)
BILIRUB SERPL-MCNC: 0.31 MG/DL (ref 0.2–1)
BUN SERPL-MCNC: 11 MG/DL (ref 5–25)
CALCIUM SERPL-MCNC: 9.9 MG/DL (ref 8.4–10.2)
CANCER AG125 SERPL-ACNC: 1131 U/ML (ref 0–35)
CARIS ER: POSITIVE
CARIS FOLR1: NEGATIVE
CARIS GENOMIC LOH - EXOME: NORMAL
CARIS HER2/NEU: NEGATIVE
CARIS HLA-A: NORMAL
CARIS HLA-B: NORMAL
CARIS HLA-C: NORMAL
CARIS HRD - EXOME: NEGATIVE
CARIS MSI - EXOME: NORMAL
CARIS PD-L1 (22C3): POSITIVE
CARIS PR: NEGATIVE
CARIS TMB - EXOME: NORMAL
CHLORIDE SERPL-SCNC: 102 MMOL/L (ref 96–108)
CO2 SERPL-SCNC: 26 MMOL/L (ref 21–32)
CREAT SERPL-MCNC: 0.73 MG/DL (ref 0.6–1.3)
CREAT UR-MCNC: 224.7 MG/DL
EOSINOPHIL # BLD AUTO: 0.01 THOUSAND/ÂΜL (ref 0–0.61)
EOSINOPHIL NFR BLD AUTO: 1 % (ref 0–6)
ERYTHROCYTE [DISTWIDTH] IN BLOOD BY AUTOMATED COUNT: 16 % (ref 11.6–15.1)
GFR SERPL CREATININE-BSD FRML MDRD: 91 ML/MIN/1.73SQ M
GLUCOSE P FAST SERPL-MCNC: 88 MG/DL (ref 65–99)
HCT VFR BLD AUTO: 34.2 % (ref 34.8–46.1)
HGB BLD-MCNC: 11 G/DL (ref 11.5–15.4)
IMM GRANULOCYTES # BLD AUTO: 0 THOUSAND/UL (ref 0–0.2)
IMM GRANULOCYTES NFR BLD AUTO: 0 % (ref 0–2)
LYMPHOCYTES # BLD AUTO: 1.04 THOUSANDS/ÂΜL (ref 0.6–4.47)
LYMPHOCYTES NFR BLD AUTO: 51 % (ref 14–44)
Lab: NORMAL
MAGNESIUM SERPL-MCNC: 1.7 MG/DL (ref 1.9–2.7)
MCH RBC QN AUTO: 28.9 PG (ref 26.8–34.3)
MCHC RBC AUTO-ENTMCNC: 32.2 G/DL (ref 31.4–37.4)
MCV RBC AUTO: 90 FL (ref 82–98)
MONOCYTES # BLD AUTO: 0.32 THOUSAND/ÂΜL (ref 0.17–1.22)
MONOCYTES NFR BLD AUTO: 16 % (ref 4–12)
NEUTROPHILS # BLD AUTO: 0.61 THOUSANDS/ÂΜL (ref 1.85–7.62)
NEUTS SEG NFR BLD AUTO: 31 % (ref 43–75)
NRBC BLD AUTO-RTO: 0 /100 WBCS
PLATELET # BLD AUTO: 306 THOUSANDS/UL (ref 149–390)
PMV BLD AUTO: 8.9 FL (ref 8.9–12.7)
POTASSIUM SERPL-SCNC: 4.5 MMOL/L (ref 3.5–5.3)
PROT SERPL-MCNC: 6.9 G/DL (ref 6.4–8.4)
PROT UR-MCNC: 53 MG/DL
PROT/CREAT UR: 0.2 MG/G{CREAT} (ref 0–0.1)
RBC # BLD AUTO: 3.81 MILLION/UL (ref 3.81–5.12)
SODIUM SERPL-SCNC: 136 MMOL/L (ref 135–147)
WBC # BLD AUTO: 1.99 THOUSAND/UL (ref 4.31–10.16)

## 2025-03-28 PROCEDURE — 85025 COMPLETE CBC W/AUTO DIFF WBC: CPT

## 2025-03-28 PROCEDURE — 86304 IMMUNOASSAY TUMOR CA 125: CPT

## 2025-03-28 PROCEDURE — 82570 ASSAY OF URINE CREATININE: CPT

## 2025-03-28 PROCEDURE — 80053 COMPREHEN METABOLIC PANEL: CPT

## 2025-03-28 PROCEDURE — 83735 ASSAY OF MAGNESIUM: CPT

## 2025-03-28 PROCEDURE — 96372 THER/PROPH/DIAG INJ SC/IM: CPT

## 2025-03-28 PROCEDURE — 36415 COLL VENOUS BLD VENIPUNCTURE: CPT

## 2025-03-28 PROCEDURE — 84156 ASSAY OF PROTEIN URINE: CPT

## 2025-03-28 RX ADMIN — TBO-FILGRASTIM 300 MCG: 300 INJECTION, SOLUTION SUBCUTANEOUS at 15:55

## 2025-03-28 NOTE — TELEPHONE ENCOUNTER
Patient calling to return call from Susie NARVAEZ to confirm vm Granix shot for her today.  She reports will go to [facility] for the injection today.  I thanked her for the update and will inform her care team.

## 2025-03-28 NOTE — PROGRESS NOTES
Ewa Hernandes tolerated Granix injection to left arm well with no complications.      Ewa Hernandes is aware of future appt on 4/1/25 at 0800.     AVS declined.

## 2025-03-31 ENCOUNTER — TELEPHONE (OUTPATIENT)
Dept: SURGICAL ONCOLOGY | Facility: CLINIC | Age: 60
End: 2025-03-31

## 2025-03-31 ENCOUNTER — APPOINTMENT (OUTPATIENT)
Dept: LAB | Facility: HOSPITAL | Age: 60
End: 2025-03-31
Payer: COMMERCIAL

## 2025-03-31 DIAGNOSIS — N83.202 CYSTS OF BOTH OVARIES: ICD-10-CM

## 2025-03-31 DIAGNOSIS — N83.201 CYSTS OF BOTH OVARIES: ICD-10-CM

## 2025-03-31 LAB
ALBUMIN SERPL BCG-MCNC: 4 G/DL (ref 3.5–5)
ALP SERPL-CCNC: 74 U/L (ref 34–104)
ALT SERPL W P-5'-P-CCNC: 9 U/L (ref 7–52)
ANION GAP SERPL CALCULATED.3IONS-SCNC: 9 MMOL/L (ref 4–13)
AST SERPL W P-5'-P-CCNC: 18 U/L (ref 13–39)
BASOPHILS # BLD AUTO: 0.02 THOUSANDS/ÂΜL (ref 0–0.1)
BASOPHILS NFR BLD AUTO: 0 % (ref 0–1)
BILIRUB SERPL-MCNC: 0.35 MG/DL (ref 0.2–1)
BUN SERPL-MCNC: 15 MG/DL (ref 5–25)
CALCIUM SERPL-MCNC: 9.8 MG/DL (ref 8.4–10.2)
CHLORIDE SERPL-SCNC: 102 MMOL/L (ref 96–108)
CO2 SERPL-SCNC: 27 MMOL/L (ref 21–32)
CREAT SERPL-MCNC: 0.74 MG/DL (ref 0.6–1.3)
EOSINOPHIL # BLD AUTO: 0.03 THOUSAND/ÂΜL (ref 0–0.61)
EOSINOPHIL NFR BLD AUTO: 0 % (ref 0–6)
ERYTHROCYTE [DISTWIDTH] IN BLOOD BY AUTOMATED COUNT: 17.1 % (ref 11.6–15.1)
GFR SERPL CREATININE-BSD FRML MDRD: 89 ML/MIN/1.73SQ M
GLUCOSE P FAST SERPL-MCNC: 83 MG/DL (ref 65–99)
HCT VFR BLD AUTO: 34 % (ref 34.8–46.1)
HGB BLD-MCNC: 10.8 G/DL (ref 11.5–15.4)
IMM GRANULOCYTES # BLD AUTO: 0.05 THOUSAND/UL (ref 0–0.2)
IMM GRANULOCYTES NFR BLD AUTO: 1 % (ref 0–2)
LYMPHOCYTES # BLD AUTO: 1.61 THOUSANDS/ÂΜL (ref 0.6–4.47)
LYMPHOCYTES NFR BLD AUTO: 24 % (ref 14–44)
MAGNESIUM SERPL-MCNC: 1.9 MG/DL (ref 1.9–2.7)
MCH RBC QN AUTO: 28.3 PG (ref 26.8–34.3)
MCHC RBC AUTO-ENTMCNC: 31.8 G/DL (ref 31.4–37.4)
MCV RBC AUTO: 89 FL (ref 82–98)
MONOCYTES # BLD AUTO: 0.53 THOUSAND/ÂΜL (ref 0.17–1.22)
MONOCYTES NFR BLD AUTO: 8 % (ref 4–12)
NEUTROPHILS # BLD AUTO: 4.44 THOUSANDS/ÂΜL (ref 1.85–7.62)
NEUTS SEG NFR BLD AUTO: 67 % (ref 43–75)
NRBC BLD AUTO-RTO: 0 /100 WBCS
PLATELET # BLD AUTO: 351 THOUSANDS/UL (ref 149–390)
PMV BLD AUTO: 9 FL (ref 8.9–12.7)
POTASSIUM SERPL-SCNC: 4.3 MMOL/L (ref 3.5–5.3)
PROT SERPL-MCNC: 7.4 G/DL (ref 6.4–8.4)
RBC # BLD AUTO: 3.82 MILLION/UL (ref 3.81–5.12)
SODIUM SERPL-SCNC: 138 MMOL/L (ref 135–147)
WBC # BLD AUTO: 6.68 THOUSAND/UL (ref 4.31–10.16)

## 2025-03-31 PROCEDURE — 80053 COMPREHEN METABOLIC PANEL: CPT

## 2025-03-31 PROCEDURE — 83735 ASSAY OF MAGNESIUM: CPT

## 2025-03-31 PROCEDURE — 85025 COMPLETE CBC W/AUTO DIFF WBC: CPT

## 2025-03-31 PROCEDURE — 36415 COLL VENOUS BLD VENIPUNCTURE: CPT

## 2025-03-31 RX ORDER — PALONOSETRON 0.05 MG/ML
0.25 INJECTION, SOLUTION INTRAVENOUS ONCE
Status: CANCELLED | OUTPATIENT
Start: 2025-04-01

## 2025-03-31 RX ORDER — SODIUM CHLORIDE 9 MG/ML
20 INJECTION, SOLUTION INTRAVENOUS ONCE
Status: CANCELLED | OUTPATIENT
Start: 2025-04-01

## 2025-03-31 NOTE — TELEPHONE ENCOUNTER
[facility] pharmacy called the RX Refill Line. Message is being forwarded to the office.     Pharmacy is requesting a new rx for granix injection  to be sent. Please call pharmacy at [phone] option 3 and can speak to the pharmacy with rx.

## 2025-04-01 ENCOUNTER — HOSPITAL ENCOUNTER (OUTPATIENT)
Dept: INFUSION CENTER | Facility: HOSPITAL | Age: 60
Discharge: HOME | End: 2025-04-01
Attending: OBSTETRICS & GYNECOLOGY
Payer: COMMERCIAL

## 2025-04-01 VITALS
HEART RATE: 88 BPM | HEIGHT: 69 IN | OXYGEN SATURATION: 97 % | WEIGHT: 159.17 LBS | TEMPERATURE: 97 F | SYSTOLIC BLOOD PRESSURE: 107 MMHG | RESPIRATION RATE: 16 BRPM | DIASTOLIC BLOOD PRESSURE: 62 MMHG | BODY MASS INDEX: 23.58 KG/M2

## 2025-04-01 DIAGNOSIS — C56.3 MALIGNANT NEOPLASM OF BOTH OVARIES (HCC): Primary | ICD-10-CM

## 2025-04-01 PROCEDURE — 96367 TX/PROPH/DG ADDL SEQ IV INF: CPT

## 2025-04-01 PROCEDURE — 96415 CHEMO IV INFUSION ADDL HR: CPT

## 2025-04-01 PROCEDURE — 96413 CHEMO IV INFUSION 1 HR: CPT

## 2025-04-01 PROCEDURE — 96417 CHEMO IV INFUS EACH ADDL SEQ: CPT

## 2025-04-01 PROCEDURE — 96375 TX/PRO/DX INJ NEW DRUG ADDON: CPT

## 2025-04-01 RX ORDER — PALONOSETRON 0.05 MG/ML
0.25 INJECTION, SOLUTION INTRAVENOUS ONCE
Status: COMPLETED | OUTPATIENT
Start: 2025-04-01 | End: 2025-04-01

## 2025-04-01 RX ORDER — SODIUM CHLORIDE 9 MG/ML
20 INJECTION, SOLUTION INTRAVENOUS ONCE
Status: COMPLETED | OUTPATIENT
Start: 2025-04-01 | End: 2025-04-01

## 2025-04-01 RX ADMIN — FAMOTIDINE 20 MG: 10 INJECTION, SOLUTION INTRAVENOUS at 09:30

## 2025-04-01 RX ADMIN — PACLITAXEL 330 MG: 6 INJECTION, SOLUTION, CONCENTRATE INTRAVENOUS at 10:49

## 2025-04-01 RX ADMIN — SODIUM CHLORIDE 20 ML/HR: 0.9 INJECTION, SOLUTION INTRAVENOUS at 08:23

## 2025-04-01 RX ADMIN — DIPHENHYDRAMINE HYDROCHLORIDE 25 MG: 50 INJECTION, SOLUTION INTRAMUSCULAR; INTRAVENOUS at 08:55

## 2025-04-01 RX ADMIN — FOSAPREPITANT 150 MG: 150 INJECTION, POWDER, LYOPHILIZED, FOR SOLUTION INTRAVENOUS at 10:02

## 2025-04-01 RX ADMIN — CARBOPLATIN 622 MG: 10 INJECTION, SOLUTION INTRAVENOUS at 14:15

## 2025-04-01 RX ADMIN — PALONOSETRON HYDROCHLORIDE 0.25 MG: 0.25 INJECTION INTRAVENOUS at 10:42

## 2025-04-01 RX ADMIN — DEXAMETHASONE SODIUM PHOSPHATE 20 MG: 100 INJECTION INTRAMUSCULAR; INTRAVENOUS at 08:35

## 2025-04-01 RX ADMIN — BEVACIZUMAB-AWWB 1100 MG: 400 INJECTION, SOLUTION INTRAVENOUS at 15:18

## 2025-04-01 NOTE — PLAN OF CARE
Problem: Potential for Falls  Goal: Patient will remain free of falls  Description: INTERVENTIONS:- Educate patient/family on patient safety including physical limitations- Instruct patient to call for assistance with activity - Consult OT/PT to assist with strengthening/mobility - Keep Call bell within reach- Keep bed low and locked with side rails adjusted as appropriate- Keep care items and personal belongings within reach- Initiate and maintain comfort rounds- Consider moving patient to room near nurses station  Outcome: Progressing     Problem: INFECTION - ADULT  Goal: Absence or prevention of progression during hospitalization  Description: INTERVENTIONS:- Assess and monitor for signs and symptoms of infection- Monitor lab/diagnostic results- Monitor all insertion sites, i.e. indwelling lines, tubes, and drains- Monitor endotracheal if appropriate and nasal secretions for changes in amount and color- [place] appropriate cooling/warming therapies per order- Administer medications as ordered- Instruct and encourage patient and family to use good hand hygiene technique- Identify and instruct in appropriate isolation precautions for identified infection/condition  Outcome: Progressing     Problem: Knowledge Deficit  Goal: Patient/family/caregiver demonstrates understanding of disease process, treatment plan, medications, and discharge instructions  Description: Complete learning assessment and assess knowledge base.Interventions:- Provide teaching at level of understanding- Provide teaching via preferred learning methods  Outcome: Progressing

## 2025-04-01 NOTE — PROGRESS NOTES
Recent labs reviewed. Pt tolerated Taxol, Carboplatin, & MVASI chemo tx well without any complications. PIV removed without incident.     Ewa Hernandes is aware of future appt on 4/2/25 at 4PM.     AVS printed and given to Ewa Hernandes.    Pt discharged off unit in stable condition with all personal belongings.

## 2025-04-02 ENCOUNTER — TELEPHONE (OUTPATIENT)
Dept: GYNECOLOGIC ONCOLOGY | Facility: CLINIC | Age: 60
End: 2025-04-02

## 2025-04-02 ENCOUNTER — HOSPITAL ENCOUNTER (OUTPATIENT)
Dept: INFUSION CENTER | Facility: HOSPITAL | Age: 60
Discharge: HOME | End: 2025-04-02
Attending: OBSTETRICS & GYNECOLOGY
Payer: COMMERCIAL

## 2025-04-02 VITALS — TEMPERATURE: 97.6 F

## 2025-04-02 DIAGNOSIS — T45.1X5A CHEMOTHERAPY-INDUCED NAUSEA: ICD-10-CM

## 2025-04-02 DIAGNOSIS — R11.0 CHEMOTHERAPY-INDUCED NAUSEA: ICD-10-CM

## 2025-04-02 DIAGNOSIS — C56.3 MALIGNANT NEOPLASM OF BOTH OVARIES (HCC): Primary | ICD-10-CM

## 2025-04-02 DIAGNOSIS — R19.00 PELVIC MASS: ICD-10-CM

## 2025-04-02 PROCEDURE — 96372 THER/PROPH/DIAG INJ SC/IM: CPT

## 2025-04-02 RX ORDER — ONDANSETRON 8 MG/1
8 TABLET, FILM COATED ORAL EVERY 8 HOURS PRN
Qty: 30 TABLET | Refills: 5 | Status: SHIPPED | OUTPATIENT
Start: 2025-04-02

## 2025-04-02 RX ADMIN — PEGFILGRASTIM-JMDB 6 MG: 6 INJECTION SUBCUTANEOUS at 16:05

## 2025-04-02 NOTE — PLAN OF CARE
Problem: INFECTION - ADULT  Goal: Absence or prevention of progression during hospitalization  Description: INTERVENTIONS:- Assess and monitor for signs and symptoms of infection- Monitor lab/diagnostic results- Monitor all insertion sites, i.e. indwelling lines, tubes, and drains- Monitor endotracheal if appropriate and nasal secretions for changes in amount and color- [place] appropriate cooling/warming therapies per order- Administer medications as ordered- Instruct and encourage patient and family to use good hand hygiene technique- Identify and instruct in appropriate isolation precautions for identified infection/condition  Outcome: Progressing     Problem: Knowledge Deficit  Goal: Patient/family/caregiver demonstrates understanding of disease process, treatment plan, medications, and discharge instructions  Description: Complete learning assessment and assess knowledge base.Interventions:- Provide teaching at level of understanding- Provide teaching via preferred learning methods  Outcome: Progressing

## 2025-04-02 NOTE — TELEPHONE ENCOUNTER
Patient called the RX Refill Line. Message is being forwarded to the office.     Patient is requesting a prescription for Granix 300 mcg / .5 mL syringe.     Please contact patient at            [facility] [place]

## 2025-04-02 NOTE — PROGRESS NOTES
Recent labs reviewed. Pt tolerated Fulphila injection in [place] well without any complications.      Ewa Hernandes is aware of future appt on 4/24/25 at 8AM.      AVS declined by Ewa Hernandes.     Pt discharged off unit in stable condition with all personal belongings.

## 2025-04-10 ENCOUNTER — APPOINTMENT (OUTPATIENT)
Dept: LAB | Facility: HOSPITAL | Age: 60
End: 2025-04-10
Payer: COMMERCIAL

## 2025-04-10 DIAGNOSIS — N83.201 CYSTS OF BOTH OVARIES: ICD-10-CM

## 2025-04-10 DIAGNOSIS — N83.202 CYSTS OF BOTH OVARIES: ICD-10-CM

## 2025-04-10 LAB
ALBUMIN SERPL BCG-MCNC: 4.5 G/DL (ref 3.5–5)
ALP SERPL-CCNC: 116 U/L (ref 34–104)
ALT SERPL W P-5'-P-CCNC: 13 U/L (ref 7–52)
ANION GAP SERPL CALCULATED.3IONS-SCNC: 12 MMOL/L (ref 4–13)
ANISOCYTOSIS BLD QL SMEAR: PRESENT
AST SERPL W P-5'-P-CCNC: 24 U/L (ref 13–39)
BASOPHILS # BLD MANUAL: 0.16 THOUSAND/UL (ref 0–0.1)
BASOPHILS NFR MAR MANUAL: 1 % (ref 0–1)
BILIRUB SERPL-MCNC: 0.33 MG/DL (ref 0.2–1)
BUN SERPL-MCNC: 17 MG/DL (ref 5–25)
CALCIUM SERPL-MCNC: 10.1 MG/DL (ref 8.4–10.2)
CHLORIDE SERPL-SCNC: 101 MMOL/L (ref 96–108)
CO2 SERPL-SCNC: 23 MMOL/L (ref 21–32)
CREAT SERPL-MCNC: 0.74 MG/DL (ref 0.6–1.3)
EOSINOPHIL # BLD MANUAL: 0 THOUSAND/UL (ref 0–0.4)
EOSINOPHIL NFR BLD MANUAL: 0 % (ref 0–6)
ERYTHROCYTE [DISTWIDTH] IN BLOOD BY AUTOMATED COUNT: 17.7 % (ref 11.6–15.1)
GFR SERPL CREATININE-BSD FRML MDRD: 89 ML/MIN/1.73SQ M
GLUCOSE P FAST SERPL-MCNC: 103 MG/DL (ref 65–99)
HCT VFR BLD AUTO: 37.2 % (ref 34.8–46.1)
HGB BLD-MCNC: 12.1 G/DL (ref 11.5–15.4)
LYMPHOCYTES # BLD AUTO: 14 % (ref 14–44)
LYMPHOCYTES # BLD AUTO: 3.42 THOUSAND/UL (ref 0.6–4.47)
MAGNESIUM SERPL-MCNC: 1.5 MG/DL (ref 1.9–2.7)
MCH RBC QN AUTO: 29.3 PG (ref 26.8–34.3)
MCHC RBC AUTO-ENTMCNC: 32.5 G/DL (ref 31.4–37.4)
MCV RBC AUTO: 90 FL (ref 82–98)
MONOCYTES # BLD AUTO: 0.31 THOUSAND/UL (ref 0–1.22)
MONOCYTES NFR BLD: 2 % (ref 4–12)
NEUTROPHILS # BLD MANUAL: 11.66 THOUSAND/UL (ref 1.85–7.62)
NEUTS BAND NFR BLD MANUAL: 1 % (ref 0–8)
NEUTS SEG NFR BLD AUTO: 74 % (ref 43–75)
PLATELET # BLD AUTO: 371 THOUSANDS/UL (ref 149–390)
PLATELET BLD QL SMEAR: ABNORMAL
PLATELET CLUMP BLD QL SMEAR: PRESENT
PMV BLD AUTO: 9.9 FL (ref 8.9–12.7)
POIKILOCYTOSIS BLD QL SMEAR: PRESENT
POLYCHROMASIA BLD QL SMEAR: PRESENT
POTASSIUM SERPL-SCNC: 4.3 MMOL/L (ref 3.5–5.3)
PROT SERPL-MCNC: 7.8 G/DL (ref 6.4–8.4)
RBC # BLD AUTO: 4.13 MILLION/UL (ref 3.81–5.12)
RBC MORPH BLD: PRESENT
SODIUM SERPL-SCNC: 136 MMOL/L (ref 135–147)
VARIANT LYMPHS # BLD AUTO: 8 %
WBC # BLD AUTO: 15.55 THOUSAND/UL (ref 4.31–10.16)

## 2025-04-10 PROCEDURE — 85027 COMPLETE CBC AUTOMATED: CPT

## 2025-04-10 PROCEDURE — 80053 COMPREHEN METABOLIC PANEL: CPT

## 2025-04-10 PROCEDURE — 85007 BL SMEAR W/DIFF WBC COUNT: CPT

## 2025-04-10 PROCEDURE — 36415 COLL VENOUS BLD VENIPUNCTURE: CPT

## 2025-04-10 PROCEDURE — 83735 ASSAY OF MAGNESIUM: CPT

## 2025-04-14 ENCOUNTER — HOSPITAL ENCOUNTER (OUTPATIENT)
Dept: CT IMAGING | Facility: HOSPITAL | Age: 60
Discharge: HOME | End: 2025-04-14
Payer: COMMERCIAL

## 2025-04-14 DIAGNOSIS — C56.3 MALIGNANT NEOPLASM OF BOTH OVARIES (HCC): ICD-10-CM

## 2025-04-14 PROCEDURE — 71260 CT THORAX DX C+: CPT

## 2025-04-14 PROCEDURE — 74177 CT ABD & PELVIS W/CONTRAST: CPT

## 2025-04-14 RX ADMIN — IOHEXOL 100 ML: 350 INJECTION, SOLUTION INTRAVENOUS at 08:01

## 2025-04-17 DIAGNOSIS — J45.909 ASTHMA, UNSPECIFIED ASTHMA SEVERITY, UNSPECIFIED WHETHER COMPLICATED, UNSPECIFIED WHETHER PERSISTENT: ICD-10-CM

## 2025-04-18 ENCOUNTER — APPOINTMENT (OUTPATIENT)
Dept: LAB | Facility: HOSPITAL | Age: 60
End: 2025-04-18
Payer: COMMERCIAL

## 2025-04-18 DIAGNOSIS — N83.202 CYSTS OF BOTH OVARIES: ICD-10-CM

## 2025-04-18 DIAGNOSIS — N83.201 CYSTS OF BOTH OVARIES: ICD-10-CM

## 2025-04-18 LAB
ALBUMIN SERPL BCG-MCNC: 4.5 G/DL (ref 3.5–5)
ALP SERPL-CCNC: 78 U/L (ref 34–104)
ALT SERPL W P-5'-P-CCNC: 11 U/L (ref 7–52)
ANION GAP SERPL CALCULATED.3IONS-SCNC: 11 MMOL/L (ref 4–13)
AST SERPL W P-5'-P-CCNC: 20 U/L (ref 13–39)
BASOPHILS # BLD AUTO: 0.03 THOUSANDS/ÂΜL (ref 0–0.1)
BASOPHILS NFR BLD AUTO: 0 % (ref 0–1)
BILIRUB SERPL-MCNC: 0.39 MG/DL (ref 0.2–1)
BUN SERPL-MCNC: 17 MG/DL (ref 5–25)
CALCIUM SERPL-MCNC: 10 MG/DL (ref 8.4–10.2)
CANCER AG125 SERPL-ACNC: 573.4 U/ML (ref 0–35)
CHLORIDE SERPL-SCNC: 103 MMOL/L (ref 96–108)
CO2 SERPL-SCNC: 21 MMOL/L (ref 21–32)
CREAT SERPL-MCNC: 0.73 MG/DL (ref 0.6–1.3)
CREAT UR-MCNC: 265.9 MG/DL
EOSINOPHIL # BLD AUTO: 0.02 THOUSAND/ÂΜL (ref 0–0.61)
EOSINOPHIL NFR BLD AUTO: 0 % (ref 0–6)
ERYTHROCYTE [DISTWIDTH] IN BLOOD BY AUTOMATED COUNT: 18.6 % (ref 11.6–15.1)
GFR SERPL CREATININE-BSD FRML MDRD: 91 ML/MIN/1.73SQ M
GLUCOSE P FAST SERPL-MCNC: 100 MG/DL (ref 65–99)
HCT VFR BLD AUTO: 35 % (ref 34.8–46.1)
HGB BLD-MCNC: 11.3 G/DL (ref 11.5–15.4)
IMM GRANULOCYTES # BLD AUTO: 0.06 THOUSAND/UL (ref 0–0.2)
IMM GRANULOCYTES NFR BLD AUTO: 1 % (ref 0–2)
LYMPHOCYTES # BLD AUTO: 1.93 THOUSANDS/ÂΜL (ref 0.6–4.47)
LYMPHOCYTES NFR BLD AUTO: 21 % (ref 14–44)
MAGNESIUM SERPL-MCNC: 1.7 MG/DL (ref 1.9–2.7)
MCH RBC QN AUTO: 29.2 PG (ref 26.8–34.3)
MCHC RBC AUTO-ENTMCNC: 32.3 G/DL (ref 31.4–37.4)
MCV RBC AUTO: 90 FL (ref 82–98)
MONOCYTES # BLD AUTO: 0.76 THOUSAND/ÂΜL (ref 0.17–1.22)
MONOCYTES NFR BLD AUTO: 8 % (ref 4–12)
NEUTROPHILS # BLD AUTO: 6.61 THOUSANDS/ÂΜL (ref 1.85–7.62)
NEUTS SEG NFR BLD AUTO: 70 % (ref 43–75)
NRBC BLD AUTO-RTO: 0 /100 WBCS
PLATELET # BLD AUTO: 236 THOUSANDS/UL (ref 149–390)
PMV BLD AUTO: 10.4 FL (ref 8.9–12.7)
POTASSIUM SERPL-SCNC: 4.4 MMOL/L (ref 3.5–5.3)
PROT SERPL-MCNC: 7.5 G/DL (ref 6.4–8.4)
PROT UR-MCNC: 38.5 MG/DL
PROT/CREAT UR: 0.1 MG/G{CREAT}
RBC # BLD AUTO: 3.87 MILLION/UL (ref 3.81–5.12)
SODIUM SERPL-SCNC: 135 MMOL/L (ref 135–147)
WBC # BLD AUTO: 9.41 THOUSAND/UL (ref 4.31–10.16)

## 2025-04-18 PROCEDURE — 80053 COMPREHEN METABOLIC PANEL: CPT

## 2025-04-18 PROCEDURE — 86304 IMMUNOASSAY TUMOR CA 125: CPT

## 2025-04-18 PROCEDURE — 85025 COMPLETE CBC W/AUTO DIFF WBC: CPT

## 2025-04-18 PROCEDURE — 84156 ASSAY OF PROTEIN URINE: CPT

## 2025-04-18 PROCEDURE — 36415 COLL VENOUS BLD VENIPUNCTURE: CPT

## 2025-04-18 PROCEDURE — 83735 ASSAY OF MAGNESIUM: CPT

## 2025-04-18 PROCEDURE — 82570 ASSAY OF URINE CREATININE: CPT

## 2025-04-18 RX ORDER — ALBUTEROL SULFATE 90 UG/1
INHALANT RESPIRATORY (INHALATION)
Qty: 18 G | Refills: 0 | Status: SHIPPED | OUTPATIENT
Start: 2025-04-18

## 2025-04-22 ENCOUNTER — OFFICE VISIT (OUTPATIENT)
Dept: GYNECOLOGIC ONCOLOGY | Facility: CLINIC | Age: 60
End: 2025-04-22
Payer: COMMERCIAL

## 2025-04-22 VITALS
RESPIRATION RATE: 16 BRPM | SYSTOLIC BLOOD PRESSURE: 134 MMHG | DIASTOLIC BLOOD PRESSURE: 94 MMHG | HEART RATE: 124 BPM | HEIGHT: 69 IN | OXYGEN SATURATION: 98 % | TEMPERATURE: 97.8 F | WEIGHT: 156.2 LBS | BODY MASS INDEX: 23.13 KG/M2

## 2025-04-22 DIAGNOSIS — C56.9 MALIGNANT NEOPLASM OF OVARY, UNSPECIFIED LATERALITY (HCC): ICD-10-CM

## 2025-04-22 DIAGNOSIS — C56.3 MALIGNANT NEOPLASM OF BOTH OVARIES (HCC): Primary | ICD-10-CM

## 2025-04-22 PROBLEM — R18.0 MALIGNANT ASCITES: Status: RESOLVED | Noted: 2025-02-11 | Resolved: 2025-04-22

## 2025-04-22 PROBLEM — E87.6 HYPOKALEMIA: Status: RESOLVED | Noted: 2025-02-04 | Resolved: 2025-04-22

## 2025-04-22 PROCEDURE — 99215 OFFICE O/P EST HI 40 MIN: CPT | Performed by: OBSTETRICS & GYNECOLOGY

## 2025-04-22 RX ORDER — POLYETHYLENE GLYCOL 3350 17 G/17G
POWDER, FOR SOLUTION ORAL
Qty: 238 G | Refills: 0 | Status: SHIPPED | OUTPATIENT
Start: 2025-04-22

## 2025-04-22 RX ORDER — SODIUM CHLORIDE 9 MG/ML
20 INJECTION, SOLUTION INTRAVENOUS ONCE
Status: CANCELLED | OUTPATIENT
Start: 2025-04-24

## 2025-04-22 RX ORDER — ACETAMINOPHEN 10 MG/ML
1000 INJECTION, SOLUTION INTRAVENOUS ONCE
OUTPATIENT
Start: 2025-04-22 | End: 2025-04-22

## 2025-04-22 RX ORDER — MAGNESIUM SULFATE HEPTAHYDRATE 40 MG/ML
2 INJECTION, SOLUTION INTRAVENOUS ONCE
Status: CANCELLED
Start: 2025-04-24 | End: 2025-04-24

## 2025-04-22 RX ORDER — NEOMYCIN SULFATE 500 MG/1
1000 TABLET ORAL 3 TIMES DAILY
Qty: 6 TABLET | Refills: 0 | Status: SHIPPED | OUTPATIENT
Start: 2025-04-22 | End: 2025-04-23

## 2025-04-22 RX ORDER — PALONOSETRON 0.05 MG/ML
0.25 INJECTION, SOLUTION INTRAVENOUS ONCE
Status: CANCELLED | OUTPATIENT
Start: 2025-04-24

## 2025-04-22 RX ORDER — CELECOXIB 100 MG/1
200 CAPSULE ORAL ONCE
OUTPATIENT
Start: 2025-04-22 | End: 2025-04-22

## 2025-04-22 RX ORDER — BISACODYL 5 MG/1
20 TABLET, DELAYED RELEASE ORAL ONCE
Qty: 4 TABLET | Refills: 0 | Status: SHIPPED | OUTPATIENT
Start: 2025-04-22 | End: 2025-04-22

## 2025-04-22 RX ORDER — HEPARIN SODIUM 5000 [USP'U]/ML
5000 INJECTION, SOLUTION INTRAVENOUS; SUBCUTANEOUS
OUTPATIENT
Start: 2025-04-23 | End: 2025-04-24

## 2025-04-22 RX ORDER — METRONIDAZOLE 500 MG/1
500 TABLET ORAL ONCE
Qty: 1 TABLET | Refills: 0 | Status: SHIPPED | OUTPATIENT
Start: 2025-04-22 | End: 2025-04-22

## 2025-04-22 NOTE — PROGRESS NOTES
Name: Ewa Hernandes      : 1965      MRN: [id number]  Encounter Provider: Augusto De Souza MD  Encounter Date: 2025   Encounter department: [facility] [place]  :  Assessment & Plan  Malignant neoplasm of both ovaries (HCC)  I have independently obtained history from patient today.  I have reviewed pre-visit CT scan images which demonstrate evidence of objective response.  I have also reviewed laboratory trends including CBC, CMP, magnesium, urine protein creatinine ratio.    Patient continues to receive apixaban 2.5 mg p.o. twice daily given Khorana score and presence of ascites.  Will plan to continue and hold 48 hours prior to surgery (see below).    I have recommended proceeding with cycle #4 of chemotherapy with carboplatin and paclitaxel.  We will hold bevacizumab for this cycle.  This is scheduled for .  Then, 3 weeks later the earliest, we will proceed with interval cytoreductive surgery.  I have recommended and she has consented to proceed with exam under anesthesia, exploratory laparotomy, tumor cytoreduction/debulking with total hysterectomy, bilateral salpingo-oophorectomy, possible colonic/bowel resection and all other indicated procedures.    I discussed with patient the possibility of adding HIPEC at the time of interval cytoreductive surgery.  After discussion of pros and cons as well as risks associated with this potential intervention, patient has decided she does not want to proceed with HIPEC.    Patient has good exercise tolerance and no additional cardiovascular workup is indicated.  We will hold apixaban 48 hours prior to surgery.  Hold lisinopril 24 hours prior to surgery.  Given the possibility of requiring low anterior rectosigmoid resection will prescribe antibiotic and mechanical bowel prep.    The patient was counseled regarding indications, risks, benefits and alternatives to surgical management.  We discussed risks including but not  limited to bleeding and potential need for blood transfusions, infection, pain, injury to surrounding organs such as bladder, intestines, ureters and neurovascular structures.  Patient understands potential risks associated with stress of surgery and general anesthesia including but not limited to acute cardiac events, venous thromboembolism, etc.  Patient consents for blood transfusions if those are deemed necessary during the course of care.  She understands risks include but are not limited to hypersensitivity reactions and infection with blood-borne pathogens.  Patient verbalizes understanding, agrees and wants to proceed.  Informed consent form signed. All questions answered to patient's satisfaction.      Orders:  •  Case request operating room: EXPLORATORY LAPAROTOMY, DEBULKING TUMOR/ CYTOREDUCTION WITH TOTAL HYSTERECTOMY, BILATERAL SALPINGO-OOPHORECTOMY, POSSIBLE BOWEL/COLONIC RESECTION AND ALL OTHER INDICATED PROCEDURES; Standing  •  Type and screen; Future  •  neomycin (MYCIFRADIN) 500 mg tablet; Take 2 tablets (1,000 mg total) by mouth 3 (three) times a day for 3 doses Take at 1 PM, 4 PM, and 9 PM the day before procedure.  •  metroNIDAZOLE (FLAGYL) 500 mg tablet; Take 1 tablet (500 mg total) by mouth once for 1 dose Take at 9 PM the night before the procedure  •  polyethylene glycol (MiraLax) 17 GM/SCOOP powder; Mix with 64 oz Gatorade, begin 4 PM day before surgery per bowel prep instructions.  •  bisacodyl (DULCOLAX) 5 mg EC tablet; Take 4 tablets (20 mg total) by mouth once for 1 dose Take 4 tablets with 8 oz water at 2 PM day before procedure    Malignant neoplasm of ovary, unspecified laterality (HCC)    Orders:  •  apixaban (Eliquis) 2.5 mg; Take 1 tablet (2.5 mg total) by mouth 2 (two) times a day            History of Present Illness   Reason for Visit / CC: Prechemotherapy visit, surgical planning visit   Ewa Hernandes is a 59 y.o. female   Patient with stage IIIc high-grade serous  ovarian/primary peritoneal cancer presented with extensive disease burden and massive ascites.  Initiated on scheduled paracentesis and neoadjuvant chemotherapy.  Now status post 3 cycles.  Presents to review CT scan results in order to plan for possible interval cytoreductive surgery.  Reports decreased appetite.  Normal bowel bladder function.  Ascites has resolved.  Reports occasional upper abdominal discomfort and pain.  Denies nausea vomiting.  Ongoing alopecia.  No neuropathy.      Pertinent Medical History     Hypertension, reactive airway disease, anxiety disorder, GERD.       Oncology History   Cancer Staging   Malignant neoplasm of both ovaries (HCC)  Staging form: Ovary, Fallopian Tube, Primary Peritoneal, AJCC 8th Edition  - Clinical stage from 2/6/2025: FIGO Stage IIIC (cT3c, cNX, cM0) - Signed by Augusto De Souza MD on 2/11/2025  Histopathologic type: Serous cystadenocarcinoma, NOS  Stage prefix: Initial diagnosis  Histologic grade (G): G3  Histologic grading system: 4 grade system  Oncology History   Malignant neoplasm of both ovaries (HCC)   2/4/2025 Initial Diagnosis    Malignant neoplasm of both ovaries (HCC)     2/6/2025 -  Cancer Staged    Staging form: Ovary, Fallopian Tube, Primary Peritoneal, AJCC 8th Edition  - Clinical stage from 2/6/2025: FIGO Stage IIIC (cT3c, cNX, cM0) - Signed by Augusto De Souza MD on 2/11/2025  Histopathologic type: Serous cystadenocarcinoma, NOS  Stage prefix: Initial diagnosis  Histologic grade (G): G3  Histologic grading system: 4 grade system       2/18/2025 -  Chemotherapy    CARBOplatin (PARAPLATIN) IVPB (GOG AUC DOSING), 650.5 mg, 3 of 7 cycles  Administration: 650.5 mg (2/18/2025), 622 mg (4/1/2025), 650.5 mg (3/11/2025)  PACLItaxel (TAXOL) chemo IVPB, 175 mg/m2 = 336 mg, 3 of 7 cycles  Administration: 336 mg (2/18/2025), 330 mg (4/1/2025), 336 mg (3/11/2025)  bevacizumab-awwb (MVASI) IVPB, 1,140 mg (100 % of original dose 15 mg/kg), 3 of 7 cycles  Dose  "modification: 15 mg/kg (original dose 15 mg/kg, Cycle 1)  Administration: 1,100 mg (2/18/2025), 1,100 mg (3/11/2025), 1,100 mg (4/1/2025)     Ovarian cancer (HCC) (Resolved)   2/6/2025 -  Cancer Staged    Staging form: Ovary, Fallopian Tube, Primary Peritoneal, AJCC 8th Edition  - Clinical stage from 2/6/2025: FIGO Stage IIIC (cT3c, cNX, cM0) - Signed by Augusto De Souza MD on 2/11/2025  Histopathologic type: Serous cystadenocarcinoma, NOS  Histologic grade (G): G3  Histologic grading system: 4 grade system       2/11/2025 Initial Diagnosis    Ovarian cancer (HCC)        Review of Systems A complete review of systems is negative other than that noted above in the HPI.       Objective   /94 (BP Location: Left arm, Patient Position: Sitting, Cuff Size: Standard)   Pulse (!) 124   Temp 97.8 °F (36.6 °C) (Temporal)   Resp 16   Ht 5' 9.02\" (1.753 m)   Wt 70.9 kg (156 lb 3.2 oz)   SpO2 98%   BMI 23.05 kg/m²     Body mass index is 23.05 kg/m².  Pain Screening:  Pain Score: 0-No pain  ECOG ECOG Performance Status: 0 - Fully active, able to carry on all pre-disease performance without restriction   Physical Exam  Vitals reviewed. Exam conducted with a chaperone present.   Constitutional:       General: She is not in acute distress.     Appearance: Normal appearance. She is not toxic-appearing or diaphoretic.   Cardiovascular:      Rate and Rhythm: Normal rate and regular rhythm.   Pulmonary:      Effort: Pulmonary effort is normal.      Breath sounds: No stridor.   Abdominal:      General: There is no distension.      Palpations: Abdomen is soft. There is no mass.      Tenderness: There is no abdominal tenderness.   Musculoskeletal:      Right lower leg: No edema.      Left lower leg: No edema.            Labs: I have reviewed pertinent labs.   Lab Results   Component Value Date/Time     573.4 (H) 04/18/2025 12:06 PM     Lab Results   Component Value Date/Time    Potassium 4.4 04/18/2025 12:06 PM    " Chloride 103 04/18/2025 12:06 PM    CO2 21 04/18/2025 12:06 PM    BUN 17 04/18/2025 12:06 PM    Creatinine 0.73 04/18/2025 12:06 PM    Glucose, Fasting 100 (H) 04/18/2025 12:06 PM    Calcium 10.0 04/18/2025 12:06 PM    AST 20 04/18/2025 12:06 PM    ALT 11 04/18/2025 12:06 PM    Alkaline Phosphatase 78 04/18/2025 12:06 PM    eGFR 91 04/18/2025 12:06 PM     Lab Results   Component Value Date/Time    WBC 9.41 04/18/2025 12:06 PM    Hemoglobin 11.3 (L) 04/18/2025 12:06 PM    Hematocrit 35.0 04/18/2025 12:06 PM    MCV 90 04/18/2025 12:06 PM    Platelets 236 04/18/2025 12:06 PM     Lab Results   Component Value Date/Time    Absolute Neutrophils 6.61 04/18/2025 12:06 PM        Trend:  Lab Results   Component Value Date     573.4 (H) 04/18/2025     1,131.0 (H) 03/28/2025     3,772.2 (H) 03/07/2025     6,003.0 (H) 02/14/2025     6,483.0 (H) 02/04/2025       Radiology Results Review : I have reviewed images/report studies in PACS as described above (in the HPI).          Augusto De Souza MD, JUAN PABLO, FACOG, FACS  4/22/2025  2:27 PM

## 2025-04-22 NOTE — ASSESSMENT & PLAN NOTE
I have independently obtained history from patient today.  I have reviewed pre-visit CT scan images which demonstrate evidence of objective response.  I have also reviewed laboratory trends including CBC, CMP, magnesium, urine protein creatinine ratio.    Patient continues to receive apixaban 2.5 mg p.o. twice daily given Khorana score and presence of ascites.  Will plan to continue and hold 48 hours prior to surgery (see below).    I have recommended proceeding with cycle #4 of chemotherapy with carboplatin and paclitaxel.  We will hold bevacizumab for this cycle.  This is scheduled for April 24.  Then, 3 weeks later the earliest, we will proceed with interval cytoreductive surgery.  I have recommended and she has consented to proceed with exam under anesthesia, exploratory laparotomy, tumor cytoreduction/debulking with total hysterectomy, bilateral salpingo-oophorectomy, possible colonic/bowel resection and all other indicated procedures.    I discussed with patient the possibility of adding HIPEC at the time of interval cytoreductive surgery.  After discussion of pros and cons as well as risks associated with this potential intervention, patient has decided she does not want to proceed with HIPEC.    Patient has good exercise tolerance and no additional cardiovascular workup is indicated.  We will hold apixaban 48 hours prior to surgery.  Hold lisinopril 24 hours prior to surgery.  Given the possibility of requiring low anterior rectosigmoid resection will prescribe antibiotic and mechanical bowel prep.    The patient was counseled regarding indications, risks, benefits and alternatives to surgical management.  We discussed risks including but not limited to bleeding and potential need for blood transfusions, infection, pain, injury to surrounding organs such as bladder, intestines, ureters and neurovascular structures.  Patient understands potential risks associated with stress of surgery and general anesthesia  including but not limited to acute cardiac events, venous thromboembolism, etc.  Patient consents for blood transfusions if those are deemed necessary during the course of care.  She understands risks include but are not limited to hypersensitivity reactions and infection with blood-borne pathogens.  Patient verbalizes understanding, agrees and wants to proceed.  Informed consent form signed. All questions answered to patient's satisfaction.      Orders:  •  Case request operating room: EXPLORATORY LAPAROTOMY, DEBULKING TUMOR/ CYTOREDUCTION WITH TOTAL HYSTERECTOMY, BILATERAL SALPINGO-OOPHORECTOMY, POSSIBLE BOWEL/COLONIC RESECTION AND ALL OTHER INDICATED PROCEDURES; Standing  •  Type and screen; Future  •  neomycin (MYCIFRADIN) 500 mg tablet; Take 2 tablets (1,000 mg total) by mouth 3 (three) times a day for 3 doses Take at 1 PM, 4 PM, and 9 PM the day before procedure.  •  metroNIDAZOLE (FLAGYL) 500 mg tablet; Take 1 tablet (500 mg total) by mouth once for 1 dose Take at 9 PM the night before the procedure  •  polyethylene glycol (MiraLax) 17 GM/SCOOP powder; Mix with 64 oz Gatorade, begin 4 PM day before surgery per bowel prep instructions.  •  bisacodyl (DULCOLAX) 5 mg EC tablet; Take 4 tablets (20 mg total) by mouth once for 1 dose Take 4 tablets with 8 oz water at 2 PM day before procedure

## 2025-04-24 ENCOUNTER — HOSPITAL ENCOUNTER (OUTPATIENT)
Dept: INFUSION CENTER | Facility: HOSPITAL | Age: 60
Discharge: HOME | End: 2025-04-24
Attending: OBSTETRICS & GYNECOLOGY
Payer: COMMERCIAL

## 2025-04-24 VITALS
TEMPERATURE: 97.9 F | HEART RATE: 92 BPM | WEIGHT: 156.75 LBS | SYSTOLIC BLOOD PRESSURE: 96 MMHG | HEIGHT: 69 IN | OXYGEN SATURATION: 97 % | RESPIRATION RATE: 16 BRPM | DIASTOLIC BLOOD PRESSURE: 69 MMHG | BODY MASS INDEX: 23.22 KG/M2

## 2025-04-24 DIAGNOSIS — C56.3 MALIGNANT NEOPLASM OF BOTH OVARIES (HCC): Primary | ICD-10-CM

## 2025-04-24 PROCEDURE — 96367 TX/PROPH/DG ADDL SEQ IV INF: CPT

## 2025-04-24 PROCEDURE — 96368 THER/DIAG CONCURRENT INF: CPT

## 2025-04-24 PROCEDURE — 96415 CHEMO IV INFUSION ADDL HR: CPT

## 2025-04-24 PROCEDURE — 96413 CHEMO IV INFUSION 1 HR: CPT

## 2025-04-24 PROCEDURE — 96417 CHEMO IV INFUS EACH ADDL SEQ: CPT

## 2025-04-24 PROCEDURE — 96375 TX/PRO/DX INJ NEW DRUG ADDON: CPT

## 2025-04-24 RX ORDER — MAGNESIUM SULFATE HEPTAHYDRATE 40 MG/ML
2 INJECTION, SOLUTION INTRAVENOUS ONCE
Status: COMPLETED | OUTPATIENT
Start: 2025-04-24 | End: 2025-04-24

## 2025-04-24 RX ORDER — SODIUM CHLORIDE 9 MG/ML
20 INJECTION, SOLUTION INTRAVENOUS ONCE
Status: COMPLETED | OUTPATIENT
Start: 2025-04-24 | End: 2025-04-24

## 2025-04-24 RX ORDER — PALONOSETRON 0.05 MG/ML
0.25 INJECTION, SOLUTION INTRAVENOUS ONCE
Status: COMPLETED | OUTPATIENT
Start: 2025-04-24 | End: 2025-04-24

## 2025-04-24 RX ADMIN — PACLITAXEL 330 MG: 6 INJECTION, SOLUTION, CONCENTRATE INTRAVENOUS at 10:37

## 2025-04-24 RX ADMIN — SODIUM CHLORIDE 20 ML/HR: 0.9 INJECTION, SOLUTION INTRAVENOUS at 08:21

## 2025-04-24 RX ADMIN — FAMOTIDINE 20 MG: 10 INJECTION, SOLUTION INTRAVENOUS at 09:17

## 2025-04-24 RX ADMIN — DIPHENHYDRAMINE HYDROCHLORIDE 25 MG: 50 INJECTION, SOLUTION INTRAMUSCULAR; INTRAVENOUS at 08:51

## 2025-04-24 RX ADMIN — FOSAPREPITANT 150 MG: 150 INJECTION, POWDER, LYOPHILIZED, FOR SOLUTION INTRAVENOUS at 09:44

## 2025-04-24 RX ADMIN — CARBOPLATIN 629 MG: 10 INJECTION, SOLUTION INTRAVENOUS at 14:03

## 2025-04-24 RX ADMIN — MAGNESIUM SULFATE HEPTAHYDRATE 2 G: 2 INJECTION, SOLUTION INTRAVENOUS at 10:35

## 2025-04-24 RX ADMIN — DEXAMETHASONE SODIUM PHOSPHATE 20 MG: 100 INJECTION INTRAMUSCULAR; INTRAVENOUS at 08:30

## 2025-04-24 RX ADMIN — PALONOSETRON HYDROCHLORIDE 0.25 MG: 0.25 INJECTION INTRAVENOUS at 10:24

## 2025-04-24 NOTE — PROGRESS NOTES
Recent labs reviewed. Pt tolerated Taxol, Carboplatin, & Magnesium 2g tx well without any complications. PIV removed without incident.      Ewa Hernandes is aware of future appt on 4/25/25 at 3:30PM.      AVS printed and given to Ewa Hernandes.     Pt discharged off unit in stable condition with all personal belongings.

## 2025-04-24 NOTE — PLAN OF CARE
Problem: Potential for Falls  Goal: Patient will remain free of falls  Description: INTERVENTIONS:- Educate patient/family on patient safety including physical limitations- Instruct patient to call for assistance with activity - Consult OT/PT to assist with strengthening/mobility - Keep Call bell within reach- Keep bed low and locked with side rails adjusted as appropriate- Keep care items and personal belongings within reach- Initiate and maintain comfort rounds- Consider moving patient to room near nurses station  Outcome: Progressing     Problem: INFECTION - ADULT  Goal: Absence or prevention of progression during hospitalization  Description: INTERVENTIONS:- Assess and monitor for signs and symptoms of infection- Monitor lab/diagnostic results- Monitor all insertion sites, i.e. indwelling lines, tubes, and drains- Monitor endotracheal if appropriate and nasal secretions for changes in amount and color- [place] appropriate cooling/warming therapies per order- Administer medications as ordered- Instruct and encourage patient and family to use good hand hygiene technique- Identify and instruct in appropriate isolation precautions for identified infection/condition  Outcome: Progressing     Problem: Knowledge Deficit  Goal: Patient/family/caregiver demonstrates understanding of disease process, treatment plan, medications, and discharge instructions  Description: Complete learning assessment and assess knowledge base.Interventions:- Provide teaching at level of understanding- Provide teaching via preferred learning methods  Outcome: Progressing

## 2025-04-25 ENCOUNTER — APPOINTMENT (OUTPATIENT)
Dept: LAB | Facility: HOSPITAL | Age: 60
End: 2025-04-25
Payer: COMMERCIAL

## 2025-04-25 ENCOUNTER — HOSPITAL ENCOUNTER (OUTPATIENT)
Dept: INFUSION CENTER | Facility: HOSPITAL | Age: 60
End: 2025-04-25
Attending: OBSTETRICS & GYNECOLOGY
Payer: COMMERCIAL

## 2025-04-25 VITALS — TEMPERATURE: 97.7 F

## 2025-04-25 DIAGNOSIS — N83.202 CYSTS OF BOTH OVARIES: ICD-10-CM

## 2025-04-25 DIAGNOSIS — C56.3 MALIGNANT NEOPLASM OF BOTH OVARIES (HCC): Primary | ICD-10-CM

## 2025-04-25 DIAGNOSIS — N83.201 CYSTS OF BOTH OVARIES: ICD-10-CM

## 2025-04-25 LAB
ALBUMIN SERPL BCG-MCNC: 4.4 G/DL (ref 3.5–5)
ALP SERPL-CCNC: 61 U/L (ref 34–104)
ALT SERPL W P-5'-P-CCNC: 8 U/L (ref 7–52)
ANION GAP SERPL CALCULATED.3IONS-SCNC: 9 MMOL/L (ref 4–13)
AST SERPL W P-5'-P-CCNC: 19 U/L (ref 13–39)
BASOPHILS # BLD AUTO: 0.01 THOUSANDS/ÂΜL (ref 0–0.1)
BASOPHILS NFR BLD AUTO: 0 % (ref 0–1)
BILIRUB SERPL-MCNC: 0.54 MG/DL (ref 0.2–1)
BUN SERPL-MCNC: 24 MG/DL (ref 5–25)
CALCIUM SERPL-MCNC: 9.6 MG/DL (ref 8.4–10.2)
CHLORIDE SERPL-SCNC: 102 MMOL/L (ref 96–108)
CO2 SERPL-SCNC: 22 MMOL/L (ref 21–32)
CREAT SERPL-MCNC: 0.89 MG/DL (ref 0.6–1.3)
EOSINOPHIL # BLD AUTO: 0.02 THOUSAND/ÂΜL (ref 0–0.61)
EOSINOPHIL NFR BLD AUTO: 0 % (ref 0–6)
ERYTHROCYTE [DISTWIDTH] IN BLOOD BY AUTOMATED COUNT: 19.5 % (ref 11.6–15.1)
GFR SERPL CREATININE-BSD FRML MDRD: 71 ML/MIN/1.73SQ M
GLUCOSE SERPL-MCNC: 106 MG/DL (ref 65–140)
HCT VFR BLD AUTO: 31.1 % (ref 34.8–46.1)
HGB BLD-MCNC: 10.3 G/DL (ref 11.5–15.4)
IMM GRANULOCYTES # BLD AUTO: 0.04 THOUSAND/UL (ref 0–0.2)
IMM GRANULOCYTES NFR BLD AUTO: 1 % (ref 0–2)
LYMPHOCYTES # BLD AUTO: 1.12 THOUSANDS/ÂΜL (ref 0.6–4.47)
LYMPHOCYTES NFR BLD AUTO: 16 % (ref 14–44)
MAGNESIUM SERPL-MCNC: 2 MG/DL (ref 1.9–2.7)
MCH RBC QN AUTO: 30.2 PG (ref 26.8–34.3)
MCHC RBC AUTO-ENTMCNC: 33.1 G/DL (ref 31.4–37.4)
MCV RBC AUTO: 91 FL (ref 82–98)
MONOCYTES # BLD AUTO: 0.57 THOUSAND/ÂΜL (ref 0.17–1.22)
MONOCYTES NFR BLD AUTO: 8 % (ref 4–12)
NEUTROPHILS # BLD AUTO: 5.15 THOUSANDS/ÂΜL (ref 1.85–7.62)
NEUTS SEG NFR BLD AUTO: 75 % (ref 43–75)
NRBC BLD AUTO-RTO: 0 /100 WBCS
PLATELET # BLD AUTO: 228 THOUSANDS/UL (ref 149–390)
PMV BLD AUTO: 10 FL (ref 8.9–12.7)
POTASSIUM SERPL-SCNC: 4.1 MMOL/L (ref 3.5–5.3)
PROT SERPL-MCNC: 7.7 G/DL (ref 6.4–8.4)
RBC # BLD AUTO: 3.41 MILLION/UL (ref 3.81–5.12)
SODIUM SERPL-SCNC: 133 MMOL/L (ref 135–147)
WBC # BLD AUTO: 6.91 THOUSAND/UL (ref 4.31–10.16)

## 2025-04-25 PROCEDURE — 83735 ASSAY OF MAGNESIUM: CPT

## 2025-04-25 PROCEDURE — 96372 THER/PROPH/DIAG INJ SC/IM: CPT

## 2025-04-25 PROCEDURE — 85025 COMPLETE CBC W/AUTO DIFF WBC: CPT

## 2025-04-25 PROCEDURE — 80053 COMPREHEN METABOLIC PANEL: CPT

## 2025-04-25 PROCEDURE — 36415 COLL VENOUS BLD VENIPUNCTURE: CPT

## 2025-04-25 RX ADMIN — PEGFILGRASTIM-JMDB 6 MG: 6 INJECTION SUBCUTANEOUS at 15:34

## 2025-04-25 NOTE — PROGRESS NOTES
Recent labs reviewed. Pt tolerated Fulphila injection in DELISA well without any complications.      Ewa Hernandes is aware of future appt on 6/17/25 at 8AM.      AVS declined by Ewa Hernandes.     Pt discharged off unit in stable condition with all personal belongings.

## 2025-04-25 NOTE — PLAN OF CARE
Problem: INFECTION - ADULT  Goal: Absence or prevention of progression during hospitalization  Description: INTERVENTIONS:- Assess and monitor for signs and symptoms of infection- Monitor lab/diagnostic results- Monitor all insertion sites, i.e. indwelling lines, tubes, and drains- Monitor endotracheal if appropriate and nasal secretions for changes in amount and color- [place] appropriate cooling/warming therapies per order- Administer medications as ordered- Instruct and encourage patient and family to use good hand hygiene technique- Identify and instruct in appropriate isolation precautions for identified infection/condition  Outcome: Progressing     Problem: Knowledge Deficit  Goal: Patient/family/caregiver demonstrates understanding of disease process, treatment plan, medications, and discharge instructions  Description: Complete learning assessment and assess knowledge base.Interventions:- Provide teaching at level of understanding- Provide teaching via preferred learning methods  Outcome: Progressing

## 2025-05-02 ENCOUNTER — LAB REQUISITION (OUTPATIENT)
Dept: LAB | Facility: HOSPITAL | Age: 60
End: 2025-05-02
Payer: COMMERCIAL

## 2025-05-02 ENCOUNTER — APPOINTMENT (OUTPATIENT)
Dept: LAB | Facility: HOSPITAL | Age: 60
End: 2025-05-02
Payer: COMMERCIAL

## 2025-05-02 DIAGNOSIS — N83.202 CYSTS OF BOTH OVARIES: ICD-10-CM

## 2025-05-02 DIAGNOSIS — N83.202 UNSPECIFIED OVARIAN CYST, LEFT SIDE: ICD-10-CM

## 2025-05-02 DIAGNOSIS — C56.3 MALIGNANT NEOPLASM OF BILATERAL OVARIES (HCC): ICD-10-CM

## 2025-05-02 DIAGNOSIS — N83.201 CYSTS OF BOTH OVARIES: ICD-10-CM

## 2025-05-02 DIAGNOSIS — N83.201 UNSPECIFIED OVARIAN CYST, RIGHT SIDE: ICD-10-CM

## 2025-05-02 DIAGNOSIS — C56.3 MALIGNANT NEOPLASM OF BOTH OVARIES (HCC): ICD-10-CM

## 2025-05-02 LAB
ABO GROUP BLD: NORMAL
ALBUMIN SERPL BCG-MCNC: 4.5 G/DL (ref 3.5–5)
ALP SERPL-CCNC: 92 U/L (ref 34–104)
ALT SERPL W P-5'-P-CCNC: 9 U/L (ref 7–52)
ANION GAP SERPL CALCULATED.3IONS-SCNC: 10 MMOL/L (ref 4–13)
ANISOCYTOSIS BLD QL SMEAR: PRESENT
AST SERPL W P-5'-P-CCNC: 17 U/L (ref 13–39)
BASOPHILS # BLD MANUAL: 0 THOUSAND/UL (ref 0–0.1)
BASOPHILS NFR MAR MANUAL: 0 % (ref 0–1)
BILIRUB SERPL-MCNC: 0.41 MG/DL (ref 0.2–1)
BLD GP AB SCN SERPL QL: NEGATIVE
BUN SERPL-MCNC: 26 MG/DL (ref 5–25)
CALCIUM SERPL-MCNC: 9.9 MG/DL (ref 8.4–10.2)
CANCER AG125 SERPL-ACNC: 341.1 U/ML (ref 0–35)
CHLORIDE SERPL-SCNC: 101 MMOL/L (ref 96–108)
CO2 SERPL-SCNC: 24 MMOL/L (ref 21–32)
CREAT SERPL-MCNC: 0.75 MG/DL (ref 0.6–1.3)
EOSINOPHIL # BLD MANUAL: 0.07 THOUSAND/UL (ref 0–0.4)
EOSINOPHIL NFR BLD MANUAL: 1 % (ref 0–6)
ERYTHROCYTE [DISTWIDTH] IN BLOOD BY AUTOMATED COUNT: 19.3 % (ref 11.6–15.1)
GFR SERPL CREATININE-BSD FRML MDRD: 88 ML/MIN/1.73SQ M
GLUCOSE P FAST SERPL-MCNC: 98 MG/DL (ref 65–99)
HCT VFR BLD AUTO: 29.3 % (ref 34.8–46.1)
HGB BLD-MCNC: 9.4 G/DL (ref 11.5–15.4)
LYMPHOCYTES # BLD AUTO: 1.52 THOUSAND/UL (ref 0.6–4.47)
LYMPHOCYTES # BLD AUTO: 23 % (ref 14–44)
MAGNESIUM SERPL-MCNC: 1.6 MG/DL (ref 1.9–2.7)
MCH RBC QN AUTO: 30.5 PG (ref 26.8–34.3)
MCHC RBC AUTO-ENTMCNC: 32.1 G/DL (ref 31.4–37.4)
MCV RBC AUTO: 95 FL (ref 82–98)
MONOCYTES # BLD AUTO: 0.26 THOUSAND/UL (ref 0–1.22)
MONOCYTES NFR BLD: 4 % (ref 4–12)
NEUTROPHILS # BLD MANUAL: 4.77 THOUSAND/UL (ref 1.85–7.62)
NEUTS BAND NFR BLD MANUAL: 1 % (ref 0–8)
NEUTS SEG NFR BLD AUTO: 71 % (ref 43–75)
PLATELET # BLD AUTO: 132 THOUSANDS/UL (ref 149–390)
PLATELET BLD QL SMEAR: ADEQUATE
PMV BLD AUTO: 10.8 FL (ref 8.9–12.7)
POIKILOCYTOSIS BLD QL SMEAR: PRESENT
POTASSIUM SERPL-SCNC: 4.1 MMOL/L (ref 3.5–5.3)
PROT SERPL-MCNC: 7.5 G/DL (ref 6.4–8.4)
RBC # BLD AUTO: 3.08 MILLION/UL (ref 3.81–5.12)
RBC MORPH BLD: PRESENT
RH BLD: POSITIVE
SODIUM SERPL-SCNC: 135 MMOL/L (ref 135–147)
SPECIMEN EXPIRATION DATE: NORMAL
WBC # BLD AUTO: 6.62 THOUSAND/UL (ref 4.31–10.16)

## 2025-05-02 PROCEDURE — 86304 IMMUNOASSAY TUMOR CA 125: CPT

## 2025-05-02 PROCEDURE — 85007 BL SMEAR W/DIFF WBC COUNT: CPT

## 2025-05-02 PROCEDURE — 85027 COMPLETE CBC AUTOMATED: CPT

## 2025-05-02 PROCEDURE — 86900 BLOOD TYPING SEROLOGIC ABO: CPT | Performed by: OBSTETRICS & GYNECOLOGY

## 2025-05-02 PROCEDURE — 80053 COMPREHEN METABOLIC PANEL: CPT

## 2025-05-02 PROCEDURE — 86901 BLOOD TYPING SEROLOGIC RH(D): CPT | Performed by: OBSTETRICS & GYNECOLOGY

## 2025-05-02 PROCEDURE — 36415 COLL VENOUS BLD VENIPUNCTURE: CPT

## 2025-05-02 PROCEDURE — 83735 ASSAY OF MAGNESIUM: CPT

## 2025-05-02 PROCEDURE — 86850 RBC ANTIBODY SCREEN: CPT | Performed by: OBSTETRICS & GYNECOLOGY

## 2025-05-07 DIAGNOSIS — J45.909 ASTHMA, UNSPECIFIED ASTHMA SEVERITY, UNSPECIFIED WHETHER COMPLICATED, UNSPECIFIED WHETHER PERSISTENT: ICD-10-CM

## 2025-05-08 RX ORDER — ALBUTEROL SULFATE 90 UG/1
INHALANT RESPIRATORY (INHALATION)
Qty: 18 G | Refills: 5 | Status: SHIPPED | OUTPATIENT
Start: 2025-05-08

## 2025-05-09 ENCOUNTER — APPOINTMENT (OUTPATIENT)
Dept: LAB | Facility: HOSPITAL | Age: 60
End: 2025-05-09
Payer: COMMERCIAL

## 2025-05-09 ENCOUNTER — RESULTS FOLLOW-UP (OUTPATIENT)
Dept: GYNECOLOGIC ONCOLOGY | Facility: CLINIC | Age: 60
End: 2025-05-09

## 2025-05-09 DIAGNOSIS — N83.202 CYSTS OF BOTH OVARIES: ICD-10-CM

## 2025-05-09 DIAGNOSIS — N83.201 CYSTS OF BOTH OVARIES: ICD-10-CM

## 2025-05-09 DIAGNOSIS — D64.81 ANEMIA DUE TO CHEMOTHERAPY: Primary | ICD-10-CM

## 2025-05-09 DIAGNOSIS — T45.1X5A ANEMIA DUE TO CHEMOTHERAPY: Primary | ICD-10-CM

## 2025-05-09 LAB
ALBUMIN SERPL BCG-MCNC: 4.4 G/DL (ref 3.5–5)
ALP SERPL-CCNC: 72 U/L (ref 34–104)
ALT SERPL W P-5'-P-CCNC: 8 U/L (ref 7–52)
ANION GAP SERPL CALCULATED.3IONS-SCNC: 8 MMOL/L (ref 4–13)
ANISOCYTOSIS BLD QL SMEAR: PRESENT
AST SERPL W P-5'-P-CCNC: 17 U/L (ref 13–39)
BASOPHILS # BLD AUTO: 0.01 THOUSANDS/ÂΜL (ref 0–0.1)
BASOPHILS NFR BLD AUTO: 0 % (ref 0–1)
BILIRUB SERPL-MCNC: 0.45 MG/DL (ref 0.2–1)
BUN SERPL-MCNC: 15 MG/DL (ref 5–25)
CALCIUM SERPL-MCNC: 9.7 MG/DL (ref 8.4–10.2)
CANCER AG125 SERPL-ACNC: 295.5 U/ML (ref 0–35)
CHLORIDE SERPL-SCNC: 104 MMOL/L (ref 96–108)
CO2 SERPL-SCNC: 24 MMOL/L (ref 21–32)
CREAT SERPL-MCNC: 0.77 MG/DL (ref 0.6–1.3)
CREAT UR-MCNC: 77.7 MG/DL
EOSINOPHIL # BLD AUTO: 0.01 THOUSAND/ÂΜL (ref 0–0.61)
EOSINOPHIL NFR BLD AUTO: 0 % (ref 0–6)
ERYTHROCYTE [DISTWIDTH] IN BLOOD BY AUTOMATED COUNT: 19.4 % (ref 11.6–15.1)
GFR SERPL CREATININE-BSD FRML MDRD: 85 ML/MIN/1.73SQ M
GLUCOSE P FAST SERPL-MCNC: 91 MG/DL (ref 65–99)
HCT VFR BLD AUTO: 24.6 % (ref 34.8–46.1)
HGB BLD-MCNC: 7.9 G/DL (ref 11.5–15.4)
HYPERCHROMIA BLD QL SMEAR: PRESENT
IMM GRANULOCYTES # BLD AUTO: 0.01 THOUSAND/UL (ref 0–0.2)
IMM GRANULOCYTES NFR BLD AUTO: 0 % (ref 0–2)
LYMPHOCYTES # BLD AUTO: 1.11 THOUSANDS/ÂΜL (ref 0.6–4.47)
LYMPHOCYTES NFR BLD AUTO: 33 % (ref 14–44)
MAGNESIUM SERPL-MCNC: 1.8 MG/DL (ref 1.9–2.7)
MCH RBC QN AUTO: 31 PG (ref 26.8–34.3)
MCHC RBC AUTO-ENTMCNC: 32.1 G/DL (ref 31.4–37.4)
MCV RBC AUTO: 97 FL (ref 82–98)
MICROCYTES BLD QL AUTO: PRESENT
MONOCYTES # BLD AUTO: 0.17 THOUSAND/ÂΜL (ref 0.17–1.22)
MONOCYTES NFR BLD AUTO: 5 % (ref 4–12)
NEUTROPHILS # BLD AUTO: 2.07 THOUSANDS/ÂΜL (ref 1.85–7.62)
NEUTS SEG NFR BLD AUTO: 62 % (ref 43–75)
NRBC BLD AUTO-RTO: 0 /100 WBCS
PLATELET # BLD AUTO: 88 THOUSANDS/UL (ref 149–390)
PLATELET BLD QL SMEAR: ABNORMAL
PMV BLD AUTO: 10.6 FL (ref 8.9–12.7)
POTASSIUM SERPL-SCNC: 4 MMOL/L (ref 3.5–5.3)
PROT SERPL-MCNC: 7.1 G/DL (ref 6.4–8.4)
PROT UR-MCNC: 9.6 MG/DL
PROT/CREAT UR: 0.1 MG/G{CREAT}
RBC # BLD AUTO: 2.55 MILLION/UL (ref 3.81–5.12)
RBC MORPH BLD: PRESENT
SODIUM SERPL-SCNC: 136 MMOL/L (ref 135–147)
WBC # BLD AUTO: 3.38 THOUSAND/UL (ref 4.31–10.16)

## 2025-05-09 PROCEDURE — 86304 IMMUNOASSAY TUMOR CA 125: CPT

## 2025-05-09 PROCEDURE — 82570 ASSAY OF URINE CREATININE: CPT

## 2025-05-09 PROCEDURE — 36415 COLL VENOUS BLD VENIPUNCTURE: CPT

## 2025-05-09 PROCEDURE — 85025 COMPLETE CBC W/AUTO DIFF WBC: CPT

## 2025-05-09 PROCEDURE — 80053 COMPREHEN METABOLIC PANEL: CPT

## 2025-05-09 PROCEDURE — 84156 ASSAY OF PROTEIN URINE: CPT

## 2025-05-09 PROCEDURE — 83735 ASSAY OF MAGNESIUM: CPT

## 2025-05-09 NOTE — TELEPHONE ENCOUNTER
"PROVIDER\" Susie NARVAEZ    Pt calling back to Susie's earlier call to her regarding low hemoglobin.  Warm transfer to Susie,she then took over the call.  "

## 2025-05-16 ENCOUNTER — LAB REQUISITION (OUTPATIENT)
Dept: LAB | Facility: HOSPITAL | Age: 60
End: 2025-05-16
Payer: COMMERCIAL

## 2025-05-16 ENCOUNTER — APPOINTMENT (OUTPATIENT)
Dept: LAB | Facility: HOSPITAL | Age: 60
End: 2025-05-16
Payer: COMMERCIAL

## 2025-05-16 DIAGNOSIS — T45.1X5A ANEMIA DUE TO CHEMOTHERAPY: ICD-10-CM

## 2025-05-16 DIAGNOSIS — N83.202 CYSTS OF BOTH OVARIES: ICD-10-CM

## 2025-05-16 DIAGNOSIS — D64.81 ANEMIA DUE TO CHEMOTHERAPY: ICD-10-CM

## 2025-05-16 DIAGNOSIS — T45.1X5A ADVERSE EFFECT OF ANTINEOPLASTIC AND IMMUNOSUPPRESSIVE DRUGS, INITIAL ENCOUNTER: ICD-10-CM

## 2025-05-16 DIAGNOSIS — D64.81 ANEMIA DUE TO ANTINEOPLASTIC CHEMOTHERAPY (CODE): ICD-10-CM

## 2025-05-16 DIAGNOSIS — N83.201 CYSTS OF BOTH OVARIES: ICD-10-CM

## 2025-05-16 LAB
ALBUMIN SERPL BCG-MCNC: 4.6 G/DL (ref 3.5–5)
ALP SERPL-CCNC: 67 U/L (ref 34–104)
ALT SERPL W P-5'-P-CCNC: 10 U/L (ref 7–52)
ANION GAP SERPL CALCULATED.3IONS-SCNC: 8 MMOL/L (ref 4–13)
AST SERPL W P-5'-P-CCNC: 20 U/L (ref 13–39)
BASOPHILS # BLD AUTO: 0 THOUSANDS/ÂΜL (ref 0–0.1)
BASOPHILS NFR BLD AUTO: 0 % (ref 0–1)
BILIRUB SERPL-MCNC: 0.35 MG/DL (ref 0.2–1)
BUN SERPL-MCNC: 18 MG/DL (ref 5–25)
CALCIUM SERPL-MCNC: 9.8 MG/DL (ref 8.4–10.2)
CANCER AG125 SERPL-ACNC: 273.5 U/ML (ref 0–35)
CHLORIDE SERPL-SCNC: 103 MMOL/L (ref 96–108)
CO2 SERPL-SCNC: 25 MMOL/L (ref 21–32)
CREAT SERPL-MCNC: 1.02 MG/DL (ref 0.6–1.3)
CREAT UR-MCNC: 376.2 MG/DL
EOSINOPHIL # BLD AUTO: 0.06 THOUSAND/ÂΜL (ref 0–0.61)
EOSINOPHIL NFR BLD AUTO: 2 % (ref 0–6)
ERYTHROCYTE [DISTWIDTH] IN BLOOD BY AUTOMATED COUNT: 20.9 % (ref 11.6–15.1)
GFR SERPL CREATININE-BSD FRML MDRD: 60 ML/MIN/1.73SQ M
GLUCOSE P FAST SERPL-MCNC: 99 MG/DL (ref 65–99)
HCT VFR BLD AUTO: 26.6 % (ref 34.8–46.1)
HGB BLD-MCNC: 8.6 G/DL (ref 11.5–15.4)
IMM GRANULOCYTES # BLD AUTO: 0 THOUSAND/UL (ref 0–0.2)
IMM GRANULOCYTES NFR BLD AUTO: 0 % (ref 0–2)
LYMPHOCYTES # BLD AUTO: 1.59 THOUSANDS/ÂΜL (ref 0.6–4.47)
LYMPHOCYTES NFR BLD AUTO: 57 % (ref 14–44)
MAGNESIUM SERPL-MCNC: 2 MG/DL (ref 1.9–2.7)
MCH RBC QN AUTO: 32.1 PG (ref 26.8–34.3)
MCHC RBC AUTO-ENTMCNC: 32.3 G/DL (ref 31.4–37.4)
MCV RBC AUTO: 99 FL (ref 82–98)
MONOCYTES # BLD AUTO: 0.28 THOUSAND/ÂΜL (ref 0.17–1.22)
MONOCYTES NFR BLD AUTO: 10 % (ref 4–12)
NEUTROPHILS # BLD AUTO: 0.88 THOUSANDS/ÂΜL (ref 1.85–7.62)
NEUTS SEG NFR BLD AUTO: 31 % (ref 43–75)
NRBC BLD AUTO-RTO: 0 /100 WBCS
PLATELET # BLD AUTO: 379 THOUSANDS/UL (ref 149–390)
PMV BLD AUTO: 10.1 FL (ref 8.9–12.7)
POTASSIUM SERPL-SCNC: 4.4 MMOL/L (ref 3.5–5.3)
PROT SERPL-MCNC: 7.3 G/DL (ref 6.4–8.4)
PROT UR-MCNC: 40.2 MG/DL
PROT/CREAT UR: 0.1 MG/G{CREAT}
RBC # BLD AUTO: 2.68 MILLION/UL (ref 3.81–5.12)
SODIUM SERPL-SCNC: 136 MMOL/L (ref 135–147)
WBC # BLD AUTO: 2.81 THOUSAND/UL (ref 4.31–10.16)

## 2025-05-16 PROCEDURE — 86901 BLOOD TYPING SEROLOGIC RH(D): CPT | Performed by: NURSE PRACTITIONER

## 2025-05-16 PROCEDURE — 80053 COMPREHEN METABOLIC PANEL: CPT

## 2025-05-16 PROCEDURE — 86900 BLOOD TYPING SEROLOGIC ABO: CPT | Performed by: NURSE PRACTITIONER

## 2025-05-16 PROCEDURE — 36415 COLL VENOUS BLD VENIPUNCTURE: CPT

## 2025-05-16 PROCEDURE — 86850 RBC ANTIBODY SCREEN: CPT | Performed by: NURSE PRACTITIONER

## 2025-05-16 PROCEDURE — 83735 ASSAY OF MAGNESIUM: CPT

## 2025-05-16 PROCEDURE — 85025 COMPLETE CBC W/AUTO DIFF WBC: CPT

## 2025-05-16 PROCEDURE — 84156 ASSAY OF PROTEIN URINE: CPT

## 2025-05-16 PROCEDURE — 82570 ASSAY OF URINE CREATININE: CPT

## 2025-05-16 PROCEDURE — 86304 IMMUNOASSAY TUMOR CA 125: CPT

## 2025-05-17 LAB
ABO GROUP BLD: NORMAL
BLD GP AB SCN SERPL QL: NEGATIVE
RH BLD: POSITIVE
SPECIMEN EXPIRATION DATE: NORMAL

## 2025-05-21 ENCOUNTER — TELEPHONE (OUTPATIENT)
Dept: GYNECOLOGIC ONCOLOGY | Facility: CLINIC | Age: 60
End: 2025-05-21

## 2025-05-21 NOTE — TELEPHONE ENCOUNTER
Phoned patient to discuss rescheduling surgical procedure that was canceled, provided call back number for patient to return my call ([phone]).

## 2025-05-21 NOTE — TELEPHONE ENCOUNTER
----- Message from Jenny LANDAVERDE sent at 5/21/2025  9:05 AM EDT -----  Regarding: RE: Pt canceled surgery - please reschedule  Phoned patient - left machine message to call me to reschedule procedure.Thanks,Jenny  ----- Message -----  From: Augusto De Souza MD  Sent: 5/21/2025   8:14 AM EDT  To: SOLANGE Cobian; Jenny Amaya  Subject: Pt canceled surgery - please reschedule          Patient called and cancelled at 5 am. I tried to call her twice and she did not answer nor she called me back. Please contact patient and reschedule surgery. Reportedly she had vomiting with the bowel prep? If so, OK to omit mechanical bowel pep next time and do only antibiotics and needs the ERAS drinks again.Thanks!IZ

## 2025-05-21 NOTE — TELEPHONE ENCOUNTER
Contacted patient to discuss rescheduling surgical procedure that she canceled due to vomiting the bowel prep.  Procedure rescheduled for 6/4/2025 at [facility].    Patient stated she began vomiting after consuming the miralax portion of the prep.  Patient stated she did not consume the Ensure pre-surgical carbonated drink.  Explained to the patient that she will not need to have the bowel prep for her rescheduled procedure, however she will need to take flagyl as directed.  Patient concerned that she may vomit again, I  suggested patient contact office immediately if vomiting happens again and request to speak with the on-call Gyn/Onc provider.  Patient confirmed.    Provided patient with number [phone].

## 2025-05-22 ENCOUNTER — TELEPHONE (OUTPATIENT)
Dept: GYNECOLOGIC ONCOLOGY | Facility: CLINIC | Age: 60
End: 2025-05-22

## 2025-05-22 NOTE — TELEPHONE ENCOUNTER
"Phoned patient with surg prep antibiotic instructions.  Stated Dr. De Souza placed two new Rx for antibiotic prep:- \"Metronidazole AND neomycin (an additional antibiotic). If the pharmacy does not have it as it is sometimes on shortage, OK to do Metronidazole alone. \" - per Dr. De Souza instructions.  Requesting patient to return my call to confirm, call back number provided.  [phone].  "

## 2025-05-23 ENCOUNTER — APPOINTMENT (OUTPATIENT)
Dept: LAB | Facility: HOSPITAL | Age: 60
End: 2025-05-23
Payer: COMMERCIAL

## 2025-05-23 DIAGNOSIS — N83.202 CYSTS OF BOTH OVARIES: ICD-10-CM

## 2025-05-23 DIAGNOSIS — N83.201 CYSTS OF BOTH OVARIES: ICD-10-CM

## 2025-05-23 DIAGNOSIS — N17.9 AKI (ACUTE KIDNEY INJURY) (HCC): Primary | ICD-10-CM

## 2025-05-23 LAB
ALBUMIN SERPL BCG-MCNC: 4.5 G/DL (ref 3.5–5)
ALP SERPL-CCNC: 64 U/L (ref 34–104)
ALT SERPL W P-5'-P-CCNC: 13 U/L (ref 7–52)
ANION GAP SERPL CALCULATED.3IONS-SCNC: 10 MMOL/L (ref 4–13)
AST SERPL W P-5'-P-CCNC: 21 U/L (ref 13–39)
BASOPHILS # BLD AUTO: 0.02 THOUSANDS/ÂΜL (ref 0–0.1)
BASOPHILS NFR BLD AUTO: 1 % (ref 0–1)
BILIRUB SERPL-MCNC: 0.31 MG/DL (ref 0.2–1)
BUN SERPL-MCNC: 18 MG/DL (ref 5–25)
CALCIUM SERPL-MCNC: 9.4 MG/DL (ref 8.4–10.2)
CHLORIDE SERPL-SCNC: 104 MMOL/L (ref 96–108)
CO2 SERPL-SCNC: 23 MMOL/L (ref 21–32)
CREAT SERPL-MCNC: 0.99 MG/DL (ref 0.6–1.3)
EOSINOPHIL # BLD AUTO: 0.05 THOUSAND/ÂΜL (ref 0–0.61)
EOSINOPHIL NFR BLD AUTO: 1 % (ref 0–6)
ERYTHROCYTE [DISTWIDTH] IN BLOOD BY AUTOMATED COUNT: 19.7 % (ref 11.6–15.1)
GFR SERPL CREATININE-BSD FRML MDRD: 63 ML/MIN/1.73SQ M
GLUCOSE SERPL-MCNC: 108 MG/DL (ref 65–140)
HCT VFR BLD AUTO: 29.4 % (ref 34.8–46.1)
HGB BLD-MCNC: 9.6 G/DL (ref 11.5–15.4)
IMM GRANULOCYTES # BLD AUTO: 0.01 THOUSAND/UL (ref 0–0.2)
IMM GRANULOCYTES NFR BLD AUTO: 0 % (ref 0–2)
LYMPHOCYTES # BLD AUTO: 1.62 THOUSANDS/ÂΜL (ref 0.6–4.47)
LYMPHOCYTES NFR BLD AUTO: 42 % (ref 14–44)
MAGNESIUM SERPL-MCNC: 1.7 MG/DL (ref 1.9–2.7)
MCH RBC QN AUTO: 33.3 PG (ref 26.8–34.3)
MCHC RBC AUTO-ENTMCNC: 32.7 G/DL (ref 31.4–37.4)
MCV RBC AUTO: 102 FL (ref 82–98)
MONOCYTES # BLD AUTO: 0.52 THOUSAND/ÂΜL (ref 0.17–1.22)
MONOCYTES NFR BLD AUTO: 13 % (ref 4–12)
NEUTROPHILS # BLD AUTO: 1.67 THOUSANDS/ÂΜL (ref 1.85–7.62)
NEUTS SEG NFR BLD AUTO: 43 % (ref 43–75)
NRBC BLD AUTO-RTO: 0 /100 WBCS
PLATELET # BLD AUTO: 525 THOUSANDS/UL (ref 149–390)
PMV BLD AUTO: 9 FL (ref 8.9–12.7)
POTASSIUM SERPL-SCNC: 4.1 MMOL/L (ref 3.5–5.3)
PROT SERPL-MCNC: 7.3 G/DL (ref 6.4–8.4)
RBC # BLD AUTO: 2.88 MILLION/UL (ref 3.81–5.12)
SODIUM SERPL-SCNC: 137 MMOL/L (ref 135–147)
WBC # BLD AUTO: 3.89 THOUSAND/UL (ref 4.31–10.16)

## 2025-05-23 PROCEDURE — 83735 ASSAY OF MAGNESIUM: CPT

## 2025-05-23 PROCEDURE — 80053 COMPREHEN METABOLIC PANEL: CPT

## 2025-05-23 PROCEDURE — 85025 COMPLETE CBC W/AUTO DIFF WBC: CPT

## 2025-05-23 PROCEDURE — 36415 COLL VENOUS BLD VENIPUNCTURE: CPT

## 2025-05-23 NOTE — TELEPHONE ENCOUNTER
Contacted patient to confirm instructions regarding pre-operative anitbiotic prescriptions.  Patient confirmed receipt of my message and also confirmed understanding directions.

## 2025-05-30 ENCOUNTER — APPOINTMENT (OUTPATIENT)
Dept: LAB | Facility: HOSPITAL | Age: 60
End: 2025-05-30
Payer: COMMERCIAL

## 2025-05-30 DIAGNOSIS — N83.202 CYSTS OF BOTH OVARIES: ICD-10-CM

## 2025-05-30 DIAGNOSIS — N83.201 CYSTS OF BOTH OVARIES: ICD-10-CM

## 2025-05-30 LAB
ALBUMIN SERPL BCG-MCNC: 4.7 G/DL (ref 3.5–5)
ALP SERPL-CCNC: 61 U/L (ref 34–104)
ALT SERPL W P-5'-P-CCNC: 10 U/L (ref 7–52)
ANION GAP SERPL CALCULATED.3IONS-SCNC: 11 MMOL/L (ref 4–13)
AST SERPL W P-5'-P-CCNC: 19 U/L (ref 13–39)
BASOPHILS # BLD AUTO: 0.03 THOUSANDS/ÂΜL (ref 0–0.1)
BASOPHILS NFR BLD AUTO: 1 % (ref 0–1)
BILIRUB SERPL-MCNC: 0.41 MG/DL (ref 0.2–1)
BUN SERPL-MCNC: 23 MG/DL (ref 5–25)
CALCIUM SERPL-MCNC: 10 MG/DL (ref 8.4–10.2)
CHLORIDE SERPL-SCNC: 102 MMOL/L (ref 96–108)
CO2 SERPL-SCNC: 20 MMOL/L (ref 21–32)
CREAT SERPL-MCNC: 1 MG/DL (ref 0.6–1.3)
EOSINOPHIL # BLD AUTO: 0.25 THOUSAND/ÂΜL (ref 0–0.61)
EOSINOPHIL NFR BLD AUTO: 4 % (ref 0–6)
ERYTHROCYTE [DISTWIDTH] IN BLOOD BY AUTOMATED COUNT: 18.3 % (ref 11.6–15.1)
GFR SERPL CREATININE-BSD FRML MDRD: 62 ML/MIN/1.73SQ M
GLUCOSE SERPL-MCNC: 106 MG/DL (ref 65–140)
HCT VFR BLD AUTO: 31.6 % (ref 34.8–46.1)
HGB BLD-MCNC: 10.3 G/DL (ref 11.5–15.4)
IMM GRANULOCYTES # BLD AUTO: 0.01 THOUSAND/UL (ref 0–0.2)
IMM GRANULOCYTES NFR BLD AUTO: 0 % (ref 0–2)
LYMPHOCYTES # BLD AUTO: 1.66 THOUSANDS/ÂΜL (ref 0.6–4.47)
LYMPHOCYTES NFR BLD AUTO: 25 % (ref 14–44)
MAGNESIUM SERPL-MCNC: 1.8 MG/DL (ref 1.9–2.7)
MCH RBC QN AUTO: 33.1 PG (ref 26.8–34.3)
MCHC RBC AUTO-ENTMCNC: 32.6 G/DL (ref 31.4–37.4)
MCV RBC AUTO: 102 FL (ref 82–98)
MONOCYTES # BLD AUTO: 0.6 THOUSAND/ÂΜL (ref 0.17–1.22)
MONOCYTES NFR BLD AUTO: 9 % (ref 4–12)
NEUTROPHILS # BLD AUTO: 4.05 THOUSANDS/ÂΜL (ref 1.85–7.62)
NEUTS SEG NFR BLD AUTO: 61 % (ref 43–75)
NRBC BLD AUTO-RTO: 0 /100 WBCS
PLATELET # BLD AUTO: 340 THOUSANDS/UL (ref 149–390)
PMV BLD AUTO: 9.4 FL (ref 8.9–12.7)
POTASSIUM SERPL-SCNC: 4.4 MMOL/L (ref 3.5–5.3)
PROT SERPL-MCNC: 7.5 G/DL (ref 6.4–8.4)
RBC # BLD AUTO: 3.11 MILLION/UL (ref 3.81–5.12)
SODIUM SERPL-SCNC: 133 MMOL/L (ref 135–147)
WBC # BLD AUTO: 6.6 THOUSAND/UL (ref 4.31–10.16)

## 2025-05-30 PROCEDURE — 80053 COMPREHEN METABOLIC PANEL: CPT

## 2025-05-30 PROCEDURE — 83735 ASSAY OF MAGNESIUM: CPT

## 2025-05-30 PROCEDURE — 36415 COLL VENOUS BLD VENIPUNCTURE: CPT

## 2025-05-30 PROCEDURE — 85025 COMPLETE CBC W/AUTO DIFF WBC: CPT

## 2025-06-02 ENCOUNTER — ANESTHESIA EVENT (OUTPATIENT)
Dept: PERIOP | Facility: HOSPITAL | Age: 60
DRG: 511 | End: 2025-06-02
Payer: COMMERCIAL

## 2025-06-04 ENCOUNTER — HOSPITAL ENCOUNTER (INPATIENT)
Facility: HOSPITAL | Age: 60
LOS: 8 days | Discharge: HOME | DRG: 511 | End: 2025-06-12
Attending: OBSTETRICS & GYNECOLOGY | Admitting: OBSTETRICS & GYNECOLOGY
Payer: COMMERCIAL

## 2025-06-04 ENCOUNTER — ANESTHESIA (OUTPATIENT)
Dept: PERIOP | Facility: HOSPITAL | Age: 60
DRG: 511 | End: 2025-06-04
Payer: COMMERCIAL

## 2025-06-04 DIAGNOSIS — E44.0 MODERATE PROTEIN-CALORIE MALNUTRITION (HCC): ICD-10-CM

## 2025-06-04 DIAGNOSIS — G89.18 POST-OP PAIN: ICD-10-CM

## 2025-06-04 DIAGNOSIS — Z79.899 ON DEEP VEIN THROMBOSIS (DVT) PROPHYLAXIS: ICD-10-CM

## 2025-06-04 DIAGNOSIS — J45.20 MILD INTERMITTENT ASTHMA WITHOUT COMPLICATION: ICD-10-CM

## 2025-06-04 DIAGNOSIS — C56.3 MALIGNANT NEOPLASM OF BOTH OVARIES (HCC): Primary | ICD-10-CM

## 2025-06-04 DIAGNOSIS — K21.00 GASTROESOPHAGEAL REFLUX DISEASE WITH ESOPHAGITIS WITHOUT HEMORRHAGE: ICD-10-CM

## 2025-06-04 LAB
ABO GROUP BLD BPU: NORMAL
ABO GROUP BLD BPU: NORMAL
ANION GAP SERPL CALCULATED.3IONS-SCNC: 6 MMOL/L (ref 4–13)
APTT PPP: 28 SECONDS (ref 23–34)
BPU ID: NORMAL
BPU ID: NORMAL
BUN SERPL-MCNC: 17 MG/DL (ref 5–25)
CALCIUM SERPL-MCNC: 7.9 MG/DL (ref 8.4–10.2)
CANCER AG125 SERPL-ACNC: 286.4 U/ML (ref 0–35)
CHLORIDE SERPL-SCNC: 112 MMOL/L (ref 96–108)
CO2 SERPL-SCNC: 21 MMOL/L (ref 21–32)
CREAT SERPL-MCNC: 0.94 MG/DL (ref 0.6–1.3)
CROSSMATCH: NORMAL
CROSSMATCH: NORMAL
ERYTHROCYTE [DISTWIDTH] IN BLOOD BY AUTOMATED COUNT: 19 % (ref 11.6–15.1)
GFR SERPL CREATININE-BSD FRML MDRD: 67 ML/MIN/1.73SQ M
GLUCOSE SERPL-MCNC: 159 MG/DL (ref 65–140)
HCT VFR BLD AUTO: 29.5 % (ref 34.8–46.1)
HGB BLD-MCNC: 9.7 G/DL (ref 11.5–15.4)
INR PPP: 1.12 (ref 0.85–1.19)
MAGNESIUM SERPL-MCNC: 1.3 MG/DL (ref 1.9–2.7)
MCH RBC QN AUTO: 32.3 PG (ref 26.8–34.3)
MCHC RBC AUTO-ENTMCNC: 32.9 G/DL (ref 31.4–37.4)
MCV RBC AUTO: 98 FL (ref 82–98)
PLATELET # BLD AUTO: 247 THOUSANDS/UL (ref 149–390)
PMV BLD AUTO: 9.9 FL (ref 8.9–12.7)
POTASSIUM SERPL-SCNC: 3.8 MMOL/L (ref 3.5–5.3)
PROTHROMBIN TIME: 14.6 SECONDS (ref 12.3–15)
RBC # BLD AUTO: 3 MILLION/UL (ref 3.81–5.12)
SODIUM SERPL-SCNC: 139 MMOL/L (ref 135–147)
UNIT DISPENSE STATUS: NORMAL
UNIT DISPENSE STATUS: NORMAL
UNIT PRODUCT CODE: NORMAL
UNIT PRODUCT CODE: NORMAL
UNIT PRODUCT VOLUME: 350 ML
UNIT PRODUCT VOLUME: 350 ML
UNIT RH: NORMAL
UNIT RH: NORMAL
WBC # BLD AUTO: 16.24 THOUSAND/UL (ref 4.31–10.16)

## 2025-06-04 PROCEDURE — 44139 MOBILIZATION OF COLON: CPT | Performed by: OBSTETRICS & GYNECOLOGY

## 2025-06-04 PROCEDURE — 30233N1 TRANSFUSION OF NONAUTOLOGOUS RED BLOOD CELLS INTO PERIPHERAL VEIN, PERCUTANEOUS APPROACH: ICD-10-PCS | Performed by: OBSTETRICS & GYNECOLOGY

## 2025-06-04 PROCEDURE — 85610 PROTHROMBIN TIME: CPT | Performed by: OBSTETRICS & GYNECOLOGY

## 2025-06-04 PROCEDURE — 88307 TISSUE EXAM BY PATHOLOGIST: CPT | Performed by: PATHOLOGY

## 2025-06-04 PROCEDURE — 86923 COMPATIBILITY TEST ELECTRIC: CPT

## 2025-06-04 PROCEDURE — NC001 PR NO CHARGE: Performed by: OBSTETRICS & GYNECOLOGY

## 2025-06-04 PROCEDURE — 80048 BASIC METABOLIC PNL TOTAL CA: CPT | Performed by: OBSTETRICS & GYNECOLOGY

## 2025-06-04 PROCEDURE — 85730 THROMBOPLASTIN TIME PARTIAL: CPT | Performed by: OBSTETRICS & GYNECOLOGY

## 2025-06-04 PROCEDURE — 58956 BSO OMENTECTOMY W/TAH: CPT | Performed by: OBSTETRICS & GYNECOLOGY

## 2025-06-04 PROCEDURE — 0DB80ZZ EXCISION OF SMALL INTESTINE, OPEN APPROACH: ICD-10-PCS | Performed by: OBSTETRICS & GYNECOLOGY

## 2025-06-04 PROCEDURE — 0DBN0ZZ EXCISION OF SIGMOID COLON, OPEN APPROACH: ICD-10-PCS | Performed by: OBSTETRICS & GYNECOLOGY

## 2025-06-04 PROCEDURE — 3E0R3BZ INTRODUCTION OF ANESTHETIC AGENT INTO SPINAL CANAL, PERCUTANEOUS APPROACH: ICD-10-PCS | Performed by: ANESTHESIOLOGY

## 2025-06-04 PROCEDURE — 85027 COMPLETE CBC AUTOMATED: CPT | Performed by: OBSTETRICS & GYNECOLOGY

## 2025-06-04 PROCEDURE — 88309 TISSUE EXAM BY PATHOLOGIST: CPT | Performed by: PATHOLOGY

## 2025-06-04 PROCEDURE — 83735 ASSAY OF MAGNESIUM: CPT | Performed by: OBSTETRICS & GYNECOLOGY

## 2025-06-04 PROCEDURE — 0UT70ZZ RESECTION OF BILATERAL FALLOPIAN TUBES, OPEN APPROACH: ICD-10-PCS | Performed by: OBSTETRICS & GYNECOLOGY

## 2025-06-04 PROCEDURE — 0DBU0ZZ EXCISION OF OMENTUM, OPEN APPROACH: ICD-10-PCS | Performed by: OBSTETRICS & GYNECOLOGY

## 2025-06-04 PROCEDURE — 0UT20ZZ RESECTION OF BILATERAL OVARIES, OPEN APPROACH: ICD-10-PCS | Performed by: OBSTETRICS & GYNECOLOGY

## 2025-06-04 PROCEDURE — 86304 IMMUNOASSAY TUMOR CA 125: CPT | Performed by: OBSTETRICS & GYNECOLOGY

## 2025-06-04 PROCEDURE — 88305 TISSUE EXAM BY PATHOLOGIST: CPT | Performed by: PATHOLOGY

## 2025-06-04 PROCEDURE — 44140 PARTIAL REMOVAL OF COLON: CPT | Performed by: OBSTETRICS & GYNECOLOGY

## 2025-06-04 RX ORDER — ALPRAZOLAM 0.5 MG
0.5 TABLET ORAL 3 TIMES DAILY PRN
Status: DISCONTINUED | OUTPATIENT
Start: 2025-06-04 | End: 2025-06-12 | Stop reason: HOSPADM

## 2025-06-04 RX ORDER — ONDANSETRON 2 MG/ML
4 INJECTION INTRAMUSCULAR; INTRAVENOUS EVERY 4 HOURS PRN
Status: DISCONTINUED | OUTPATIENT
Start: 2025-06-04 | End: 2025-06-04

## 2025-06-04 RX ORDER — ROPIVACAINE HYDROCHLORIDE 2 MG/ML
INJECTION, SOLUTION EPIDURAL; INFILTRATION; PERINEURAL AS NEEDED
Status: DISCONTINUED | OUTPATIENT
Start: 2025-06-04 | End: 2025-06-04

## 2025-06-04 RX ORDER — DIPHENHYDRAMINE HYDROCHLORIDE 50 MG/ML
25 INJECTION, SOLUTION INTRAMUSCULAR; INTRAVENOUS EVERY 6 HOURS PRN
Status: DISCONTINUED | OUTPATIENT
Start: 2025-06-04 | End: 2025-06-12 | Stop reason: HOSPADM

## 2025-06-04 RX ORDER — METRONIDAZOLE 500 MG/100ML
500 INJECTION, SOLUTION INTRAVENOUS EVERY 12 HOURS
Status: COMPLETED | OUTPATIENT
Start: 2025-06-04 | End: 2025-06-04

## 2025-06-04 RX ORDER — ALBUTEROL SULFATE 90 UG/1
2 INHALANT RESPIRATORY (INHALATION) EVERY 4 HOURS PRN
Status: DISCONTINUED | OUTPATIENT
Start: 2025-06-04 | End: 2025-06-12 | Stop reason: HOSPADM

## 2025-06-04 RX ORDER — LIDOCAINE HYDROCHLORIDE 20 MG/ML
INJECTION, SOLUTION EPIDURAL; INFILTRATION; INTRACAUDAL; PERINEURAL AS NEEDED
Status: DISCONTINUED | OUTPATIENT
Start: 2025-06-04 | End: 2025-06-04

## 2025-06-04 RX ORDER — METOCLOPRAMIDE HYDROCHLORIDE 5 MG/ML
5 INJECTION INTRAMUSCULAR; INTRAVENOUS EVERY 6 HOURS PRN
Status: DISCONTINUED | OUTPATIENT
Start: 2025-06-04 | End: 2025-06-12 | Stop reason: HOSPADM

## 2025-06-04 RX ORDER — ONDANSETRON 2 MG/ML
4 INJECTION INTRAMUSCULAR; INTRAVENOUS ONCE AS NEEDED
Status: COMPLETED | OUTPATIENT
Start: 2025-06-04 | End: 2025-06-04

## 2025-06-04 RX ORDER — ONDANSETRON 2 MG/ML
4 INJECTION INTRAMUSCULAR; INTRAVENOUS ONCE AS NEEDED
Status: DISCONTINUED | OUTPATIENT
Start: 2025-06-04 | End: 2025-06-04

## 2025-06-04 RX ORDER — PROPOFOL 10 MG/ML
INJECTION, EMULSION INTRAVENOUS CONTINUOUS PRN
Status: DISCONTINUED | OUTPATIENT
Start: 2025-06-04 | End: 2025-06-04

## 2025-06-04 RX ORDER — CEFAZOLIN SODIUM 2 G/50ML
2000 SOLUTION INTRAVENOUS EVERY 8 HOURS
Status: COMPLETED | OUTPATIENT
Start: 2025-06-04 | End: 2025-06-04

## 2025-06-04 RX ORDER — PROMETHAZINE HYDROCHLORIDE 25 MG/ML
6.25 INJECTION, SOLUTION INTRAMUSCULAR; INTRAVENOUS EVERY 6 HOURS PRN
Status: DISCONTINUED | OUTPATIENT
Start: 2025-06-04 | End: 2025-06-06

## 2025-06-04 RX ORDER — SODIUM CHLORIDE, SODIUM LACTATE, POTASSIUM CHLORIDE, CALCIUM CHLORIDE 600; 310; 30; 20 MG/100ML; MG/100ML; MG/100ML; MG/100ML
100 INJECTION, SOLUTION INTRAVENOUS CONTINUOUS
Status: DISCONTINUED | OUTPATIENT
Start: 2025-06-04 | End: 2025-06-07

## 2025-06-04 RX ORDER — SENNOSIDES 8.8 MG/5ML
8.8 LIQUID ORAL
Status: DISCONTINUED | OUTPATIENT
Start: 2025-06-05 | End: 2025-06-06

## 2025-06-04 RX ORDER — DEXAMETHASONE SODIUM PHOSPHATE 10 MG/ML
INJECTION, SOLUTION INTRAMUSCULAR; INTRAVENOUS AS NEEDED
Status: DISCONTINUED | OUTPATIENT
Start: 2025-06-04 | End: 2025-06-04

## 2025-06-04 RX ORDER — SIMETHICONE 80 MG
80 TABLET,CHEWABLE ORAL 4 TIMES DAILY PRN
Status: DISCONTINUED | OUTPATIENT
Start: 2025-06-04 | End: 2025-06-12 | Stop reason: HOSPADM

## 2025-06-04 RX ORDER — SODIUM CHLORIDE, SODIUM LACTATE, POTASSIUM CHLORIDE, CALCIUM CHLORIDE 600; 310; 30; 20 MG/100ML; MG/100ML; MG/100ML; MG/100ML
125 INJECTION, SOLUTION INTRAVENOUS CONTINUOUS
Status: DISCONTINUED | OUTPATIENT
Start: 2025-06-04 | End: 2025-06-04

## 2025-06-04 RX ORDER — FENTANYL CITRATE 50 UG/ML
INJECTION, SOLUTION INTRAMUSCULAR; INTRAVENOUS
Status: COMPLETED | OUTPATIENT
Start: 2025-06-04 | End: 2025-06-04

## 2025-06-04 RX ORDER — SCOPOLAMINE 1 MG/3D
1 PATCH, EXTENDED RELEASE TRANSDERMAL ONCE AS NEEDED
Status: DISCONTINUED | OUTPATIENT
Start: 2025-06-04 | End: 2025-06-04

## 2025-06-04 RX ORDER — SODIUM CHLORIDE 9 MG/ML
INJECTION, SOLUTION INTRAVENOUS CONTINUOUS PRN
Status: DISCONTINUED | OUTPATIENT
Start: 2025-06-04 | End: 2025-06-04

## 2025-06-04 RX ORDER — ALBUMIN HUMAN 50 G/1000ML
SOLUTION INTRAVENOUS CONTINUOUS PRN
Status: DISCONTINUED | OUTPATIENT
Start: 2025-06-04 | End: 2025-06-04

## 2025-06-04 RX ORDER — CEFAZOLIN SODIUM 2 G/50ML
2000 SOLUTION INTRAVENOUS ONCE
Status: COMPLETED | OUTPATIENT
Start: 2025-06-04 | End: 2025-06-04

## 2025-06-04 RX ORDER — FENTANYL CITRATE/PF 50 MCG/ML
25 SYRINGE (ML) INJECTION
Status: DISCONTINUED | OUTPATIENT
Start: 2025-06-04 | End: 2025-06-04

## 2025-06-04 RX ORDER — LIDOCAINE HYDROCHLORIDE AND EPINEPHRINE 15; 5 MG/ML; UG/ML
INJECTION, SOLUTION EPIDURAL
Status: COMPLETED | OUTPATIENT
Start: 2025-06-04 | End: 2025-06-04

## 2025-06-04 RX ORDER — HEPARIN SODIUM 5000 [USP'U]/ML
5000 INJECTION, SOLUTION INTRAVENOUS; SUBCUTANEOUS EVERY 8 HOURS SCHEDULED
Status: DISCONTINUED | OUTPATIENT
Start: 2025-06-04 | End: 2025-06-07

## 2025-06-04 RX ORDER — NEOMYCIN SULFATE 500 MG/1
1000 TABLET ORAL 4 TIMES DAILY
COMMUNITY
End: 2025-06-12

## 2025-06-04 RX ORDER — MAGNESIUM SULFATE HEPTAHYDRATE 40 MG/ML
2 INJECTION, SOLUTION INTRAVENOUS ONCE
Status: COMPLETED | OUTPATIENT
Start: 2025-06-04 | End: 2025-06-04

## 2025-06-04 RX ORDER — MIDAZOLAM HYDROCHLORIDE 2 MG/2ML
INJECTION, SOLUTION INTRAMUSCULAR; INTRAVENOUS
Status: COMPLETED | OUTPATIENT
Start: 2025-06-04 | End: 2025-06-04

## 2025-06-04 RX ORDER — OXYCODONE AND ACETAMINOPHEN 5; 325 MG/1; MG/1
1 TABLET ORAL EVERY 6 HOURS PRN
Status: DISCONTINUED | OUTPATIENT
Start: 2025-06-04 | End: 2025-06-06

## 2025-06-04 RX ORDER — LORAZEPAM 2 MG/ML
1 INJECTION INTRAMUSCULAR ONCE AS NEEDED
Status: COMPLETED | OUTPATIENT
Start: 2025-06-04 | End: 2025-06-04

## 2025-06-04 RX ORDER — ONDANSETRON 2 MG/ML
INJECTION INTRAMUSCULAR; INTRAVENOUS AS NEEDED
Status: DISCONTINUED | OUTPATIENT
Start: 2025-06-04 | End: 2025-06-04

## 2025-06-04 RX ORDER — ACETAMINOPHEN 10 MG/ML
1000 INJECTION, SOLUTION INTRAVENOUS EVERY 8 HOURS
Status: COMPLETED | OUTPATIENT
Start: 2025-06-04 | End: 2025-06-06

## 2025-06-04 RX ORDER — PROPOFOL 10 MG/ML
INJECTION, EMULSION INTRAVENOUS AS NEEDED
Status: DISCONTINUED | OUTPATIENT
Start: 2025-06-04 | End: 2025-06-04

## 2025-06-04 RX ORDER — SUCCINYLCHOLINE/SOD CL,ISO/PF 100 MG/5ML
SYRINGE (ML) INTRAVENOUS AS NEEDED
Status: DISCONTINUED | OUTPATIENT
Start: 2025-06-04 | End: 2025-06-04

## 2025-06-04 RX ORDER — OXYCODONE HYDROCHLORIDE 5 MG/1
5 TABLET ORAL EVERY 6 HOURS PRN
Qty: 10 TABLET | Refills: 0 | Status: SHIPPED | OUTPATIENT
Start: 2025-06-04 | End: 2025-06-14

## 2025-06-04 RX ORDER — METRONIDAZOLE 500 MG/100ML
500 INJECTION, SOLUTION INTRAVENOUS ONCE
Status: COMPLETED | OUTPATIENT
Start: 2025-06-04 | End: 2025-06-04

## 2025-06-04 RX ORDER — HEPARIN SODIUM 5000 [USP'U]/ML
5000 INJECTION, SOLUTION INTRAVENOUS; SUBCUTANEOUS
Status: COMPLETED | OUTPATIENT
Start: 2025-06-04 | End: 2025-06-04

## 2025-06-04 RX ORDER — ONDANSETRON 2 MG/ML
4 INJECTION INTRAMUSCULAR; INTRAVENOUS EVERY 6 HOURS PRN
Status: DISCONTINUED | OUTPATIENT
Start: 2025-06-04 | End: 2025-06-12 | Stop reason: HOSPADM

## 2025-06-04 RX ORDER — SODIUM CHLORIDE, SODIUM LACTATE, POTASSIUM CHLORIDE, CALCIUM CHLORIDE 600; 310; 30; 20 MG/100ML; MG/100ML; MG/100ML; MG/100ML
20 INJECTION, SOLUTION INTRAVENOUS CONTINUOUS
Status: DISCONTINUED | OUTPATIENT
Start: 2025-06-04 | End: 2025-06-04

## 2025-06-04 RX ORDER — EPHEDRINE SULFATE 50 MG/ML
INJECTION INTRAVENOUS AS NEEDED
Status: DISCONTINUED | OUTPATIENT
Start: 2025-06-04 | End: 2025-06-04

## 2025-06-04 RX ORDER — ROCURONIUM BROMIDE 10 MG/ML
INJECTION, SOLUTION INTRAVENOUS AS NEEDED
Status: DISCONTINUED | OUTPATIENT
Start: 2025-06-04 | End: 2025-06-04

## 2025-06-04 RX ORDER — ACETAMINOPHEN 10 MG/ML
1000 INJECTION, SOLUTION INTRAVENOUS ONCE
Status: COMPLETED | OUTPATIENT
Start: 2025-06-04 | End: 2025-06-04

## 2025-06-04 RX ORDER — DIPHENHYDRAMINE HYDROCHLORIDE 50 MG/ML
25 INJECTION, SOLUTION INTRAMUSCULAR; INTRAVENOUS ONCE
Status: COMPLETED | OUTPATIENT
Start: 2025-06-04 | End: 2025-06-04

## 2025-06-04 RX ORDER — HYDROMORPHONE HCL/PF 1 MG/ML
0.5 SYRINGE (ML) INJECTION
Status: DISCONTINUED | OUTPATIENT
Start: 2025-06-04 | End: 2025-06-04

## 2025-06-04 RX ORDER — CELECOXIB 200 MG/1
200 CAPSULE ORAL ONCE
Status: DISCONTINUED | OUTPATIENT
Start: 2025-06-04 | End: 2025-06-04

## 2025-06-04 RX ADMIN — ROPIVACAINE HYDROCHLORIDE: 2 INJECTION, SOLUTION EPIDURAL; INFILTRATION at 15:43

## 2025-06-04 RX ADMIN — SCOPOLAMINE 1 PATCH: 1.5 PATCH, EXTENDED RELEASE TRANSDERMAL at 08:43

## 2025-06-04 RX ADMIN — SUGAMMADEX 200 MG: 100 INJECTION, SOLUTION INTRAVENOUS at 15:12

## 2025-06-04 RX ADMIN — ALBUMIN (HUMAN): 12.5 INJECTION, SOLUTION INTRAVENOUS at 11:49

## 2025-06-04 RX ADMIN — FENTANYL CITRATE 50 MCG: 50 INJECTION INTRAMUSCULAR; INTRAVENOUS at 13:20

## 2025-06-04 RX ADMIN — ACETAMINOPHEN 1000 MG: 10 INJECTION INTRAVENOUS at 17:17

## 2025-06-04 RX ADMIN — ROCURONIUM 10 MG: 50 INJECTION, SOLUTION INTRAVENOUS at 12:12

## 2025-06-04 RX ADMIN — ONDANSETRON 4 MG: 2 INJECTION INTRAMUSCULAR; INTRAVENOUS at 08:42

## 2025-06-04 RX ADMIN — LORAZEPAM 1 MG: 2 INJECTION INTRAMUSCULAR; INTRAVENOUS at 09:12

## 2025-06-04 RX ADMIN — ROCURONIUM 10 MG: 50 INJECTION, SOLUTION INTRAVENOUS at 14:13

## 2025-06-04 RX ADMIN — DEXAMETHASONE SODIUM PHOSPHATE 10 MG: 10 INJECTION, SOLUTION INTRAMUSCULAR; INTRAVENOUS at 10:48

## 2025-06-04 RX ADMIN — SODIUM CHLORIDE: 0.9 INJECTION, SOLUTION INTRAVENOUS at 12:20

## 2025-06-04 RX ADMIN — ROCURONIUM 20 MG: 50 INJECTION, SOLUTION INTRAVENOUS at 13:46

## 2025-06-04 RX ADMIN — LIDOCAINE HYDROCHLORIDE AND EPINEPHRINE 3 ML: 15; 5 INJECTION, SOLUTION EPIDURAL; INFILTRATION; INTRACAUDAL; PERINEURAL at 10:20

## 2025-06-04 RX ADMIN — ROCURONIUM 45 MG: 50 INJECTION, SOLUTION INTRAVENOUS at 10:55

## 2025-06-04 RX ADMIN — HEPARIN SODIUM 5000 UNITS: 5000 INJECTION INTRAVENOUS; SUBCUTANEOUS at 11:22

## 2025-06-04 RX ADMIN — ONDANSETRON 4 MG: 2 INJECTION INTRAMUSCULAR; INTRAVENOUS at 14:20

## 2025-06-04 RX ADMIN — SODIUM CHLORIDE, SODIUM LACTATE, POTASSIUM CHLORIDE, AND CALCIUM CHLORIDE: .6; .31; .03; .02 INJECTION, SOLUTION INTRAVENOUS at 11:10

## 2025-06-04 RX ADMIN — SODIUM CHLORIDE, SODIUM LACTATE, POTASSIUM CHLORIDE, AND CALCIUM CHLORIDE 100 ML/HR: .6; .31; .03; .02 INJECTION, SOLUTION INTRAVENOUS at 16:28

## 2025-06-04 RX ADMIN — PROPOFOL 25 MCG/KG/MIN: 10 INJECTION, EMULSION INTRAVENOUS at 11:09

## 2025-06-04 RX ADMIN — ROCURONIUM 20 MG: 50 INJECTION, SOLUTION INTRAVENOUS at 11:35

## 2025-06-04 RX ADMIN — HEPARIN SODIUM 5000 UNITS: 5000 INJECTION INTRAVENOUS; SUBCUTANEOUS at 22:17

## 2025-06-04 RX ADMIN — METRONIDAZOLE 500 MG: 500 INJECTION, SOLUTION INTRAVENOUS at 22:15

## 2025-06-04 RX ADMIN — Medication 100 MG: at 10:45

## 2025-06-04 RX ADMIN — ROCURONIUM 20 MG: 50 INJECTION, SOLUTION INTRAVENOUS at 12:54

## 2025-06-04 RX ADMIN — SODIUM CHLORIDE: 0.9 INJECTION, SOLUTION INTRAVENOUS at 10:50

## 2025-06-04 RX ADMIN — DIPHENHYDRAMINE HYDROCHLORIDE 25 MG: 50 INJECTION, SOLUTION INTRAMUSCULAR; INTRAVENOUS at 16:25

## 2025-06-04 RX ADMIN — PROPOFOL 150 MG: 10 INJECTION, EMULSION INTRAVENOUS at 10:45

## 2025-06-04 RX ADMIN — SODIUM CHLORIDE, SODIUM LACTATE, POTASSIUM CHLORIDE, AND CALCIUM CHLORIDE: .6; .31; .03; .02 INJECTION, SOLUTION INTRAVENOUS at 12:09

## 2025-06-04 RX ADMIN — SODIUM CHLORIDE, SODIUM LACTATE, POTASSIUM CHLORIDE, AND CALCIUM CHLORIDE: .6; .31; .03; .02 INJECTION, SOLUTION INTRAVENOUS at 14:18

## 2025-06-04 RX ADMIN — FENTANYL CITRATE 50 MCG: 50 INJECTION INTRAMUSCULAR; INTRAVENOUS at 10:15

## 2025-06-04 RX ADMIN — EPHEDRINE SULFATE 10 MG: 50 INJECTION INTRAVENOUS at 11:27

## 2025-06-04 RX ADMIN — METRONIDAZOLE: 500 INJECTION, SOLUTION INTRAVENOUS at 10:50

## 2025-06-04 RX ADMIN — ROPIVACAINE HYDROCHLORIDE 5 ML: 2 INJECTION, SOLUTION EPIDURAL; INFILTRATION at 15:05

## 2025-06-04 RX ADMIN — CEFAZOLIN SODIUM 2000 MG: 2 SOLUTION INTRAVENOUS at 22:54

## 2025-06-04 RX ADMIN — ROCURONIUM 20 MG: 50 INJECTION, SOLUTION INTRAVENOUS at 11:16

## 2025-06-04 RX ADMIN — SODIUM CHLORIDE: 0.9 INJECTION, SOLUTION INTRAVENOUS at 12:18

## 2025-06-04 RX ADMIN — ROPIVACAINE HYDROCHLORIDE 5 ML: 2 INJECTION, SOLUTION EPIDURAL; INFILTRATION at 14:33

## 2025-06-04 RX ADMIN — MIDAZOLAM HYDROCHLORIDE 2 MG: 1 INJECTION, SOLUTION INTRAMUSCULAR; INTRAVENOUS at 10:15

## 2025-06-04 RX ADMIN — ACETAMINOPHEN 1000 MG: 10 INJECTION INTRAVENOUS at 10:08

## 2025-06-04 RX ADMIN — CEFAZOLIN SODIUM 2000 MG: 2 SOLUTION INTRAVENOUS at 15:00

## 2025-06-04 RX ADMIN — ROCURONIUM 5 MG: 50 INJECTION, SOLUTION INTRAVENOUS at 10:45

## 2025-06-04 RX ADMIN — SODIUM CHLORIDE, SODIUM LACTATE, POTASSIUM CHLORIDE, AND CALCIUM CHLORIDE: .6; .31; .03; .02 INJECTION, SOLUTION INTRAVENOUS at 10:39

## 2025-06-04 RX ADMIN — LIDOCAINE HYDROCHLORIDE 60 MG: 20 INJECTION, SOLUTION EPIDURAL; INFILTRATION; INTRACAUDAL at 10:44

## 2025-06-04 RX ADMIN — CEFAZOLIN SODIUM 2000 MG: 2 SOLUTION INTRAVENOUS at 11:00

## 2025-06-04 RX ADMIN — SODIUM CHLORIDE, SODIUM LACTATE, POTASSIUM CHLORIDE, AND CALCIUM CHLORIDE 125 ML/HR: .6; .31; .03; .02 INJECTION, SOLUTION INTRAVENOUS at 08:23

## 2025-06-04 RX ADMIN — MAGNESIUM SULFATE HEPTAHYDRATE 2 G: 40 INJECTION, SOLUTION INTRAVENOUS at 18:48

## 2025-06-04 NOTE — PLAN OF CARE
Problem: PAIN - ADULT  Goal: Verbalizes/displays adequate comfort level or baseline comfort level  Description: Interventions:  - Encourage patient to monitor pain and request assistance  - Assess pain using appropriate pain scale  - Administer analgesics as ordered based on type and severity of pain and evaluate response  - Implement non-pharmacological measures as appropriate and evaluate response  - Consider cultural and social influences on pain and pain management  - Notify physician/advanced practitioner if interventions unsuccessful or patient reports new pain  - Educate patient/family on pain management process including their role and importance of  reporting pain   - Provide non-pharmacologic/complimentary pain relief interventions  Outcome: Progressing     Problem: INFECTION - ADULT  Goal: Absence or prevention of progression during hospitalization  Description: INTERVENTIONS:  - Assess and monitor for signs and symptoms of infection  - Monitor lab/diagnostic results  - Monitor all insertion sites, i.e. indwelling lines, tubes, and drains  - Monitor endotracheal if appropriate and nasal secretions for changes in amount and color  - [place] appropriate cooling/warming therapies per order  - Administer medications as ordered  - Instruct and encourage patient and family to use good hand hygiene technique  - Identify and instruct in appropriate isolation precautions for identified infection/condition  Outcome: Progressing  Goal: Absence of fever/infection during neutropenic period  Description: INTERVENTIONS:  - Monitor WBC  - Perform strict hand hygiene  - Limit to healthy visitors only  - No plants, dried, fresh or silk flowers with chaves in patient room  Outcome: Progressing     Problem: SAFETY ADULT  Goal: Patient will remain free of falls  Description: INTERVENTIONS:  - Educate patient/family on patient safety including physical limitations  - Instruct patient to call for assistance with activity   -  Consider consulting OT/PT to assist with strengthening/mobility based on AM PAC & JH-HLM score  - Consult OT/PT to assist with strengthening/mobility   - Keep Call bell within reach  - Keep bed low and locked with side rails adjusted as appropriate  - Keep care items and personal belongings within reach  - Initiate and maintain comfort rounds  - Make Fall Risk Sign visible to staff  - Apply yellow socks and bracelet for high fall risk patients  - Consider moving patient to room near nurses station  Outcome: Progressing  Goal: Maintain or return to baseline ADL function  Description: INTERVENTIONS:  -  Assess patient's ability to carry out ADLs; assess patient's baseline for ADL function and identify physical deficits which impact ability to perform ADLs (bathing, care of mouth/teeth, toileting, grooming, dressing, etc.)  - Assess/evaluate cause of self-care deficits   - Assess range of motion  - Assess patient's mobility; develop plan if impaired  - Assess patient's need for assistive devices and provide as appropriate  - Encourage maximum independence but intervene and supervise when necessary  - Involve family in performance of ADLs  - Assess for home care needs following discharge   - Consider OT consult to assist with ADL evaluation and planning for discharge  - Provide patient education as appropriate  - Monitor functional capacity and physical performance, use of AM PAC & JH-HLM   - Monitor gait, balance and fatigue with ambulation    Outcome: Progressing  Goal: Maintains/Returns to pre admission functional level  Description: INTERVENTIONS:  - Perform AM-PAC 6 Click Basic Mobility/ Daily Activity assessment daily.  - Set and communicate daily mobility goal to care team and patient/family/caregiver.   - Collaborate with rehabilitation services on mobility goals if consulted  - Perform Range of Motion 3 times a day.  - Reposition patient every 3 hours.  - Dangle patient 3 times a day  - Stand patient 3 times a  day  - Ambulate patient 3 times a day  - Out of bed to chair 3 times a day   - Out of bed for meals 3 times a day  - Out of bed for toileting  - Record patient progress and toleration of activity level   Outcome: Progressing     Problem: DISCHARGE PLANNING  Goal: Discharge to home or other facility with appropriate resources  Description: INTERVENTIONS:  - Identify barriers to discharge w/patient and caregiver  - Arrange for needed discharge resources and transportation as appropriate  - Identify discharge learning needs (meds, wound care, etc.)  - Arrange for interpretive services to assist at discharge as needed  - Refer to Case Management Department for coordinating discharge planning if the patient needs post-hospital services based on physician/advanced practitioner order or complex needs related to functional status, cognitive ability, or social support system  Outcome: Progressing     Problem: Knowledge Deficit  Goal: Patient/family/caregiver demonstrates understanding of disease process, treatment plan, medications, and discharge instructions  Description: Complete learning assessment and assess knowledge base.  Interventions:  - Provide teaching at level of understanding  - Provide teaching via preferred learning methods  Outcome: Progressing     Problem: RESPIRATORY - ADULT  Goal: Achieves optimal ventilation and oxygenation  Description: INTERVENTIONS:  - Assess for changes in respiratory status  - Assess for changes in mentation and behavior  - Position to facilitate oxygenation and minimize respiratory effort  - Oxygen administered by appropriate delivery if ordered  - Initiate smoking cessation education as indicated  - Encourage broncho-pulmonary hygiene including cough, deep breathe, Incentive Spirometry  - Assess the need for suctioning and aspirate as needed  - Assess and instruct to report SOB or any respiratory difficulty  - Respiratory Therapy support as indicated  Outcome: Progressing      Problem: GASTROINTESTINAL - ADULT  Goal: Minimal or absence of nausea and/or vomiting  Description: INTERVENTIONS:  - Administer IV fluids if ordered to ensure adequate hydration  - Maintain NPO status until nausea and vomiting are resolved  - Nasogastric tube if ordered  - Administer ordered antiemetic medications as needed  - Provide nonpharmacologic comfort measures as appropriate  - Advance diet as tolerated, if ordered  - Consider nutrition services referral to assist patient with adequate nutrition and appropriate food choices  Outcome: Progressing     Problem: GENITOURINARY - ADULT  Goal: Absence of urinary retention  Description: INTERVENTIONS:  - Assess patient’s ability to void and empty bladder  - Monitor I/O  - Bladder scan as needed  - Discuss with physician/AP medications to alleviate retention as needed  - Discuss catheterization for long term situations as appropriate  Outcome: Progressing  Goal: Urinary catheter remains patent  Description: INTERVENTIONS:  - Assess patency of urinary catheter  - If patient has a chronic mendes, consider changing catheter if non-functioning  - Follow guidelines for intermittent irrigation of non-functioning urinary catheter  Outcome: Progressing

## 2025-06-04 NOTE — ANESTHESIA PROCEDURE NOTES
Epidural Block    Start time: 6/4/2025 10:14 AM  Reason for block: procedure for pain  Staffing  Performed by: Rudolph Gamble DO  Authorized by: Rudolph Gamble DO    Preanesthetic Checklist  Completed: patient identified, IV checked, risks and benefits discussed, surgical consent, monitors and equipment checked, pre-op evaluation and timeout performed  Epidural  Patient position: sitting  Prep: ChloraPrep  Sedation Level: no sedation  Patient monitoring: frequent blood pressure checks, continuous pulse oximetry and heart rate  Approach: midline  Location: lumbar, Lumbar epidural locations: T11.  Injection technique: ANGEL saline  Needle  Needle type: Tuohy   Needle gauge: 17 G  Needle insertion depth: 3 cm  Catheter type: multi-orifice  Catheter size: 19 G  Catheter at skin depth: 9 cm  Catheter securement method: stabilization device and clear occlusive dressing  Test dose: negativelidocaine-epinephrine (XYLOCAINE-MPF/EPINEPHRINE) 1.5 %-1:200,000 injection 3 mL - Epidural   3 mL - 6/4/2025 10:20:00 AM  fentanyl citrate (PF) 100 MCG/2ML 50 mcg - Intravenous   50 mcg - 6/4/2025 10:15:00 AM  midazolam (VERSED) injection 0.5 mg - Intravenous   2 mg - 6/4/2025 10:15:00 AM  Assessment  Sensory level: T10  Number of attempts: 1negative aspiration for CSF, negative aspiration for heme and no paresthesia on injection  patient tolerated the procedure well with no immediate complications

## 2025-06-04 NOTE — H&P
H&P - GYN Oncology   Name: Ewa Hernandes 59 y.o. female I MRN: [id number]  Unit/Bed#: OR [place] I Date of Admission: 6/4/2025   Date of Service: 6/4/2025 I Hospital Day: 0     Assessment & Plan  Malignant neoplasm of both ovaries (HCC)  Status post 4 cycles of neoadjuvant chemotherapy. Bevacizumab held with last cycle in anticipation of today's surgery. Presents for scheduled surgery. Will proceed to OR for EUA, laparotomy, cytoreduction / tumor debulking with hysterectomy, bilateral salpingo-oophorectomy, possible colonic/bowel resection and all indicated procedures. No changes since last seen in office. Off apixaban x >72 hrs. Complied with ERAS recommendations preop.  Essential hypertension, benign  Hold ACE inhibitor perioperatively. Monitor BPs. Resume amlodipine first once needed postoperatively.  Major depressive disorder, recurrent episode, mild (HCC)  Stable mood. No SI/HI. Plan to resume fluoxetine and alprazolam prn postoperatively.  GERD (gastroesophageal reflux disease)  Will resume home medications perioperatively.    History of Present Illness   Chief Complaint: Presents for scheduled surgery  Ewa Hernandes is a 59 y.o. 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 Presents after 4 cycles of neoadjuvant chemotherapy for scheduled interval cytoreductiove surgery. Anxious. Otherwise, no complaints. Reports nausea and single episode of vomit after preop drink last night.        Review of Systems   Skin:         Alopecia.   All other systems reviewed and are negative.    Historical Information   Past Medical History[1]  Past Surgical History[2]  Social History[3]  E-Cigarette/Vaping    E-Cigarette Use Never User      E-Cigarette/Vaping Substances    Nicotine No     THC No     CBD No     Flavoring No     Other No     Unknown No      Family history non-contributory  Oncology History:   Cancer Staging   Malignant neoplasm of both ovaries (HCC)  Staging form: Ovary, Fallopian Tube,  Primary Peritoneal, AJCC 8th Edition  - Clinical stage from 2/6/2025: FIGO Stage IIIC (cT3c, cNX, cM0) - Signed by Augusto De Souza MD on 2/11/2025    Oncology History   Malignant neoplasm of both ovaries (HCC)   2/4/2025 Initial Diagnosis    Malignant neoplasm of both ovaries (HCC)     2/6/2025 -  Cancer Staged    Staging form: Ovary, Fallopian Tube, Primary Peritoneal, AJCC 8th Edition  - Clinical stage from 2/6/2025: FIGO Stage IIIC (cT3c, cNX, cM0) - Signed by Augusto De Souza MD on 2/11/2025  Histopathologic type: Serous cystadenocarcinoma, NOS  Stage prefix: Initial diagnosis  Histologic grade (G): G3  Histologic grading system: 4 grade system       2/18/2025 -  Chemotherapy    CARBOplatin (PARAPLATIN) IVPB (GOG AUC DOSING), 650.5 mg, 4 of 7 cycles  Administration: 650.5 mg (2/18/2025), 622 mg (4/1/2025), 650.5 mg (3/11/2025), 629 mg (4/24/2025)  PACLItaxel (TAXOL) chemo IVPB, 175 mg/m2 = 336 mg, 4 of 7 cycles  Administration: 336 mg (2/18/2025), 330 mg (4/1/2025), 336 mg (3/11/2025), 330 mg (4/24/2025)  bevacizumab-awwb (MVASI) IVPB, 1,140 mg (100 % of original dose 15 mg/kg), 3 of 6 cycles  Dose modification: 15 mg/kg (original dose 15 mg/kg, Cycle 1)  Administration: 1,100 mg (2/18/2025), 1,100 mg (3/11/2025), 1,100 mg (4/1/2025)     Ovarian cancer (HCC) (Resolved)   2/6/2025 -  Cancer Staged    Staging form: Ovary, Fallopian Tube, Primary Peritoneal, AJCC 8th Edition  - Clinical stage from 2/6/2025: FIGO Stage IIIC (cT3c, cNX, cM0) - Signed by Augusto De Souza MD on 2/11/2025  Histopathologic type: Serous cystadenocarcinoma, NOS  Histologic grade (G): G3  Histologic grading system: 4 grade system       2/11/2025 Initial Diagnosis    Ovarian cancer (HCC)       Current Medications[4]    Objective :       Physical Exam  Vitals reviewed.   Constitutional:       General: She is not in acute distress.     Appearance: Normal appearance. She is normal weight. She is not toxic-appearing.      Comments:  Alopecia.     Cardiovascular:      Rate and Rhythm: Normal rate and regular rhythm.      Pulses: Normal pulses.   Pulmonary:      Effort: Pulmonary effort is normal. No respiratory distress.      Breath sounds: No stridor.   Abdominal:      General: There is no distension.      Palpations: Abdomen is soft. There is no mass.      Tenderness: There is no abdominal tenderness.      Hernia: No hernia is present.   Genitourinary:     Comments: Deferred to OR.    Musculoskeletal:      Cervical back: No rigidity.      Right lower leg: No edema.      Left lower leg: No edema.   Lymphadenopathy:      Cervical: No cervical adenopathy.           Lab Results: I have reviewed the following results:  Lab Results   Component Value Date     273.5 (H) 05/16/2025     Lab Results   Component Value Date    K 4.4 05/30/2025     05/30/2025    CO2 20 (L) 05/30/2025    BUN 23 05/30/2025    CREATININE 1.00 05/30/2025    GLUF 99 05/16/2025    CALCIUM 10.0 05/30/2025    AST 19 05/30/2025    ALT 10 05/30/2025    ALKPHOS 61 05/30/2025    EGFR 62 05/30/2025     Lab Results   Component Value Date    WBC 6.60 05/30/2025    HGB 10.3 (L) 05/30/2025    HCT 31.6 (L) 05/30/2025     (H) 05/30/2025     05/30/2025     Lab Results   Component Value Date    NEUTROABS 4.05 05/30/2025        Trend:  Lab Results   Component Value Date     273.5 (H) 05/16/2025     295.5 (H) 05/09/2025     341.1 (H) 05/02/2025     573.4 (H) 04/18/2025     1,131.0 (H) 03/28/2025     3,772.2 (H) 03/07/2025     6,003.0 (H) 02/14/2025     6,483.0 (H) 02/04/2025     Imaging Results Review: I personally reviewed the following image studies/reports in PACS and discussed pertinent findings with Radiology: CT chest and CT abdomen/pelvis. My interpretation of the radiology images/reports is: evidence of response to neoadjuvant chemotherapy.          Augusto De Souza MD, JUAN PABLO, FACOG, FACS  6/4/2025  7:57 AM            [1]   Past Medical History:  Diagnosis Date    Anxiety     Asthma     Cancer (HCC)     Ovarian    Depression     Eczema     Hypertension     Inflammatory bowel disease    [2]   Past Surgical History:  Procedure Laterality Date    BREAST CYST EXCISION Right     benign    CATARACT EXTRACTION, BILATERAL Bilateral     CHOLECYSTECTOMY      EYE SURGERY      IR BIOPSY LYMPH NODE  2020    IR BIOPSY OMENTUM  2025    IR PARACENTESIS  2025    IR PARACENTESIS  2025   [3]   Social History  Tobacco Use    Smoking status: Former     Current packs/day: 0.00     Average packs/day: 0.5 packs/day for 29.0 years (14.5 ttl pk-yrs)     Types: Cigarettes     Start date: 1982     Quit date:      Years since quittin.4     Passive exposure: Past    Smokeless tobacco: Never   Vaping Use    Vaping status: Never Used   Substance and Sexual Activity    Alcohol use: Not Currently     Alcohol/week: 3.0 standard drinks of alcohol    Drug use: Never    Sexual activity: Not Currently   [4]   Current Facility-Administered Medications   Medication Dose Route Frequency Provider Last Rate Last Admin    acetaminophen (Ofirmev) injection 1,000 mg  1,000 mg Intravenous Once Augusto De Souza MD        ceFAZolin (ANCEF) IVPB (premix in dextrose) 2,000 mg 50 mL  2,000 mg Intravenous Once Augusto De Souza MD        celecoxib (CeleBREX) capsule 200 mg  200 mg Oral Once Augusto De Souza MD        heparin (porcine) subcutaneous injection 5,000 Units  5,000 Units Subcutaneous On Call To OR Augusto De Souza MD        lactated ringers infusion  125 mL/hr Intravenous Continuous Tate Odonnell DO        lactated ringers infusion  20 mL/hr Intravenous Continuous Tate Odonnell DO        metroNIDAZOLE (FLAGYL) IVPB (premix) 500 mg 100 mL  500 mg Intravenous Once Augusto De Souza MD

## 2025-06-04 NOTE — ANESTHESIA POSTPROCEDURE EVALUATION
Post-Op Assessment Note    CV Status:  Stable    Pain management: adequate       Mental Status:  Alert and awake   Hydration Status:  Euvolemic   PONV Controlled:  Controlled   Airway Patency:  Patent  Two or more mitigation strategies used for obstructive sleep apnea   Post Op Vitals Reviewed: Yes    No anethesia notable event occurred.    Staff: Anesthesiologist           Last Filed PACU Vitals:  Vitals Value Taken Time   Temp 97.3 °F (36.3 °C) 06/04/25 16:15   Pulse 80 06/04/25 16:55   /65 06/04/25 16:45   Resp 20 06/04/25 16:15   SpO2 99 % 06/04/25 16:55   Vitals shown include unfiled device data.    Modified Sascha:     Vitals Value Taken Time   Activity 2 06/04/25 16:00   Respiration 2 06/04/25 16:00   Circulation 2 06/04/25 16:00   Consciousness 1 06/04/25 16:00   Oxygen Saturation 1 06/04/25 16:00     Modified Sascha Score: 8

## 2025-06-04 NOTE — PERIOPERATIVE NURSING NOTE
Significant other, Diogo Slaughter, called but unable to get through. Voicemail is full, can't leave message.

## 2025-06-04 NOTE — QUICK NOTE
Ewa Hernandes  [id number]  6/4/2025  6:40 PM    POST-OP CHECK    SUBJECTIVE:  Ewa Hernandes is doing well. Pain well controlled. Denies nausea/vomiting, chest pain, shortness of breath. No complaints at this time.    OBJECTIVE:  Vitals:    06/04/25 1801   BP: 103/68   Pulse: 82   Resp: 15   Temp: (!) 97.3 °F (36.3 °C)   SpO2: 100%         OBGyn Exam    ASSESSMENT:  Ewa Hernandes is s/p  s/p ex lap, rad hyst, BSO, splenectomy, bowel resection, suboptimal debulking.    PLAN:  Routine postop check  ADELE IV tylenol, continue epidural  Continue NPO, IVF w/ NGT  Magnesium 1.3, repleting  Antiemetics for nausea/vomiting prn  Follow up am labs  Adequate UOP, continue to monitor  SCDs and SQH for DVT ppx, encourage ambulation as tolerated    Ina Horowitz MD  OBGYN Resident  6/4/2025  6:40 PM

## 2025-06-04 NOTE — ANESTHESIA POSTPROCEDURE EVALUATION
Post-Op Assessment Note    CV Status:  Stable  Pain Score: 0    Pain management: adequate       Mental Status:  Sleepy   Hydration Status:  Euvolemic   PONV Controlled:  Controlled   Airway Patency:  Patent     Post Op Vitals Reviewed: Yes    No anethesia notable event occurred.    Staff: CRNA           Last Filed PACU Vitals:  Vitals Value Taken Time   Temp 36 06/04/25 15:33   Pulse 80 06/04/25 15:32   /58 06/04/25 15:32   Resp 12 06/04/25 15:33   SpO2 100 % 06/04/25 15:32   Vitals shown include unfiled device data.

## 2025-06-04 NOTE — OP NOTE
OPERATIVE REPORT  PATIENT NAME: Ewa Hernandes    :  1965  MRN: [id number]  Pt Location: [facility] OR ROOM 05    SURGERY DATE: 2025    Surgeons and Role:     * Augusto De Souza MD - Primary     * Ina Horowitz MD - Assisting     * Delilah Tripp MD - Assisting    Preop Diagnosis:  Malignant neoplasm of both ovaries (HCC) [C56.3]    Post-Op Diagnosis Codes:     * Malignant neoplasm of both ovaries (HCC) [C56.3]    Procedure(s):  EXAM UNDER ANESTHESIA. EXPLORATORY LAPAROTOMY. RADICAL HYSTERECTOMY. BILATERAL SALPINGO-OOPHORECTOMY. PELVIC PERITONECTOMY WITH EN BLOC LOW ANTERIOR RECTOSIGMOID RESECTION. APPENDECTOMY. SPLENIC FLEXURE MOBILIZATION. GASTROCOLING OMENTECTOMY. INDICATED SPLENECTOMY. RESECTION SMALL BOWEL MESENTERIC NODULES.    Specimen(s):  ID Type Source Tests Collected by Time Destination   1 : Gastrocolic omentum Tissue Omentum TISSUE EXAM Augusto De Souza MD 2025 1140    2 : Liver adhesion Tissue Liver TISSUE EXAM Augusto De Souza MD 2025 1144    3 : Uterus, cervix, bilateral fallopian tubes and ovaries, pelvic peritoneum and rectosigmoid, and base of appendix Tissue Uterus w/Bilateral Ovaries and Fallopian Tubes TISSUE EXAM Augusto De Souza MD 2025 1256    4 : Small bowel implant Tissue Jejunum TISSUE EXAM Augusto De Souza MD 2025 1313    5 : spleen and distal omentum Tissue Spleen TISSUE EXAM Augusto De Souza MD 2025 1328    6 : I.M.A REMNANT Tissue Abdominal TISSUE EXAM Augusto De Souza MD 2025 1350        Estimated Blood Loss:   700 mL    Drains:  Closed/Suction Drain Left LLQ Bulb 19 Fr. (Active)   Number of days: 0       Closed/Suction Drain Right LLQ Bulb 19 Fr. (Active)   Number of days: 0       NG/OG/Enteral Tube Nasogastric 18 Fr Right nare (Active)   Number of days: 0       Urethral Catheter Double-lumen;Non-latex 16 Fr. 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  She was noted to have a documented clinical and biochemical response.  After counseling, she agreed to proceed with intervals or reductive surgery.    Operative Findings:  #1.  Diffuse calcified and fibrinous nodularity covering most peritoneal surfaces in the form of loose adhesions likely representing treatment effect.  #2.  Approximately 12 x 10 cm omental tumor/cake.  #3.  Diffuse induration of several peritoneal surfaces including anterior stomach/lesser curvature, small bowel mesenteric root without appreciable distinct nodularity and obliterated right upper quadrant with dense adhesions from diaphragm to liver surface.  #4.,  Limited pelvic mass involving the appendix, uterus, bilateral tubes and ovaries and rectosigmoid with complete obliteration of the anterior and posterior cul-de-sac.  En bloc resection necessitated low anterior resection and pelvic peritonectomy with en bloc appendectomy.  #5.  Indurated perisplenic tumor which fractured easily upon contact causing bleeding.  In addition, in order to adequately mobilized the splenic flexure for a tension-free anastomosis without further risking hemorrhage indicated splenectomy was performed.  #6.  Suboptimal cytoreductive surgery.  At the completion of the procedure there was evidence of as described diffuse peritoneal thickening which could possibly represent viable tumor.  No individual tumor implants/area of tumor involvement with thickness/diameter greater than 5 mm.     Complications:   None    Procedure and Technique:  The patient received epidural in the preoperative holding area for perioperative analgesia.  She was then taken to the operating room where her general tracheal anesthesia was induced without complications.  The patient was placed in the dorsal lithotomy  position on Ugo stirrups.  Antibiotic prophylaxis was administered per hospital protocol and redosed accordingly.  Sequential compression devices were applied to lower extremities and activated prior to induction of anesthesia.  5000 units of subcutaneous heparin were administered approximately 1 hour after epidural catheter placement.      The patient's abdomen, perineum and vagina were prepped and draped in the usual sterile fashion and a 16 Tu[place] Miguel catheter was inserted in the bladder and a sponge stick placed in the vagina.    Attention was turned to the abdomen.  A midline infra and supraumbilical laparotomy was made with electrocautery and the peritoneal cavity was entered sharply without difficulty.  An Steve wound retractor was placed for surgical site infection prevention.  The Bookwalter retractor was used in order to maximize exposure with careful attention taken to prevent compression of neurovascular structures with a retractor blades.  The above-mentioned findings were noted.  The bowels were run from the terminal ileum to ligament of Treitz with the above-mentioned findings noted.  Diffuse nodules which were distinct and identifiable were excised and sent for permanent.  Similarly, we took representative samples from aforementioned adhesions.    Attention was first turned to the upper abdomen.  The lesser sac was entered sharply.  Attachments from the gastrocolic omentum to the greater curvature of the stomach and transverse colon were divided sharply using electrocautery and the LigaSure device.  Then, the omentum was amputated from its attachments to the spleen in the left upper quadrant.  The omentum was sent for permanent.  Attention was turned to the pelvis.    The peritoneum lateral to the ovarian vessels was divided on both sides.  The retroperitoneal spaces were developed.  The ovarian vessels were skeletonized cauterized and divided bilaterally with the vessel sealer device.  The  ureters were identified and mobilized from the pelvic brim to the level of the ureteral tunnel.  The peritoneum overlying the anterior obliterated cul-de-sac was divided which allowed to mobilized the perivesical fat off of the overlying tumor involved peritoneum.  The round ligaments were cauterized and divided bilaterally.  Then, decision was made to proceed with en bloc low anterior resection.  The descending colon was divided with a contour stapler.  Inferior mesenteric artery was skeletonized clamped divided and suture-ligated using 0 Vicryl.  Lung pedicle was identified and therefore excessive/remnant stump was reclamped religated and excised.  The presacral space was entered.  Circumferential mesorectal mobilization was completed.  Then, after mobilization of the bladder anteriorly, the anterior fornix of the vagina was entered and the dissection carried to the 3 and 9 o'clock positions.  There are, curved clamps were used to secure the uterine vessels above the level of the ureters.  The vessels were divided and the ureters further lateralized.  Then, the posterior vagina was divided and entered sharply.  The rectovaginal septum was developed.  The rectal pillars were divided with the vessel sealer device and the mid rectum mobilized circumferentially.  Posterior excessive mesorectum was skeletonized cauterized and divided with the vessel sealer device.  Then, the rectum below the peritoneal reflection and all tumor involvement was divided with a green load of the contour stapler.  Excellent hemostasis was noted.  Pelvic anatomy was carefully reviewed and there was no evidence of injuries or abnormalities.    At this point we initiated mobilization of the descending colon/splenic flexure.  In order to allow for a tension-free anastomosis it was necessary to completely mobilize the splenic flexure.  During dissection it was noted that previously fractured tumor in close possibly to the spleen was bleeding.   Therefore decision was made to proceed with splenectomy.  A posterior approach was utilized.  Splenic attachments to the kidney, left diaphragm peritoneum and stomach were taken using sharp dissection with electrocautery and the vessel sealer device.  The vessel sealer was also used to divide the short gastric vessels.  The splenic hilum was then exposed and the vessels clamped divided and doubly suture-ligated with 0 silk.  Excellent hemostasis was obtained.  Minimal bleeding from the serratus fascia was noted and easily controlled using the vessel sealer device.  Copious irrigation was used and hemostasis was confirmed.  At this point, the inferior mesenteric vein was skeletonized cauterized and divided which allowed complete mobilization of the descending colon for tension-free anastomosis.  The vagina was closed using figure-of-eight stitches of 0 Vicryl and excellent closure and hemostasis was obtained.    The descending colon staple line was excised.  The arcade was tested and noted to have pulsatile flow.  The anvil of a 31 mm EEA anastomotic stapler was secured in place with a pursestring stitch of 2-0 Prolene.  Then, the long end of the EEA stapler was advanced transrectally in a tension-free end-to-end anastomosis was created without difficulty.    The anastomosis was tested under sterile irrigant using a rigid sigmoidoscope to distend the anastomosis under fluid.  There was no evidence of bubble leak.    All areas of dissection were inspected next hemostasis was noted.  The stump of the appendix was noted to be cauterized and divided in proximity to the cecum demonstrated appendix had been taken en bloc with the previous specimen.  This cauterized margin was elevated clamped and reexcised for clean closure with 3-0 Vicryl.  Two 3-0 Vicryl's were used for appendiceal closure and a pursestring was placed around appendiceal stump.  There was no evidence of any leak of intestinal contents.    A 19 Rahul  drains was placed in the left upper quadrant/splenic bed and exteriorized through a stab incision in the left lower quadrant.  A second drain was placed in the pelvis and exteriorized through a stab incision in the right lower quadrant.  The drains were secured with stitches of 2-0 nylon.    All operators changed gown and gloves.  Dedicated closure tray was used for abdominal wall closure as per surgical site infection reduction protocol.  The abdominal wall was closed using a mass technique with 2 #1 loop PDS sutures.  The subcutaneous fat was jenniffer irrigated and loosely reapproximated with interrupted stitches of 2-0 plain gut.  The skin was closed using a subcuticular stitch of 4-0 Monocryl STRATAFIX.  Exofin was applied.    The patient tolerated the procedure well.  Sponge, needle and instrument counts reports correct x 2.  At the time of this dictation the patient is awaiting extubation in the operating room.  She has remained stable throughout the procedure.  Will plan to transfer her to the postanesthetic care unit upon extubation.     I was present for the entire procedure.    Patient Disposition:  PACU     This procedure was not performed to treat colon cancer through resection    Augusto De Souza MD, JUAN PABLO, FACOG, FACS  6/4/2025  3:11 PM

## 2025-06-04 NOTE — ASSESSMENT & PLAN NOTE
Status post 4 cycles of neoadjuvant chemotherapy. Bevacizumab held with last cycle in anticipation of today's surgery. Presents for scheduled surgery. Will proceed to OR for EUA, laparotomy, cytoreduction / tumor debulking with hysterectomy, bilateral salpingo-oophorectomy, possible colonic/bowel resection and all indicated procedures. No changes since last seen in office. Off apixaban x >72 hrs. Complied with ERAS recommendations preop.

## 2025-06-04 NOTE — ANESTHESIA PREPROCEDURE EVALUATION
Procedure:  EXPLORATORY LAPAROTOMY, EXAM UNDER ANESTHESIA (Abdomen)  DEBULKING TUMOR/CYTOREDUCTION WITH TOTAL HYSTERECTOMY, BILATERAL SALPINGO-OOPHERECTOMY (Abdomen)  BOWEL/COLONIC RESECTION (Abdomen)    Relevant Problems   CARDIO   (+) Essential hypertension, benign      GI/HEPATIC   (+) GERD (gastroesophageal reflux disease)      GYN   (+) Malignant neoplasm of both ovaries (HCC)      NEURO/PSYCH   (+) Major depressive disorder, recurrent episode, mild (HCC)   (+) Panic disorder      PULMONARY   (+) Asthma   (+) Mild persistent asthma with (acute) exacerbation      Blood   (+) Neutropenia (HCC)      Oncology   (+) Chemotherapy-induced nausea      FEN/Gastrointestinal   (+) Drug induced constipation        Physical Exam    Airway     Mallampati score: II  TM Distance: >3 FB  Neck ROM: full  Upper bite lip test: II  Mouth opening: >= 4 cm      Cardiovascular  Rhythm: regular, Rate: normalCardiovascular exam normal    Dental   No notable dental hx     Pulmonary  Pulmonary exam normal Breath sounds clear to auscultation    Neurological  - normal exam  She appears awake, alert and oriented x3.      Other Findings  post-pubertal.      Anesthesia Plan  ASA Score- 3     Anesthesia Type- general and epidural with ASA Monitors.         Additional Monitors:     Airway Plan: Oral ETT.           Plan Factors-Exercise tolerance (METS): <4 METS.    Chart reviewed. EKG reviewed. Imaging results reviewed. Existing labs reviewed. Patient summary reviewed.    Patient is not a current smoker.      Obstructive sleep apnea risk education given perioperatively.        Induction- intravenous.    Postoperative Plan- .   Monitoring Plan - Monitoring plan - standard ASA monitoring  Post Operative Pain Plan - epidural catheter and multimodal analgesia    Perioperative Resuscitation Plan - Level 1 - Full Code.       Informed Consent- Anesthetic plan and risks discussed with patient.  I personally reviewed this patient with the CRNA. Discussed  and agreed on the Anesthesia Plan with the CRNA..    Off eliquis x 3 days.Epidural to be placed in holding for post op pain control.  Pt aware that she may require A-line and blood transfusion.  NPO Status:  Vitals Value Taken Time   Date of last liquid 06/04/25 06/04/25 07:56   Time of last liquid 0700 06/04/25 07:56   Date of last solid 06/03/25 06/04/25 07:56   Time of last solid 2000 06/04/25 07:56

## 2025-06-04 NOTE — ASSESSMENT & PLAN NOTE
Hold ACE inhibitor perioperatively. Monitor BPs. Resume amlodipine first once needed postoperatively.

## 2025-06-05 LAB
ANION GAP SERPL CALCULATED.3IONS-SCNC: 9 MMOL/L (ref 4–13)
BASOPHILS # BLD AUTO: 0.03 THOUSANDS/ÂΜL (ref 0–0.1)
BASOPHILS NFR BLD AUTO: 0 % (ref 0–1)
BUN SERPL-MCNC: 17 MG/DL (ref 5–25)
CALCIUM SERPL-MCNC: 9 MG/DL (ref 8.4–10.2)
CHLORIDE SERPL-SCNC: 109 MMOL/L (ref 96–108)
CO2 SERPL-SCNC: 21 MMOL/L (ref 21–32)
CREAT SERPL-MCNC: 0.84 MG/DL (ref 0.6–1.3)
EOSINOPHIL # BLD AUTO: 0 THOUSAND/ÂΜL (ref 0–0.61)
EOSINOPHIL NFR BLD AUTO: 0 % (ref 0–6)
ERYTHROCYTE [DISTWIDTH] IN BLOOD BY AUTOMATED COUNT: 20 % (ref 11.6–15.1)
GFR SERPL CREATININE-BSD FRML MDRD: 76 ML/MIN/1.73SQ M
GLUCOSE SERPL-MCNC: 114 MG/DL (ref 65–140)
HCT VFR BLD AUTO: 27.8 % (ref 34.8–46.1)
HGB BLD-MCNC: 9.2 G/DL (ref 11.5–15.4)
IMM GRANULOCYTES # BLD AUTO: 0.07 THOUSAND/UL (ref 0–0.2)
IMM GRANULOCYTES NFR BLD AUTO: 1 % (ref 0–2)
LYMPHOCYTES # BLD AUTO: 0.86 THOUSANDS/ÂΜL (ref 0.6–4.47)
LYMPHOCYTES NFR BLD AUTO: 6 % (ref 14–44)
MAGNESIUM SERPL-MCNC: 2 MG/DL (ref 1.9–2.7)
MCH RBC QN AUTO: 31.8 PG (ref 26.8–34.3)
MCHC RBC AUTO-ENTMCNC: 33.1 G/DL (ref 31.4–37.4)
MCV RBC AUTO: 96 FL (ref 82–98)
MONOCYTES # BLD AUTO: 1.57 THOUSAND/ÂΜL (ref 0.17–1.22)
MONOCYTES NFR BLD AUTO: 11 % (ref 4–12)
NEUTROPHILS # BLD AUTO: 12.45 THOUSANDS/ÂΜL (ref 1.85–7.62)
NEUTS SEG NFR BLD AUTO: 82 % (ref 43–75)
NRBC BLD AUTO-RTO: 0 /100 WBCS
PLATELET # BLD AUTO: 260 THOUSANDS/UL (ref 149–390)
PMV BLD AUTO: 10.4 FL (ref 8.9–12.7)
POTASSIUM SERPL-SCNC: 4.2 MMOL/L (ref 3.5–5.3)
RBC # BLD AUTO: 2.89 MILLION/UL (ref 3.81–5.12)
SODIUM SERPL-SCNC: 139 MMOL/L (ref 135–147)
WBC # BLD AUTO: 14.98 THOUSAND/UL (ref 4.31–10.16)

## 2025-06-05 PROCEDURE — 80048 BASIC METABOLIC PNL TOTAL CA: CPT | Performed by: OBSTETRICS & GYNECOLOGY

## 2025-06-05 PROCEDURE — 97166 OT EVAL MOD COMPLEX 45 MIN: CPT

## 2025-06-05 PROCEDURE — 85025 COMPLETE CBC W/AUTO DIFF WBC: CPT | Performed by: OBSTETRICS & GYNECOLOGY

## 2025-06-05 PROCEDURE — 99024 POSTOP FOLLOW-UP VISIT: CPT | Performed by: OBSTETRICS & GYNECOLOGY

## 2025-06-05 PROCEDURE — 83735 ASSAY OF MAGNESIUM: CPT | Performed by: OBSTETRICS & GYNECOLOGY

## 2025-06-05 PROCEDURE — 97163 PT EVAL HIGH COMPLEX 45 MIN: CPT

## 2025-06-05 RX ORDER — HYDROMORPHONE HCL/PF 1 MG/ML
0.5 SYRINGE (ML) INJECTION ONCE
Status: COMPLETED | OUTPATIENT
Start: 2025-06-05 | End: 2025-06-05

## 2025-06-05 RX ADMIN — ROPIVACAINE HYDROCHLORIDE: 2 INJECTION, SOLUTION EPIDURAL; INFILTRATION at 11:17

## 2025-06-05 RX ADMIN — OXYCODONE HYDROCHLORIDE AND ACETAMINOPHEN 1 TABLET: 5; 325 TABLET ORAL at 19:24

## 2025-06-05 RX ADMIN — ROPIVACAINE HYDROCHLORIDE: 2 INJECTION, SOLUTION EPIDURAL; INFILTRATION at 02:58

## 2025-06-05 RX ADMIN — SODIUM CHLORIDE, SODIUM LACTATE, POTASSIUM CHLORIDE, AND CALCIUM CHLORIDE 100 ML/HR: .6; .31; .03; .02 INJECTION, SOLUTION INTRAVENOUS at 16:40

## 2025-06-05 RX ADMIN — ONDANSETRON 4 MG: 2 INJECTION INTRAMUSCULAR; INTRAVENOUS at 02:17

## 2025-06-05 RX ADMIN — SIMETHICONE 80 MG: 80 TABLET, CHEWABLE ORAL at 23:11

## 2025-06-05 RX ADMIN — FLUOXETINE HYDROCHLORIDE 40 MG: 20 CAPSULE ORAL at 10:10

## 2025-06-05 RX ADMIN — HEPARIN SODIUM 5000 UNITS: 5000 INJECTION INTRAVENOUS; SUBCUTANEOUS at 15:11

## 2025-06-05 RX ADMIN — SENNOSIDES 8.8 MG: 8.8 LIQUID ORAL at 22:02

## 2025-06-05 RX ADMIN — OXYCODONE HYDROCHLORIDE AND ACETAMINOPHEN 1 TABLET: 5; 325 TABLET ORAL at 02:17

## 2025-06-05 RX ADMIN — ACETAMINOPHEN 1000 MG: 10 INJECTION INTRAVENOUS at 10:10

## 2025-06-05 RX ADMIN — OXYCODONE HYDROCHLORIDE AND ACETAMINOPHEN 1 TABLET: 5; 325 TABLET ORAL at 10:10

## 2025-06-05 RX ADMIN — ROPIVACAINE HYDROCHLORIDE: 2 INJECTION, SOLUTION EPIDURAL; INFILTRATION at 20:41

## 2025-06-05 RX ADMIN — ACETAMINOPHEN 1000 MG: 10 INJECTION INTRAVENOUS at 02:17

## 2025-06-05 RX ADMIN — HYDROMORPHONE HYDROCHLORIDE 0.5 MG: 1 INJECTION, SOLUTION INTRAMUSCULAR; INTRAVENOUS; SUBCUTANEOUS at 20:43

## 2025-06-05 RX ADMIN — ONDANSETRON 4 MG: 2 INJECTION INTRAMUSCULAR; INTRAVENOUS at 19:24

## 2025-06-05 RX ADMIN — HEPARIN SODIUM 5000 UNITS: 5000 INJECTION INTRAVENOUS; SUBCUTANEOUS at 22:02

## 2025-06-05 RX ADMIN — ACETAMINOPHEN 1000 MG: 10 INJECTION INTRAVENOUS at 20:44

## 2025-06-05 RX ADMIN — HEPARIN SODIUM 5000 UNITS: 5000 INJECTION INTRAVENOUS; SUBCUTANEOUS at 05:22

## 2025-06-05 NOTE — ASSESSMENT & PLAN NOTE
Status post 4 cycles of neoadjuvant chemotherapy. Bevacizumab held with last cycle in anticipation of surgery on 6/4.   - s/p ex lap, SARAY, debulking on 6/4; not yet meeting post-op milestones  - maintain epidural x4days post-op  - pain currently poorly controlled on epidural, scheduled Tylenol, and PRN Percocet; adjust pain control regimen this AM  - d/c Miguel catheter, NGT this AM  - advance diet as tolerated  - SQH 5000U TID

## 2025-06-05 NOTE — UTILIZATION REVIEW
Initial Clinical Review    Elective   IP   surgical procedure    Age/Sex: 59 y.o. female    Surgery Date:    6/4/25    Procedure: EXAM UNDER ANESTHESIA. EXPLORATORY LAPAROTOMY. RADICAL HYSTERECTOMY. BILATERAL SALPINGO-OOPHORECTOMY. PELVIC PERITONECTOMY WITH EN BLOC LOW ANTERIOR RECTOSIGMOID RESECTION. APPENDECTOMY. SPLENIC FLEXURE MOBILIZATION. GASTROCOLING OMENTECTOMY. INDICATED SPLENECTOMY. RESECTION SMALL BOWEL MESENTERIC NODULES.       Anesthesia:   general    Operative Findings: #1.  Diffuse calcified and fibrinous nodularity covering most peritoneal surfaces in the form of loose adhesions likely representing treatment effect.  #2.  Approximately 12 x 10 cm omental tumor/cake.  #3.  Diffuse induration of several peritoneal surfaces including anterior stomach/lesser curvature, small bowel mesenteric root without appreciable distinct nodularity and obliterated right upper quadrant with dense adhesions from diaphragm to liver surface.  #4.,  Limited pelvic mass involving the appendix, uterus, bilateral tubes and ovaries and rectosigmoid with complete obliteration of the anterior and posterior cul-de-sac.  En bloc resection necessitated low anterior resection and pelvic peritonectomy with en bloc appendectomy.  #5.  Indurated perisplenic tumor which fractured easily upon contact causing bleeding.  In addition, in order to adequately mobilized the splenic flexure for a tension-free anastomosis without further risking hemorrhage indicated splenectomy was performed.  #6.  Suboptimal cytoreductive surgery.  At the completion of the procedure there was evidence of as described diffuse peritoneal thickening which could possibly represent viable tumor.  No individual tumor implants/area of tumor involvement with thickness/diameter greater than 5 mm.    POD#1 Progress Note:   6/5     Continue post op care.  Continue epidural catheter and  multimodal analgesia.  NGT  d/c this am and starting cl liq diet.  Miguel catheter   d/c.   Not yet  OOB.  - flatus thus far.      Admission Orders: Date/Time/Statement:   Admission Orders (From admission, onward)       Ordered        06/04/25 1537  Inpatient Admission  Once                          Orders Placed This Encounter   Procedures    Inpatient Admission     Standing Status:   Standing     Number of Occurrences:   1     Level of Care:   Med Surg [16]     Estimated length of stay:   More than 2 Midnights     Certification:   I certify that inpatient services are medically necessary for this patient for a duration of greater than two midnights. See H&P and MD Progress Notes for additional information about the patient's course of treatment.     Diet:   cl liq    Mobility:  PT/OT    DVT Prophylaxis:   SCD's    Medications/Pain Control:   Scheduled Medications:  acetaminophen, 1,000 mg, Intravenous, Q8H  FLUoxetine, 40 mg, Oral, Daily  heparin (porcine), 5,000 Units, Subcutaneous, Q8H ADELE  senna, 8.8 mg, Oral, HS      Continuous IV Infusions:  lactated ringers, 100 mL/hr, Intravenous, Continuous  ropivacaine 0.2%, , Epidural, Continuous      PRN Meds:  albuterol, 2 puff, Inhalation, Q4H PRN  ALPRAZolam, 0.5 mg, Oral, TID PRN  diphenhydrAMINE, 25 mg, Intravenous, Q6H PRN  lactated ringers, 1,000 mL, Intravenous, Once PRN   And  lactated ringers, 1,000 mL, Intravenous, Once PRN  metoclopramide, 5 mg, Intravenous, Q6H PRN  ondansetron, 4 mg, Intravenous, Q6H PRN  oxyCODONE-acetaminophen, 1 tablet, Oral, Q6H PRN  promethazine, 6.25 mg, Intravenous, Q6H PRN  simethicone, 80 mg, Oral, 4x Daily PRN  sodium chloride, 1,000 mL, Intravenous, Once PRN   And  sodium chloride, 1,000 mL, Intravenous, Once PRN      Vital Signs (last 3 days)       Date/Time Temp Pulse Resp BP MAP (mmHg) SpO2 Calculated FIO2 (%) - Nasal Cannula Nasal Cannula O2 Flow Rate (L/min) O2 Device Cardiac (WDL) Patient Position - Orthostatic VS Pain    06/05/25 1010 -- -- -- -- -- -- -- -- -- -- -- 10 - Worst Possible Pain    06/05/25  0701 98.1 °F (36.7 °C) -- 17 142/80 102 -- 28 2 L/min Nasal cannula -- Lying --    06/05/25 0620 -- 90 -- 146/85 105 98 % -- -- -- -- -- --    06/05/25 05:20:10 -- 100 -- 129/62 84 98 % -- -- -- -- -- --    06/05/25 03:00:02 98 °F (36.7 °C) 98 16 134/86 102 97 % 28 2 L/min Nasal cannula -- Lying --    06/05/25 0258 -- -- -- -- -- -- -- -- -- -- -- 7    06/05/25 02:21:07 97.6 °F (36.4 °C) 103 -- 134/86 102 97 % -- -- -- -- -- --    06/05/25 0217 -- -- -- -- -- -- -- -- -- -- -- 10 - Worst Possible Pain    06/05/25 0018 98.6 °F (37 °C) 97 16 142/79 100 98 % 28 2 L/min Nasal cannula -- Lying --    06/04/25 2258 98.6 °F (37 °C) 94 16 130/79 96 98 % 28 2 L/min Nasal cannula -- Lying --    06/04/25 22:57:08 98.6 °F (37 °C) 94 -- 130/79 96 98 % -- -- -- -- -- --    06/04/25 22:54:46 98.6 °F (37 °C) 92 -- -- -- 98 % -- -- -- -- -- --    06/04/25 2220 -- 90 -- 122/77 92 98 % -- -- -- -- Lying --    06/04/25 2100 98.5 °F (36.9 °C) 89 16 116/73 -- 99 % 28 2 L/min Nasal cannula -- Lying --    06/04/25 2034 -- 89 -- -- -- 99 % -- -- -- -- -- --    06/04/25 20:17:13 98.5 °F (36.9 °C) 95 16 116/73 87 99 % 28 2 L/min Nasal cannula -- Lying No Pain    06/04/25 2000 98.5 °F (36.9 °C) 95 16 116/73 -- 99 % -- -- -- -- -- --    06/04/25 19:33:54 98.4 °F (36.9 °C) 81 16 112/71 85 100 % 28 2 L/min Nasal cannula -- Lying No Pain    06/04/25 1926 98.4 °F (36.9 °C) 81 16 112/71 -- 100 % -- -- -- -- -- --    06/04/25 18:57:43 97.7 °F (36.5 °C) 82 15 108/70 83 100 % 28 2 L/min Nasal cannula -- Lying --    06/04/25 18:01:50 97.3 °F (36.3 °C) 82 15 103/68 80 100 % 28 2 L/min Nasal cannula -- Lying --    06/04/25 1737 -- -- -- -- -- -- 28 2 L/min Nasal cannula -- -- No Pain    06/04/25 17:23:57 -- 82 -- -- -- 100 % -- -- -- -- -- --    06/04/25 1723 -- -- -- -- -- -- -- -- -- -- -- 2    06/04/25 17:18:12 97.4 °F (36.3 °C) 95 16 104/68 80 100 % 28 2 L/min Nasal cannula -- Lying --    06/04/25 1645 -- 82 -- 108/65 82 99 % -- -- -- -- -- --     06/04/25 1630 -- 80 -- 104/64 80 100 % -- -- -- -- -- No Pain    06/04/25 1615 97.3 °F (36.3 °C) 82 20 112/62 82 100 % -- -- -- -- -- --    06/04/25 1600 97 °F (36.1 °C) 82 14 105/64 79 100 % -- -- -- -- -- --    06/04/25 1545 96.4 °F (35.8 °C) 78 12 102/60 76 100 % -- -- -- -- -- --    06/04/25 1530 96.8 °F (36 °C) 82 16 113/58 -- 100 % 32 3 L/min Nasal cannula WDL -- --    06/04/25 1025 -- 103 13 108/63 -- 97 % 32 3 L/min Nasal cannula -- -- --    06/04/25 1017 -- 100 12 124/75 -- 100 % 32 3 L/min Nasal cannula -- -- --    06/04/25 1012 -- 89 14 117/71 -- 96 % 32 3 L/min Nasal cannula -- -- --    06/04/25 1004 -- -- -- -- -- -- -- -- -- -- -- No Pain    06/04/25 0804 -- -- -- -- -- -- -- -- -- -- -- No Pain    06/04/25 0754 98.1 °F (36.7 °C) 94 16 108/71 -- 94 % -- -- None (Room air) -- -- --          Weight (last 2 days)       Date/Time Weight    06/04/25 17:18:12 71.4 (157.41)    06/04/25 0804 68.7 (151.46)            Pertinent Labs/Diagnostic Test Results:   Radiology:    Cardiology:    GI:          Results from last 7 days   Lab Units 06/05/25  0532 06/04/25  1559 05/30/25  1823   WBC Thousand/uL 14.98* 16.24* 6.60   HEMOGLOBIN g/dL 9.2* 9.7* 10.3*   HEMATOCRIT % 27.8* 29.5* 31.6*   PLATELETS Thousands/uL 260 247 340   TOTAL NEUT ABS Thousands/µL 12.45*  --  4.05         Results from last 7 days   Lab Units 06/05/25  0532 06/04/25  1559 05/30/25  1823   SODIUM mmol/L 139 139 133*   POTASSIUM mmol/L 4.2 3.8 4.4   CHLORIDE mmol/L 109* 112* 102   CO2 mmol/L 21 21 20*   ANION GAP mmol/L 9 6 11   BUN mg/dL 17 17 23   CREATININE mg/dL 0.84 0.94 1.00   EGFR ml/min/1.73sq m 76 67 62   CALCIUM mg/dL 9.0 7.9* 10.0   MAGNESIUM mg/dL 2.0 1.3* 1.8*     Results from last 7 days   Lab Units 05/30/25  1823   AST U/L 19   ALT U/L 10   ALK PHOS U/L 61   TOTAL PROTEIN g/dL 7.5   ALBUMIN g/dL 4.7   TOTAL BILIRUBIN mg/dL 0.41         Results from last 7 days   Lab Units 06/05/25  0532 06/04/25  1559 05/30/25  1823   GLUCOSE RANDOM  mg/dL 114 159* 106               Results from last 7 days   Lab Units 06/04/25  0820   PROTIME seconds 14.6   INR  1.12   PTT seconds 28                   Results from last 7 days   Lab Units 06/04/25  1556   UNIT PRODUCT CODE  [id number]  [id number]   UNIT NUMBER  [id number]  [id number]   UNITABO  A  A   UNITRH  POS  POS   CROSSMATCH  Compatible  Compatible   UNIT DISPENSE STATUS  Return to Inv  Return to Inv   UNIT PRODUCT VOL ml 350  350             Network Utilization Review Department  ATTENTION: Please call with any questions or concerns to [phone] and carefully listen to the prompts so that you are directed to the right person. All voicemails are confidential.   For Discharge needs, contact Care Management DC Support Team at [phone] opt. 2  Send all requests for admission clinical reviews, approved or denied determinations and any other requests to dedicated fax number below belonging to the campus where the patient is receiving treatment. List of dedicated fax numbers for the Facilities:  FACILITY NAME UR FAX NUMBER   ADMISSION DENIALS (Administrative/Medical Necessity) [phone]   DISCHARGE SUPPORT TEAM (NETWORK) [phone]   PARENT CHILD HEALTH (Maternity/NICU/Pediatrics) [phone]   [facility] [phone]   [facility] [phone]   [facility] [phone]   [facility] [phone]   [facility] [phone]   [facility] [phone]   [facility] [phone]   [facility] [phone]   [facility] [phone]   [facility] [phone]   [facility] [phone]   [facility] [phone]

## 2025-06-05 NOTE — DISCHARGE SUMMARY
"Discharge Summary - GYN Oncology   Name: Ewa Hernandes 59 y.o. 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    FEN: NPO, D51/2NS LR@100cc/hr, Zofran PRN, Senna  Pain: Tylenol ADELE, epidural in place  : Miguel in place, d/c POD1  DVT ppx: SCDs, SQH 5000 TID (hold POD2 AM for possible epidural removal)    Essential hypertension, benign  Hold ACE inhibitor perioperatively. Monitor BPs. Resume amlodipine first once needed postoperatively.  Major depressive disorder, recurrent episode, mild (HCC)  Stable mood. No SI/HI. Plan to resume fluoxetine and alprazolam prn postoperatively.  GERD (gastroesophageal reflux disease)  Will resume home medications perioperatively.    Admission Date: 6/4/2025 0714  Discharge Date: 06/04/25  Admitting Diagnosis: Malignant neoplasm of both ovaries (HCC) [C56.3]  Discharge Diagnosis:   Medical Problems       Resolved Problems  Date Reviewed: 4/22/2025   None         HPI: ***    Procedures Performed: No orders of the defined types were placed in this encounter.      Summary of Hospital Course: {Hospital Course:84458} ***    Significant Findings, Care, Treatment and Services Provided: ***    Complications: ***    Discharge Day Visit / Exam:   /73 (BP Location: Right arm)   Pulse 89   Temp 98.5 °F (36.9 °C) (Axillary)   Resp 16   Ht 5' 9\" (1.753 m)   Wt 71.4 kg (157 lb 6.5 oz)   SpO2 99%   BMI 23.25 kg/m²   Physical Exam ***    Discussion with Family: {Family Communication:03391}    Condition at Discharge: {condition:92886}       Discharge instructions/Information to patient and family:   See After Visit " Summary (AVS) for information provided to patient and family.      Provisions for Follow-Up Care:  See after visit summary for information related to follow-up care and any pertinent home health orders.      PCP: Rudolph Shaffer DO    Disposition: {Discharge Disposition:38297}    Planned Readmission: {EXAM; YES/NO:18601}     Discharge Medications:  See after visit summary for reconciled discharge medications provided to patient and family.      Discharge Statement:  I have spent a total time of *** minutes in caring for this patient on the day of the visit/encounter. {>30 minutes of time was spent on:70472}.

## 2025-06-05 NOTE — ASSESSMENT & PLAN NOTE
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    FEN: NPO, D51/2NS LR@100cc/hr, Zofran PRN, Senna  Pain: Tylenol ADELE, epidural in place  : Miguel in place, d/c POD1  DVT ppx: SCDs, SQH 5000 TID (hold POD2 AM for possible epidural removal)

## 2025-06-05 NOTE — PLAN OF CARE
Problem: PAIN - ADULT  Goal: Verbalizes/displays adequate comfort level or baseline comfort level  Description: Interventions:  - Encourage patient to monitor pain and request assistance  - Assess pain using appropriate pain scale  - Administer analgesics as ordered based on type and severity of pain and evaluate response  - Implement non-pharmacological measures as appropriate and evaluate response  - Consider cultural and social influences on pain and pain management  - Notify physician/advanced practitioner if interventions unsuccessful or patient reports new pain  - Educate patient/family on pain management process including their role and importance of  reporting pain   - Provide non-pharmacologic/complimentary pain relief interventions  Outcome: Progressing     Problem: INFECTION - ADULT  Goal: Absence or prevention of progression during hospitalization  Description: INTERVENTIONS:  - Assess and monitor for signs and symptoms of infection  - Monitor lab/diagnostic results  - Monitor all insertion sites, i.e. indwelling lines, tubes, and drains  - Monitor endotracheal if appropriate and nasal secretions for changes in amount and color  - [place] appropriate cooling/warming therapies per order  - Administer medications as ordered  - Instruct and encourage patient and family to use good hand hygiene technique  - Identify and instruct in appropriate isolation precautions for identified infection/condition  Outcome: Progressing  Goal: Absence of fever/infection during neutropenic period  Description: INTERVENTIONS:  - Monitor WBC  - Perform strict hand hygiene  - Limit to healthy visitors only  - No plants, dried, fresh or silk flowers with chaves in patient room  Outcome: Progressing     Problem: SAFETY ADULT  Goal: Patient will remain free of falls  Description: INTERVENTIONS:  - Educate patient/family on patient safety including physical limitations  - Instruct patient to call for assistance with activity   -  Consider consulting OT/PT to assist with strengthening/mobility based on AM PAC & JH-HLM score  - Consult OT/PT to assist with strengthening/mobility   - Keep Call bell within reach  - Keep bed low and locked with side rails adjusted as appropriate  - Keep care items and personal belongings within reach  - Initiate and maintain comfort rounds  - Make Fall Risk Sign visible to staff  - Offer Toileting every 2 Hours, in advance of need  - Initiate/Maintain bed alarm  - Apply yellow socks and bracelet for high fall risk patients  - Consider moving patient to room near nurses station  Outcome: Progressing  Goal: Maintain or return to baseline ADL function  Description: INTERVENTIONS:  -  Assess patient's ability to carry out ADLs; assess patient's baseline for ADL function and identify physical deficits which impact ability to perform ADLs (bathing, care of mouth/teeth, toileting, grooming, dressing, etc.)  - Assess/evaluate cause of self-care deficits   - Assess range of motion  - Assess patient's mobility; develop plan if impaired  - Assess patient's need for assistive devices and provide as appropriate  - Encourage maximum independence but intervene and supervise when necessary  - Involve family in performance of ADLs  - Assess for home care needs following discharge   - Consider OT consult to assist with ADL evaluation and planning for discharge  - Provide patient education as appropriate  - Monitor functional capacity and physical performance, use of AM PAC & JH-HLM   - Monitor gait, balance and fatigue with ambulation    Outcome: Progressing  Goal: Maintains/Returns to pre admission functional level  Description: INTERVENTIONS:  - Perform AM-PAC 6 Click Basic Mobility/ Daily Activity assessment daily.  - Set and communicate daily mobility goal to care team and patient/family/caregiver.   - Collaborate with rehabilitation services on mobility goals if consulted  - Perform Range of Motion 3 times a day.  -  Reposition patient every 2 hours.  - Dangle patient 3 times a day  - Stand patient 3 times a day  - Ambulate patient 3 times a day  - Out of bed to chair 3 times a day   - Out of bed for meals 3 times a day  - Out of bed for toileting  - Record patient progress and toleration of activity level   Outcome: Progressing     Problem: DISCHARGE PLANNING  Goal: Discharge to home or other facility with appropriate resources  Description: INTERVENTIONS:  - Identify barriers to discharge w/patient and caregiver  - Arrange for needed discharge resources and transportation as appropriate  - Identify discharge learning needs (meds, wound care, etc.)  - Arrange for interpretive services to assist at discharge as needed  - Refer to Case Management Department for coordinating discharge planning if the patient needs post-hospital services based on physician/advanced practitioner order or complex needs related to functional status, cognitive ability, or social support system  Outcome: Progressing     Problem: Knowledge Deficit  Goal: Patient/family/caregiver demonstrates understanding of disease process, treatment plan, medications, and discharge instructions  Description: Complete learning assessment and assess knowledge base.  Interventions:  - Provide teaching at level of understanding  - Provide teaching via preferred learning methods  Outcome: Progressing     Problem: RESPIRATORY - ADULT  Goal: Achieves optimal ventilation and oxygenation  Description: INTERVENTIONS:  - Assess for changes in respiratory status  - Assess for changes in mentation and behavior  - Position to facilitate oxygenation and minimize respiratory effort  - Oxygen administered by appropriate delivery if ordered  - Initiate smoking cessation education as indicated  - Encourage broncho-pulmonary hygiene including cough, deep breathe, Incentive Spirometry  - Assess the need for suctioning and aspirate as needed  - Assess and instruct to report SOB or any  respiratory difficulty  - Respiratory Therapy support as indicated  Outcome: Progressing     Problem: GASTROINTESTINAL - ADULT  Goal: Minimal or absence of nausea and/or vomiting  Description: INTERVENTIONS:  - Administer IV fluids if ordered to ensure adequate hydration  - Maintain NPO status until nausea and vomiting are resolved  - Nasogastric tube if ordered  - Administer ordered antiemetic medications as needed  - Provide nonpharmacologic comfort measures as appropriate  - Advance diet as tolerated, if ordered  - Consider nutrition services referral to assist patient with adequate nutrition and appropriate food choices  Outcome: Progressing     Problem: GENITOURINARY - ADULT  Goal: Absence of urinary retention  Description: INTERVENTIONS:  - Assess patient’s ability to void and empty bladder  - Monitor I/O  - Bladder scan as needed  - Discuss with physician/AP medications to alleviate retention as needed  - Discuss catheterization for long term situations as appropriate  Outcome: Progressing  Goal: Urinary catheter remains patent  Description: INTERVENTIONS:  - Assess patency of urinary catheter  - If patient has a chronic mendes, consider changing catheter if non-functioning  - Follow guidelines for intermittent irrigation of non-functioning urinary catheter  Outcome: Progressing     Problem: Prexisting or High Potential for Compromised Skin Integrity  Goal: Skin integrity is maintained or improved  Description: INTERVENTIONS:  - Identify patients at risk for skin breakdown  - Assess and monitor skin integrity including under and around medical devices   - Assess and monitor nutrition and hydration status  - Monitor labs  - Assess for incontinence   - Turn and reposition patient  - Assist with mobility/ambulation  - Relieve pressure over tamra prominences   - Avoid friction and shearing  - Provide appropriate hygiene as needed including keeping skin clean and dry  - Evaluate need for skin moisturizer/barrier  cream  - Collaborate with interdisciplinary team  - Patient/family teaching  - Consider wound care consult

## 2025-06-05 NOTE — PROGRESS NOTES
Patient seen POD #1 s/p general anesthesia with epidural for postoperative pain control. NG tube recently removed. She is having some discomfort especially with coughing. Discussed with surgeon, will continue epidural for 2-3 days as needed. Will increase rate to 7ml/hr now for better pain control. Will follow.     Buster Montalvo MD

## 2025-06-05 NOTE — PLAN OF CARE
Problem: PHYSICAL THERAPY ADULT  Goal: Performs mobility at highest level of function for planned discharge setting.  See evaluation for individualized goals.  Description: Treatment/Interventions: Functional transfer training, Elevations, LE strengthening/ROM, Therapeutic exercise, Patient/family training, Equipment eval/education, Gait training, Bed mobility, Continued evaluation, Compensatory technique education, OT  Equipment Recommended: Walker, Other (Comment) (shower chair)       See flowsheet documentation for full assessment, interventions and recommendations.  Note: Prognosis: Good  Problem List: Decreased mobility, Pain  Assessment: Ewa Hernandes is a 59 y.o. female admitted to [facility] on 6/4/2025 for Malignant neoplasm of both ovaries (HCC). POD# 1 ex lap, SARAY, debulking. PT was consulted and pt was seen on 6/5/2025 for mobility assessment and d/c planning. Pt presents w high fall risk, multiple lines, acute pain. At baseline is indep without an AD. Pt is currently functioning at a min A to get OOB dt pain, otherwise S for transfers and steps w RW bed>chair. Pt demonstrated pain limited mobility. Decline further ambulation despite encouragement. Was mildly unsteady secondary to gait deviations however no gross LOB w use of walker. Able to wean from 2L to RA w O2 sats >97%. Mobility likely to continue to improve w pain management. Encouraged OOB to chair daily for meals as well as ambulation to bathroom as tolerated. Pt will benefit from continued skilled IP PT to address the above mentioned impairments  in order to maximize recovery and increase functional independence when completing mobility and ADLs.        Rehab Resource Intensity Level, PT: No post-acute rehabilitation needs    See flowsheet documentation for full assessment.

## 2025-06-05 NOTE — DISCHARGE SUMMARY
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 She was transferred in stable condition to [facility] with an NGT, epidural and mendes catheter.    On POD1, mendes catheter and NGT were removed and her diet was advanced to CLD. She did not pass her void trial so mendes catheter was replaced and remained overnight.    On POD2, the patient's pain was still poorly controlled with the epidural. Settings were modified and mendes catheter remained in place. CT abdomen was performed, without concern for intra-abdominal pathology or bleed. She tolerated minimal PO.    On POD3, pain was still poorly controlled so epidural was removed and replaced with dPCA. Mendes remained in place. There was concern for ileus due to persistent nausea and vomiting, so diet was backed to NPO with IVF.       Lab Results:   Lab Results   Component Value Date    WBC 12.24 (H) 06/11/2025    HGB 8.4 (L) 06/11/2025    HCT 26.3 (L) 06/11/2025     (H) 06/11/2025     (H) 06/11/2025     Lab Results   Component Value Date    CALCIUM 8.6 06/11/2025    K 3.8 06/11/2025    CO2 23 06/11/2025     06/11/2025    BUN 7 06/11/2025    CREATININE 0.53 (L) 06/11/2025     No results found for: \"POCGLU\"  Lab Results   Component Value Date    PTT 28 06/04/2025     Lab Results   Component Value Date    INR 1.12 06/04/2025    INR 1.23 (H) 02/05/2025    PROTIME 14.6 06/04/2025    PROTIME 15.6 (H) 02/05/2025       Complications: none apparent    Condition at discharge: stable     Discharge instructions/Information to patient and " family:   See After Visit Summary for information provided to patient and family.      Provisions for Follow-Up Care:  See After Visit Summary for information related to follow-up care and any pertinent home health orders.      Disposition: See After Visit Summary for discharge disposition information.    Planned Readmission: Yes, pending clinical status    Discharge Medications:  For a complete list of the patient's medications, please refer to her med rec.    Isabel Enriquez MD  6/11/2025  1:39 PM

## 2025-06-05 NOTE — PLAN OF CARE
Problem: OCCUPATIONAL THERAPY ADULT  Goal: Performs self-care activities at highest level of function for planned discharge setting.  See evaluation for individualized goals.  Description: Treatment Interventions: ADL retraining, Functional transfer training, UE strengthening/ROM, Endurance training, Patient/family training, Equipment evaluation/education, Neuromuscular reeducation, Compensatory technique education, Energy conservation, Activityengagement          See flowsheet documentation for full assessment, interventions and recommendations.   Note: Limitation: Decreased ADL status, Decreased UE strength, Decreased Safe judgement during ADL, Decreased endurance, Decreased self-care trans, Decreased high-level ADLs  Prognosis: Good  Assessment: Ewa Hernandes is a 59 y.o. female seen for OT evaluation s/p admit to [facility] on 6/4/2025 w/ Malignant neoplasm of both ovaries (HCC).  POD#1 ex lap, SARAY, debulking. Comorbidities affecting pt's functional performance at time of assessment include: HTN and cancer history, depression. Pt with active OT orders and activity orders for Up and OOB as tolerated. Personal factors affecting pt at time of IE include:DORIAN home environment, difficulty performing ADLs, and difficulty performing IADLs. Prior to admission, pt lives with SO in a 2 level home with 1/2bath on 1st floor. Bedroom/ bathroom on second floor. I with ADLs, IADLs and mobility with no device. Upon evaluation: Pt currently requires supervision for UB ADLs, Lc for LB ADLs, supervision for toileting, supervision for bed mobility, supervision for functional transfers, and supervision RW mobility 2* the following deficits impacting occupational performance:weakness, decreased strength , decreased balance, and decreased activity tolerance. Pt to benefit from continued skilled OT tx while in the hospital to address deficits as defined above and maximize level of functional independence w ADL's and functional mobility.  Name: Bobby Early  MRN:  [id number]  : 1941  Age:  77 y.o. Surgery Date: 2017      OPERATIVE REPORT - RIGHT HIP HEMIARTHROPLASTY -   ANTERIOR APPROACH    PREOPERATIVE DIAGNOSIS: Femoral neck fracture right hip    POSTOPERATIVE DIAGNOSIS: Femoral neck fracture right hip    PROCEDURE PERFORMED: Right bipolar hemiarthroplasty    SURGEON: Ju Cunningham MD    FIRST ASSISTANT:      ANESTHESIA: General    PRE-OP ANTIBIOTIC: Ancef 2g    COMPLICATIONS: None. ESTIMATED BLOOD LOSS: 300 mL. SPECIMENS REMOVED: None    COMPONENTS IMPLANTED:   Implant Name Type Inv. Item Serial No.  Lot No. LRB No. Used Action   STEM FEM CORAIL2 N-COL SZ 15 --  - SNA  STEM FEM CORAIL2 N-COL SZ 15 --  NA [facility] [employer] [id number] Right 1 Implanted   HEAD FEM SLF-CENTER [id number] BRN --  - SNA  HEAD FEM SLF-CENTER [id number] BRN --  NA [facility] [id number] Right 1 Implanted   HEAD FEM EZ 28MM +1.5MM NK --  - SNA   HEAD FEM EZ 28MM +1.5MM NK --  NA [facility] [id number] Right 1 Implanted       INDICATIONS: The patient is an 77 yrs male with a right femoral neck fracture. Risks, benefits, alternatives of the procedure were reviewed and the patient desired to proceed. They understood the increased risk for perioperative medical complications due to their medical comorbidities. They were deemed medically optimized prior to surgery by the hospitalist service. DESCRIPTION OF PROCEDURE: Anesthetic was initiated. Preoperative dose of IV  antibiotic was given. Nielsen catheter was placed. The right side was confirmed as the operative side, prepped and draped in the usual sterile fashion. Skin was covered with Ioban occlusive dressing. One gram of tranexamic acid given intravenously if not contraindicated. Direct anterior exposure was made to the patient's hip through the sartorius tensor interval well lateral of the ASIS to protect the LFCN.  Anterior hip vasculature including the ascending branches of Occupational Performance areas to address include: grooming, bathing/shower, toilet hygiene, dressing, functional mobility, and clothing management. From OT standpoint, recommendation at time of d/c would be no rehab needs. OT to follow pt on caseload 2-3x/wk.     Rehab Resource Intensity Level, OT: No post-acute rehabilitation needs           the lateral circumflex artery were cauterized. Retractors were taken out to observe for bleeding and there was none. A T capsulotomy was made with bovie electrocautery. The Charnley retractor was repositioned for exposure. The femoral neck was recut. Femoral head was removed from the acetabulum. Femur was positioned and elevated from the wound. Appropriate soft tissue releases were made including the posterior capsule and obturator internus. The medullary canal was entered with a box osteotome. The femur was broached to a size 15. Calcar planed and then trialed. A 51 mm , +1.5 hip ball was the most appropriate for leg length and tension with offset to best match native offset. The hip was dislocated. The trial was removed and the real stem was impacted. The real hip ball was placed. The hip was reduced. After copious irrigation, the capsule was closed with #1 Stratafix barbed sutures. I irrigated the skin, subcutaneous and deep wound. I closed the fascia of the tensor fascia saeid with #1 stratafix barbed sutures. Skin and subcutaneous were irrigated. Soft tissues were infiltrated with local anesthetic. 2 grams of tranexamic acid with 0.4 mL of epi given topically in the wound. Skin and subcutaneous were closed in a standard fashion with 2-0 vicryl and 3-0 monocryl followed by Dermabond. An aquacel dressing was applied. There were no complications. No specimen was sent. The procedure was a RIGHT BIPOLAR HEMIARTHROPLASTY using a Depuy construct. The patient was transferred to the recovery room in stable condition.     Irasema Brush MD

## 2025-06-05 NOTE — PLAN OF CARE
Problem: PAIN - ADULT  Goal: Verbalizes/displays adequate comfort level or baseline comfort level  Description: Interventions:  - Encourage patient to monitor pain and request assistance  - Assess pain using appropriate pain scale  - Administer analgesics as ordered based on type and severity of pain and evaluate response  - Implement non-pharmacological measures as appropriate and evaluate response  - Consider cultural and social influences on pain and pain management  - Notify physician/advanced practitioner if interventions unsuccessful or patient reports new pain  - Educate patient/family on pain management process including their role and importance of  reporting pain   - Provide non-pharmacologic/complimentary pain relief interventions  Outcome: Progressing     Problem: INFECTION - ADULT  Goal: Absence or prevention of progression during hospitalization  Description: INTERVENTIONS:  - Assess and monitor for signs and symptoms of infection  - Monitor lab/diagnostic results  - Monitor all insertion sites, i.e. indwelling lines, tubes, and drains  - Monitor endotracheal if appropriate and nasal secretions for changes in amount and color  - Faucett appropriate cooling/warming therapies per order  - Administer medications as ordered  - Instruct and encourage patient and family to use good hand hygiene technique  - Identify and instruct in appropriate isolation precautions for identified infection/condition  Outcome: Progressing  Goal: Absence of fever/infection during neutropenic period  Description: INTERVENTIONS:  - Monitor WBC  - Perform strict hand hygiene  - Limit to healthy visitors only  - No plants, dried, fresh or silk flowers with chaves in patient room  Outcome: Progressing     Problem: SAFETY ADULT  Goal: Patient will remain free of falls  Description: INTERVENTIONS:  - Educate patient/family on patient safety including physical limitations  - Instruct patient to call for assistance with activity   -  Consider consulting OT/PT to assist with strengthening/mobility based on AM PAC & JH-HLM score  - Consult OT/PT to assist with strengthening/mobility   - Keep Call bell within reach  - Keep bed low and locked with side rails adjusted as appropriate  - Keep care items and personal belongings within reach  - Initiate and maintain comfort rounds  - Apply yellow socks and bracelet for high fall risk patients  - Consider moving patient to room near nurses station  Outcome: Progressing  Goal: Maintain or return to baseline ADL function  Description: INTERVENTIONS:  -  Assess patient's ability to carry out ADLs; assess patient's baseline for ADL function and identify physical deficits which impact ability to perform ADLs (bathing, care of mouth/teeth, toileting, grooming, dressing, etc.)  - Assess/evaluate cause of self-care deficits   - Assess range of motion  - Assess patient's mobility; develop plan if impaired  - Assess patient's need for assistive devices and provide as appropriate  - Encourage maximum independence but intervene and supervise when necessary  - Involve family in performance of ADLs  - Assess for home care needs following discharge   - Consider OT consult to assist with ADL evaluation and planning for discharge  - Provide patient education as appropriate  - Monitor functional capacity and physical performance, use of AM PAC & JH-HLM   - Monitor gait, balance and fatigue with ambulation    Outcome: Progressing  Goal: Maintains/Returns to pre admission functional level  Description: INTERVENTIONS:  - Perform AM-PAC 6 Click Basic Mobility/ Daily Activity assessment daily.  - Set and communicate daily mobility goal to care team and patient/family/caregiver.   - Out of bed for toileting  - Record patient progress and toleration of activity level   Outcome: Progressing     Problem: DISCHARGE PLANNING  Goal: Discharge to home or other facility with appropriate resources  Description: INTERVENTIONS:  - Identify  barriers to discharge w/patient and caregiver  - Arrange for needed discharge resources and transportation as appropriate  - Identify discharge learning needs (meds, wound care, etc.)  - Arrange for interpretive services to assist at discharge as needed  - Refer to Case Management Department for coordinating discharge planning if the patient needs post-hospital services based on physician/advanced practitioner order or complex needs related to functional status, cognitive ability, or social support system  Outcome: Progressing     Problem: Knowledge Deficit  Goal: Patient/family/caregiver demonstrates understanding of disease process, treatment plan, medications, and discharge instructions  Description: Complete learning assessment and assess knowledge base.  Interventions:  - Provide teaching at level of understanding  - Provide teaching via preferred learning methods  Outcome: Progressing     Problem: RESPIRATORY - ADULT  Goal: Achieves optimal ventilation and oxygenation  Description: INTERVENTIONS:  - Assess for changes in respiratory status  - Assess for changes in mentation and behavior  - Position to facilitate oxygenation and minimize respiratory effort  - Oxygen administered by appropriate delivery if ordered  - Initiate smoking cessation education as indicated  - Encourage broncho-pulmonary hygiene including cough, deep breathe, Incentive Spirometry  - Assess the need for suctioning and aspirate as needed  - Assess and instruct to report SOB or any respiratory difficulty  - Respiratory Therapy support as indicated  Outcome: Progressing     Problem: GASTROINTESTINAL - ADULT  Goal: Minimal or absence of nausea and/or vomiting  Description: INTERVENTIONS:  - Administer IV fluids if ordered to ensure adequate hydration  - Maintain NPO status until nausea and vomiting are resolved  - Nasogastric tube if ordered  - Administer ordered antiemetic medications as needed  - Provide nonpharmacologic comfort measures  as appropriate  - Advance diet as tolerated, if ordered  - Consider nutrition services referral to assist patient with adequate nutrition and appropriate food choices  Outcome: Progressing     Problem: GENITOURINARY - ADULT  Goal: Absence of urinary retention  Description: INTERVENTIONS:  - Assess patient’s ability to void and empty bladder  - Monitor I/O  - Bladder scan as needed  - Discuss with physician/AP medications to alleviate retention as needed  - Discuss catheterization for long term situations as appropriate  Outcome: Progressing  Goal: Urinary catheter remains patent  Description: INTERVENTIONS:  - Assess patency of urinary catheter  - If patient has a chronic mendes, consider changing catheter if non-functioning  - Follow guidelines for intermittent irrigation of non-functioning urinary catheter  Outcome: Progressing

## 2025-06-05 NOTE — PROGRESS NOTES
Progress Note - GYN Oncology   Name: Ewa Hernandes 59 y.o. 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 Not yet meeting post-op milestones.  Assessment & Plan  Malignant neoplasm of both ovaries (HCC)  Status post 4 cycles of neoadjuvant chemotherapy. Bevacizumab held with last cycle in anticipation of surgery on 6/4.   - s/p ex lap, SARAY, debulking on 6/4; not yet meeting post-op milestones  - maintain epidural x4days post-op  - pain currently poorly controlled on epidural, scheduled Tylenol, and PRN Percocet; adjust pain control regimen this AM  - d/c Miguel catheter, NGT this AM  - advance diet as tolerated  - SQH 5000U TID  Essential hypertension, benign  Hold ACE inhibitor perioperatively. Monitor BPs. Resume amlodipine first once needed postoperatively.  Major depressive disorder, recurrent episode, mild (HCC)  Stable mood. No SI/HI. Plan to resume fluoxetine and alprazolam PRN postoperatively.  GERD (gastroesophageal reflux disease)  Will resume home medications perioperatively.    GYNECOLOGY  Subjective   Ewa reports suboptimally controlled pain overnight. She endorses occasional nausea but denies vomiting. She is not yet ambulating or passing flatus. She denies shortness of breath or chest pain.    Objective :  Temp:  [96.4 °F (35.8 °C)-98.6 °F (37 °C)] 98 °F (36.7 °C)  HR:  [] 100  BP: (102-142)/(58-86) 129/62  Resp:  [12-20] 16  SpO2:  [94 %-100 %] 98 %  O2 Device: Nasal cannula  Nasal Cannula O2 Flow Rate (L/min):  [2 L/min-3 L/min] 2 L/min    Physical Exam  Vitals reviewed.   Constitutional:       General: She is not in acute distress.     Appearance: She is ill-appearing.   HENT:      Nose:      Comments: NGT in place; cannister with 400cc dark brown  fluid    Cardiovascular:      Rate and Rhythm: Normal rate.   Pulmonary:      Effort: Pulmonary effort is normal. No respiratory distress.      Comments: Nasal cannula in place; 2L  Abdominal:      Palpations: Abdomen is soft.      Tenderness: There is abdominal tenderness (significant tenderness to palpation).      Comments: PAIGE drain x2 in place draining serosanguinous fluid  Wound dressing in place; no strikethrough present   Genitourinary:     Comments: Miguel catheter in place draining clear yellow urine    Musculoskeletal:      Comments: SCDs in place     Neurological:      Mental Status: She is alert.       Isabel Enriquez MD  OBGYN PGY-1  06/05/25  6:04 AM

## 2025-06-05 NOTE — OCCUPATIONAL THERAPY NOTE
Occupational Therapy Evaluation     Patient Name: Ewa Hernandes  Today's Date: 6/5/2025  Problem List  Principal Problem:    Malignant neoplasm of both ovaries (HCC)  Active Problems:    Essential hypertension, benign    Major depressive disorder, recurrent episode, mild (HCC)    GERD (gastroesophageal reflux disease)    Past Medical History  Past Medical History[1]  Past Surgical History  Past Surgical History[2]      06/05/25 0822   OT Last Visit   OT Visit Date 06/05/25   Note Type   Note type Evaluation   Additional Comments greeted in supine and agreeable.   Pain Assessment   Pain Assessment Tool 0-10   Pain Score 10 - Worst Possible Pain   Pain Location/Orientation Location: Abdomen   Restrictions/Precautions   Weight Bearing Precautions Per Order No   Other Precautions Chair Alarm;Bed Alarm;Multiple lines;Fall Risk;Pain;O2  (2Lo2> RA, epidural, catheter)   Home Living   Type of Home House   Home Layout Two level;1/2 bath on main level;Bed/bath upstairs   Bathroom Equipment Grab bars in shower   Prior Function   Level of [place] Independent with ADLs;Independent with functional mobility;Independent with IADLS   Lives With Spouse   Receives Help From Family   Falls in the last 6 months 1 to 4   ADL   Where Assessed Edge of bed   Eating Assistance 7  Independent   Grooming Assistance 6  Modified Independent   UB Bathing Assistance 5  Supervision/Setup   LB Bathing Assistance 4  Minimal Assistance   UB Dressing Assistance 5  Supervision/Setup   LB Dressing Assistance 4  Minimal Assistance   Toileting Assistance  5  Supervision/Setup   Bed Mobility   Supine to Sit 4  Minimal assistance   Additional items Assist x 1;Increased time required;Verbal cues   Transfers   Sit to Stand 5  Supervision   Stand to Sit 5  Supervision   Functional Mobility   Functional Mobility 5  Supervision   Additional Comments AX1, RW. short distances.   Additional items Rolling walker   Balance   Static Sitting Fair   Dynamic  Sitting Fair   Static Standing Fair -   Dynamic Standing Fair -   Ambulatory Fair -   Activity Tolerance   Activity Tolerance Patient limited by pain   Medical Staff Made Aware PT Mine   Nurse Made Aware RN   RUE Assessment   RUE Assessment WFL   LUE Assessment   LUE Assessment WFL   Hand Function   Gross Motor Coordination Functional   Fine Motor Coordination Functional   Psychosocial   Psychosocial (WDL) WDL   Cognition   Overall Cognitive Status WFL   Arousal/Participation Cooperative   Attention Within functional limits   Orientation Level Oriented X4   Memory Within functional limits   Following Commands Follows all commands and directions without difficulty   Assessment   Limitation Decreased ADL status;Decreased UE strength;Decreased Safe judgement during ADL;Decreased endurance;Decreased self-care trans;Decreased high-level ADLs   Prognosis Good   Assessment Ewa Hernandes is a 59 y.o. female seen for OT evaluation s/p admit to [facility] on 6/4/2025 w/ Malignant neoplasm of both ovaries (HCC).  POD#1 ex lap, SARAY, debulking. Comorbidities affecting pt's functional performance at time of assessment include: HTN and cancer history, depression. Pt with active OT orders and activity orders for Up and OOB as tolerated. Personal factors affecting pt at time of IE include:DORIAN home environment, difficulty performing ADLs, and difficulty performing IADLs. Prior to admission, pt lives with SO in a 2 level home with 1/2bath on 1st floor. Bedroom/ bathroom on second floor. I with ADLs, IADLs and mobility with no device. Upon evaluation: Pt currently requires supervision for UB ADLs, Lc for LB ADLs, supervision for toileting, supervision for bed mobility, supervision for functional transfers, and supervision RW mobility 2* the following deficits impacting occupational performance:weakness, decreased strength , decreased balance, and decreased activity tolerance. Pt to benefit from continued skilled OT tx while in the  hospital to address deficits as defined above and maximize level of functional independence w ADL's and functional mobility. Occupational Performance areas to address include: grooming, bathing/shower, toilet hygiene, dressing, functional mobility, and clothing management. From OT standpoint, recommendation at time of d/c would be no rehab needs. OT to follow pt on caseload 2-3x/wk.   Goals   STG Time Frame 3-5   Short Term Goal #1 Pt will improve activity tolerance to G for min 30 min txment sessions for increase engagement in functional tasks   Short Term Goal #2 Pt will complete bed mobility at a Mod I level w/ G balance/safety demonstrated to decrease caregiver assistance required   Short Term Goal  Pt will complete LB dressing/self care w/ mod I using adaptive device and DME as needed   LTG Time Frame 10-14   Long Term Goal #1 Pt will complete toileting w/ mod I w/ G hygiene/thoroughness using DME as needed   Long Term Goal #2 Pt will improve functional transfers to Mod I on/off all surfaces using DME as needed w/ G balance/safety   Long Term Goal Pt will improve functional mobility during ADL/IADL/leisure tasks to Mod I using DME as needed w/ G balance/safety   Plan   Treatment Interventions ADL retraining;Functional transfer training;UE strengthening/ROM;Endurance training;Patient/family training;Equipment evaluation/education;Neuromuscular reeducation;Compensatory technique education;Energy conservation;Activityengagement   Goal Expiration Date 06/19/25   OT Treatment Day 0   OT Frequency 1-2x/wk;2-3x/wk   Discharge Recommendation   Rehab Resource Intensity Level, OT No post-acute rehabilitation needs   AM-PAC Daily Activity Inpatient   Lower Body Dressing 2   Bathing 3   Toileting 3   Upper Body Dressing 4   Grooming 4   Eating 4   Daily Activity Raw Score 20   Daily Activity Standardized Score (Calc for Raw Score >=11) 42.03   AM-PAC Applied Cognition Inpatient   Following a Speech/Presentation 4    Understanding Ordinary Conversation 4   Taking Medications 4   Remembering Where Things Are Placed or Put Away 4   Remembering List of 4-5 Errands 4   Taking Care of Complicated Tasks 4   Applied Cognition Raw Score 24   Applied Cognition Standardized Score 62.21   Shanda Edmonds OT         [1]   Past Medical History:  Diagnosis Date    Anxiety     Asthma     Cancer (HCC)     Ovarian    Depression     Eczema     Hypertension     Inflammatory bowel disease    [2]   Past Surgical History:  Procedure Laterality Date    BREAST CYST EXCISION Right     benign    CATARACT EXTRACTION, BILATERAL Bilateral     CHOLECYSTECTOMY      EYE SURGERY      IR BIOPSY LYMPH NODE  04/28/2020    IR BIOPSY OMENTUM  2/6/2025    IR PARACENTESIS  2/6/2025    IR PARACENTESIS  2/17/2025    ND EXPLORATORY LAPAROTOMY CELIOTOMY W/WO BIOPSY SPX N/A 6/4/2025    Procedure: EXAM UNDER ANESTHESIA, EXPLORATORY LAPAROTOMY, RADICAL HYSTERECTOMY, BILATERAL SALPINGO-OOPHORECTOMY, PELVIC PERITONEXTOMY WITH EN BLOC LOW ANTERIOR RECTOSIGMOID RESECTION, APPENDECTOMY, SPLENIC FLEXURE MOBILIZATION, GASTROCOLING OMENTECTOMY, INDICATED SPLENECTOMY, RESECTION SMALL BOWEL MESENTERIC NODULES;  Surgeon: Augusto De Souza MD;  Location: [facility];  Service: Gynecology Oncol

## 2025-06-05 NOTE — PHYSICAL THERAPY NOTE
PHYSICAL THERAPY EVALUATION          Patient Name: Ewa Hernandes  Today's Date: 6/5/2025 06/05/25 0834   PT Last Visit   PT Visit Date 06/05/25   Note Type   Note type Evaluation   Pain Assessment   Pain Assessment Tool 0-10   Pain Score 10 - Worst Possible Pain   Pain Location/Orientation Location: [facility]   Hospital Pain Intervention(s) Repositioned;Emotional support;Rest   Restrictions/Precautions   Other Precautions Chair Alarm;Bed Alarm;Multiple lines;Fall Risk;Pain;O2  (2L>RA, IV, catheter, epidural)   Home Living   Type of Home House   Home Layout Two level;1/2 bath on main level;Bed/bath upstairs   Bathroom Equipment Grab bars in shower   Prior Function   Level of [place] Independent with ADLs;Independent with functional mobility;Independent with IADLS   Lives With Significant other   Falls in the last 6 months 1 to 4   Comments indep at baseline without an AD   General   Additional Pertinent History pt admitted 6/4/25 for malignant neoplasm of both ovaries now POD#1 ex lap, SARAY, debulking. PMHx significant for depression, CA, anxiety   Cognition   Overall Cognitive Status WFL   Arousal/Participation Cooperative   Following Commands Follows all commands and directions without difficulty   Comments withdrawn/ slow to respond at times   Bed Mobility   Supine to Sit 4  Minimal assistance   Additional items Assist x 1;HOB elevated;Bedrails;Increased time required;Other  (trunk support)   Transfers   Sit to Stand 5  Supervision   Additional items Increased time required;Verbal cues;Other  (RW)   Stand to Sit 5  Supervision   Additional items Armrests;Increased time required;Verbal cues;Other  (RW)   Additional Comments cues for hand placement   Ambulation/Elevation   Gait pattern Improper Weight shift;Decreased foot clearance;Redundant gait at times;Short stride;Excessively slow   Gait Assistance 5  Supervision   Additional items Assist x 1;Verbal cues  (cues for direction)  Spoke w/pt reminding of 2:30pm appt w/Dr. Louis on 12/19/24     Assistive Device Rolling walker   Distance 3' bed>chair   Balance   Static Standing Fair -   Dynamic Standing Fair -   Ambulatory Fair -   Endurance Deficit   Endurance Deficit No  (O2 98% on 2L and 97% on RA)   Activity Tolerance   Activity Tolerance Patient limited by pain   Medical Staff Made Aware Venessa OT   Assessment   Prognosis Good   Problem List Decreased mobility;Pain   Assessment Ewa Hernandes is a 59 y.o. female admitted to [facility] on 6/4/2025 for Malignant neoplasm of both ovaries (HCC). POD# 1 ex lap, SARAY, debulking. PT was consulted and pt was seen on 6/5/2025 for mobility assessment and d/c planning. Pt presents w high fall risk, multiple lines, acute pain. At baseline is indep without an AD. Pt is currently functioning at a min A to get OOB dt pain, otherwise S for transfers and steps w RW bed>chair. Pt demonstrated pain limited mobility. Decline further ambulation despite encouragement. Was mildly unsteady secondary to gait deviations however no gross LOB w use of walker. Able to wean from 2L to RA w O2 sats >97%. Mobility likely to continue to improve w pain management. Encouraged OOB to chair daily for meals as well as ambulation to bathroom as tolerated. Pt will benefit from continued skilled IP PT to address the above mentioned impairments  in order to maximize recovery and increase functional independence when completing mobility and ADLs.   Goals   Patient Goals less pain   STG Expiration Date 06/15/25   Short Term Goal #1 1) Pt will perform bed mobility mod I demonstrating appropriate technique 100% of the time in order to improve function. 2) Pt will perform all transfers mod I demonstrating safe technique 100% of the time in order to improve ability to negotiate safely in home environment. 3) Amb with least restrictive AD > 100'x1 with mod I in order to demonstrate ability to negotiate in home environment. 4) Improve overall strength and balance 1/2 grade in order to optimize ability to  perform functional tasks and reduce fall risk. 5) Improve am-pac by 2 to demonstrate functional progress and return to baseline function/reduced caregiver burden. 6) Negotiate stairs using most appropriate technique and S in order to be able to negotiate safely in home environment.   Plan   Treatment/Interventions Functional transfer training;Elevations;LE strengthening/ROM;Therapeutic exercise;Patient/family training;Equipment eval/education;Gait training;Bed mobility;Continued evaluation;Compensatory technique education;OT   PT Frequency 1-2x/wk   Discharge Recommendation   Rehab Resource Intensity Level, PT No post-acute rehabilitation needs   Equipment Recommended Walker;Other (Comment)  (shower chair)   Walker Package Recommended Wheeled walker   Change/add to Walker Package? No   Additional Comments reports able to borrow DME if needed   AM-PAC Basic Mobility Inpatient   Turning in Flat Bed Without Bedrails 3   Lying on Back to Sitting on Edge of Flat Bed Without Bedrails 2   Moving Bed to Chair 3   Standing Up From Chair Using Arms 3   Walk in Room 3   Climb 3-5 Stairs With Railing 3   Basic Mobility Inpatient Raw Score 17   Basic Mobility Standardized Score 39.67   [facility] Highest Level Of Mobility   -HL Goal 5: Stand one or more mins   -HL Achieved 5: Stand (1 or more minutes)   End of Consult   Patient Position at End of Consult Bedside chair;All needs within reach   History: co - morbidities including age, current experience including fall risk, multiple lines  Exam: impairments in systems including multiple body structures involved; neuromuscular (balance, gait, transfers), cardiopulmonary (vitals), cognition; activity limitations  (difficulties executing an action); participation restrictions (problems associated w involvement in life situations), am-pac  Clinical: unstable/unpredictable  Complexity:high    Abigail Marvin, PT

## 2025-06-06 ENCOUNTER — APPOINTMENT (INPATIENT)
Dept: CT IMAGING | Facility: HOSPITAL | Age: 60
DRG: 511 | End: 2025-06-06
Payer: COMMERCIAL

## 2025-06-06 PROBLEM — D64.9 ANEMIA: Status: ACTIVE | Noted: 2025-06-06

## 2025-06-06 LAB
ANION GAP SERPL CALCULATED.3IONS-SCNC: 4 MMOL/L (ref 4–13)
ANION GAP SERPL CALCULATED.3IONS-SCNC: 5 MMOL/L (ref 4–13)
BASOPHILS # BLD AUTO: 0.04 THOUSANDS/ÂΜL (ref 0–0.1)
BASOPHILS NFR BLD AUTO: 0 % (ref 0–1)
BUN SERPL-MCNC: 14 MG/DL (ref 5–25)
BUN SERPL-MCNC: 16 MG/DL (ref 5–25)
CALCIUM SERPL-MCNC: 9 MG/DL (ref 8.4–10.2)
CALCIUM SERPL-MCNC: 9 MG/DL (ref 8.4–10.2)
CHLORIDE SERPL-SCNC: 107 MMOL/L (ref 96–108)
CHLORIDE SERPL-SCNC: 107 MMOL/L (ref 96–108)
CO2 SERPL-SCNC: 24 MMOL/L (ref 21–32)
CO2 SERPL-SCNC: 26 MMOL/L (ref 21–32)
CREAT SERPL-MCNC: 0.67 MG/DL (ref 0.6–1.3)
CREAT SERPL-MCNC: 0.76 MG/DL (ref 0.6–1.3)
EOSINOPHIL # BLD AUTO: 0.13 THOUSAND/ÂΜL (ref 0–0.61)
EOSINOPHIL NFR BLD AUTO: 1 % (ref 0–6)
ERYTHROCYTE [DISTWIDTH] IN BLOOD BY AUTOMATED COUNT: 19.4 % (ref 11.6–15.1)
ERYTHROCYTE [DISTWIDTH] IN BLOOD BY AUTOMATED COUNT: 19.5 % (ref 11.6–15.1)
ERYTHROCYTE [DISTWIDTH] IN BLOOD BY AUTOMATED COUNT: 19.8 % (ref 11.6–15.1)
GFR SERPL CREATININE-BSD FRML MDRD: 86 ML/MIN/1.73SQ M
GFR SERPL CREATININE-BSD FRML MDRD: 97 ML/MIN/1.73SQ M
GLUCOSE SERPL-MCNC: 89 MG/DL (ref 65–140)
GLUCOSE SERPL-MCNC: 95 MG/DL (ref 65–140)
HCT VFR BLD AUTO: 23.1 % (ref 34.8–46.1)
HCT VFR BLD AUTO: 23.8 % (ref 34.8–46.1)
HCT VFR BLD AUTO: 24.9 % (ref 34.8–46.1)
HGB BLD-MCNC: 7.6 G/DL (ref 11.5–15.4)
HGB BLD-MCNC: 7.9 G/DL (ref 11.5–15.4)
HGB BLD-MCNC: 8.1 G/DL (ref 11.5–15.4)
IMM GRANULOCYTES # BLD AUTO: 0.05 THOUSAND/UL (ref 0–0.2)
IMM GRANULOCYTES NFR BLD AUTO: 0 % (ref 0–2)
LYMPHOCYTES # BLD AUTO: 1.17 THOUSANDS/ÂΜL (ref 0.6–4.47)
LYMPHOCYTES NFR BLD AUTO: 7 % (ref 14–44)
MAGNESIUM SERPL-MCNC: 1.8 MG/DL (ref 1.9–2.7)
MAGNESIUM SERPL-MCNC: 2.2 MG/DL (ref 1.9–2.7)
MCH RBC QN AUTO: 32.1 PG (ref 26.8–34.3)
MCH RBC QN AUTO: 32.3 PG (ref 26.8–34.3)
MCH RBC QN AUTO: 32.9 PG (ref 26.8–34.3)
MCHC RBC AUTO-ENTMCNC: 32.5 G/DL (ref 31.4–37.4)
MCHC RBC AUTO-ENTMCNC: 32.9 G/DL (ref 31.4–37.4)
MCHC RBC AUTO-ENTMCNC: 33.2 G/DL (ref 31.4–37.4)
MCV RBC AUTO: 98 FL (ref 82–98)
MCV RBC AUTO: 99 FL (ref 82–98)
MCV RBC AUTO: 99 FL (ref 82–98)
MONOCYTES # BLD AUTO: 1.28 THOUSAND/ÂΜL (ref 0.17–1.22)
MONOCYTES NFR BLD AUTO: 8 % (ref 4–12)
NEUTROPHILS # BLD AUTO: 14.14 THOUSANDS/ÂΜL (ref 1.85–7.62)
NEUTS SEG NFR BLD AUTO: 84 % (ref 43–75)
NRBC BLD AUTO-RTO: 0 /100 WBCS
PLATELET # BLD AUTO: 240 THOUSANDS/UL (ref 149–390)
PLATELET # BLD AUTO: 254 THOUSANDS/UL (ref 149–390)
PLATELET # BLD AUTO: 272 THOUSANDS/UL (ref 149–390)
PMV BLD AUTO: 10.2 FL (ref 8.9–12.7)
PMV BLD AUTO: 9.6 FL (ref 8.9–12.7)
PMV BLD AUTO: 9.9 FL (ref 8.9–12.7)
POTASSIUM SERPL-SCNC: 3.7 MMOL/L (ref 3.5–5.3)
POTASSIUM SERPL-SCNC: 3.9 MMOL/L (ref 3.5–5.3)
RBC # BLD AUTO: 2.35 MILLION/UL (ref 3.81–5.12)
RBC # BLD AUTO: 2.4 MILLION/UL (ref 3.81–5.12)
RBC # BLD AUTO: 2.52 MILLION/UL (ref 3.81–5.12)
SODIUM SERPL-SCNC: 136 MMOL/L (ref 135–147)
SODIUM SERPL-SCNC: 137 MMOL/L (ref 135–147)
WBC # BLD AUTO: 14.22 THOUSAND/UL (ref 4.31–10.16)
WBC # BLD AUTO: 16.81 THOUSAND/UL (ref 4.31–10.16)
WBC # BLD AUTO: 16.9 THOUSAND/UL (ref 4.31–10.16)

## 2025-06-06 PROCEDURE — 71275 CT ANGIOGRAPHY CHEST: CPT

## 2025-06-06 PROCEDURE — 85027 COMPLETE CBC AUTOMATED: CPT

## 2025-06-06 PROCEDURE — 99024 POSTOP FOLLOW-UP VISIT: CPT | Performed by: OBSTETRICS & GYNECOLOGY

## 2025-06-06 PROCEDURE — 83735 ASSAY OF MAGNESIUM: CPT

## 2025-06-06 PROCEDURE — 85025 COMPLETE CBC W/AUTO DIFF WBC: CPT

## 2025-06-06 PROCEDURE — 74174 CTA ABD&PLVS W/CONTRAST: CPT

## 2025-06-06 PROCEDURE — 80048 BASIC METABOLIC PNL TOTAL CA: CPT

## 2025-06-06 RX ORDER — HYDROMORPHONE HCL/PF 1 MG/ML
1 SYRINGE (ML) INJECTION EVERY 2 HOUR PRN
Status: DISCONTINUED | OUTPATIENT
Start: 2025-06-06 | End: 2025-06-11

## 2025-06-06 RX ORDER — OXYCODONE AND ACETAMINOPHEN 5; 325 MG/1; MG/1
1 TABLET ORAL EVERY 6 HOURS PRN
Refills: 0 | Status: ACTIVE | OUTPATIENT
Start: 2025-06-06 | End: 2025-06-08

## 2025-06-06 RX ORDER — PANTOPRAZOLE SODIUM 20 MG/1
20 TABLET, DELAYED RELEASE ORAL
Status: DISCONTINUED | OUTPATIENT
Start: 2025-06-06 | End: 2025-06-12 | Stop reason: HOSPADM

## 2025-06-06 RX ORDER — PROMETHAZINE HYDROCHLORIDE 25 MG/ML
6.25 INJECTION, SOLUTION INTRAMUSCULAR; INTRAVENOUS EVERY 6 HOURS PRN
Status: ACTIVE | OUTPATIENT
Start: 2025-06-06 | End: 2025-06-08

## 2025-06-06 RX ORDER — SENNOSIDES 8.6 MG
1 TABLET ORAL
Status: DISCONTINUED | OUTPATIENT
Start: 2025-06-06 | End: 2025-06-10

## 2025-06-06 RX ORDER — HYDROMORPHONE HCL/PF 1 MG/ML
0.5 SYRINGE (ML) INJECTION ONCE
Status: COMPLETED | OUTPATIENT
Start: 2025-06-06 | End: 2025-06-06

## 2025-06-06 RX ORDER — MAGNESIUM SULFATE HEPTAHYDRATE 40 MG/ML
2 INJECTION, SOLUTION INTRAVENOUS ONCE
Status: COMPLETED | OUTPATIENT
Start: 2025-06-06 | End: 2025-06-06

## 2025-06-06 RX ADMIN — ALPRAZOLAM 0.5 MG: 0.5 TABLET ORAL at 21:46

## 2025-06-06 RX ADMIN — PANTOPRAZOLE SODIUM 20 MG: 40 TABLET, DELAYED RELEASE ORAL at 15:28

## 2025-06-06 RX ADMIN — ALPRAZOLAM 0.5 MG: 0.5 TABLET ORAL at 02:22

## 2025-06-06 RX ADMIN — ONDANSETRON 4 MG: 2 INJECTION INTRAMUSCULAR; INTRAVENOUS at 18:41

## 2025-06-06 RX ADMIN — METOCLOPRAMIDE 5 MG: 5 INJECTION, SOLUTION INTRAMUSCULAR; INTRAVENOUS at 02:36

## 2025-06-06 RX ADMIN — ROPIVACAINE HYDROCHLORIDE: 2 INJECTION, SOLUTION EPIDURAL; INFILTRATION at 17:18

## 2025-06-06 RX ADMIN — IOHEXOL 85 ML: 350 INJECTION, SOLUTION INTRAVENOUS at 14:50

## 2025-06-06 RX ADMIN — ONDANSETRON 4 MG: 2 INJECTION INTRAMUSCULAR; INTRAVENOUS at 07:36

## 2025-06-06 RX ADMIN — PANTOPRAZOLE SODIUM 20 MG: 40 TABLET, DELAYED RELEASE ORAL at 06:41

## 2025-06-06 RX ADMIN — SENNOSIDES 8.6 MG: 8.6 TABLET, FILM COATED ORAL at 21:40

## 2025-06-06 RX ADMIN — ACETAMINOPHEN 1000 MG: 10 INJECTION INTRAVENOUS at 10:47

## 2025-06-06 RX ADMIN — SIMETHICONE 80 MG: 80 TABLET, CHEWABLE ORAL at 18:41

## 2025-06-06 RX ADMIN — ACETAMINOPHEN 1000 MG: 10 INJECTION INTRAVENOUS at 02:17

## 2025-06-06 RX ADMIN — ROPIVACAINE HYDROCHLORIDE: 2 INJECTION, SOLUTION EPIDURAL; INFILTRATION at 08:51

## 2025-06-06 RX ADMIN — HEPARIN SODIUM 5000 UNITS: 5000 INJECTION INTRAVENOUS; SUBCUTANEOUS at 21:40

## 2025-06-06 RX ADMIN — HYDROMORPHONE HYDROCHLORIDE 1 MG: 1 INJECTION, SOLUTION INTRAMUSCULAR; INTRAVENOUS; SUBCUTANEOUS at 15:30

## 2025-06-06 RX ADMIN — METOCLOPRAMIDE 5 MG: 5 INJECTION, SOLUTION INTRAMUSCULAR; INTRAVENOUS at 21:53

## 2025-06-06 RX ADMIN — FLUOXETINE HYDROCHLORIDE 40 MG: 20 CAPSULE ORAL at 08:43

## 2025-06-06 RX ADMIN — HEPARIN SODIUM 5000 UNITS: 5000 INJECTION INTRAVENOUS; SUBCUTANEOUS at 15:28

## 2025-06-06 RX ADMIN — SODIUM CHLORIDE, SODIUM LACTATE, POTASSIUM CHLORIDE, AND CALCIUM CHLORIDE 100 ML/HR: .6; .31; .03; .02 INJECTION, SOLUTION INTRAVENOUS at 20:40

## 2025-06-06 RX ADMIN — MAGNESIUM SULFATE HEPTAHYDRATE 2 G: 40 INJECTION, SOLUTION INTRAVENOUS at 06:44

## 2025-06-06 RX ADMIN — HYDROMORPHONE HYDROCHLORIDE 1 MG: 1 INJECTION, SOLUTION INTRAMUSCULAR; INTRAVENOUS; SUBCUTANEOUS at 06:44

## 2025-06-06 RX ADMIN — SODIUM CHLORIDE, SODIUM LACTATE, POTASSIUM CHLORIDE, AND CALCIUM CHLORIDE 100 ML/HR: .6; .31; .03; .02 INJECTION, SOLUTION INTRAVENOUS at 02:23

## 2025-06-06 RX ADMIN — HYDROMORPHONE HYDROCHLORIDE 0.5 MG: 1 INJECTION, SOLUTION INTRAMUSCULAR; INTRAVENOUS; SUBCUTANEOUS at 02:33

## 2025-06-06 RX ADMIN — HYDROMORPHONE HYDROCHLORIDE 1 MG: 1 INJECTION, SOLUTION INTRAMUSCULAR; INTRAVENOUS; SUBCUTANEOUS at 18:41

## 2025-06-06 NOTE — PROGRESS NOTES
Progress Note - GYN Oncology   Name: Ewa Hernandes 59 y.o. 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   Assessment & Plan  Malignant neoplasm of both ovaries (HCC)  Status post 4 cycles of neoadjuvant chemotherapy. Bevacizumab held with last cycle in anticipation of surgery on 6/4.   - s/p ex lap, SARAY, debulking on 6/4; not yet meeting post-op milestones  - maintain epidural   - pain currently poorly controlled on epidural after bolus from anesthesia ON, scheduled Tylenol, and PRN Percocet; adding IV dilaudid 1mg q2h PRN, continue following anesthesia recommendations  - S/p NGT, Miguel replaced on 6/5/25 after failed void trial   - CLD, not yet tolerating PO  - SQH 5000U TID  Essential hypertension, benign  Hold ACE inhibitor perioperatively. Monitor BPs. Resume amlodipine first once needed postoperatively.  Major depressive disorder, recurrent episode, mild (HCC)  Stable mood. No SI/HI. Fluoxetine and alprazolam PRN ordered  GERD (gastroesophageal reflux disease)  Resume Protonix when tolerating PO  Asthma  Home albuterol is ordered  Anemia  Hgb 9.1 -> 8.1 today, consider afternoon repeat to continue monitoring closely with poorly controlled pain    GYNECOLOGY  Subjective   Ewa reports 8/10 pain overnight. She endorses occasional nausea and one episode of vomiting. She was able to drink a small amount of fluid She is not yet ambulating or passing flatus. She denies shortness of breath or chest pain.    Objective :  Temp:  [98.1 °F (36.7 °C)-99.2 °F (37.3 °C)] 99 °F (37.2 °C)  HR:  [] 87  BP: (117-142)/(64-87) 136/79  Resp:  [17-19] 19  SpO2:  [95 %-96 %] 95 %  O2 Device: None (Room air)  Nasal Cannula O2 Flow Rate (L/min):  [2 L/min] 2 L/min    Physical Exam  Vitals  reviewed.   Constitutional:       General: She is not in acute distress.     Appearance: She is ill-appearing.   HENT:      Nose:      Comments: NGT in place; cannister with 400cc dark brown fluid    Cardiovascular:      Rate and Rhythm: Normal rate.   Pulmonary:      Effort: Pulmonary effort is normal. No respiratory distress.      Comments: Nasal cannula in place; 2L  Abdominal:      General: There is no distension.      Palpations: Abdomen is soft.      Tenderness: There is abdominal tenderness (significant tenderness to palpation).      Comments: PAIGE drain x2 in place draining serosanguinous fluid (Left 150cc, Right 90cc in 24 hr)  Vertical midline incision without erythema    Genitourinary:     Comments: Miguel catheter in place draining clear yellow urine (0.6cc/kg/hr)    Musculoskeletal:      Comments: SCDs in place     Neurological:      Mental Status: She is alert.       Ina Horowitz MD  OBGYN PGY-3  06/06/25  6:41 AM

## 2025-06-06 NOTE — ASSESSMENT & PLAN NOTE
Hgb 9.1 -> 8.1 today, consider afternoon repeat to continue monitoring closely with poorly controlled pain

## 2025-06-06 NOTE — ANESTHESIA POST-OP FOLLOW-UP NOTE
Patient: Ewa Hernandes  Procedure(s):  EXAM UNDER ANESTHESIA, EXPLORATORY LAPAROTOMY, RADICAL HYSTERECTOMY, BILATERAL SALPINGO-OOPHORECTOMY, PELVIC PERITONEXTOMY WITH EN BLOC LOW ANTERIOR RECTOSIGMOID RESECTION, APPENDECTOMY, SPLENIC FLEXURE MOBILIZATION, GASTROCOLING OMENTECTOMY, INDICATED SPLENECTOMY, RESECTION SMALL BOWEL MESENTERIC NODULES  Vitals:    06/06/25 0339   BP: 136/79   Pulse:    Resp: 19   Temp: 99 °F (37.2 °C)   SpO2:      Patient with epidural. Pain lower part of incision. Improved with bolus.  Still NPO will maintain epidural

## 2025-06-06 NOTE — ANESTHESIA POST-OP FOLLOW-UP NOTE
Patient: Ewa Hernandes  Procedure(s):  EXAM UNDER ANESTHESIA, EXPLORATORY LAPAROTOMY, RADICAL HYSTERECTOMY, BILATERAL SALPINGO-OOPHORECTOMY, PELVIC PERITONEXTOMY WITH EN BLOC LOW ANTERIOR RECTOSIGMOID RESECTION, APPENDECTOMY, SPLENIC FLEXURE MOBILIZATION, GASTROCOLING OMENTECTOMY, INDICATED SPLENECTOMY, RESECTION SMALL BOWEL MESENTERIC NODULES  Vitals:    06/05/25 1909   BP: 131/87   Pulse: 102   Resp: 19   Temp: 98.1 °F (36.7 °C)   SpO2: 95%         Patient c/o pain. Ropivaine .05 5 ml given

## 2025-06-06 NOTE — PLAN OF CARE
Problem: PAIN - ADULT  Goal: Verbalizes/displays adequate comfort level or baseline comfort level  Description: Interventions:  - Encourage patient to monitor pain and request assistance  - Assess pain using appropriate pain scale  - Administer analgesics as ordered based on type and severity of pain and evaluate response  - Implement non-pharmacological measures as appropriate and evaluate response  - Consider cultural and social influences on pain and pain management  - Notify physician/advanced practitioner if interventions unsuccessful or patient reports new pain  - Educate patient/family on pain management process including their role and importance of  reporting pain   - Provide non-pharmacologic/complimentary pain relief interventions  Outcome: Progressing     Problem: INFECTION - ADULT  Goal: Absence or prevention of progression during hospitalization  Description: INTERVENTIONS:  - Assess and monitor for signs and symptoms of infection  - Monitor lab/diagnostic results  - Monitor all insertion sites, i.e. indwelling lines, tubes, and drains  - Monitor endotracheal if appropriate and nasal secretions for changes in amount and color  - [place] appropriate cooling/warming therapies per order  - Administer medications as ordered  - Instruct and encourage patient and family to use good hand hygiene technique  - Identify and instruct in appropriate isolation precautions for identified infection/condition  Outcome: Progressing  Goal: Absence of fever/infection during neutropenic period  Description: INTERVENTIONS:  - Monitor WBC  - Perform strict hand hygiene  - Limit to healthy visitors only  - No plants, dried, fresh or silk flowers with chaves in patient room  Outcome: Progressing     Problem: SAFETY ADULT  Goal: Patient will remain free of falls  Description: INTERVENTIONS:  - Educate patient/family on patient safety including physical limitations  - Instruct patient to call for assistance with activity   -  Consider consulting OT/PT to assist with strengthening/mobility based on AM PAC & JH-HLM score  - Consult OT/PT to assist with strengthening/mobility   - Keep Call bell within reach  - Keep bed low and locked with side rails adjusted as appropriate  - Keep care items and personal belongings within reach  - Initiate and maintain comfort rounds  - Apply yellow socks and bracelet for high fall risk patients  - Consider moving patient to room near nurses station  Outcome: Progressing  Goal: Maintain or return to baseline ADL function  Description: INTERVENTIONS:  -  Assess patient's ability to carry out ADLs; assess patient's baseline for ADL function and identify physical deficits which impact ability to perform ADLs (bathing, care of mouth/teeth, toileting, grooming, dressing, etc.)  - Assess/evaluate cause of self-care deficits   - Assess range of motion  - Assess patient's mobility; develop plan if impaired  - Assess patient's need for assistive devices and provide as appropriate  - Encourage maximum independence but intervene and supervise when necessary  - Involve family in performance of ADLs  - Assess for home care needs following discharge   - Consider OT consult to assist with ADL evaluation and planning for discharge  - Provide patient education as appropriate  - Monitor functional capacity and physical performance, use of AM PAC & JH-HLM   - Monitor gait, balance and fatigue with ambulation    Outcome: Progressing  Goal: Maintains/Returns to pre admission functional level  Description: INTERVENTIONS:  - Perform AM-PAC 6 Click Basic Mobility/ Daily Activity assessment daily.  - Set and communicate daily mobility goal to care team and patient/family/caregiver.   - Collaborate with rehabilitation services on mobility goals if consulted  - Out of bed for toileting  - Record patient progress and toleration of activity level   Outcome: Progressing     Problem: DISCHARGE PLANNING  Goal: Discharge to home or other  facility with appropriate resources  Description: INTERVENTIONS:  - Identify barriers to discharge w/patient and caregiver  - Arrange for needed discharge resources and transportation as appropriate  - Identify discharge learning needs (meds, wound care, etc.)  - Arrange for interpretive services to assist at discharge as needed  - Refer to Case Management Department for coordinating discharge planning if the patient needs post-hospital services based on physician/advanced practitioner order or complex needs related to functional status, cognitive ability, or social support system  Outcome: Progressing     Problem: Knowledge Deficit  Goal: Patient/family/caregiver demonstrates understanding of disease process, treatment plan, medications, and discharge instructions  Description: Complete learning assessment and assess knowledge base.  Interventions:  - Provide teaching at level of understanding  - Provide teaching via preferred learning methods  Outcome: Progressing     Problem: RESPIRATORY - ADULT  Goal: Achieves optimal ventilation and oxygenation  Description: INTERVENTIONS:  - Assess for changes in respiratory status  - Assess for changes in mentation and behavior  - Position to facilitate oxygenation and minimize respiratory effort  - Oxygen administered by appropriate delivery if ordered  - Initiate smoking cessation education as indicated  - Encourage broncho-pulmonary hygiene including cough, deep breathe, Incentive Spirometry  - Assess the need for suctioning and aspirate as needed  - Assess and instruct to report SOB or any respiratory difficulty  - Respiratory Therapy support as indicated  Outcome: Progressing     Problem: GASTROINTESTINAL - ADULT  Goal: Minimal or absence of nausea and/or vomiting  Description: INTERVENTIONS:  - Administer IV fluids if ordered to ensure adequate hydration  - Maintain NPO status until nausea and vomiting are resolved  - Nasogastric tube if ordered  - Administer ordered  antiemetic medications as needed  - Provide nonpharmacologic comfort measures as appropriate  - Advance diet as tolerated, if ordered  - Consider nutrition services referral to assist patient with adequate nutrition and appropriate food choices  Outcome: Progressing     Problem: GENITOURINARY - ADULT  Goal: Absence of urinary retention  Description: INTERVENTIONS:  - Assess patient’s ability to void and empty bladder  - Monitor I/O  - Bladder scan as needed  - Discuss with physician/AP medications to alleviate retention as needed  - Discuss catheterization for long term situations as appropriate  Outcome: Progressing  Goal: Urinary catheter remains patent  Description: INTERVENTIONS:  - Assess patency of urinary catheter  - If patient has a chronic mendes, consider changing catheter if non-functioning  - Follow guidelines for intermittent irrigation of non-functioning urinary catheter  Outcome: Progressing     Problem: Prexisting or High Potential for Compromised Skin Integrity  Goal: Skin integrity is maintained or improved  Description: INTERVENTIONS:  - Identify patients at risk for skin breakdown  - Assess and monitor skin integrity including under and around medical devices   - Assess and monitor nutrition and hydration status  - Monitor labs  - Assess for incontinence   - Turn and reposition patient  - Assist with mobility/ambulation  - Relieve pressure over tamra prominences   - Avoid friction and shearing  - Provide appropriate hygiene as needed including keeping skin clean and dry  - Evaluate need for skin moisturizer/barrier cream  - Collaborate with interdisciplinary team  - Patient/family teaching  - Consider wound care consult

## 2025-06-06 NOTE — ASSESSMENT & PLAN NOTE
Status post 4 cycles of neoadjuvant chemotherapy. Bevacizumab held with last cycle in anticipation of surgery on 6/4.   - s/p ex lap, SARAY, debulking on 6/4; not yet meeting post-op milestones  - maintain epidural   - pain currently poorly controlled on epidural after bolus from anesthesia ON, scheduled Tylenol, and PRN Percocet; adding IV dilaudid 1mg q2h PRN, continue following anesthesia recommendations  - S/p NGT, Miguel replaced on 6/5/25 after failed void trial   - CLD, not yet tolerating PO  - SQH 5000U TID

## 2025-06-06 NOTE — MALNUTRITION/BMI
This medical record reflects one or more clinical indicators suggestive of malnutrition and/or morbid obesity.    Malnutrition Findings:   Adult Malnutrition type: Chronic illness  Adult Degree of Malnutrition: Malnutrition of moderate degree  Malnutrition Characteristics: Inadequate energy, Muscle loss, Fat loss, Weight loss                360 Statement: Moderate malnutrition r/t catabolic illness as evidenced by 19% unplanned wt loss since 7/17/24, consuming < 75% energy intake compared to estimated energy needs > 1 month, mild fat/muscle wasting observed at orbital/temple areas. Currently on cl liq diet    BMI Findings:           Body mass index is 23.25 kg/m².     See Nutrition note dated 6/6/25 for additional details.  Completed nutrition assessment is viewable in the nutrition documentation.

## 2025-06-06 NOTE — PLAN OF CARE
Problem: PAIN - ADULT  Goal: Verbalizes/displays adequate comfort level or baseline comfort level  Description: Interventions:  - Encourage patient to monitor pain and request assistance  - Assess pain using appropriate pain scale  - Administer analgesics as ordered based on type and severity of pain and evaluate response  - Implement non-pharmacological measures as appropriate and evaluate response  - Consider cultural and social influences on pain and pain management  - Notify physician/advanced practitioner if interventions unsuccessful or patient reports new pain  - Educate patient/family on pain management process including their role and importance of  reporting pain   - Provide non-pharmacologic/complimentary pain relief interventions  Outcome: Progressing     Problem: INFECTION - ADULT  Goal: Absence or prevention of progression during hospitalization  Description: INTERVENTIONS:  - Assess and monitor for signs and symptoms of infection  - Monitor lab/diagnostic results  - Monitor all insertion sites, i.e. indwelling lines, tubes, and drains  - Monitor endotracheal if appropriate and nasal secretions for changes in amount and color  - [place] appropriate cooling/warming therapies per order  - Administer medications as ordered  - Instruct and encourage patient and family to use good hand hygiene technique  - Identify and instruct in appropriate isolation precautions for identified infection/condition  Outcome: Progressing  Goal: Absence of fever/infection during neutropenic period  Description: INTERVENTIONS:  - Monitor WBC  - Perform strict hand hygiene  - Limit to healthy visitors only  - No plants, dried, fresh or silk flowers with chaves in patient room  Outcome: Progressing     Problem: SAFETY ADULT  Goal: Patient will remain free of falls  Description: INTERVENTIONS:  - Educate patient/family on patient safety including physical limitations  - Instruct patient to call for assistance with activity   -  Consider consulting OT/PT to assist with strengthening/mobility based on AM PAC & JH-HLM score  - Consult OT/PT to assist with strengthening/mobility   - Keep Call bell within reach  - Keep bed low and locked with side rails adjusted as appropriate  - Keep care items and personal belongings within reach  - Initiate and maintain comfort rounds  - Make Fall Risk Sign visible to staff  - Offer Toileting every 2 Hours, in advance of need  - Initiate/Maintain bed/chair alarm  - Obtain necessary fall risk management equipment: socks, alarm  - Apply yellow socks and bracelet for high fall risk patients  - Consider moving patient to room near nurses station  Outcome: Progressing  Goal: Maintain or return to baseline ADL function  Description: INTERVENTIONS:  -  Assess patient's ability to carry out ADLs; assess patient's baseline for ADL function and identify physical deficits which impact ability to perform ADLs (bathing, care of mouth/teeth, toileting, grooming, dressing, etc.)  - Assess/evaluate cause of self-care deficits   - Assess range of motion  - Assess patient's mobility; develop plan if impaired  - Assess patient's need for assistive devices and provide as appropriate  - Encourage maximum independence but intervene and supervise when necessary  - Involve family in performance of ADLs  - Assess for home care needs following discharge   - Consider OT consult to assist with ADL evaluation and planning for discharge  - Provide patient education as appropriate  - Monitor functional capacity and physical performance, use of AM PAC & JH-HLM   - Monitor gait, balance and fatigue with ambulation    Outcome: Progressing  Goal: Maintains/Returns to pre admission functional level  Description: INTERVENTIONS:  - Perform AM-PAC 6 Click Basic Mobility/ Daily Activity assessment daily.  - Set and communicate daily mobility goal to care team and patient/family/caregiver.   - Collaborate with rehabilitation services on mobility  goals if consulted  - Perform Range of Motion 4 times a day.  - Reposition patient every 2 hours.  - Dangle patient 3 times a day  - Stand patient 3 times a day  - Ambulate patient 3 times a day  - Out of bed to chair 3 times a day   - Out of bed for meals 3 times a day  - Out of bed for toileting  - Record patient progress and toleration of activity level   Outcome: Progressing     Problem: DISCHARGE PLANNING  Goal: Discharge to home or other facility with appropriate resources  Description: INTERVENTIONS:  - Identify barriers to discharge w/patient and caregiver  - Arrange for needed discharge resources and transportation as appropriate  - Identify discharge learning needs (meds, wound care, etc.)  - Arrange for interpretive services to assist at discharge as needed  - Refer to Case Management Department for coordinating discharge planning if the patient needs post-hospital services based on physician/advanced practitioner order or complex needs related to functional status, cognitive ability, or social support system  Outcome: Progressing     Problem: Knowledge Deficit  Goal: Patient/family/caregiver demonstrates understanding of disease process, treatment plan, medications, and discharge instructions  Description: Complete learning assessment and assess knowledge base.  Interventions:  - Provide teaching at level of understanding  - Provide teaching via preferred learning methods  Outcome: Progressing     Problem: RESPIRATORY - ADULT  Goal: Achieves optimal ventilation and oxygenation  Description: INTERVENTIONS:  - Assess for changes in respiratory status  - Assess for changes in mentation and behavior  - Position to facilitate oxygenation and minimize respiratory effort  - Oxygen administered by appropriate delivery if ordered  - Initiate smoking cessation education as indicated  - Encourage broncho-pulmonary hygiene including cough, deep breathe, Incentive Spirometry  - Assess the need for suctioning and  aspirate as needed  - Assess and instruct to report SOB or any respiratory difficulty  - Respiratory Therapy support as indicated  Outcome: Progressing     Problem: GASTROINTESTINAL - ADULT  Goal: Minimal or absence of nausea and/or vomiting  Description: INTERVENTIONS:  - Administer IV fluids if ordered to ensure adequate hydration  - Maintain NPO status until nausea and vomiting are resolved  - Nasogastric tube if ordered  - Administer ordered antiemetic medications as needed  - Provide nonpharmacologic comfort measures as appropriate  - Advance diet as tolerated, if ordered  - Consider nutrition services referral to assist patient with adequate nutrition and appropriate food choices  Outcome: Progressing     Problem: GENITOURINARY - ADULT  Goal: Absence of urinary retention  Description: INTERVENTIONS:  - Assess patient’s ability to void and empty bladder  - Monitor I/O  - Bladder scan as needed  - Discuss with physician/AP medications to alleviate retention as needed  - Discuss catheterization for long term situations as appropriate  Outcome: Progressing  Goal: Urinary catheter remains patent  Description: INTERVENTIONS:  - Assess patency of urinary catheter  - If patient has a chronic mendes, consider changing catheter if non-functioning  - Follow guidelines for intermittent irrigation of non-functioning urinary catheter  Outcome: Progressing     Problem: Prexisting or High Potential for Compromised Skin Integrity  Goal: Skin integrity is maintained or improved  Description: INTERVENTIONS:  - Identify patients at risk for skin breakdown  - Assess and monitor skin integrity including under and around medical devices   - Assess and monitor nutrition and hydration status  - Monitor labs  - Assess for incontinence   - Turn and reposition patient  - Assist with mobility/ambulation  - Relieve pressure over tamra prominences   - Avoid friction and shearing  - Provide appropriate hygiene as needed including keeping skin  clean and dry  - Evaluate need for skin moisturizer/barrier cream  - Collaborate with interdisciplinary team  - Patient/family teaching  - Consider wound care consult    Assess:  - Review Nader scale daily  - Clean and moisturize skin every day  - Inspect skin when repositioning, toileting, and assisting with ADLS  - Assess under medical devices such as PAIGE drain, wound dressing every shift  - Assess extremities for adequate circulation and sensation     Bed Management:  - Have minimal linens on bed & keep smooth, unwrinkled  - Change linens as needed when moist or perspiring  - Avoid sitting or lying in one position for more than 2 hours while in bed?Keep HOB at 30degrees   - Toileting:  - Offer bedside commode  - Assess for incontinence every 2 hours  - Use incontinent care products after each incontinent episode such as     Activity:  - Mobilize patient 4 times a day  - Encourage activity and walks on unit  - Encourage or provide ROM exercises   - Turn and reposition patient every 2 Hours  - Use appropriate equipment to lift or move patient in bed  - Instruct/ Assist with weight shifting every 2 when out of bed in chair  - Consider limitation of chair time 1 hour intervals    Skin Care:  - Avoid use of baby powder, tape, friction and shearing, hot water or constrictive clothing  - Relieve pressure over bony prominences using   - Do not massage red bony areas    Next Steps:  - Teach patient strategies to minimize risks such as   - Consider consults to  interdisciplinary teams such as   Outcome: Progressing     Problem: Nutrition/Hydration-ADULT  Goal: Nutrient/Hydration intake appropriate for improving, restoring or maintaining nutritional needs  Description: Monitor and assess patient's nutrition/hydration status for malnutrition. Collaborate with interdisciplinary team and initiate plan and interventions as ordered.  Monitor patient's weight and dietary intake as ordered or per policy. Utilize nutrition  screening tool and intervene as necessary. Determine patient's food preferences and provide high-protein, high-caloric foods as appropriate.     INTERVENTIONS:  - Monitor oral intake, urinary output, labs, and treatment plans  - Assess nutrition and hydration status and recommend course of action  - Evaluate amount of meals eaten  - Assist patient with eating if necessary   - Allow adequate time for meals  - Recommend/ encourage appropriate diets, oral nutritional supplements, and vitamin/mineral supplements  - Order, calculate, and assess calorie counts as needed  - Recommend, monitor, and adjust tube feedings and TPN/PPN based on assessed needs  - Assess need for intravenous fluids  - Provide specific nutrition/hydration education as appropriate  - Include patient/family/caregiver in decisions related to nutrition  Outcome: Progressing

## 2025-06-06 NOTE — PLAN OF CARE
Problem: PAIN - ADULT  Goal: Verbalizes/displays adequate comfort level or baseline comfort level  Description: Interventions:  - Encourage patient to monitor pain and request assistance  - Assess pain using appropriate pain scale  - Administer analgesics as ordered based on type and severity of pain and evaluate response  - Implement non-pharmacological measures as appropriate and evaluate response  - Consider cultural and social influences on pain and pain management  - Notify physician/advanced practitioner if interventions unsuccessful or patient reports new pain  - Educate patient/family on pain management process including their role and importance of  reporting pain   - Provide non-pharmacologic/complimentary pain relief interventions  Outcome: Progressing     Problem: INFECTION - ADULT  Goal: Absence or prevention of progression during hospitalization  Description: INTERVENTIONS:  - Assess and monitor for signs and symptoms of infection  - Monitor lab/diagnostic results  - Monitor all insertion sites, i.e. indwelling lines, tubes, and drains  - Monitor endotracheal if appropriate and nasal secretions for changes in amount and color  - [place] appropriate cooling/warming therapies per order  - Administer medications as ordered  - Instruct and encourage patient and family to use good hand hygiene technique  - Identify and instruct in appropriate isolation precautions for identified infection/condition  Outcome: Progressing  Goal: Absence of fever/infection during neutropenic period  Description: INTERVENTIONS:  - Monitor WBC  - Perform strict hand hygiene  - Limit to healthy visitors only  - No plants, dried, fresh or silk flowers with chaves in patient room  Outcome: Progressing     Problem: SAFETY ADULT  Goal: Patient will remain free of falls  Description: INTERVENTIONS:  - Educate patient/family on patient safety including physical limitations  - Instruct patient to call for assistance with activity   -  Consider consulting OT/PT to assist with strengthening/mobility based on AM PAC & JH-HLM score  - Consult OT/PT to assist with strengthening/mobility   - Keep Call bell within reach  - Keep bed low and locked with side rails adjusted as appropriate  - Keep care items and personal belongings within reach  - Initiate and maintain comfort rounds  - Make Fall Risk Sign visible to staff  - Offer Toileting every 2 Hours, in advance of need  - Initiate/Maintain bed alarm  - Obtain necessary fall risk management equipment  - Apply yellow socks and bracelet for high fall risk patients  - Consider moving patient to room near nurses station  Outcome: Progressing  Goal: Maintain or return to baseline ADL function  Description: INTERVENTIONS:  -  Assess patient's ability to carry out ADLs; assess patient's baseline for ADL function and identify physical deficits which impact ability to perform ADLs (bathing, care of mouth/teeth, toileting, grooming, dressing, etc.)  - Assess/evaluate cause of self-care deficits   - Assess range of motion  - Assess patient's mobility; develop plan if impaired  - Assess patient's need for assistive devices and provide as appropriate  - Encourage maximum independence but intervene and supervise when necessary  - Involve family in performance of ADLs  - Assess for home care needs following discharge   - Consider OT consult to assist with ADL evaluation and planning for discharge  - Provide patient education as appropriate  - Monitor functional capacity and physical performance, use of AM PAC & JH-HLM   - Monitor gait, balance and fatigue with ambulation    Outcome: Progressing  Goal: Maintains/Returns to pre admission functional level  Description: INTERVENTIONS:  - Perform AM-PAC 6 Click Basic Mobility/ Daily Activity assessment daily.  - Set and communicate daily mobility goal to care team and patient/family/caregiver.   - Collaborate with rehabilitation services on mobility goals if consulted  -  Reposition patient every 2 hours.  - Stand patient 4 times a day  - Ambulate patient 4 times a day  - Out of bed to chair 4 times a day   - Out of bed for meals 3 times a day  - Out of bed for toileting  - Record patient progress and toleration of activity level   Outcome: Progressing     Problem: DISCHARGE PLANNING  Goal: Discharge to home or other facility with appropriate resources  Description: INTERVENTIONS:  - Identify barriers to discharge w/patient and caregiver  - Arrange for needed discharge resources and transportation as appropriate  - Identify discharge learning needs (meds, wound care, etc.)  - Arrange for interpretive services to assist at discharge as needed  - Refer to Case Management Department for coordinating discharge planning if the patient needs post-hospital services based on physician/advanced practitioner order or complex needs related to functional status, cognitive ability, or social support system  Outcome: Progressing     Problem: Knowledge Deficit  Goal: Patient/family/caregiver demonstrates understanding of disease process, treatment plan, medications, and discharge instructions  Description: Complete learning assessment and assess knowledge base.  Interventions:  - Provide teaching at level of understanding  - Provide teaching via preferred learning methods  Outcome: Progressing     Problem: RESPIRATORY - ADULT  Goal: Achieves optimal ventilation and oxygenation  Description: INTERVENTIONS:  - Assess for changes in respiratory status  - Assess for changes in mentation and behavior  - Position to facilitate oxygenation and minimize respiratory effort  - Oxygen administered by appropriate delivery if ordered  - Initiate smoking cessation education as indicated  - Encourage broncho-pulmonary hygiene including cough, deep breathe, Incentive Spirometry  - Assess the need for suctioning and aspirate as needed  - Assess and instruct to report SOB or any respiratory difficulty  - Respiratory  Therapy support as indicated  Outcome: Progressing     Problem: GASTROINTESTINAL - ADULT  Goal: Minimal or absence of nausea and/or vomiting  Description: INTERVENTIONS:  - Administer IV fluids if ordered to ensure adequate hydration  - Maintain NPO status until nausea and vomiting are resolved  - Nasogastric tube if ordered  - Administer ordered antiemetic medications as needed  - Provide nonpharmacologic comfort measures as appropriate  - Advance diet as tolerated, if ordered  - Consider nutrition services referral to assist patient with adequate nutrition and appropriate food choices  Outcome: Progressing     Problem: GENITOURINARY - ADULT  Goal: Absence of urinary retention  Description: INTERVENTIONS:  - Assess patient’s ability to void and empty bladder  - Monitor I/O  - Bladder scan as needed  - Discuss with physician/AP medications to alleviate retention as needed  - Discuss catheterization for long term situations as appropriate  Outcome: Progressing  Goal: Urinary catheter remains patent  Description: INTERVENTIONS:  - Assess patency of urinary catheter  - If patient has a chronic mendes, consider changing catheter if non-functioning  - Follow guidelines for intermittent irrigation of non-functioning urinary catheter  Outcome: Progressing     Problem: Prexisting or High Potential for Compromised Skin Integrity  Goal: Skin integrity is maintained or improved  Description: INTERVENTIONS:  - Identify patients at risk for skin breakdown  - Assess and monitor skin integrity including under and around medical devices   - Assess and monitor nutrition and hydration status  - Monitor labs  - Assess for incontinence   - Turn and reposition patient  - Assist with mobility/ambulation  - Relieve pressure over tamra prominences   - Avoid friction and shearing  - Provide appropriate hygiene as needed including keeping skin clean and dry  - Evaluate need for skin moisturizer/barrier cream  - Collaborate with  interdisciplinary team  - Patient/family teaching  - Consider wound care consult    Assess:  - Review Nader scale daily  - Clean and moisturize skin  - Inspect skin when repositioning, toileting, and assisting with ADLS  - Assess under medical devices  - Assess extremities for adequate circulation and sensation     Bed Management:  - Have minimal linens on bed & keep smooth, unwrinkled  - Change linens as needed when moist or perspiring  - Avoid sitting or lying in one position for more than 2 hours while in bed?Keep HOB at 30 degrees   - Toileting:  - Offer bedside commode  - Assess for incontinence  - Use incontinent care products after each incontinent episode    Activity:  - Mobilize patient 4 times a day  - Encourage activity and walks on unit  - Encourage or provide ROM exercises   - Turn and reposition patient every 2 Hours  - Use appropriate equipment to lift or move patient in bed  - Instruct/ Assist with weight shifting every 2 hours when out of bed in chair  - Consider limitation of chair time 2 hour intervals    Skin Care:  - Avoid use of baby powder, tape, friction and shearing, hot water or constrictive clothing  - Relieve pressure over bony prominences  - Do not massage red bony areas    Next Steps:  - Teach patient strategies to minimize risks  - Consider consults to  interdisciplinary teams   Outcome: Progressing

## 2025-06-06 NOTE — PROGRESS NOTES
Chart reviewed  Pt interviewed and examined  Pt with 8/10 pain post op.        No nausea and vomiting.    Paresthesias   lower extremities    Getting dilaudid PRN    Examined epidural site that is clean and intact   Bolused epidural with 5 mL 2% lido with some efficacy     Will increase bolus dose and encourage pt to use as often as possible     D/w nursing and Gyn team         Yan Macedo,

## 2025-06-07 LAB
ANION GAP SERPL CALCULATED.3IONS-SCNC: 8 MMOL/L (ref 4–13)
BUN SERPL-MCNC: 9 MG/DL (ref 5–25)
CALCIUM SERPL-MCNC: 8.8 MG/DL (ref 8.4–10.2)
CHLORIDE SERPL-SCNC: 104 MMOL/L (ref 96–108)
CO2 SERPL-SCNC: 25 MMOL/L (ref 21–32)
CREAT SERPL-MCNC: 0.59 MG/DL (ref 0.6–1.3)
ERYTHROCYTE [DISTWIDTH] IN BLOOD BY AUTOMATED COUNT: 18.6 % (ref 11.6–15.1)
GFR SERPL CREATININE-BSD FRML MDRD: 101 ML/MIN/1.73SQ M
GLUCOSE SERPL-MCNC: 71 MG/DL (ref 65–140)
HCT VFR BLD AUTO: 24.7 % (ref 34.8–46.1)
HGB BLD-MCNC: 8 G/DL (ref 11.5–15.4)
MAGNESIUM SERPL-MCNC: 1.7 MG/DL (ref 1.9–2.7)
MCH RBC QN AUTO: 32.4 PG (ref 26.8–34.3)
MCHC RBC AUTO-ENTMCNC: 32.4 G/DL (ref 31.4–37.4)
MCV RBC AUTO: 100 FL (ref 82–98)
PLATELET # BLD AUTO: 299 THOUSANDS/UL (ref 149–390)
PMV BLD AUTO: 10.4 FL (ref 8.9–12.7)
POTASSIUM SERPL-SCNC: 3.6 MMOL/L (ref 3.5–5.3)
RBC # BLD AUTO: 2.47 MILLION/UL (ref 3.81–5.12)
SODIUM SERPL-SCNC: 137 MMOL/L (ref 135–147)
WBC # BLD AUTO: 16.46 THOUSAND/UL (ref 4.31–10.16)

## 2025-06-07 PROCEDURE — 80048 BASIC METABOLIC PNL TOTAL CA: CPT | Performed by: OBSTETRICS & GYNECOLOGY

## 2025-06-07 PROCEDURE — 83735 ASSAY OF MAGNESIUM: CPT | Performed by: OBSTETRICS & GYNECOLOGY

## 2025-06-07 PROCEDURE — 85027 COMPLETE CBC AUTOMATED: CPT | Performed by: OBSTETRICS & GYNECOLOGY

## 2025-06-07 RX ORDER — POTASSIUM CHLORIDE 1500 MG/1
40 TABLET, EXTENDED RELEASE ORAL ONCE
Status: DISCONTINUED | OUTPATIENT
Start: 2025-06-07 | End: 2025-06-07

## 2025-06-07 RX ORDER — ENOXAPARIN SODIUM 100 MG/ML
40 INJECTION SUBCUTANEOUS
Status: DISCONTINUED | OUTPATIENT
Start: 2025-06-08 | End: 2025-06-12 | Stop reason: HOSPADM

## 2025-06-07 RX ORDER — POTASSIUM CHLORIDE 14.9 MG/ML
20 INJECTION INTRAVENOUS
Status: COMPLETED | OUTPATIENT
Start: 2025-06-07 | End: 2025-06-07

## 2025-06-07 RX ORDER — ACETAMINOPHEN 10 MG/ML
1000 INJECTION, SOLUTION INTRAVENOUS EVERY 8 HOURS
Status: COMPLETED | OUTPATIENT
Start: 2025-06-07 | End: 2025-06-10

## 2025-06-07 RX ORDER — POTASSIUM CHLORIDE 29.8 MG/ML
40 INJECTION INTRAVENOUS ONCE
Status: DISCONTINUED | OUTPATIENT
Start: 2025-06-07 | End: 2025-06-07 | Stop reason: SDUPTHER

## 2025-06-07 RX ORDER — SODIUM CHLORIDE, SODIUM LACTATE, POTASSIUM CHLORIDE, CALCIUM CHLORIDE 600; 310; 30; 20 MG/100ML; MG/100ML; MG/100ML; MG/100ML
75 INJECTION, SOLUTION INTRAVENOUS CONTINUOUS
Status: DISCONTINUED | OUTPATIENT
Start: 2025-06-07 | End: 2025-06-09

## 2025-06-07 RX ADMIN — ROPIVACAINE HYDROCHLORIDE: 2 INJECTION, SOLUTION EPIDURAL; INFILTRATION at 01:16

## 2025-06-07 RX ADMIN — POTASSIUM CHLORIDE 20 MEQ: 14.9 INJECTION, SOLUTION INTRAVENOUS at 10:11

## 2025-06-07 RX ADMIN — Medication: at 12:56

## 2025-06-07 RX ADMIN — ACETAMINOPHEN 1000 MG: 10 INJECTION INTRAVENOUS at 19:34

## 2025-06-07 RX ADMIN — SODIUM CHLORIDE, SODIUM LACTATE, POTASSIUM CHLORIDE, AND CALCIUM CHLORIDE 100 ML/HR: .6; .31; .03; .02 INJECTION, SOLUTION INTRAVENOUS at 10:13

## 2025-06-07 RX ADMIN — FLUOXETINE HYDROCHLORIDE 40 MG: 20 CAPSULE ORAL at 10:12

## 2025-06-07 RX ADMIN — HEPARIN SODIUM 5000 UNITS: 5000 INJECTION INTRAVENOUS; SUBCUTANEOUS at 05:25

## 2025-06-07 RX ADMIN — PANTOPRAZOLE SODIUM 20 MG: 40 TABLET, DELAYED RELEASE ORAL at 05:25

## 2025-06-07 RX ADMIN — ONDANSETRON 4 MG: 2 INJECTION INTRAMUSCULAR; INTRAVENOUS at 15:43

## 2025-06-07 RX ADMIN — ONDANSETRON 4 MG: 2 INJECTION INTRAMUSCULAR; INTRAVENOUS at 05:31

## 2025-06-07 RX ADMIN — SODIUM CHLORIDE, SODIUM LACTATE, POTASSIUM CHLORIDE, AND CALCIUM CHLORIDE 100 ML/HR: .6; .31; .03; .02 INJECTION, SOLUTION INTRAVENOUS at 16:50

## 2025-06-07 RX ADMIN — HYDROMORPHONE HYDROCHLORIDE 1 MG: 1 INJECTION, SOLUTION INTRAMUSCULAR; INTRAVENOUS; SUBCUTANEOUS at 06:37

## 2025-06-07 RX ADMIN — POTASSIUM CHLORIDE 20 MEQ: 14.9 INJECTION, SOLUTION INTRAVENOUS at 12:03

## 2025-06-07 RX ADMIN — ACETAMINOPHEN 1000 MG: 10 INJECTION INTRAVENOUS at 12:03

## 2025-06-07 RX ADMIN — HYDROMORPHONE HYDROCHLORIDE 1 MG: 1 INJECTION, SOLUTION INTRAMUSCULAR; INTRAVENOUS; SUBCUTANEOUS at 10:16

## 2025-06-07 RX ADMIN — PANTOPRAZOLE SODIUM 20 MG: 40 TABLET, DELAYED RELEASE ORAL at 16:50

## 2025-06-07 NOTE — PROGRESS NOTES
For questions/concerns on this patient, please reach out to the following: [facility]- GynOnc Resident       Progress Note - GYN Oncology   Name: Ewa Hernandes 59 y.o. female I MRN: [id number]  Unit/Bed#: E5 -01 I Date of Admission: 6/4/2025   Date of Service: 6/7/2025 I Hospital Day: 3     Assessment & Plan  Malignant neoplasm of both ovaries (HCC)  60yo who is POD#3 status post ex lap, SARAY, BSO, LAR, splenectomy, and debulking for high-grade serous ovarian cancer (status post neoadjuvant chemotherapy).  Nausea and vomiting and abdominal distention concerning for evolving ileus.  Suboptimal postoperative pain control.  Stable vital signs.    - N.p.o.  - LR at 100 cc/h as maintenance fluids while n.p.o.  - Appreciate anesthesia assistance, will request their evaluation for pain control while epidural is in place  - Avoiding NSAIDs in the setting of LAR  - Continue prophylactic heparin, will hold tomorrow morning in anticipation of possible epidural removal  - Miguel removed on POD #1 status post failed trial of void, maintain Miguel until epidural is removed  Essential hypertension, benign  Hold ACE inhibitor perioperatively. Monitor BPs. Resume amlodipine first once needed postoperatively.  Major depressive disorder, recurrent episode, mild (HCC)  Stable mood. No SI/HI. Fluoxetine and alprazolam PRN ordered  GERD (gastroesophageal reflux disease)  Resume Protonix when tolerating PO  Asthma  Home albuterol is ordered  Anemia  Stable hemoglobin compared to yesterday.  Status post CT yesterday with no acute findings    GYNECOLOGY  Subjective   Feeling okay this morning.  She is having some pain in her abdomen, but it is improved compared to yesterday.  She has new nausea and vomiting, which started this morning.  Throwing up makes her pain worse.  No chest pain or shortness of breath.    Objective :  Temp:  [98.5 °F (36.9 °C)-99.6 °F (37.6 °C)] 99.1 °F (37.3 °C)  HR:  [] 95  BP: (106-146)/(66-90) 124/71  Resp:   [16-20] 16  SpO2:  [92 %-96 %] 96 %  O2 Device: None (Room air)    Physical Exam  Vitals and nursing note reviewed.   Constitutional:       General: She is not in acute distress.     Appearance: She is well-developed.   HENT:      Head: Normocephalic and atraumatic.     Eyes:      Conjunctiva/sclera: Conjunctivae normal.       Cardiovascular:      Rate and Rhythm: Normal rate and regular rhythm.      Heart sounds: No murmur heard.  Pulmonary:      Effort: Pulmonary effort is normal. No respiratory distress.      Breath sounds: Normal breath sounds.   Abdominal:      Palpations: Abdomen is soft.      Tenderness: There is no abdominal tenderness.      Comments: Soft, mildly tender, moderately distended.  2 PAIGE drains in place with serosanguineous fluid.  Midline incision with dressing in place, clean, dry, no strikethrough.     Musculoskeletal:         General: No swelling.      Cervical back: Neck supple.     Skin:     General: Skin is warm and dry.      Capillary Refill: Capillary refill takes less than 2 seconds.     Neurological:      Mental Status: She is alert.     Psychiatric:         Mood and Affect: Mood normal.         Lab Results: I have reviewed the following results:CBC/BMP:   .     06/07/25  0507   WBC 16.46*   HGB 8.0*   HCT 24.7*      SODIUM 137   K 3.6      CO2 25   BUN 9   CREATININE 0.59*   GLUC 71   MG 1.7*        Delilah Tripp  Gynecologic Oncology Fellow

## 2025-06-07 NOTE — PROGRESS NOTES
"Patient is post op from exploratory laparotomy by gyn/onc.  She is stating that her pain score is 7/10 bolus administered through the pump. Will maintain current infusion parameters. Patient encouraged to accept supplemental narcotic pain control. I also encouraged her to get up out of bed to the chair more frequently.   She is also experiencing nausea and vomiting. Prior to my arrival this morning she vomited a significant amount of bilious appearing emesis. I did pass this along to the OB resident on call and suggested a flat plate of the abdomen to rule out post op Ileus.  Vital signs remain stable /77   Pulse 96   Temp 98.5 °F (36.9 °C)   Resp 20   Ht 5' 9\" (1.753 m)   Wt 71.4 kg (157 lb 6.5 oz)   SpO2 92%   BMI 23.25 kg/m²     "

## 2025-06-07 NOTE — ASSESSMENT & PLAN NOTE
58yo who is POD#3 status post ex lap, SARAY, BSO, LAR, splenectomy, and debulking for high-grade serous ovarian cancer (status post neoadjuvant chemotherapy).  Nausea and vomiting and abdominal distention concerning for evolving ileus.  Suboptimal postoperative pain control.  Stable vital signs.    - N.p.o.  - LR at 100 cc/h as maintenance fluids while n.p.o.  - Appreciate anesthesia assistance, will request their evaluation for pain control while epidural is in place  - Avoiding NSAIDs in the setting of LAR  - Continue prophylactic heparin, will hold tomorrow morning in anticipation of possible epidural removal  - Miguel removed on POD #1 status post failed trial of void, maintain Miguel until epidural is removed

## 2025-06-07 NOTE — PLAN OF CARE
Problem: PAIN - ADULT  Goal: Verbalizes/displays adequate comfort level or baseline comfort level  Description: Interventions:  - Encourage patient to monitor pain and request assistance  - Assess pain using appropriate pain scale  - Administer analgesics as ordered based on type and severity of pain and evaluate response  - Implement non-pharmacological measures as appropriate and evaluate response  - Consider cultural and social influences on pain and pain management  - Notify physician/advanced practitioner if interventions unsuccessful or patient reports new pain  - Educate patient/family on pain management process including their role and importance of  reporting pain   - Provide non-pharmacologic/complimentary pain relief interventions  Outcome: Progressing     Problem: INFECTION - ADULT  Goal: Absence or prevention of progression during hospitalization  Description: INTERVENTIONS:  - Assess and monitor for signs and symptoms of infection  - Monitor lab/diagnostic results  - Monitor all insertion sites, i.e. indwelling lines, tubes, and drains  - Monitor endotracheal if appropriate and nasal secretions for changes in amount and color  - [place] appropriate cooling/warming therapies per order  - Administer medications as ordered  - Instruct and encourage patient and family to use good hand hygiene technique  - Identify and instruct in appropriate isolation precautions for identified infection/condition  Outcome: Progressing  Goal: Absence of fever/infection during neutropenic period  Description: INTERVENTIONS:  - Monitor WBC  - Perform strict hand hygiene  - Limit to healthy visitors only  - No plants, dried, fresh or silk flowers with chaves in patient room  Outcome: Progressing     Problem: SAFETY ADULT  Goal: Patient will remain free of falls  Description: INTERVENTIONS:  - Educate patient/family on patient safety including physical limitations  - Instruct patient to call for assistance with activity   -  Consider consulting OT/PT to assist with strengthening/mobility based on AM PAC & JH-HLM score  - Consult OT/PT to assist with strengthening/mobility   - Keep Call bell within reach  - Keep bed low and locked with side rails adjusted as appropriate  - Keep care items and personal belongings within reach  - Initiate and maintain comfort rounds  - Make Fall Risk Sign visible to staff  - Offer Toileting every  Hours, in advance of need  - Initiate/Maintain alarm  - Obtain necessary fall risk management equipment:   - Apply yellow socks and bracelet for high fall risk patients  - Consider moving patient to room near nurses station  Outcome: Progressing  Goal: Maintain or return to baseline ADL function  Description: INTERVENTIONS:  -  Assess patient's ability to carry out ADLs; assess patient's baseline for ADL function and identify physical deficits which impact ability to perform ADLs (bathing, care of mouth/teeth, toileting, grooming, dressing, etc.)  - Assess/evaluate cause of self-care deficits   - Assess range of motion  - Assess patient's mobility; develop plan if impaired  - Assess patient's need for assistive devices and provide as appropriate  - Encourage maximum independence but intervene and supervise when necessary  - Involve family in performance of ADLs  - Assess for home care needs following discharge   - Consider OT consult to assist with ADL evaluation and planning for discharge  - Provide patient education as appropriate  - Monitor functional capacity and physical performance, use of AM PAC & JH-HLM   - Monitor gait, balance and fatigue with ambulation    Outcome: Progressing  Goal: Maintains/Returns to pre admission functional level  Description: INTERVENTIONS:  - Perform AM-PAC 6 Click Basic Mobility/ Daily Activity assessment daily.  - Set and communicate daily mobility goal to care team and patient/family/caregiver.   - Collaborate with rehabilitation services on mobility goals if consulted  -  Perform Range of Motion  times a day.  - Reposition patient every  hours.  - Dangle patient  times a day  - Stand patient  times a day  - Ambulate patient  times a day  - Out of bed to chair  times a day   - Out of bed for meals  times a day  - Out of bed for toileting  - Record patient progress and toleration of activity level   Outcome: Progressing     Problem: DISCHARGE PLANNING  Goal: Discharge to home or other facility with appropriate resources  Description: INTERVENTIONS:  - Identify barriers to discharge w/patient and caregiver  - Arrange for needed discharge resources and transportation as appropriate  - Identify discharge learning needs (meds, wound care, etc.)  - Arrange for interpretive services to assist at discharge as needed  - Refer to Case Management Department for coordinating discharge planning if the patient needs post-hospital services based on physician/advanced practitioner order or complex needs related to functional status, cognitive ability, or social support system  Outcome: Progressing     Problem: Knowledge Deficit  Goal: Patient/family/caregiver demonstrates understanding of disease process, treatment plan, medications, and discharge instructions  Description: Complete learning assessment and assess knowledge base.  Interventions:  - Provide teaching at level of understanding  - Provide teaching via preferred learning methods  Outcome: Progressing     Problem: RESPIRATORY - ADULT  Goal: Achieves optimal ventilation and oxygenation  Description: INTERVENTIONS:  - Assess for changes in respiratory status  - Assess for changes in mentation and behavior  - Position to facilitate oxygenation and minimize respiratory effort  - Oxygen administered by appropriate delivery if ordered  - Initiate smoking cessation education as indicated  - Encourage broncho-pulmonary hygiene including cough, deep breathe, Incentive Spirometry  - Assess the need for suctioning and aspirate as needed  - Assess and  instruct to report SOB or any respiratory difficulty  - Respiratory Therapy support as indicated  Outcome: Progressing     Problem: GASTROINTESTINAL - ADULT  Goal: Minimal or absence of nausea and/or vomiting  Description: INTERVENTIONS:  - Administer IV fluids if ordered to ensure adequate hydration  - Maintain NPO status until nausea and vomiting are resolved  - Nasogastric tube if ordered  - Administer ordered antiemetic medications as needed  - Provide nonpharmacologic comfort measures as appropriate  - Advance diet as tolerated, if ordered  - Consider nutrition services referral to assist patient with adequate nutrition and appropriate food choices  Outcome: Progressing     Problem: GENITOURINARY - ADULT  Goal: Absence of urinary retention  Description: INTERVENTIONS:  - Assess patient’s ability to void and empty bladder  - Monitor I/O  - Bladder scan as needed  - Discuss with physician/AP medications to alleviate retention as needed  - Discuss catheterization for long term situations as appropriate  Outcome: Progressing  Goal: Urinary catheter remains patent  Description: INTERVENTIONS:  - Assess patency of urinary catheter  - If patient has a chronic mendes, consider changing catheter if non-functioning  - Follow guidelines for intermittent irrigation of non-functioning urinary catheter  Outcome: Progressing     Problem: Prexisting or High Potential for Compromised Skin Integrity  Goal: Skin integrity is maintained or improved  Description: INTERVENTIONS:  - Identify patients at risk for skin breakdown  - Assess and monitor skin integrity including under and around medical devices   - Assess and monitor nutrition and hydration status  - Monitor labs  - Assess for incontinence   - Turn and reposition patient  - Assist with mobility/ambulation  - Relieve pressure over tamra prominences   - Avoid friction and shearing  - Provide appropriate hygiene as needed including keeping skin clean and dry  - Evaluate need  for skin moisturizer/barrier cream  - Collaborate with interdisciplinary team  - Patient/family teaching  - Consider wound care consult    Assess:  - Review Nader scale daily  - Clean and moisturize skin every   - Inspect skin when repositioning, toileting, and assisting with ADLS  - Assess under medical devices such as  every   - Assess extremities for adequate circulation and sensation     Bed Management:  - Have minimal linens on bed & keep smooth, unwrinkled  - Change linens as needed when moist or perspiring  - Avoid sitting or lying in one position for more than  hours while in bed?Keep HOB at degrees   - Toileting:  - Offer bedside commode  - Assess for incontinence every   - Use incontinent care products after each incontinent episode such as     Activity:  - Mobilize patient  times a day  - Encourage activity and walks on unit  - Encourage or provide ROM exercises   - Turn and reposition patient every  Hours  - Use appropriate equipment to lift or move patient in bed  - Instruct/ Assist with weight shifting every  when out of bed in chair  - Consider limitation of chair time  hour intervals    Skin Care:  - Avoid use of baby powder, tape, friction and shearing, hot water or constrictive clothing  - Relieve pressure over bony prominences using   - Do not massage red bony areas    Next Steps:  - Teach patient strategies to minimize risks such as   - Consider consults to  interdisciplinary teams such as  Outcome: Progressing     Problem: Nutrition/Hydration-ADULT  Goal: Nutrient/Hydration intake appropriate for improving, restoring or maintaining nutritional needs  Description: Monitor and assess patient's nutrition/hydration status for malnutrition. Collaborate with interdisciplinary team and initiate plan and interventions as ordered.  Monitor patient's weight and dietary intake as ordered or per policy. Utilize nutrition screening tool and intervene as necessary. Determine patient's food preferences and  provide high-protein, high-caloric foods as appropriate.     INTERVENTIONS:  - Monitor oral intake, urinary output, labs, and treatment plans  - Assess nutrition and hydration status and recommend course of action  - Evaluate amount of meals eaten  - Assist patient with eating if necessary   - Allow adequate time for meals  - Recommend/ encourage appropriate diets, oral nutritional supplements, and vitamin/mineral supplements  - Order, calculate, and assess calorie counts as needed  - Recommend, monitor, and adjust tube feedings and TPN/PPN based on assessed needs  - Assess need for intravenous fluids  - Provide specific nutrition/hydration education as appropriate  - Include patient/family/caregiver in decisions related to nutrition  Outcome: Progressing

## 2025-06-08 PROBLEM — K56.7 ILEUS (HCC): Status: ACTIVE | Noted: 2025-06-08

## 2025-06-08 LAB
ANION GAP SERPL CALCULATED.3IONS-SCNC: 12 MMOL/L (ref 4–13)
BUN SERPL-MCNC: 15 MG/DL (ref 5–25)
CALCIUM SERPL-MCNC: 8.9 MG/DL (ref 8.4–10.2)
CHLORIDE SERPL-SCNC: 105 MMOL/L (ref 96–108)
CO2 SERPL-SCNC: 21 MMOL/L (ref 21–32)
CREAT SERPL-MCNC: 0.63 MG/DL (ref 0.6–1.3)
ERYTHROCYTE [DISTWIDTH] IN BLOOD BY AUTOMATED COUNT: 17.9 % (ref 11.6–15.1)
GFR SERPL CREATININE-BSD FRML MDRD: 99 ML/MIN/1.73SQ M
GLUCOSE SERPL-MCNC: 52 MG/DL (ref 65–140)
HCT VFR BLD AUTO: 25.2 % (ref 34.8–46.1)
HGB BLD-MCNC: 8.2 G/DL (ref 11.5–15.4)
MAGNESIUM SERPL-MCNC: 1.7 MG/DL (ref 1.9–2.7)
MCH RBC QN AUTO: 32.9 PG (ref 26.8–34.3)
MCHC RBC AUTO-ENTMCNC: 32.5 G/DL (ref 31.4–37.4)
MCV RBC AUTO: 101 FL (ref 82–98)
PLATELET # BLD AUTO: 373 THOUSANDS/UL (ref 149–390)
PMV BLD AUTO: 10 FL (ref 8.9–12.7)
POTASSIUM SERPL-SCNC: 4.2 MMOL/L (ref 3.5–5.3)
RBC # BLD AUTO: 2.49 MILLION/UL (ref 3.81–5.12)
SODIUM SERPL-SCNC: 138 MMOL/L (ref 135–147)
WBC # BLD AUTO: 16.48 THOUSAND/UL (ref 4.31–10.16)

## 2025-06-08 PROCEDURE — 80048 BASIC METABOLIC PNL TOTAL CA: CPT

## 2025-06-08 PROCEDURE — 83735 ASSAY OF MAGNESIUM: CPT

## 2025-06-08 PROCEDURE — 85027 COMPLETE CBC AUTOMATED: CPT

## 2025-06-08 PROCEDURE — 99024 POSTOP FOLLOW-UP VISIT: CPT | Performed by: OBSTETRICS & GYNECOLOGY

## 2025-06-08 RX ORDER — HYDROMORPHONE HCL/PF 1 MG/ML
0.5 SYRINGE (ML) INJECTION ONCE
Status: COMPLETED | OUTPATIENT
Start: 2025-06-08 | End: 2025-06-08

## 2025-06-08 RX ORDER — CALCIUM CARBONATE 500 MG/1
1000 TABLET, CHEWABLE ORAL 3 TIMES DAILY PRN
Status: DISCONTINUED | OUTPATIENT
Start: 2025-06-08 | End: 2025-06-12 | Stop reason: HOSPADM

## 2025-06-08 RX ORDER — MAGNESIUM SULFATE HEPTAHYDRATE 40 MG/ML
2 INJECTION, SOLUTION INTRAVENOUS ONCE
Status: COMPLETED | OUTPATIENT
Start: 2025-06-08 | End: 2025-06-08

## 2025-06-08 RX ORDER — FAMOTIDINE 20 MG/1
20 TABLET, FILM COATED ORAL 2 TIMES DAILY
Status: DISCONTINUED | OUTPATIENT
Start: 2025-06-08 | End: 2025-06-10

## 2025-06-08 RX ADMIN — SODIUM CHLORIDE, SODIUM LACTATE, POTASSIUM CHLORIDE, AND CALCIUM CHLORIDE 100 ML/HR: .6; .31; .03; .02 INJECTION, SOLUTION INTRAVENOUS at 02:43

## 2025-06-08 RX ADMIN — ENOXAPARIN SODIUM 40 MG: 40 INJECTION SUBCUTANEOUS at 09:44

## 2025-06-08 RX ADMIN — PANTOPRAZOLE SODIUM 20 MG: 40 TABLET, DELAYED RELEASE ORAL at 16:58

## 2025-06-08 RX ADMIN — CALCIUM CARBONATE (ANTACID) CHEW TAB 500 MG 1000 MG: 500 CHEW TAB at 10:15

## 2025-06-08 RX ADMIN — ACETAMINOPHEN 1000 MG: 10 INJECTION INTRAVENOUS at 18:54

## 2025-06-08 RX ADMIN — ALBUTEROL SULFATE 2 PUFF: 90 AEROSOL, METERED RESPIRATORY (INHALATION) at 09:44

## 2025-06-08 RX ADMIN — FAMOTIDINE 20 MG: 20 TABLET, FILM COATED ORAL at 16:58

## 2025-06-08 RX ADMIN — FAMOTIDINE 20 MG: 20 TABLET, FILM COATED ORAL at 10:15

## 2025-06-08 RX ADMIN — HYDROMORPHONE HYDROCHLORIDE 0.5 MG: 1 INJECTION, SOLUTION INTRAMUSCULAR; INTRAVENOUS; SUBCUTANEOUS at 10:15

## 2025-06-08 RX ADMIN — FLUOXETINE HYDROCHLORIDE 40 MG: 20 CAPSULE ORAL at 09:44

## 2025-06-08 RX ADMIN — ALBUTEROL SULFATE 2 PUFF: 90 AEROSOL, METERED RESPIRATORY (INHALATION) at 01:15

## 2025-06-08 RX ADMIN — ACETAMINOPHEN 1000 MG: 10 INJECTION INTRAVENOUS at 02:46

## 2025-06-08 RX ADMIN — ACETAMINOPHEN 1000 MG: 10 INJECTION INTRAVENOUS at 11:51

## 2025-06-08 RX ADMIN — ONDANSETRON 4 MG: 2 INJECTION INTRAMUSCULAR; INTRAVENOUS at 09:44

## 2025-06-08 RX ADMIN — MAGNESIUM SULFATE HEPTAHYDRATE 2 G: 40 INJECTION, SOLUTION INTRAVENOUS at 09:44

## 2025-06-08 NOTE — PLAN OF CARE
Problem: PAIN - ADULT  Goal: Verbalizes/displays adequate comfort level or baseline comfort level  Description: Interventions:  - Encourage patient to monitor pain and request assistance  - Assess pain using appropriate pain scale  - Administer analgesics as ordered based on type and severity of pain and evaluate response  - Implement non-pharmacological measures as appropriate and evaluate response  - Consider cultural and social influences on pain and pain management  - Notify physician/advanced practitioner if interventions unsuccessful or patient reports new pain  - Educate patient/family on pain management process including their role and importance of  reporting pain   - Provide non-pharmacologic/complimentary pain relief interventions  Outcome: Progressing     Problem: INFECTION - ADULT  Goal: Absence or prevention of progression during hospitalization  Description: INTERVENTIONS:  - Assess and monitor for signs and symptoms of infection  - Monitor lab/diagnostic results  - Monitor all insertion sites, i.e. indwelling lines, tubes, and drains  - Monitor endotracheal if appropriate and nasal secretions for changes in amount and color  - [place] appropriate cooling/warming therapies per order  - Administer medications as ordered  - Instruct and encourage patient and family to use good hand hygiene technique  - Identify and instruct in appropriate isolation precautions for identified infection/condition  Outcome: Progressing  Goal: Absence of fever/infection during neutropenic period  Description: INTERVENTIONS:  - Monitor WBC  - Perform strict hand hygiene  - Limit to healthy visitors only  - No plants, dried, fresh or silk flowers with chaves in patient room  Outcome: Progressing     Problem: SAFETY ADULT  Goal: Patient will remain free of falls  Description: INTERVENTIONS:  - Educate patient/family on patient safety including physical limitations  - Instruct patient to call for assistance with activity   -  Consider consulting OT/PT to assist with strengthening/mobility based on AM PAC & JH-HLM score  - Consult OT/PT to assist with strengthening/mobility   - Keep Call bell within reach  - Keep bed low and locked with side rails adjusted as appropriate  - Keep care items and personal belongings within reach  - Initiate and maintain comfort rounds  - Make Fall Risk Sign visible to staff  - Offer Toileting every 2 Hours, in advance of need  - Obtain necessary fall risk management equipment  - Apply yellow socks and bracelet for high fall risk patients  - Consider moving patient to room near nurses station  Outcome: Progressing  Goal: Maintain or return to baseline ADL function  Description: INTERVENTIONS:  -  Assess patient's ability to carry out ADLs; assess patient's baseline for ADL function and identify physical deficits which impact ability to perform ADLs (bathing, care of mouth/teeth, toileting, grooming, dressing, etc.)  - Assess/evaluate cause of self-care deficits   - Assess range of motion  - Assess patient's mobility; develop plan if impaired  - Assess patient's need for assistive devices and provide as appropriate  - Encourage maximum independence but intervene and supervise when necessary  - Involve family in performance of ADLs  - Assess for home care needs following discharge   - Consider OT consult to assist with ADL evaluation and planning for discharge  - Provide patient education as appropriate  - Monitor functional capacity and physical performance, use of AM PAC & JH-HLM   - Monitor gait, balance and fatigue with ambulation    Outcome: Progressing  Goal: Maintains/Returns to pre admission functional level  Description: INTERVENTIONS:  - Perform AM-PAC 6 Click Basic Mobility/ Daily Activity assessment daily.  - Set and communicate daily mobility goal to care team and patient/family/caregiver.   - Collaborate with rehabilitation services on mobility goals if consulted  - Perform Range of Motion 3 times  a day.  - Reposition patient every 2 hours.  - Dangle patient 3 times a day  - Stand patient 3 times a day  - Ambulate patient 3 times a day  - Out of bed to chair 3 times a day   - Out of bed for meals 3 times a day  - Out of bed for toileting  - Record patient progress and toleration of activity level   Outcome: Progressing     Problem: DISCHARGE PLANNING  Goal: Discharge to home or other facility with appropriate resources  Description: INTERVENTIONS:  - Identify barriers to discharge w/patient and caregiver  - Arrange for needed discharge resources and transportation as appropriate  - Identify discharge learning needs (meds, wound care, etc.)  - Arrange for interpretive services to assist at discharge as needed  - Refer to Case Management Department for coordinating discharge planning if the patient needs post-hospital services based on physician/advanced practitioner order or complex needs related to functional status, cognitive ability, or social support system  Outcome: Progressing     Problem: Knowledge Deficit  Goal: Patient/family/caregiver demonstrates understanding of disease process, treatment plan, medications, and discharge instructions  Description: Complete learning assessment and assess knowledge base.  Interventions:  - Provide teaching at level of understanding  - Provide teaching via preferred learning methods  Outcome: Progressing     Problem: RESPIRATORY - ADULT  Goal: Achieves optimal ventilation and oxygenation  Description: INTERVENTIONS:  - Assess for changes in respiratory status  - Assess for changes in mentation and behavior  - Position to facilitate oxygenation and minimize respiratory effort  - Oxygen administered by appropriate delivery if ordered  - Initiate smoking cessation education as indicated  - Encourage broncho-pulmonary hygiene including cough, deep breathe, Incentive Spirometry  - Assess the need for suctioning and aspirate as needed  - Assess and instruct to report SOB or  any respiratory difficulty  - Respiratory Therapy support as indicated  Outcome: Progressing     Problem: GASTROINTESTINAL - ADULT  Goal: Minimal or absence of nausea and/or vomiting  Description: INTERVENTIONS:  - Administer IV fluids if ordered to ensure adequate hydration  - Maintain NPO status until nausea and vomiting are resolved  - Nasogastric tube if ordered  - Administer ordered antiemetic medications as needed  - Provide nonpharmacologic comfort measures as appropriate  - Advance diet as tolerated, if ordered  - Consider nutrition services referral to assist patient with adequate nutrition and appropriate food choices  Outcome: Progressing     Problem: GENITOURINARY - ADULT  Goal: Absence of urinary retention  Description: INTERVENTIONS:  - Assess patient’s ability to void and empty bladder  - Monitor I/O  - Bladder scan as needed  - Discuss with physician/AP medications to alleviate retention as needed  - Discuss catheterization for long term situations as appropriate  Outcome: Progressing  Goal: Urinary catheter remains patent  Description: INTERVENTIONS:  - Assess patency of urinary catheter  - If patient has a chronic mendes, consider changing catheter if non-functioning  - Follow guidelines for intermittent irrigation of non-functioning urinary catheter  Outcome: Progressing     Problem: Prexisting or High Potential for Compromised Skin Integrity  Goal: Skin integrity is maintained or improved  Description: INTERVENTIONS:  - Identify patients at risk for skin breakdown  - Assess and monitor skin integrity including under and around medical devices   - Assess and monitor nutrition and hydration status  - Monitor labs  - Assess for incontinence   - Turn and reposition patient  - Assist with mobility/ambulation  - Relieve pressure over tamra prominences   - Avoid friction and shearing  - Provide appropriate hygiene as needed including keeping skin clean and dry  - Evaluate need for skin  moisturizer/barrier cream  - Collaborate with interdisciplinary team  - Patient/family teaching  - Consider wound care consult    Assess:  - Review Nader scale daily  - Clean and moisturize skin every shift  - Inspect skin when repositioning, toileting, and assisting with ADLS  - Assess under medical devices   - Assess extremities for adequate circulation and sensation     Bed Management:  - Have minimal linens on bed & keep smooth, unwrinkled  - Change linens as needed when moist or perspiring  - Avoid sitting or lying in one position for more than 2 hours while in bed?Keep HOB at 30 degrees   - Toileting:  - Offer bedside commode  - Assess for incontinence every shift  - Use incontinent care products after each incontinent episode     Activity:  - Mobilize patient 3 times a day  - Encourage activity and walks on unit  - Encourage or provide ROM exercises   - Turn and reposition patient every 2 Hours  - Use appropriate equipment to lift or move patient in bed  - Instruct/ Assist with weight shifting every hour when out of bed in chair  - Consider limitation of chair time 1 hour intervals    Skin Care:  - Avoid use of baby powder, tape, friction and shearing, hot water or constrictive clothing  - Relieve pressure over bony prominences   - Do not massage red bony areas    Next Steps:  - Teach patient strategies to minimize risks   - Consider consults to  interdisciplinary teams   Outcome: Progressing     Problem: Nutrition/Hydration-ADULT  Goal: Nutrient/Hydration intake appropriate for improving, restoring or maintaining nutritional needs  Description: Monitor and assess patient's nutrition/hydration status for malnutrition. Collaborate with interdisciplinary team and initiate plan and interventions as ordered.  Monitor patient's weight and dietary intake as ordered or per policy. Utilize nutrition screening tool and intervene as necessary. Determine patient's food preferences and provide high-protein, high-caloric  foods as appropriate.     INTERVENTIONS:  - Monitor oral intake, urinary output, labs, and treatment plans  - Assess nutrition and hydration status and recommend course of action  - Evaluate amount of meals eaten  - Assist patient with eating if necessary   - Allow adequate time for meals  - Recommend/ encourage appropriate diets, oral nutritional supplements, and vitamin/mineral supplements  - Order, calculate, and assess calorie counts as needed  - Recommend, monitor, and adjust tube feedings and TPN/PPN based on assessed needs  - Assess need for intravenous fluids  - Provide specific nutrition/hydration education as appropriate  - Include patient/family/caregiver in decisions related to nutrition  Outcome: Progressing

## 2025-06-08 NOTE — ASSESSMENT & PLAN NOTE
60yo who is POD#4 status post ex lap, SARAY, BSO, LAR, splenectomy, and debulking for high-grade serous ovarian cancer (status post neoadjuvant chemotherapy).  Nausea and vomiting and abdominal distention concerning for evolving ileus.  Suboptimal postoperative pain control.  Stable vital signs.    - slighlty improved nausea. Minimal distension on exam. Will advance diet slowly  - LR at 100 cc/h as maintenance fluids   - Appreciate anesthesia assistance for pain control. PCA short acting. Encoureaged pushing more often vs basal rate   - Avoiding NSAIDs in the setting of LAR  - Continue prophylactic heparin,  - Miguel removed on POD #1 status post failed trial of void, repeat trial today

## 2025-06-08 NOTE — PLAN OF CARE
Problem: PAIN - ADULT  Goal: Verbalizes/displays adequate comfort level or baseline comfort level  Description: Interventions:  - Encourage patient to monitor pain and request assistance  - Assess pain using appropriate pain scale  - Administer analgesics as ordered based on type and severity of pain and evaluate response  - Implement non-pharmacological measures as appropriate and evaluate response  - Consider cultural and social influences on pain and pain management  - Notify physician/advanced practitioner if interventions unsuccessful or patient reports new pain  - Educate patient/family on pain management process including their role and importance of  reporting pain   - Provide non-pharmacologic/complimentary pain relief interventions  Outcome: Progressing     Problem: INFECTION - ADULT  Goal: Absence or prevention of progression during hospitalization  Description: INTERVENTIONS:  - Assess and monitor for signs and symptoms of infection  - Monitor lab/diagnostic results  - Monitor all insertion sites, i.e. indwelling lines, tubes, and drains  - Monitor endotracheal if appropriate and nasal secretions for changes in amount and color  - [employer] appropriate cooling/warming therapies per order  - Administer medications as ordered  - Instruct and encourage patient and family to use good hand hygiene technique  - Identify and instruct in appropriate isolation precautions for identified infection/condition  Outcome: Progressing  Goal: Absence of fever/infection during neutropenic period  Description: INTERVENTIONS:  - Monitor WBC  - Perform strict hand hygiene  - Limit to healthy visitors only  - No plants, dried, fresh or silk flowers with chaves in patient room  Outcome: Progressing     Problem: SAFETY ADULT  Goal: Patient will remain free of falls  Description: INTERVENTIONS:  - Educate patient/family on patient safety including physical limitations  - Instruct patient to call for assistance with activity   -  Consider consulting OT/PT to assist with strengthening/mobility based on AM PAC & JH-HLM score  - Consult OT/PT to assist with strengthening/mobility   - Keep Call bell within reach  - Keep bed low and locked with side rails adjusted as appropriate  - Keep care items and personal belongings within reach  - Initiate and maintain comfort rounds  - Make Fall Risk Sign visible to staff  - Offer Toileting every 2 Hours, in advance of need  - Obtain necessary fall risk management equipment  - Apply yellow socks and bracelet for high fall risk patients  - Consider moving patient to room near nurses station  Outcome: Progressing  Goal: Maintain or return to baseline ADL function  Description: INTERVENTIONS:  -  Assess patient's ability to carry out ADLs; assess patient's baseline for ADL function and identify physical deficits which impact ability to perform ADLs (bathing, care of mouth/teeth, toileting, grooming, dressing, etc.)  - Assess/evaluate cause of self-care deficits   - Assess range of motion  - Assess patient's mobility; develop plan if impaired  - Assess patient's need for assistive devices and provide as appropriate  - Encourage maximum independence but intervene and supervise when necessary  - Involve family in performance of ADLs  - Assess for home care needs following discharge   - Consider OT consult to assist with ADL evaluation and planning for discharge  - Provide patient education as appropriate  - Monitor functional capacity and physical performance, use of AM PAC & JH-HLM   - Monitor gait, balance and fatigue with ambulation    Outcome: Progressing  Goal: Maintains/Returns to pre admission functional level  Description: INTERVENTIONS:  - Perform AM-PAC 6 Click Basic Mobility/ Daily Activity assessment daily.  - Set and communicate daily mobility goal to care team and patient/family/caregiver.   - Collaborate with rehabilitation services on mobility goals if consulted  - Perform Range of Motion 3 times  a day.  - Reposition patient every 2 hours.  - Dangle patient 3 times a day  - Stand patient 3 times a day  - Ambulate patient 3 times a day  - Out of bed to chair 3 times a day   - Out of bed for meals 3 times a day  - Out of bed for toileting  - Record patient progress and toleration of activity level   Outcome: Progressing     Problem: DISCHARGE PLANNING  Goal: Discharge to home or other facility with appropriate resources  Description: INTERVENTIONS:  - Identify barriers to discharge w/patient and caregiver  - Arrange for needed discharge resources and transportation as appropriate  - Identify discharge learning needs (meds, wound care, etc.)  - Arrange for interpretive services to assist at discharge as needed  - Refer to Case Management Department for coordinating discharge planning if the patient needs post-hospital services based on physician/advanced practitioner order or complex needs related to functional status, cognitive ability, or social support system  Outcome: Progressing     Problem: Knowledge Deficit  Goal: Patient/family/caregiver demonstrates understanding of disease process, treatment plan, medications, and discharge instructions  Description: Complete learning assessment and assess knowledge base.  Interventions:  - Provide teaching at level of understanding  - Provide teaching via preferred learning methods  Outcome: Progressing     Problem: RESPIRATORY - ADULT  Goal: Achieves optimal ventilation and oxygenation  Description: INTERVENTIONS:  - Assess for changes in respiratory status  - Assess for changes in mentation and behavior  - Position to facilitate oxygenation and minimize respiratory effort  - Oxygen administered by appropriate delivery if ordered  - Initiate smoking cessation education as indicated  - Encourage broncho-pulmonary hygiene including cough, deep breathe, Incentive Spirometry  - Assess the need for suctioning and aspirate as needed  - Assess and instruct to report SOB or  any respiratory difficulty  - Respiratory Therapy support as indicated  Outcome: Progressing     Problem: GASTROINTESTINAL - ADULT  Goal: Minimal or absence of nausea and/or vomiting  Description: INTERVENTIONS:  - Administer IV fluids if ordered to ensure adequate hydration  - Maintain NPO status until nausea and vomiting are resolved  - Nasogastric tube if ordered  - Administer ordered antiemetic medications as needed  - Provide nonpharmacologic comfort measures as appropriate  - Advance diet as tolerated, if ordered  - Consider nutrition services referral to assist patient with adequate nutrition and appropriate food choices  Outcome: Progressing     Problem: GENITOURINARY - ADULT  Goal: Absence of urinary retention  Description: INTERVENTIONS:  - Assess patient’s ability to void and empty bladder  - Monitor I/O  - Bladder scan as needed  - Discuss with physician/AP medications to alleviate retention as needed  - Discuss catheterization for long term situations as appropriate  Outcome: Progressing  Goal: Urinary catheter remains patent  Description: INTERVENTIONS:  - Assess patency of urinary catheter  - If patient has a chronic mendes, consider changing catheter if non-functioning  - Follow guidelines for intermittent irrigation of non-functioning urinary catheter  Outcome: Progressing     Problem: Prexisting or High Potential for Compromised Skin Integrity  Goal: Skin integrity is maintained or improved  Description: INTERVENTIONS:  - Identify patients at risk for skin breakdown  - Assess and monitor skin integrity including under and around medical devices   - Assess and monitor nutrition and hydration status  - Monitor labs  - Assess for incontinence   - Turn and reposition patient  - Assist with mobility/ambulation  - Relieve pressure over tamra prominences   - Avoid friction and shearing  - Provide appropriate hygiene as needed including keeping skin clean and dry  - Evaluate need for skin  moisturizer/barrier cream  - Collaborate with interdisciplinary team  - Patient/family teaching  - Consider wound care consult    Assess:  - Review Nader scale daily  - Clean and moisturize skin every shift  - Inspect skin when repositioning, toileting, and assisting with ADLS  - Assess under medical devices   - Assess extremities for adequate circulation and sensation     Bed Management:  - Have minimal linens on bed & keep smooth, unwrinkled  - Change linens as needed when moist or perspiring  - Avoid sitting or lying in one position for more than 2 hours while in bed?Keep HOB at 30 degrees   - Toileting:  - Offer bedside commode  - Assess for incontinence every shift  - Use incontinent care products after each incontinent episode     Activity:  - Mobilize patient 3 times a day  - Encourage activity and walks on unit  - Encourage or provide ROM exercises   - Turn and reposition patient every 2 Hours  - Use appropriate equipment to lift or move patient in bed  - Instruct/ Assist with weight shifting every hour when out of bed in chair  - Consider limitation of chair time 1 hour intervals    Skin Care:  - Avoid use of baby powder, tape, friction and shearing, hot water or constrictive clothing  - Relieve pressure over bony prominences   - Do not massage red bony areas    Next Steps:  - Teach patient strategies to minimize risks   - Consider consults to  interdisciplinary teams   Outcome: Progressing     Problem: Nutrition/Hydration-ADULT  Goal: Nutrient/Hydration intake appropriate for improving, restoring or maintaining nutritional needs  Description: Monitor and assess patient's nutrition/hydration status for malnutrition. Collaborate with interdisciplinary team and initiate plan and interventions as ordered.  Monitor patient's weight and dietary intake as ordered or per policy. Utilize nutrition screening tool and intervene as necessary. Determine patient's food preferences and provide high-protein, high-caloric  foods as appropriate.     INTERVENTIONS:  - Monitor oral intake, urinary output, labs, and treatment plans  - Assess nutrition and hydration status and recommend course of action  - Evaluate amount of meals eaten  - Assist patient with eating if necessary   - Allow adequate time for meals  - Recommend/ encourage appropriate diets, oral nutritional supplements, and vitamin/mineral supplements  - Order, calculate, and assess calorie counts as needed  - Recommend, monitor, and adjust tube feedings and TPN/PPN based on assessed needs  - Assess need for intravenous fluids  - Provide specific nutrition/hydration education as appropriate  - Include patient/family/caregiver in decisions related to nutrition  Outcome: Progressing

## 2025-06-08 NOTE — PROGRESS NOTES
Progress Note - GYN Oncology   Name: Ewa Hernandes 59 y.o. female I MRN: [id number]  Unit/Bed#: E5 -01 I Date of Admission: 6/4/2025   Date of Service: 6/8/2025 I Hospital Day: 4     Assessment & Plan  Malignant neoplasm of both ovaries (HCC)  60yo who is POD#4 status post ex lap, SARAY, BSO, LAR, splenectomy, and debulking for high-grade serous ovarian cancer (status post neoadjuvant chemotherapy).  Nausea and vomiting and abdominal distention concerning for evolving ileus.  Suboptimal postoperative pain control.  Stable vital signs.    - slighlty improved nausea. Minimal distension on exam. Will advance diet slowly  - LR at 100 cc/h as maintenance fluids   - Appreciate anesthesia assistance for pain control. PCA short acting. Encoureaged pushing more often vs basal rate   - Avoiding NSAIDs in the setting of LAR  - Continue prophylactic heparin,  - Miguel removed on POD #1 status post failed trial of void, repeat trial today   Essential hypertension, benign  Monitor BPs. Resume amlodipine first once needed postoperatively.  Major depressive disorder, recurrent episode, mild (HCC)  Stable mood. No SI/HI. Fluoxetine and alprazolam PRN ordered  GERD (gastroesophageal reflux disease)  Resume Protonix when tolerating PO  Asthma  Home albuterol is ordered  Anemia  Stable hemoglobin compared to yesterday.  Status post CT yesterday with no acute findings  Ileus (HCC)  Imaging with fluid filled bowels and clinically with evolving ileus. Seems to be better this am.    Will advance to sips and monitor closely.       GYNECOLOGY  Subjective   Pt reports feeling well. PCA does take away pain but does not last long. Encouraged pushing more often. No nausea this am. Minimal OOB. No flatus.     Objective :  Temp:  [98 °F (36.7 °C)-99.1 °F (37.3 °C)] 98 °F (36.7 °C)  HR:  [80-98] 81  BP: (102-143)/(57-83) 102/57  Resp:  [16-19] 18  SpO2:  [91 %-96 %] 93 %  O2 Device: None (Room air)    Physical Exam  Vitals and nursing note  reviewed.   Constitutional:       General: She is not in acute distress.     Appearance: She is well-developed.   HENT:      Head: Normocephalic and atraumatic.     Eyes:      Conjunctiva/sclera: Conjunctivae normal.       Cardiovascular:      Rate and Rhythm: Normal rate.      Heart sounds: No murmur heard.  Pulmonary:      Effort: Pulmonary effort is normal. No respiratory distress.   Abdominal:      General: There is distension.      Palpations: Abdomen is soft.      Tenderness: There is no abdominal tenderness.     Musculoskeletal:         General: No swelling.      Cervical back: Neck supple.     Skin:     General: Skin is warm and dry.      Capillary Refill: Capillary refill takes less than 2 seconds.     Neurological:      Mental Status: She is alert.     Psychiatric:         Mood and Affect: Mood normal.           Lab Results: I have reviewed the following results:

## 2025-06-08 NOTE — NURSING NOTE
Per report, L PAIGE drain had little to no drainage overnight. R PAIGE had mild drainage. Clots noted in both PAIGE drain tubing. Milked tubing and noted 100cc drainage to R drain and 40cc to L drain

## 2025-06-08 NOTE — ASSESSMENT & PLAN NOTE
Imaging with fluid filled bowels and clinically with evolving ileus. Seems to be better this am.    Will advance to sips and monitor closely.

## 2025-06-09 LAB
ANION GAP SERPL CALCULATED.3IONS-SCNC: 10 MMOL/L (ref 4–13)
BUN SERPL-MCNC: 10 MG/DL (ref 5–25)
CALCIUM SERPL-MCNC: 8.4 MG/DL (ref 8.4–10.2)
CHLORIDE SERPL-SCNC: 104 MMOL/L (ref 96–108)
CO2 SERPL-SCNC: 22 MMOL/L (ref 21–32)
CREAT SERPL-MCNC: 0.52 MG/DL (ref 0.6–1.3)
ERYTHROCYTE [DISTWIDTH] IN BLOOD BY AUTOMATED COUNT: 17.6 % (ref 11.6–15.1)
GFR SERPL CREATININE-BSD FRML MDRD: 105 ML/MIN/1.73SQ M
GLUCOSE SERPL-MCNC: 76 MG/DL (ref 65–140)
HCT VFR BLD AUTO: 23.3 % (ref 34.8–46.1)
HGB BLD-MCNC: 7.4 G/DL (ref 11.5–15.4)
MAGNESIUM SERPL-MCNC: 1.6 MG/DL (ref 1.9–2.7)
MCH RBC QN AUTO: 32.6 PG (ref 26.8–34.3)
MCHC RBC AUTO-ENTMCNC: 31.8 G/DL (ref 31.4–37.4)
MCV RBC AUTO: 103 FL (ref 82–98)
PLATELET # BLD AUTO: 421 THOUSANDS/UL (ref 149–390)
PMV BLD AUTO: 10.4 FL (ref 8.9–12.7)
POTASSIUM SERPL-SCNC: 3.9 MMOL/L (ref 3.5–5.3)
RBC # BLD AUTO: 2.27 MILLION/UL (ref 3.81–5.12)
SODIUM SERPL-SCNC: 136 MMOL/L (ref 135–147)
WBC # BLD AUTO: 12.89 THOUSAND/UL (ref 4.31–10.16)

## 2025-06-09 PROCEDURE — 99024 POSTOP FOLLOW-UP VISIT: CPT | Performed by: OBSTETRICS & GYNECOLOGY

## 2025-06-09 PROCEDURE — 85027 COMPLETE CBC AUTOMATED: CPT

## 2025-06-09 PROCEDURE — 88307 TISSUE EXAM BY PATHOLOGIST: CPT | Performed by: PATHOLOGY

## 2025-06-09 PROCEDURE — 83735 ASSAY OF MAGNESIUM: CPT

## 2025-06-09 PROCEDURE — 88309 TISSUE EXAM BY PATHOLOGIST: CPT | Performed by: PATHOLOGY

## 2025-06-09 PROCEDURE — 88305 TISSUE EXAM BY PATHOLOGIST: CPT | Performed by: PATHOLOGY

## 2025-06-09 PROCEDURE — 80048 BASIC METABOLIC PNL TOTAL CA: CPT

## 2025-06-09 RX ORDER — SODIUM CHLORIDE, SODIUM LACTATE, POTASSIUM CHLORIDE, CALCIUM CHLORIDE 600; 310; 30; 20 MG/100ML; MG/100ML; MG/100ML; MG/100ML
20 INJECTION, SOLUTION INTRAVENOUS CONTINUOUS
Status: DISCONTINUED | OUTPATIENT
Start: 2025-06-09 | End: 2025-06-10

## 2025-06-09 RX ORDER — MAGNESIUM SULFATE HEPTAHYDRATE 40 MG/ML
4 INJECTION, SOLUTION INTRAVENOUS ONCE
Status: COMPLETED | OUTPATIENT
Start: 2025-06-09 | End: 2025-06-09

## 2025-06-09 RX ORDER — FLUTICASONE FUROATE AND VILANTEROL 100; 25 UG/1; UG/1
1 POWDER RESPIRATORY (INHALATION) DAILY
Status: DISCONTINUED | OUTPATIENT
Start: 2025-06-09 | End: 2025-06-12 | Stop reason: HOSPADM

## 2025-06-09 RX ORDER — MAGNESIUM SULFATE HEPTAHYDRATE 40 MG/ML
2 INJECTION, SOLUTION INTRAVENOUS ONCE
Status: COMPLETED | OUTPATIENT
Start: 2025-06-09 | End: 2025-06-09

## 2025-06-09 RX ADMIN — ALPRAZOLAM 0.5 MG: 0.5 TABLET ORAL at 00:03

## 2025-06-09 RX ADMIN — FAMOTIDINE 20 MG: 20 TABLET, FILM COATED ORAL at 08:24

## 2025-06-09 RX ADMIN — ACETAMINOPHEN 1000 MG: 10 INJECTION INTRAVENOUS at 19:15

## 2025-06-09 RX ADMIN — MAGNESIUM SULFATE HEPTAHYDRATE 2 G: 40 INJECTION, SOLUTION INTRAVENOUS at 15:54

## 2025-06-09 RX ADMIN — ENOXAPARIN SODIUM 40 MG: 40 INJECTION SUBCUTANEOUS at 08:24

## 2025-06-09 RX ADMIN — FLUOXETINE HYDROCHLORIDE 40 MG: 20 CAPSULE ORAL at 08:24

## 2025-06-09 RX ADMIN — PANTOPRAZOLE SODIUM 20 MG: 40 TABLET, DELAYED RELEASE ORAL at 15:54

## 2025-06-09 RX ADMIN — METOCLOPRAMIDE 5 MG: 5 INJECTION, SOLUTION INTRAMUSCULAR; INTRAVENOUS at 23:34

## 2025-06-09 RX ADMIN — HYDROMORPHONE HYDROCHLORIDE 1 MG: 1 INJECTION, SOLUTION INTRAMUSCULAR; INTRAVENOUS; SUBCUTANEOUS at 07:28

## 2025-06-09 RX ADMIN — SODIUM CHLORIDE, SODIUM LACTATE, POTASSIUM CHLORIDE, AND CALCIUM CHLORIDE 75 ML/HR: .6; .31; .03; .02 INJECTION, SOLUTION INTRAVENOUS at 15:54

## 2025-06-09 RX ADMIN — ACETAMINOPHEN 1000 MG: 10 INJECTION INTRAVENOUS at 11:58

## 2025-06-09 RX ADMIN — SODIUM CHLORIDE, SODIUM LACTATE, POTASSIUM CHLORIDE, AND CALCIUM CHLORIDE 20 ML/HR: .6; .31; .03; .02 INJECTION, SOLUTION INTRAVENOUS at 18:05

## 2025-06-09 RX ADMIN — ACETAMINOPHEN 1000 MG: 10 INJECTION INTRAVENOUS at 03:31

## 2025-06-09 RX ADMIN — SIMETHICONE 80 MG: 80 TABLET, CHEWABLE ORAL at 23:44

## 2025-06-09 RX ADMIN — FLUTICASONE FUROATE AND VILANTEROL TRIFENATATE 1 PUFF: 100; 25 POWDER RESPIRATORY (INHALATION) at 10:10

## 2025-06-09 RX ADMIN — HYDROMORPHONE HYDROCHLORIDE 1 MG: 1 INJECTION, SOLUTION INTRAMUSCULAR; INTRAVENOUS; SUBCUTANEOUS at 19:15

## 2025-06-09 RX ADMIN — PANTOPRAZOLE SODIUM 20 MG: 40 TABLET, DELAYED RELEASE ORAL at 07:25

## 2025-06-09 RX ADMIN — CALCIUM CARBONATE (ANTACID) CHEW TAB 500 MG 1000 MG: 500 CHEW TAB at 00:03

## 2025-06-09 RX ADMIN — MAGNESIUM SULFATE HEPTAHYDRATE 4 G: 40 INJECTION, SOLUTION INTRAVENOUS at 08:31

## 2025-06-09 RX ADMIN — FAMOTIDINE 20 MG: 20 TABLET, FILM COATED ORAL at 18:05

## 2025-06-09 RX ADMIN — SODIUM CHLORIDE, SODIUM LACTATE, POTASSIUM CHLORIDE, AND CALCIUM CHLORIDE 20 ML/HR: .6; .31; .03; .02 INJECTION, SOLUTION INTRAVENOUS at 16:02

## 2025-06-09 RX ADMIN — ALPRAZOLAM 0.5 MG: 0.5 TABLET ORAL at 23:35

## 2025-06-09 RX ADMIN — HYDROMORPHONE HYDROCHLORIDE 1 MG: 1 INJECTION, SOLUTION INTRAMUSCULAR; INTRAVENOUS; SUBCUTANEOUS at 14:47

## 2025-06-09 RX ADMIN — CALCIUM CARBONATE (ANTACID) CHEW TAB 500 MG 1000 MG: 500 CHEW TAB at 19:55

## 2025-06-09 NOTE — CASE MANAGEMENT
Case Management Discharge Planning Note    Patient name Ewa Hernandes  Location [facility] /E5 -* MRN [id number]  : 1965 Date 2025       Current Admission Date: 2025  Current Admission Diagnosis:Malignant neoplasm of both ovaries (HCC)   Patient Active Problem List    Diagnosis Date Noted    Ileus (HCC) 2025    Anemia 2025    Neutropenia (HCC) 2025    Drug induced constipation 2025    GERD (gastroesophageal reflux disease) 2025    Malignant neoplasm of both ovaries (HCC) 2025    Mild persistent asthma with (acute) exacerbation 2025    Chemotherapy-induced nausea 01/10/2024    Financial difficulties 10/14/2023    Panic disorder 2023    Age-related cataract of both eyes 10/05/2022    Essential hypertension, benign 2020    Major depressive disorder, recurrent episode, mild (HCC) 2020    Asthma 2020      LOS (days): 5  Geometric Mean LOS (GMLOS) (days):   Days to GMLOS:     OBJECTIVE:  Risk of Unplanned Readmission Score: 13.95         Current admission status: Inpatient   Preferred Pharmacy:   [facility] [address] [place] [address] - [address]  [address] [place] [address]  Phone: [phone] Fax: [phone]    [place], [place] - [address]   [address] [facility] [address]  Phone: [phone] Fax: [phone]    [facility] [place], [place] - [address]  [facility],  [address]  [facility],  [facility] [address]  Phone: [phone] Fax: [phone]    Primary Care Provider: Rudolph Shaffer DO    Primary Insurance: JAYDA HILL  Secondary Insurance:     DISCHARGE DETAILS:    Discharge planning discussed with:: PATIENT     Requested Home Health Care         Is the patient interested in HHC at discharge?: No    DME Referral Provided  Referral made for DME?: Yes  DME referral completed for the following items:: [facility]  Supplier Name:: [employer]    Other Referral/Resources/Interventions Provided:  Interventions: DME    Additional Comments: Patient has PAIGE drain, patient refused home nursing, states she would like to learn from bedside nursing. PT OT recommending a RW, patient wants the RW. Ordered via [employer] in [place] to be delivered to bedside

## 2025-06-09 NOTE — ASSESSMENT & PLAN NOTE
Imaging with fluid filled bowels and clinically with evolving ileus, improving  - advance diet as above

## 2025-06-09 NOTE — UTILIZATION REVIEW
Elective Surgical Continued Stay Review    Date:     for  6/8       POD#: 5 Days Post-Op     Current Patient Class: Inpatient  Current Level of Care:   med surg    Assessment/Plan: 59 y.o. female, initial surgery date   6/4 6/4  Procedure: EXAM UNDER ANESTHESIA. EXPLORATORY LAPAROTOMY. RADICAL HYSTERECTOMY. BILATERAL SALPINGO-OOPHORECTOMY. PELVIC PERITONECTOMY WITH EN BLOC LOW ANTERIOR RECTOSIGMOID RESECTION. APPENDECTOMY. SPLENIC FLEXURE MOBILIZATION. GASTROCOLING OMENTECTOMY. INDICATED SPLENECTOMY. RESECTION SMALL BOWEL MESENTERIC NODULES.     6/8  POD   #  4  Concern for evolving post op ileus.   Nausea slightly improved.   On  sips   cl liq.    - flatus    Remains on  IVF  75/hr.   Failed  initial void trial.  Still requiring  PCA and supplemental pain  control.    Medications:   Scheduled Medications:  acetaminophen, 1,000 mg, Intravenous, Q8H  enoxaparin, 40 mg, Subcutaneous, Q24H ADELE  famotidine, 20 mg, Oral, BID  FLUoxetine, 40 mg, Oral, Daily  Fluticasone Furoate-Vilanterol, 1 puff, Inhalation, Daily  pantoprazole, 20 mg, Oral, BID AC  [Held by provider] senna, 1 tablet, Oral, HS      Continuous IV Infusions:  HYDROmorphone, , Intravenous, Continuous  lactated ringers, 75 mL/hr, Intravenous, Continuous      PRN Meds:  albuterol, 2 puff, Inhalation, Q4H PRN  ALPRAZolam, 0.5 mg, Oral, TID PRN  calcium carbonate, 1,000 mg, Oral, TID PRN  diphenhydrAMINE, 25 mg, Intravenous, Q6H PRN  HYDROmorphone, 1 mg, Intravenous, Q2H PRN  metoclopramide, 5 mg, Intravenous, Q6H PRN  ondansetron, 4 mg, Intravenous, Q6H PRN  ( x1  6/8)    simethicone, 80 mg, Oral, 4x Daily PRN      Discharge Plan:   home    Vital Signs (last 3 days)       Date/Time Temp Pulse Resp BP MAP (mmHg) SpO2 O2 Device Patient Position - Orthostatic VS Carmen Coma Scale Score Pain    06/09/25 0730 -- -- -- -- -- 93 % None (Room air) -- -- --    06/09/25 0728 -- -- -- -- -- -- -- -- -- 7    06/09/25 07:19:07 98.4 °F (36.9 °C) 87 18 112/91 98 96 %  -- Lying -- --    06/09/25 0659 -- -- -- -- -- -- -- -- -- 7 06/08/25 23:00:51 98.5 °F (36.9 °C) 83 -- 102/59 73 93 % -- -- -- --    06/08/25 2209 -- -- -- -- -- -- -- -- -- 3    06/08/25 2100 -- -- -- -- -- -- -- -- 15 -- 06/08/25 14:50:16 98 °F (36.7 °C) 78 -- 112/64 80 95 % -- -- -- --    06/08/25 1015 -- -- -- -- -- -- -- -- -- 7 06/08/25 07:44:09 98 °F (36.7 °C) 81 18 102/57 72 93 % None (Room air) Lying -- --    06/08/25 0721 -- -- -- -- -- -- -- -- -- 7 06/08/25 0720 -- -- -- -- -- 93 % None (Room air) -- 15 7    06/08/25 05:31:09 98.6 °F (37 °C) 80 19 112/67 82 94 % None (Room air) Lying -- 7 06/08/25 0246 -- -- -- -- -- -- -- -- -- 7 06/08/25 01:17:15 98.3 °F (36.8 °C) 83 17 130/73 92 94 % None (Room air) Lying -- 7 06/07/25 21:29:57 98.3 °F (36.8 °C) 83 18 115/70 85 92 % None (Room air) Lying -- 7 06/07/25 1934 -- -- -- -- -- -- -- -- 15 -- 06/07/25 1918 -- -- -- -- -- -- -- -- -- 7 06/07/25 17:44:47 99 °F (37.2 °C) 98 -- 138/75 96 91 % None (Room air) -- -- 6 06/07/25 1630 -- -- -- -- -- 92 % None (Room air) -- -- --    06/07/25 15:30:11 98.2 °F (36.8 °C) 94 16 143/83 103 91 % None (Room air) Sitting -- 6 06/07/25 13:23:13 99.1 °F (37.3 °C) 86 16 121/74 90 96 % None (Room air) Lying -- --    06/07/25 1256 -- -- -- -- -- -- -- -- -- 8 06/07/25 1137 -- -- -- -- -- -- -- -- -- 7 06/07/25 1016 -- -- -- -- -- -- -- -- -- 7 06/07/25 0900 -- -- -- -- -- -- -- -- 15 --    06/07/25 07:10:16 99.1 °F (37.3 °C) 95 16 124/71 89 96 % None (Room air) Sitting -- --    06/07/25 0657 -- -- -- -- -- -- -- -- -- 9 06/07/25 06:56:29 -- 96 -- 117/90 99 94 % -- -- -- --    06/07/25 0637 -- -- -- -- -- -- -- -- -- 9 06/07/25 03:01:07 98.5 °F (36.9 °C) 96 20 146/77 100 92 % -- -- -- 7 06/07/25 0300 -- -- -- -- -- -- -- -- -- 7 06/07/25 0116 -- -- -- -- -- -- -- -- -- 7 06/06/25 2300 99.6 °F (37.6 °C) -- 18 141/78 -- -- None (Room air) Lying -- 7 06/06/25 2258 -- 102  -- 141/78 99 -- -- -- -- 7 06/06/25 2146 -- -- -- -- -- -- -- -- 15 7 06/06/25 19:19:30 98.7 °F (37.1 °C) 101 -- 117/79 92 93 % -- -- -- --    06/06/25 1911 -- -- -- -- -- -- -- -- -- 7 06/06/25 1841 -- -- -- -- -- -- -- -- -- 9 06/06/25 1718 -- -- -- -- -- -- -- -- -- 7 06/06/25 1530 -- -- -- -- -- -- -- -- -- 7 06/06/25 14:59:16 99.5 °F (37.5 °C) 89 18 127/75 92 95 % None (Room air) Lying -- --    06/06/25 11:47:03 98.9 °F (37.2 °C) 89 -- 106/71 83 95 % -- -- -- --    06/06/25 10:53:45 99.1 °F (37.3 °C) 84 18 122/66 85 95 % None (Room air) Lying -- --    06/06/25 0851 -- -- -- -- -- -- -- -- -- 7 06/06/25 0750 -- -- -- -- -- 95 % None (Room air) -- -- --    06/06/25 07:28:22 98.9 °F (37.2 °C) 98 18 134/82 99 94 % None (Room air) Lying -- --    06/06/25 0728 -- -- -- -- -- -- -- -- -- 9 06/06/25 0706 -- -- -- -- -- -- -- -- -- 9 06/06/25 0644 -- -- -- -- -- -- -- -- -- 9 06/06/25 03:39:35 99 °F (37.2 °C) -- 19 136/79 98 -- -- -- -- --    06/06/25 0339 99 °F (37.2 °C) 87 18 136/79 98 95 % None (Room air) Lying -- --    06/06/25 0233 -- -- -- -- -- -- -- -- -- 9              Pertinent Labs/Diagnostic Results:   Radiology:  CTA chest abdomen pelvis w wo contrast   Final Interpretation by Rosario Sawant MD (06/06 1605)   No acute vascular pathology in the chest, abdomen or pelvis.      No hematoma or evidence of active bleeding.      Expected postoperative changes in the abdomen and pelvis.      Other nonemergent findings above.            Computerized Assisted Algorithm (CAA) may have aided analysis of applicable images.      Workstation performed: [id number]           Cardiology:    GI:        Results from last 7 days   Lab Units 06/09/25  0452 06/08/25  0419 06/07/25  0507 06/06/25  1815 06/06/25  1203 06/06/25  0502 06/05/25  0532   WBC Thousand/uL 12.89* 16.48* 16.46* 16.90* 14.22* 16.81* 14.98*   HEMOGLOBIN g/dL 7.4* 8.2* 8.0* 7.9* 7.6* 8.1* 9.2*   HEMATOCRIT % 23.3* 25.2* 24.7*  23.8* 23.1* 24.9* 27.8*   PLATELETS Thousands/uL 421* 373 299 272 240 254 260   TOTAL NEUT ABS Thousands/µL  --   --   --   --   --  14.14* 12.45*         Results from last 7 days   Lab Units 06/09/25  0452 06/08/25  0419 06/07/25  0507 06/06/25  1203 06/06/25  0502   SODIUM mmol/L 136 138 137 137 136   POTASSIUM mmol/L 3.9 4.2 3.6 3.7 3.9   CHLORIDE mmol/L 104 105 104 107 107   CO2 mmol/L 22 21 25 26 24   ANION GAP mmol/L 10 12 8 4 5   BUN mg/dL 10 15 9 14 16   CREATININE mg/dL 0.52* 0.63 0.59* 0.67 0.76   EGFR ml/min/1.73sq m 105 99 101 97 86   CALCIUM mg/dL 8.4 8.9 8.8 9.0 9.0   MAGNESIUM mg/dL 1.6* 1.7* 1.7* 2.2 1.8*             Results from last 7 days   Lab Units 06/09/25  0452 06/08/25  0419 06/07/25  0507 06/06/25  1203 06/06/25  0502 06/05/25  0532 06/04/25  1559   GLUCOSE RANDOM mg/dL 76 52* 71 89 95 114 159*           Results from last 7 days   Lab Units 06/04/25  0820   PROTIME seconds 14.6   INR  1.12   PTT seconds 28           Results from last 7 days   Lab Units 06/04/25  1556   UNIT PRODUCT CODE  [id number]  [id number]   UNIT NUMBER  [id number]  [id number]   UNITABO  A  A   UNITRH  POS  POS   CROSSMATCH  Compatible  Compatible   UNIT DISPENSE STATUS  Return to Inv  Return to Inv   UNIT PRODUCT VOL ml 350  350         Network Utilization Review Department  ATTENTION: Please call with any questions or concerns to [phone] and carefully listen to the prompts so that you are directed to the right person. All voicemails are confidential.   For Discharge needs, contact Care Management DC Support Team at [phone] opt. 2  Send all requests for admission clinical reviews, approved or denied determinations and any other requests to dedicated fax number below belonging to the campus where the patient is receiving treatment. List of dedicated fax numbers for the Facilities:  FACILITY NAME UR FAX NUMBER   ADMISSION DENIALS (Administrative/Medical Necessity) [phone]   DISCHARGE SUPPORT  TEAM (NETWORK) [phone]   PARENT CHILD HEALTH (Maternity/NICU/Pediatrics) [phone]   [facility] [phone]   [facility] [phone]   [facility] [phone]   [facility] [phone]   [facility] [phone]   [facility] [phone]   [facility] [phone]   [facility] [phone]   [facility] [phone]   [facility] [phone]   [facility] [phone]   [facility] [phone]

## 2025-06-09 NOTE — PLAN OF CARE
Problem: PAIN - ADULT  Goal: Verbalizes/displays adequate comfort level or baseline comfort level  Description: Interventions:  - Encourage patient to monitor pain and request assistance  - Assess pain using appropriate pain scale  - Administer analgesics as ordered based on type and severity of pain and evaluate response  - Implement non-pharmacological measures as appropriate and evaluate response  - Consider cultural and social influences on pain and pain management  - Notify physician/advanced practitioner if interventions unsuccessful or patient reports new pain  - Educate patient/family on pain management process including their role and importance of  reporting pain   - Provide non-pharmacologic/complimentary pain relief interventions  Outcome: Progressing     Problem: INFECTION - ADULT  Goal: Absence or prevention of progression during hospitalization  Description: INTERVENTIONS:  - Assess and monitor for signs and symptoms of infection  - Monitor lab/diagnostic results  - Monitor all insertion sites, i.e. indwelling lines, tubes, and drains  - Monitor endotracheal if appropriate and nasal secretions for changes in amount and color  - [place] appropriate cooling/warming therapies per order  - Administer medications as ordered  - Instruct and encourage patient and family to use good hand hygiene technique  - Identify and instruct in appropriate isolation precautions for identified infection/condition  Outcome: Progressing  Goal: Absence of fever/infection during neutropenic period  Description: INTERVENTIONS:  - Monitor WBC  - Perform strict hand hygiene  - Limit to healthy visitors only  - No plants, dried, fresh or silk flowers with chaves in patient room  Outcome: Progressing     Problem: SAFETY ADULT  Goal: Patient will remain free of falls  Description: INTERVENTIONS:  - Educate patient/family on patient safety including physical limitations  - Instruct patient to call for assistance with activity   -  Consider consulting OT/PT to assist with strengthening/mobility based on AM PAC & JH-HLM score  - Consult OT/PT to assist with strengthening/mobility   - Keep Call bell within reach  - Keep bed low and locked with side rails adjusted as appropriate  - Keep care items and personal belongings within reach  - Initiate and maintain comfort rounds  - Make Fall Risk Sign visible to staff  - Offer Toileting every 2 Hours, in advance of need  - Obtain necessary fall risk management equipment  - Apply yellow socks and bracelet for high fall risk patients  - Consider moving patient to room near nurses station  Outcome: Progressing  Goal: Maintain or return to baseline ADL function  Description: INTERVENTIONS:  -  Assess patient's ability to carry out ADLs; assess patient's baseline for ADL function and identify physical deficits which impact ability to perform ADLs (bathing, care of mouth/teeth, toileting, grooming, dressing, etc.)  - Assess/evaluate cause of self-care deficits   - Assess range of motion  - Assess patient's mobility; develop plan if impaired  - Assess patient's need for assistive devices and provide as appropriate  - Encourage maximum independence but intervene and supervise when necessary  - Involve family in performance of ADLs  - Assess for home care needs following discharge   - Consider OT consult to assist with ADL evaluation and planning for discharge  - Provide patient education as appropriate  - Monitor functional capacity and physical performance, use of AM PAC & JH-HLM   - Monitor gait, balance and fatigue with ambulation    Outcome: Progressing  Goal: Maintains/Returns to pre admission functional level  Description: INTERVENTIONS:  - Perform AM-PAC 6 Click Basic Mobility/ Daily Activity assessment daily.  - Set and communicate daily mobility goal to care team and patient/family/caregiver.   - Collaborate with rehabilitation services on mobility goals if consulted  - Perform Range of Motion 3 times  a day.  - Reposition patient every 2 hours.  - Dangle patient 3 times a day  - Stand patient 3 times a day  - Ambulate patient 3 times a day  - Out of bed to chair 3 times a day   - Out of bed for meals 3 times a day  - Out of bed for toileting  - Record patient progress and toleration of activity level   Outcome: Progressing     Problem: DISCHARGE PLANNING  Goal: Discharge to home or other facility with appropriate resources  Description: INTERVENTIONS:  - Identify barriers to discharge w/patient and caregiver  - Arrange for needed discharge resources and transportation as appropriate  - Identify discharge learning needs (meds, wound care, etc.)  - Arrange for interpretive services to assist at discharge as needed  - Refer to Case Management Department for coordinating discharge planning if the patient needs post-hospital services based on physician/advanced practitioner order or complex needs related to functional status, cognitive ability, or social support system  Outcome: Progressing     Problem: Knowledge Deficit  Goal: Patient/family/caregiver demonstrates understanding of disease process, treatment plan, medications, and discharge instructions  Description: Complete learning assessment and assess knowledge base.  Interventions:  - Provide teaching at level of understanding  - Provide teaching via preferred learning methods  Outcome: Progressing     Problem: RESPIRATORY - ADULT  Goal: Achieves optimal ventilation and oxygenation  Description: INTERVENTIONS:  - Assess for changes in respiratory status  - Assess for changes in mentation and behavior  - Position to facilitate oxygenation and minimize respiratory effort  - Oxygen administered by appropriate delivery if ordered  - Initiate smoking cessation education as indicated  - Encourage broncho-pulmonary hygiene including cough, deep breathe, Incentive Spirometry  - Assess the need for suctioning and aspirate as needed  - Assess and instruct to report SOB or  any respiratory difficulty  - Respiratory Therapy support as indicated  Outcome: Progressing     Problem: GASTROINTESTINAL - ADULT  Goal: Minimal or absence of nausea and/or vomiting  Description: INTERVENTIONS:  - Administer IV fluids if ordered to ensure adequate hydration  - Maintain NPO status until nausea and vomiting are resolved  - Nasogastric tube if ordered  - Administer ordered antiemetic medications as needed  - Provide nonpharmacologic comfort measures as appropriate  - Advance diet as tolerated, if ordered  - Consider nutrition services referral to assist patient with adequate nutrition and appropriate food choices  Outcome: Progressing     Problem: GENITOURINARY - ADULT  Goal: Absence of urinary retention  Description: INTERVENTIONS:  - Assess patient’s ability to void and empty bladder  - Monitor I/O  - Bladder scan as needed  - Discuss with physician/AP medications to alleviate retention as needed  - Discuss catheterization for long term situations as appropriate  Outcome: Progressing  Goal: Urinary catheter remains patent  Description: INTERVENTIONS:  - Assess patency of urinary catheter  - If patient has a chronic mendes, consider changing catheter if non-functioning  - Follow guidelines for intermittent irrigation of non-functioning urinary catheter  Outcome: Progressing     Problem: Prexisting or High Potential for Compromised Skin Integrity  Goal: Skin integrity is maintained or improved  Description: INTERVENTIONS:  - Identify patients at risk for skin breakdown  - Assess and monitor skin integrity including under and around medical devices   - Assess and monitor nutrition and hydration status  - Monitor labs  - Assess for incontinence   - Turn and reposition patient  - Assist with mobility/ambulation  - Relieve pressure over tamra prominences   - Avoid friction and shearing  - Provide appropriate hygiene as needed including keeping skin clean and dry  - Evaluate need for skin  moisturizer/barrier cream  - Collaborate with interdisciplinary team  - Patient/family teaching  - Consider wound care consult    Assess:  - Review Nader scale daily  - Clean and moisturize skin every shift  - Inspect skin when repositioning, toileting, and assisting with ADLS  - Assess under medical devices   - Assess extremities for adequate circulation and sensation     Bed Management:  - Have minimal linens on bed & keep smooth, unwrinkled  - Change linens as needed when moist or perspiring  - Avoid sitting or lying in one position for more than 2 hours while in bed?Keep HOB at 30 degrees   - Toileting:  - Offer bedside commode  - Assess for incontinence every shift  - Use incontinent care products after each incontinent episode     Activity:  - Mobilize patient 3 times a day  - Encourage activity and walks on unit  - Encourage or provide ROM exercises   - Turn and reposition patient every 2 Hours  - Use appropriate equipment to lift or move patient in bed  - Instruct/ Assist with weight shifting every hour when out of bed in chair  - Consider limitation of chair time 1 hour intervals    Skin Care:  - Avoid use of baby powder, tape, friction and shearing, hot water or constrictive clothing  - Relieve pressure over bony prominences   - Do not massage red bony areas    Next Steps:  - Teach patient strategies to minimize risks   - Consider consults to  interdisciplinary teams   Outcome: Progressing     Problem: Nutrition/Hydration-ADULT  Goal: Nutrient/Hydration intake appropriate for improving, restoring or maintaining nutritional needs  Description: Monitor and assess patient's nutrition/hydration status for malnutrition. Collaborate with interdisciplinary team and initiate plan and interventions as ordered.  Monitor patient's weight and dietary intake as ordered or per policy. Utilize nutrition screening tool and intervene as necessary. Determine patient's food preferences and provide high-protein, high-caloric  foods as appropriate.     INTERVENTIONS:  - Monitor oral intake, urinary output, labs, and treatment plans  - Assess nutrition and hydration status and recommend course of action  - Evaluate amount of meals eaten  - Assist patient with eating if necessary   - Allow adequate time for meals  - Recommend/ encourage appropriate diets, oral nutritional supplements, and vitamin/mineral supplements  - Order, calculate, and assess calorie counts as needed  - Recommend, monitor, and adjust tube feedings and TPN/PPN based on assessed needs  - Assess need for intravenous fluids  - Provide specific nutrition/hydration education as appropriate  - Include patient/family/caregiver in decisions related to nutrition  Outcome: Progressing

## 2025-06-09 NOTE — ASSESSMENT & PLAN NOTE
60yo POD#5 s/p ex lap, SARAY, BSO, LAR, splenectomy, and debulking for high-grade serous ovarian cancer s/p neoadjuvant chemotherapy. Post-operative course complicated by nausea, vomiting, and abdominal distention concerning for evolving ileus; now improving. Post-op course also complicated by sub-optimal postoperative pain control; improving     - advance diet as tolerated  - continue maintenance LR @100cc/hr  - anesthesia consulted for pain control; recs appreciated  - avoiding NSAIDs in the setting of LAR  - Miguel removed POD #1 s/p failed trial of void; currently voiding

## 2025-06-09 NOTE — PROGRESS NOTES
Progress Note - OB/GYN   Name: Ewa Hernandes 59 y.o. female I MRN: [id number]  Unit/Bed#: E5 -01 I Date of Admission: 6/4/2025   Date of Service: 6/9/2025 I Hospital Day: 5     Assessment & Plan  Malignant neoplasm of both ovaries (HCC)  60yo POD#5 s/p ex lap, SARAY, BSO, LAR, splenectomy, and debulking for high-grade serous ovarian cancer s/p neoadjuvant chemotherapy. Post-operative course complicated by nausea, vomiting, and abdominal distention concerning for evolving ileus; now improving. Post-op course also complicated by sub-optimal postoperative pain control; improving     - advance diet as tolerated  - continue maintenance LR @100cc/hr  - anesthesia consulted for pain control; recs appreciated  - avoiding NSAIDs in the setting of LAR  - Miguel removed POD #1 s/p failed trial of void; currently voiding  Ileus (HCC)  Imaging with fluid filled bowels and clinically with evolving ileus, improving  - advance diet as above  Essential hypertension, benign  - continue to monitor BP  - resume home amlodipine first if medications needed postoperatively  Major depressive disorder, recurrent episode, mild (HCC)  Stable mood. No SI/HI  - continue home fluoxetine 40mg qd  - continue home alprazolam PRN  Asthma  - continue home albuterol PRN  GERD (gastroesophageal reflux disease)  - resume Protonix when tolerating PO   Anemia  Hb 7.4 from 8.2  - consider repeat CBC in afternoon    GYNECOLOGY  Subjective   Ewa reports improvement in her nausea. She was able to tolerate jello and broth overnight. Pain is well controlled. She is ambulating without lightheadedness, shortness of breath, or chest pain. She is voiding and having bowel movements. She is not passing flatus.    Objective :  Temp:  [98 °F (36.7 °C)-98.5 °F (36.9 °C)] 98.5 °F (36.9 °C)  HR:  [78-83] 83  BP: (102-112)/(57-64) 102/59  Resp:  [18] 18  SpO2:  [93 %-95 %] 93 %  O2 Device: None (Room air)    Physical Exam  Vitals reviewed.   Constitutional:        General: She is not in acute distress.     Appearance: She is well-developed. She is ill-appearing.   HENT:      Head: Normocephalic and atraumatic.     Cardiovascular:      Rate and Rhythm: Normal rate.   Pulmonary:      Effort: Pulmonary effort is normal. No respiratory distress.   Abdominal:      General: There is no distension.      Palpations: Abdomen is soft.      Tenderness: There is no abdominal tenderness.      Comments: Dressings covering surgical incisions; small amount of old blood at superior midline aspect of dressing  PAIGE drain x2, serous fluid     Musculoskeletal:      Comments: SCDs in place     Neurological:      Mental Status: She is alert.     Psychiatric:         Behavior: Behavior normal.       Isabel Enriquez MD  OBGYN PGY-1  06/09/25  6:22 AM

## 2025-06-10 ENCOUNTER — RESULTS FOLLOW-UP (OUTPATIENT)
Dept: GYNECOLOGIC ONCOLOGY | Facility: CLINIC | Age: 60
End: 2025-06-10

## 2025-06-10 ENCOUNTER — DOCUMENTATION (OUTPATIENT)
Dept: HEMATOLOGY ONCOLOGY | Facility: CLINIC | Age: 60
End: 2025-06-10

## 2025-06-10 PROBLEM — E44.0 MODERATE PROTEIN-CALORIE MALNUTRITION (HCC): Status: ACTIVE | Noted: 2025-06-10

## 2025-06-10 LAB
ANION GAP SERPL CALCULATED.3IONS-SCNC: 7 MMOL/L (ref 4–13)
BUN SERPL-MCNC: 7 MG/DL (ref 5–25)
CALCIUM SERPL-MCNC: 9 MG/DL (ref 8.4–10.2)
CHLORIDE SERPL-SCNC: 103 MMOL/L (ref 96–108)
CO2 SERPL-SCNC: 27 MMOL/L (ref 21–32)
CREAT SERPL-MCNC: 0.54 MG/DL (ref 0.6–1.3)
DME PARACHUTE DELIVERY DATE ACTUAL: NORMAL
DME PARACHUTE DELIVERY DATE REQUESTED: NORMAL
DME PARACHUTE DELIVERY NOTE: NORMAL
DME PARACHUTE ITEM DESCRIPTION: NORMAL
DME PARACHUTE ORDER STATUS: NORMAL
DME PARACHUTE SUPPLIER NAME: NORMAL
DME PARACHUTE SUPPLIER PHONE: NORMAL
ERYTHROCYTE [DISTWIDTH] IN BLOOD BY AUTOMATED COUNT: 17.3 % (ref 11.6–15.1)
GFR SERPL CREATININE-BSD FRML MDRD: 104 ML/MIN/1.73SQ M
GLUCOSE SERPL-MCNC: 94 MG/DL (ref 65–140)
HCT VFR BLD AUTO: 26 % (ref 34.8–46.1)
HGB BLD-MCNC: 8.3 G/DL (ref 11.5–15.4)
MAGNESIUM SERPL-MCNC: 1.9 MG/DL (ref 1.9–2.7)
MCH RBC QN AUTO: 32 PG (ref 26.8–34.3)
MCHC RBC AUTO-ENTMCNC: 31.9 G/DL (ref 31.4–37.4)
MCV RBC AUTO: 100 FL (ref 82–98)
PLATELET # BLD AUTO: 551 THOUSANDS/UL (ref 149–390)
PMV BLD AUTO: 9.7 FL (ref 8.9–12.7)
POTASSIUM SERPL-SCNC: 3.7 MMOL/L (ref 3.5–5.3)
RBC # BLD AUTO: 2.59 MILLION/UL (ref 3.81–5.12)
SODIUM SERPL-SCNC: 137 MMOL/L (ref 135–147)
WBC # BLD AUTO: 11.91 THOUSAND/UL (ref 4.31–10.16)

## 2025-06-10 PROCEDURE — 83735 ASSAY OF MAGNESIUM: CPT

## 2025-06-10 PROCEDURE — 80048 BASIC METABOLIC PNL TOTAL CA: CPT

## 2025-06-10 PROCEDURE — 99024 POSTOP FOLLOW-UP VISIT: CPT | Performed by: OBSTETRICS & GYNECOLOGY

## 2025-06-10 PROCEDURE — 97116 GAIT TRAINING THERAPY: CPT

## 2025-06-10 PROCEDURE — 97530 THERAPEUTIC ACTIVITIES: CPT

## 2025-06-10 PROCEDURE — 85027 COMPLETE CBC AUTOMATED: CPT

## 2025-06-10 RX ORDER — OXYCODONE HYDROCHLORIDE 10 MG/1
10 TABLET ORAL EVERY 4 HOURS PRN
Refills: 0 | Status: DISCONTINUED | OUTPATIENT
Start: 2025-06-10 | End: 2025-06-12 | Stop reason: HOSPADM

## 2025-06-10 RX ORDER — OXYCODONE HYDROCHLORIDE 5 MG/1
5 TABLET ORAL EVERY 4 HOURS PRN
Refills: 0 | Status: DISCONTINUED | OUTPATIENT
Start: 2025-06-10 | End: 2025-06-12 | Stop reason: HOSPADM

## 2025-06-10 RX ORDER — MAGNESIUM SULFATE HEPTAHYDRATE 40 MG/ML
2 INJECTION, SOLUTION INTRAVENOUS ONCE
Status: COMPLETED | OUTPATIENT
Start: 2025-06-10 | End: 2025-06-10

## 2025-06-10 RX ORDER — ACETAMINOPHEN 325 MG/1
975 TABLET ORAL EVERY 8 HOURS SCHEDULED
Status: DISCONTINUED | OUTPATIENT
Start: 2025-06-10 | End: 2025-06-12 | Stop reason: HOSPADM

## 2025-06-10 RX ADMIN — OXYCODONE HYDROCHLORIDE 10 MG: 10 TABLET ORAL at 23:06

## 2025-06-10 RX ADMIN — FLUTICASONE FUROATE AND VILANTEROL TRIFENATATE 1 PUFF: 100; 25 POWDER RESPIRATORY (INHALATION) at 09:50

## 2025-06-10 RX ADMIN — ENOXAPARIN SODIUM 40 MG: 40 INJECTION SUBCUTANEOUS at 09:50

## 2025-06-10 RX ADMIN — FAMOTIDINE 20 MG: 20 TABLET, FILM COATED ORAL at 09:51

## 2025-06-10 RX ADMIN — PANTOPRAZOLE SODIUM 20 MG: 40 TABLET, DELAYED RELEASE ORAL at 15:42

## 2025-06-10 RX ADMIN — ACETAMINOPHEN 975 MG: 325 TABLET, FILM COATED ORAL at 09:51

## 2025-06-10 RX ADMIN — FLUOXETINE HYDROCHLORIDE 40 MG: 20 CAPSULE ORAL at 09:51

## 2025-06-10 RX ADMIN — OXYCODONE HYDROCHLORIDE 10 MG: 10 TABLET ORAL at 14:33

## 2025-06-10 RX ADMIN — PANTOPRAZOLE SODIUM 20 MG: 40 TABLET, DELAYED RELEASE ORAL at 07:42

## 2025-06-10 RX ADMIN — HYDROMORPHONE HYDROCHLORIDE 1 MG: 1 INJECTION, SOLUTION INTRAMUSCULAR; INTRAVENOUS; SUBCUTANEOUS at 11:35

## 2025-06-10 RX ADMIN — MAGNESIUM SULFATE HEPTAHYDRATE 2 G: 40 INJECTION, SOLUTION INTRAVENOUS at 11:35

## 2025-06-10 RX ADMIN — ALPRAZOLAM 0.5 MG: 0.5 TABLET ORAL at 22:13

## 2025-06-10 RX ADMIN — HYDROMORPHONE HYDROCHLORIDE 1 MG: 1 INJECTION, SOLUTION INTRAMUSCULAR; INTRAVENOUS; SUBCUTANEOUS at 15:45

## 2025-06-10 RX ADMIN — OXYCODONE HYDROCHLORIDE 10 MG: 10 TABLET ORAL at 07:58

## 2025-06-10 RX ADMIN — ACETAMINOPHEN 1000 MG: 10 INJECTION INTRAVENOUS at 03:52

## 2025-06-10 RX ADMIN — OXYCODONE HYDROCHLORIDE 5 MG: 5 TABLET ORAL at 19:03

## 2025-06-10 RX ADMIN — ACETAMINOPHEN 975 MG: 325 TABLET, FILM COATED ORAL at 22:14

## 2025-06-10 NOTE — PLAN OF CARE
Problem: SAFETY ADULT  Goal: Patient will remain free of falls  Description: INTERVENTIONS:  - Educate patient/family on patient safety including physical limitations  - Instruct patient to call for assistance with activity   - Consider consulting OT/PT to assist with strengthening/mobility based on AM PAC & JH-HLM score  - Consult OT/PT to assist with strengthening/mobility   - Keep Call bell within reach  - Keep bed low and locked with side rails adjusted as appropriate  - Keep care items and personal belongings within reach  - Initiate and maintain comfort rounds  - Make Fall Risk Sign visible to staff  - Offer Toileting every 2 Hours, in advance of need  - Obtain necessary fall risk management equipment  - Apply yellow socks and bracelet for high fall risk patients  - Consider moving patient to room near nurses station  Outcome: Progressing  Goal: Maintain or return to baseline ADL function  Description: INTERVENTIONS:  -  Assess patient's ability to carry out ADLs; assess patient's baseline for ADL function and identify physical deficits which impact ability to perform ADLs (bathing, care of mouth/teeth, toileting, grooming, dressing, etc.)  - Assess/evaluate cause of self-care deficits   - Assess range of motion  - Assess patient's mobility; develop plan if impaired  - Assess patient's need for assistive devices and provide as appropriate  - Encourage maximum independence but intervene and supervise when necessary  - Involve family in performance of ADLs  - Assess for home care needs following discharge   - Consider OT consult to assist with ADL evaluation and planning for discharge  - Provide patient education as appropriate  - Monitor functional capacity and physical performance, use of AM PAC & JH-HLM   - Monitor gait, balance and fatigue with ambulation    Outcome: Progressing

## 2025-06-10 NOTE — ASSESSMENT & PLAN NOTE
60yo POD#6 s/p ex lap, SARAY, BSO, LAR, splenectomy, and debulking for high-grade serous ovarian cancer s/p neoadjuvant chemotherapy. Post-operative course complicated by nausea, vomiting, and abdominal distention concerning for evolving ileus; now improving. Post-op course also complicated by sub-optimal postoperative pain control; improving     - advance diet as tolerated  - anesthesia consulted for pain control; recs appreciated  - avoiding NSAIDs in the setting of LAR  - Miguel removed POD #1 s/p failed trial of void; currently voiding  - f/u AM labs; replete electrolytes as needed

## 2025-06-10 NOTE — PLAN OF CARE
Problem: PAIN - ADULT  Goal: Verbalizes/displays adequate comfort level or baseline comfort level  Description: Interventions:  - Encourage patient to monitor pain and request assistance  - Assess pain using appropriate pain scale  - Administer analgesics as ordered based on type and severity of pain and evaluate response  - Implement non-pharmacological measures as appropriate and evaluate response  - Consider cultural and social influences on pain and pain management  - Notify physician/advanced practitioner if interventions unsuccessful or patient reports new pain  - Educate patient/family on pain management process including their role and importance of  reporting pain   - Provide non-pharmacologic/complimentary pain relief interventions  Outcome: Progressing     Problem: INFECTION - ADULT  Goal: Absence or prevention of progression during hospitalization  Description: INTERVENTIONS:  - Assess and monitor for signs and symptoms of infection  - Monitor lab/diagnostic results  - Monitor all insertion sites, i.e. indwelling lines, tubes, and drains  - Monitor endotracheal if appropriate and nasal secretions for changes in amount and color  - [place] appropriate cooling/warming therapies per order  - Administer medications as ordered  - Instruct and encourage patient and family to use good hand hygiene technique  - Identify and instruct in appropriate isolation precautions for identified infection/condition  Outcome: Progressing     Problem: SAFETY ADULT  Goal: Patient will remain free of falls  Description: INTERVENTIONS:  - Educate patient/family on patient safety including physical limitations  - Instruct patient to call for assistance with activity   - Consider consulting OT/PT to assist with strengthening/mobility based on AM PAC & JH-HLM score  - Consult OT/PT to assist with strengthening/mobility   - Keep Call bell within reach  - Keep bed low and locked with side rails adjusted as appropriate  - Keep  care items and personal belongings within reach  - Initiate and maintain comfort rounds  - Make Fall Risk Sign visible to staff  - Offer Toileting every 2 Hours, in advance of need  - Obtain necessary fall risk management equipment  - Apply yellow socks and bracelet for high fall risk patients  - Consider moving patient to room near nurses station  Outcome: Progressing  Goal: Maintain or return to baseline ADL function  Description: INTERVENTIONS:  -  Assess patient's ability to carry out ADLs; assess patient's baseline for ADL function and identify physical deficits which impact ability to perform ADLs (bathing, care of mouth/teeth, toileting, grooming, dressing, etc.)  - Assess/evaluate cause of self-care deficits   - Assess range of motion  - Assess patient's mobility; develop plan if impaired  - Assess patient's need for assistive devices and provide as appropriate  - Encourage maximum independence but intervene and supervise when necessary  - Involve family in performance of ADLs  - Assess for home care needs following discharge   - Consider OT consult to assist with ADL evaluation and planning for discharge  - Provide patient education as appropriate  - Monitor functional capacity and physical performance, use of AM PAC & -HLM   - Monitor gait, balance and fatigue with ambulation    Outcome: Progressing  Goal: Maintains/Returns to pre admission functional level  Description: INTERVENTIONS:  - Perform AM-PAC 6 Click Basic Mobility/ Daily Activity assessment daily.  - Set and communicate daily mobility goal to care team and patient/family/caregiver.   - Collaborate with rehabilitation services on mobility goals if consulted  - Perform Range of Motion 3 times a day.  - Reposition patient every 2 hours.  - Dangle patient 3 times a day  - Stand patient 3 times a day  - Ambulate patient 3 times a day  - Out of bed to chair 3 times a day   - Out of bed for meals 3 times a day  - Out of bed for toileting  - Record  patient progress and toleration of activity level   Outcome: Progressing     Problem: DISCHARGE PLANNING  Goal: Discharge to home or other facility with appropriate resources  Description: INTERVENTIONS:  - Identify barriers to discharge w/patient and caregiver  - Arrange for needed discharge resources and transportation as appropriate  - Identify discharge learning needs (meds, wound care, etc.)  - Arrange for interpretive services to assist at discharge as needed  - Refer to Case Management Department for coordinating discharge planning if the patient needs post-hospital services based on physician/advanced practitioner order or complex needs related to functional status, cognitive ability, or social support system  Outcome: Progressing     Problem: Knowledge Deficit  Goal: Patient/family/caregiver demonstrates understanding of disease process, treatment plan, medications, and discharge instructions  Description: Complete learning assessment and assess knowledge base.  Interventions:  - Provide teaching at level of understanding  - Provide teaching via preferred learning methods  Outcome: Progressing     Problem: RESPIRATORY - ADULT  Goal: Achieves optimal ventilation and oxygenation  Description: INTERVENTIONS:  - Assess for changes in respiratory status  - Assess for changes in mentation and behavior  - Position to facilitate oxygenation and minimize respiratory effort  - Oxygen administered by appropriate delivery if ordered  - Initiate smoking cessation education as indicated  - Encourage broncho-pulmonary hygiene including cough, deep breathe, Incentive Spirometry  - Assess the need for suctioning and aspirate as needed  - Assess and instruct to report SOB or any respiratory difficulty  - Respiratory Therapy support as indicated  Outcome: Progressing     Problem: GASTROINTESTINAL - ADULT  Goal: Minimal or absence of nausea and/or vomiting  Description: INTERVENTIONS:  - Administer IV fluids if ordered to  ensure adequate hydration  - Maintain NPO status until nausea and vomiting are resolved  - Nasogastric tube if ordered  - Administer ordered antiemetic medications as needed  - Provide nonpharmacologic comfort measures as appropriate  - Advance diet as tolerated, if ordered  - Consider nutrition services referral to assist patient with adequate nutrition and appropriate food choices  Outcome: Progressing     Problem: GENITOURINARY - ADULT  Goal: Absence of urinary retention  Description: INTERVENTIONS:  - Assess patient’s ability to void and empty bladder  - Monitor I/O  - Bladder scan as needed  - Discuss with physician/AP medications to alleviate retention as needed  - Discuss catheterization for long term situations as appropriate  Outcome: Progressing  Goal: Urinary catheter remains patent  Description: INTERVENTIONS:  - Assess patency of urinary catheter  - If patient has a chronic mendes, consider changing catheter if non-functioning  - Follow guidelines for intermittent irrigation of non-functioning urinary catheter  Outcome: Progressing     Problem: Prexisting or High Potential for Compromised Skin Integrity  Goal: Skin integrity is maintained or improved  Description: INTERVENTIONS:  - Identify patients at risk for skin breakdown  - Assess and monitor skin integrity including under and around medical devices   - Assess and monitor nutrition and hydration status  - Monitor labs  - Assess for incontinence   - Turn and reposition patient  - Assist with mobility/ambulation  - Relieve pressure over tamra prominences   - Avoid friction and shearing  - Provide appropriate hygiene as needed including keeping skin clean and dry  - Evaluate need for skin moisturizer/barrier cream  - Collaborate with interdisciplinary team  - Patient/family teaching  - Consider wound care consult    Assess:  - Review Nader scale daily  - Clean and moisturize skin every shift  - Inspect skin when repositioning, toileting, and assisting  with ADLS  - Assess under medical devices   - Assess extremities for adequate circulation and sensation     Bed Management:  - Have minimal linens on bed & keep smooth, unwrinkled  - Change linens as needed when moist or perspiring  - Avoid sitting or lying in one position for more than 2 hours while in bed?Keep HOB at 30 degrees   - Toileting:  - Offer bedside commode  - Assess for incontinence every shift  - Use incontinent care products after each incontinent episode     Activity:  - Mobilize patient 3 times a day  - Encourage activity and walks on unit  - Encourage or provide ROM exercises   - Turn and reposition patient every 2 Hours  - Use appropriate equipment to lift or move patient in bed  - Instruct/ Assist with weight shifting every hour when out of bed in chair  - Consider limitation of chair time 1 hour intervals    Skin Care:  - Avoid use of baby powder, tape, friction and shearing, hot water or constrictive clothing  - Relieve pressure over bony prominences   - Do not massage red bony areas    Next Steps:  - Teach patient strategies to minimize risks   - Consider consults to  interdisciplinary teams   Outcome: Progressing     Problem: Nutrition/Hydration-ADULT  Goal: Nutrient/Hydration intake appropriate for improving, restoring or maintaining nutritional needs  Description: Monitor and assess patient's nutrition/hydration status for malnutrition. Collaborate with interdisciplinary team and initiate plan and interventions as ordered.  Monitor patient's weight and dietary intake as ordered or per policy. Utilize nutrition screening tool and intervene as necessary. Determine patient's food preferences and provide high-protein, high-caloric foods as appropriate.     INTERVENTIONS:  - Monitor oral intake, urinary output, labs, and treatment plans  - Assess nutrition and hydration status and recommend course of action  - Evaluate amount of meals eaten  - Assist patient with eating if necessary   - Allow  adequate time for meals  - Recommend/ encourage appropriate diets, oral nutritional supplements, and vitamin/mineral supplements  - Order, calculate, and assess calorie counts as needed  - Recommend, monitor, and adjust tube feedings and TPN/PPN based on assessed needs  - Assess need for intravenous fluids  - Provide specific nutrition/hydration education as appropriate  - Include patient/family/caregiver in decisions related to nutrition  Outcome: Progressing

## 2025-06-10 NOTE — PLAN OF CARE
Problem: Nutrition/Hydration-ADULT  Goal: Nutrient/Hydration intake appropriate for improving, restoring or maintaining nutritional needs  Description: Monitor and assess patient's nutrition/hydration status for malnutrition. Collaborate with interdisciplinary team and initiate plan and interventions as ordered.  Monitor patient's weight and dietary intake as ordered or per policy. Utilize nutrition screening tool and intervene as necessary. Determine patient's food preferences and provide high-protein, high-caloric foods as appropriate.     INTERVENTIONS:  - Monitor oral intake, urinary output, labs, and treatment plans  - Assess nutrition and hydration status and recommend course of action  - Evaluate amount of meals eaten  - Assist patient with eating if necessary   - Allow adequate time for meals  - Recommend/ encourage appropriate diets, oral nutritional supplements, and vitamin/mineral supplements  - Order, calculate, and assess calorie counts as needed  - Recommend, monitor, and adjust tube feedings and TPN/PPN based on assessed needs  - Assess need for intravenous fluids  - Provide specific nutrition/hydration education as appropriate  - Include patient/family/caregiver in decisions related to nutrition  Outcome: Not Progressing      Changed to virtual appt.

## 2025-06-10 NOTE — MALNUTRITION/BMI
This medical record reflects one or more clinical indicators suggestive of malnutrition and/or morbid obesity.    Malnutrition Findings:   Adult Malnutrition type: Chronic illness  Adult Degree of Malnutrition: Malnutrition of moderate degree  Malnutrition Characteristics: Inadequate energy, Muscle loss, Fat loss, Weight loss                360 Statement: Moderate malnutrition r/t catabolic illness as evidenced by 19% unplanned wt loss since 7/17/24, consuming < 75% energy intake compared to estimated energy needs > 1 month, mild fat/muscle wasting observed at orbital/temple areas. Currently on cl liq diet         See Nutrition note dated 6/6/25 for additional details.  Completed nutrition assessment is viewable in the nutrition documentation.

## 2025-06-10 NOTE — QUICK NOTE
"Afternoon Progress Note - Gyn Oncology  Ewa Hernandes 59 y.o. female MRN: [id number]  Unit/Bed#: E5 -01 Encounter: [id number]    Ewa reports she is feeling better this afternoon than she did in the morning. Her pain is improving and she is tolerating regular diet without nausea or vomiting. She had an additional loose bowel movement and has begun passing flatus.    On exam, patient is in no acute distress, conversing appropriately. She is seated in chair next to bed. Her boyfriend is present.     OBJECTIVE:  Vitals:   /68   Pulse 83   Temp 98.3 °F (36.8 °C)   Resp 18   Ht 5' 9\" (1.753 m)   Wt 71.4 kg (157 lb 6.5 oz)   SpO2 92%   BMI 23.25 kg/m²       Intake/Output Summary (Last 24 hours) at 6/10/2025 1548  Last data filed at 6/10/2025 1437  Gross per 24 hour   Intake 2 ml   Output 690 ml   Net -688 ml     Invasive Devices       Peripheral Intravenous Line  Duration             Peripheral IV 06/10/25 Right Antecubital <1 day              Drain  Duration             Closed/Suction Drain Left LLQ Bulb 19 Fr. 6 days    Closed/Suction Drain Right LLQ Bulb 19 Fr. 6 days                  ASSESSMENT:  58yo POD#6 s/p ex lap, SARAY, BSO, LAR, splenectomy, and debulking for high-grade serous ovarian cancer s/p neoadjuvant chemotherapy. Progressing toward post-operative milestones.    PLAN:  Labs ordered for tomorrow morning  Continue pain management with oral analgesics  Continue standard post-op care  Anticipate discharge within 24-36hrs    Isabel Enriquez MD  OBGYN PGY-1  06/10/25  3:48 PM  "

## 2025-06-10 NOTE — PROGRESS NOTES
In-basket message received from Dr. De Souza to add patient to the gyn [facility] on 6/16/2025. Chart reviewed and prep completed.

## 2025-06-10 NOTE — PHYSICAL THERAPY NOTE
Physical Therapy Treatment Note     06/10/25 1134   PT Last Visit   PT Visit Date 06/10/25   Note Type   Note Type Treatment   Pain Assessment   Pain Assessment Tool 0-10   Pain Score 7   Pain Location/Orientation Location: Incision;Location: Abdomen   Restrictions/Precautions   Weight Bearing Precautions Per Order No   Other Precautions Chair Alarm;Fall Risk;Pain;Multiple lines   General   Chart Reviewed Yes   Family/Caregiver Present No   Subjective   Subjective Pt. agreeable to PT   Transfers   Sit to Stand 5  Supervision   Additional items Verbal cues;Increased time required;Armrests;Assist x 1   Stand to Sit 5  Supervision   Additional items Assist x 1;Armrests;Increased time required;Verbal cues   Stand pivot 5  Supervision   Additional items Assist x 1;Increased time required   Ambulation/Elevation   Gait pattern Improper Weight shift;Decreased foot clearance;Narrow ROMULO;Inconsistent romario;Foward flexed;Excessively slow;Decreased heel strike;Decreased toe off   Gait Assistance 5  Supervision   Additional items Assist x 1;Verbal cues   Assistive Device Rolling walker   Distance ~350ft   Stair Management Assistance 5  Supervision   Additional items Assist x 1;Verbal cues;Increased time required   Stair Management Technique Two rails;Step to pattern;Foreward;Nonreciprocal   Number of Stairs 2   Balance   Static Sitting Good   Dynamic Sitting Fair   Static Standing Fair   Dynamic Standing Fair -   Ambulatory Fair -   Endurance Deficit   Endurance Deficit Yes   Endurance Deficit Description fatigue, pain   Activity Tolerance   Activity Tolerance Patient tolerated treatment well   Nurse Made Aware yes   Assessment   Prognosis Good   Problem List Decreased mobility;Impaired balance;Decreased strength;Pain;Impaired judgement   Assessment Pt. progressing well with overall mobility. Noted mild imbalance however no overt LOB noted. pt. given cues for hand placement, body positioning during transfers. Pt. remained in  the chair post session with all needs within reach. Will continue to follow per PT POC. Recommended pt. ambulate with family and restorative and other staff on the unit.   Barriers to Discharge None   Goals   Patient Goals None reproted   STG Expiration Date 06/15/25   PT Treatment Day 1   Plan   Treatment/Interventions Functional transfer training;Elevations;Gait training;Equipment eval/education;Patient/family training;Spoke to nursing   Progress Progressing toward goals   PT Frequency 1-2x/wk   Discharge Recommendation   Rehab Resource Intensity Level, PT No post-acute rehabilitation needs   Equipment Recommended Walker;Other (Comment)  (shower chair)   AM-PAC Basic Mobility Inpatient   Turning in Flat Bed Without Bedrails 3   Lying on Back to Sitting on Edge of Flat Bed Without Bedrails 3   Moving Bed to Chair 4   Standing Up From Chair Using Arms 4   Walk in Room 4   Climb 3-5 Stairs With Railing 4   Basic Mobility Inpatient Raw Score 22   Basic Mobility Standardized Score 47.4   [facility] Highest Level Of Mobility   -HL Goal 7: Walk 25 feet or more   -HLM Achieved 8: Walk 250 feet ot more   End of Consult   Patient Position at End of Consult All needs within reach;Bed/Chair alarm activated;Bedside chair           Ellis [place] PTA    An AM-PAC basic mobility standardized score less than 42.9 suggest the patient may benefit from discharge to post-acute rehab services.

## 2025-06-10 NOTE — PROGRESS NOTES
Progress Note - GYN Oncology   Name: Ewa Hernandes 59 y.o. female I MRN: [id number]  Unit/Bed#: E5 -01 I Date of Admission: 6/4/2025   Date of Service: 6/10/2025 I Hospital Day: 6     Assessment & Plan  Malignant neoplasm of both ovaries (HCC)  58yo POD#6 s/p ex lap, SARAY, BSO, LAR, splenectomy, and debulking for high-grade serous ovarian cancer s/p neoadjuvant chemotherapy. Post-operative course complicated by nausea, vomiting, and abdominal distention concerning for evolving ileus; now improving. Post-op course also complicated by sub-optimal postoperative pain control; improving     - advance diet as tolerated  - anesthesia consulted for pain control; recs appreciated  - avoiding NSAIDs in the setting of LAR  - Miguel removed POD #1 s/p failed trial of void; currently voiding  - f/u AM labs; replete electrolytes as needed  Ileus (HCC)  Imaging with fluid filled bowels and clinically with evolving ileus, improving  - advance diet as above  Essential hypertension, benign  - continue to monitor BP  - resume home amlodipine first if medications needed postoperatively  Major depressive disorder, recurrent episode, mild (HCC)  Stable mood. No SI/HI  - continue home fluoxetine 40mg qd  - continue home alprazolam PRN  GERD (gastroesophageal reflux disease)  - resume Protonix when tolerating PO   Asthma  - continue home albuterol PRN   Anemia  Hb stable  - f/u AM labs    GYNECOLOGY  Subjective   Ewa reports she was able to tolerate soft surgical diet yesterday. She had a bit of nausea, but it improved with Zofran and Reglan. She denies vomiting. She rates her pain as 7/10. It was initially improving, but is now a little worse. She has been using her Dilaudid PCA. She is ambulating without lightheadedness, shortness of breath, or chest pain. She is voiding and having bowel movements. She is not passing flatus.    Objective :  Temp:  [98 °F (36.7 °C)-98.4 °F (36.9 °C)] 98 °F (36.7 °C)  HR:  [84-89] 84  BP:  (109-116)/(64-91) 115/68  Resp:  [16-18] 16  SpO2:  [92 %-96 %] 94 %  O2 Device: None (Room air)    Physical Exam  Vitals reviewed.   Constitutional:       General: She is not in acute distress.     Appearance: She is well-developed. She is ill-appearing.   HENT:      Head: Normocephalic and atraumatic.     Cardiovascular:      Rate and Rhythm: Normal rate.   Pulmonary:      Effort: Pulmonary effort is normal. No respiratory distress.   Abdominal:      General: There is no distension.      Palpations: Abdomen is soft.      Tenderness: There is abdominal tenderness (moderate).      Comments: Midline vertical incision clean/dry/intact  PAIGE drain x2, serous fluid     Neurological:      Mental Status: She is alert.     Psychiatric:         Behavior: Behavior normal.       Isabel Enriquez MD  OBGYN PGY-1  06/10/25  6:29 AM

## 2025-06-10 NOTE — PLAN OF CARE
Problem: PHYSICAL THERAPY ADULT  Goal: Performs mobility at highest level of function for planned discharge setting.  See evaluation for individualized goals.  Description: Treatment/Interventions: Functional transfer training, Elevations, LE strengthening/ROM, Therapeutic exercise, Patient/family training, Equipment eval/education, Gait training, Bed mobility, Continued evaluation, Compensatory technique education, OT  Equipment Recommended: Walker, Other (Comment) (shower chair)       See flowsheet documentation for full assessment, interventions and recommendations.  Outcome: Progressing  Note: Prognosis: Good  Problem List: Decreased mobility, Impaired balance, Decreased strength, Pain, Impaired judgement  Assessment: Pt. progressing well with overall mobility. Noted mild imbalance however no overt LOB noted. pt. given cues for hand placement, body positioning during transfers. Pt. remained in the chair post session with all needs within reach. Will continue to follow per PT POC. Recommended pt. ambulate with family and restorative and other staff on the unit.  Barriers to Discharge: None     Rehab Resource Intensity Level, PT: No post-acute rehabilitation needs    See flowsheet documentation for full assessment.

## 2025-06-10 NOTE — NUTRITION
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 s/p 4 cycles of neoadjuvant chemotherapy POD2 s/ps/p ex lap, SARAY, debulking on 6/4;   Nutrition-Focused Physical Exam   Nutrition-Focused Physical Exam Findings RN skin assessment reviewed;Edema   Nutrition-Focused Physical Exam Findings +1 RUE edema   Adequacy of Intake   Nutrition Modality PO   Feeding Route   PO Independent   Current PO Intake   Current Diet Order Clear liquid   Current Meal Intake 0-25%   Estimated Calorie Intake 0-25%   Estimated Protein Intake  0-25%   Estimated Fluid Intake %  (100 ml/hr IVF)   PES Statement   Problem Intake   Oral or Nutritional Support Intake (2) Inadequate oral intake NI-2.1   Related to Nausea;Inadequate diet   As evidenced by: Per patient/family interview;Weight loss;Intake < estimated needs   Recommendations/Interventions   Malnutrition/BMI Present Yes   Adult Malnutrition type Chronic illness   Adult Degree of Malnutrition Malnutrition of moderate degree   Malnutrition Characteristics Inadequate energy;Muscle loss;Fat loss;Weight loss   360 Statement Moderate malnutrition r/t catabolic illness as evidenced by 19% unplanned wt loss since 7/17/24, consuming < 75% energy intake compared to estimated energy needs > 1 month, mild fat/muscle wasting observed at orbital/temple areas. Currently on cl liq diet   Summary Pt tolerated a little jello today, not taking much of cl liq meals, offered Ensure clear, pt declined, c/o nausea   Interventions/Recommendations Continue current diet order   Education Assessment   Education Education not indicated at this time   Patient Nutrition Goals   Goal Adequate hydration;Adequate intake   Goal Status Initiated   Timeframe to  complete goal by next f/u   Nutrition Complexity Risk   Nutrition complexity level High risk   Follow up date 06/10/25

## 2025-06-11 ENCOUNTER — APPOINTMENT (INPATIENT)
Dept: RADIOLOGY | Facility: HOSPITAL | Age: 60
DRG: 511 | End: 2025-06-11
Payer: COMMERCIAL

## 2025-06-11 LAB
ANION GAP SERPL CALCULATED.3IONS-SCNC: 10 MMOL/L (ref 4–13)
BUN SERPL-MCNC: 7 MG/DL (ref 5–25)
CALCIUM SERPL-MCNC: 8.6 MG/DL (ref 8.4–10.2)
CHLORIDE SERPL-SCNC: 105 MMOL/L (ref 96–108)
CO2 SERPL-SCNC: 23 MMOL/L (ref 21–32)
CREAT SERPL-MCNC: 0.53 MG/DL (ref 0.6–1.3)
ERYTHROCYTE [DISTWIDTH] IN BLOOD BY AUTOMATED COUNT: 17.3 % (ref 11.6–15.1)
GFR SERPL CREATININE-BSD FRML MDRD: 104 ML/MIN/1.73SQ M
GLUCOSE SERPL-MCNC: 89 MG/DL (ref 65–140)
HCT VFR BLD AUTO: 26.3 % (ref 34.8–46.1)
HGB BLD-MCNC: 8.4 G/DL (ref 11.5–15.4)
MAGNESIUM SERPL-MCNC: 2 MG/DL (ref 1.9–2.7)
MCH RBC QN AUTO: 32.3 PG (ref 26.8–34.3)
MCHC RBC AUTO-ENTMCNC: 31.9 G/DL (ref 31.4–37.4)
MCV RBC AUTO: 101 FL (ref 82–98)
PLATELET # BLD AUTO: 597 THOUSANDS/UL (ref 149–390)
PMV BLD AUTO: 9.9 FL (ref 8.9–12.7)
POTASSIUM SERPL-SCNC: 3.8 MMOL/L (ref 3.5–5.3)
RBC # BLD AUTO: 2.6 MILLION/UL (ref 3.81–5.12)
SODIUM SERPL-SCNC: 138 MMOL/L (ref 135–147)
WBC # BLD AUTO: 12.24 THOUSAND/UL (ref 4.31–10.16)

## 2025-06-11 PROCEDURE — 74022 RADEX COMPL AQT ABD SERIES: CPT

## 2025-06-11 PROCEDURE — 85027 COMPLETE CBC AUTOMATED: CPT

## 2025-06-11 PROCEDURE — 90619 MENACWY-TT VACCINE IM: CPT

## 2025-06-11 PROCEDURE — 90471 IMMUNIZATION ADMIN: CPT

## 2025-06-11 PROCEDURE — 80048 BASIC METABOLIC PNL TOTAL CA: CPT

## 2025-06-11 PROCEDURE — 99024 POSTOP FOLLOW-UP VISIT: CPT | Performed by: OBSTETRICS & GYNECOLOGY

## 2025-06-11 PROCEDURE — 83735 ASSAY OF MAGNESIUM: CPT

## 2025-06-11 PROCEDURE — 90677 PCV20 VACCINE IM: CPT

## 2025-06-11 PROCEDURE — 90648 HIB PRP-T VACCINE 4 DOSE IM: CPT

## 2025-06-11 RX ORDER — HYDROMORPHONE HCL/PF 1 MG/ML
1 SYRINGE (ML) INJECTION ONCE
Status: COMPLETED | OUTPATIENT
Start: 2025-06-11 | End: 2025-06-11

## 2025-06-11 RX ORDER — KETOROLAC TROMETHAMINE 30 MG/ML
15 INJECTION, SOLUTION INTRAMUSCULAR; INTRAVENOUS ONCE
Status: COMPLETED | OUTPATIENT
Start: 2025-06-12 | End: 2025-06-12

## 2025-06-11 RX ORDER — HYDROMORPHONE HCL/PF 1 MG/ML
1 SYRINGE (ML) INJECTION EVERY 2 HOUR PRN
Status: DISCONTINUED | OUTPATIENT
Start: 2025-06-11 | End: 2025-06-12 | Stop reason: HOSPADM

## 2025-06-11 RX ORDER — ACETAMINOPHEN 325 MG/1
975 TABLET ORAL ONCE
Status: DISCONTINUED | OUTPATIENT
Start: 2025-06-11 | End: 2025-06-12

## 2025-06-11 RX ADMIN — ENOXAPARIN SODIUM 40 MG: 40 INJECTION SUBCUTANEOUS at 09:06

## 2025-06-11 RX ADMIN — OXYCODONE HYDROCHLORIDE 10 MG: 10 TABLET ORAL at 06:00

## 2025-06-11 RX ADMIN — FLUOXETINE HYDROCHLORIDE 40 MG: 20 CAPSULE ORAL at 09:07

## 2025-06-11 RX ADMIN — HAEMOPHILUS B POLYSACCHARIDE CONJUGATE VACCINE FOR INJ 0.5 ML: RECON SOLN at 08:06

## 2025-06-11 RX ADMIN — ONDANSETRON 4 MG: 2 INJECTION INTRAMUSCULAR; INTRAVENOUS at 19:31

## 2025-06-11 RX ADMIN — METOCLOPRAMIDE 5 MG: 5 INJECTION, SOLUTION INTRAMUSCULAR; INTRAVENOUS at 21:47

## 2025-06-11 RX ADMIN — ACETAMINOPHEN 975 MG: 325 TABLET, FILM COATED ORAL at 06:01

## 2025-06-11 RX ADMIN — METOCLOPRAMIDE 5 MG: 5 INJECTION, SOLUTION INTRAMUSCULAR; INTRAVENOUS at 11:21

## 2025-06-11 RX ADMIN — NEISSERIA MENINGITIDIS GROUP A CAPSULAR POLYSACCHARIDE TETANUS TOXOID CONJUGATE ANTIGEN, NEISSERIA MENINGITIDIS GROUP C CAPSULAR POLYSACCHARIDE TETANUS TOXOID CONJUGATE ANTIGEN, NEISSERIA MENINGITIDIS GROUP Y CAPSULAR POLYSACCHARIDE TETANUS TOXOID CONJUGATE ANTIGEN, AND NEISSERIA MENINGITIDIS GROUP W-135 CAPSULAR POLYSACCHARIDE TETANUS TOXOID CONJUGATE ANTIGEN 0.5 ML: 10; 10; 10; 10 INJECTION, SOLUTION INTRAMUSCULAR at 08:04

## 2025-06-11 RX ADMIN — PNEUMOCOCCAL 20-VALENT CONJUGATE VACCINE 0.5 ML
2.2; 2.2; 2.2; 2.2; 2.2; 2.2; 2.2; 2.2; 2.2; 2.2; 2.2; 2.2; 2.2; 2.2; 2.2; 2.2; 4.4; 2.2; 2.2; 2.2 INJECTION, SUSPENSION INTRAMUSCULAR at 08:03

## 2025-06-11 RX ADMIN — FLUTICASONE FUROATE AND VILANTEROL TRIFENATATE 1 PUFF: 100; 25 POWDER RESPIRATORY (INHALATION) at 09:07

## 2025-06-11 RX ADMIN — PANTOPRAZOLE SODIUM 20 MG: 40 TABLET, DELAYED RELEASE ORAL at 17:27

## 2025-06-11 RX ADMIN — OXYCODONE HYDROCHLORIDE 10 MG: 10 TABLET ORAL at 11:24

## 2025-06-11 RX ADMIN — PANTOPRAZOLE SODIUM 20 MG: 40 TABLET, DELAYED RELEASE ORAL at 06:01

## 2025-06-11 RX ADMIN — ONDANSETRON 4 MG: 2 INJECTION INTRAMUSCULAR; INTRAVENOUS at 09:12

## 2025-06-11 RX ADMIN — HYDROMORPHONE HYDROCHLORIDE 1 MG: 1 INJECTION, SOLUTION INTRAMUSCULAR; INTRAVENOUS; SUBCUTANEOUS at 20:37

## 2025-06-11 NOTE — ASSESSMENT & PLAN NOTE
60yo POD#7 s/p ex lap, SARAY, BSO, LAR, splenectomy, and debulking for high-grade serous ovarian cancer s/p neoadjuvant chemotherapy. Post-operative course complicated by nausea, vomiting, and abdominal distention concerning for evolving ileus; resolved. Post-op course also complicated by sub-optimal postoperative pain control; improved. Pt now meeting post-op milestones     - continue regular diet  - continue oral analgesia  - avoiding NSAIDs in the setting of LAR  - f/u AM labs; replete electrolytes as needed  - post-splenectomy vaccines ordered  - anticipate discharge today

## 2025-06-11 NOTE — PROGRESS NOTES
Progress Note - GYN Oncology   Name: Ewa Hernandes 59 y.o. female I MRN: [id number]  Unit/Bed#: E5 -01 I Date of Admission: 6/4/2025   Date of Service: 6/11/2025 I Hospital Day: 7     Assessment & Plan  Malignant neoplasm of both ovaries (HCC)  58yo POD#7 s/p ex lap, SARAY, BSO, LAR, splenectomy, and debulking for high-grade serous ovarian cancer s/p neoadjuvant chemotherapy. Post-operative course complicated by nausea, vomiting, and abdominal distention concerning for evolving ileus; resolved. Post-op course also complicated by sub-optimal postoperative pain control; improved. Pt now meeting post-op milestones     - continue regular diet  - continue oral analgesia  - avoiding NSAIDs in the setting of LAR  - f/u AM labs; replete electrolytes as needed  - post-splenectomy vaccines ordered  - anticipate discharge today  Ileus (HCC)  Imaging with fluid filled bowels and clinically with evolving ileus, resolved  Essential hypertension, benign  - continue to monitor BP  - resume home amlodipine first if medications needed postoperatively  Major depressive disorder, recurrent episode, mild (HCC)  Stable mood. No SI/HI  - continue home fluoxetine 40mg qd  - continue home alprazolam PRN  GERD (gastroesophageal reflux disease)  - Protonix 20mg BID  Asthma  - continue home albuterol PRN   Anemia  Hb stable  - f/u AM labs    GYNECOLOGY  Subjective   Ewa is feeling well this morning. She is tolerating regular diet without nausea or vomiting. Her pain is well controlled on oral analgesics. She is ambulating without lightheadedness, shortness of breath, or chest pain. She is voiding without difficulty. She is passing flatus and having occasional loose bowel movements.    Objective :  Temp:  [98 °F (36.7 °C)-98.7 °F (37.1 °C)] 98.7 °F (37.1 °C)  HR:  [] 87  BP: (111-149)/(68-82) 147/78  Resp:  [18] 18  SpO2:  [92 %-96 %] 96 %  O2 Device: None (Room air)    Physical Exam  Vitals reviewed.   Constitutional:        General: She is not in acute distress.     Appearance: She is well-developed.   HENT:      Head: Normocephalic and atraumatic.     Cardiovascular:      Rate and Rhythm: Normal rate.   Pulmonary:      Effort: Pulmonary effort is normal. No respiratory distress.   Abdominal:      General: There is no distension.      Palpations: Abdomen is soft.      Tenderness: There is no abdominal tenderness (moderate).      Comments: Midline vertical incision clean, intact; small amount of clear drainage at incision near umbilicus  PAIGE drain x2, serous fluid; R drain removed at bedside without difficulty     Neurological:      Mental Status: She is alert.     Psychiatric:         Behavior: Behavior normal.       Patient seen and examined with attending physician Dr. Ap Enriquez MD  OBGYN PGY-1  06/11/25  5:25 AM

## 2025-06-11 NOTE — DISCHARGE INSTR - AVS FIRST PAGE
[facility] - Gynecologic Oncology  Ap Shell, Santos Cotto and [place]   [phone]    Hysterectomy Discharge Instructions    A hysterectomy is surgery to remove your uterus. Your ovaries, fallopian tubes, cervix, or part of your vagina may also need to be removed. The organs and tissue that will be removed depends on your medical condition.  After a hysterectomy, you will not be able to become pregnant.  If your ovaries are removed, you will go through menopause if you have not already.    DISCHARGE INSTRUCTIONS:     What to expect at home:   Recovery from surgery is generally 2-4 weeks, but sometimes longer for more strenuous activity. It is normal to be very tired during this time.   If you had laparoscopic/robotic surgery, you may experience gas pain, abdominal swelling, or shoulder pain for 24-72 hours after surgery. This is from the carbon dioxide gas put into your abdomen to better visualize your organs. A warm shower, heating pad, and/or walking may help.    Contact your doctor at the number above if:   You have a fever over 100.4o.  You have nausea or vomiting that does not improve after a light meal.  Your abdominal or pelvic pain gets worse, even after you take medicine.  You feel pain or burning when you urinate, or you have trouble urinating that worsens over time. Some burning in the first 1-2 days after surgery is expected after removal of the bladder catheter during surgery.  You have pus or a foul-smelling odor coming from your vagina.  Your wound is red, swollen, or drainage is persistent, thick/cloudy or foul smelling.  Clear/pink drainage or a minimal amount of bleeding from incisions can be normal after laparoscopic surgery.  Your arm or leg feels warm, tender, red, swollen and/or painful.   You have heavy vaginal bleeding that fills 1 or more sanitary pads in 1 hour. It is normal to have a small amount of vaginal bleeding or discharge after surgery  that would require the use of a light pantiliner. This discharge may last up to 6 weeks. The bleeding and discharge should be light and should have no odor.     CALL 911 OR GO TO THE EMERGENCY ROOM IF YOU HAVE: Any shortness of breath, difficulty breathing, or chest pain.    Pain  Pain after surgery is a challenging part of the healing process. Pain may be present for weeks but should gradually get better.   Please take extra strength acetaminophen (Tylenol) 1000 mg every 6 hours and then alternate with a NSAIDs such as ibuprofen (Advil, Motrin) 600 mg (3 tablets) every 6 hours to help decrease swelling, pain, and fever.   NSAIDs can cause stomach bleeding or kidney problems in certain people. If you take blood thinner medicine, always ask your healthcare provider if NSAIDs are safe for you. Always read the medicine label and follow directions.  The maximum dose of Tylenol is 4000 mg in 24 hours. The maximum dose of Advil/Motrin is 2400mg in 24 hours.   For moderate to severe pain, please take oxycodone 5 mg PO every 4-6 hours as needed or other narcotic that was prescribed by provider.     Anticoagulation    If provided with a prescription for anticoagulation such as Apixaban (Eliquis), please take the prescribed dose until completed.     Constipation  Constipation is common and it is normal to not have a bowel movement for up to one week after surgery. You should still be passing gas despite constipation and should be able to tolerate both liquid and solid food without nausea or vomiting.  Please take polyethylene glycol (Miralax) 17 g (1 measured capful or 1 packet) once a day. If you have not had a bowel movement 3 days after surgery, you may take the Miralax two times a day. The alternatives to Miralax include Senna and Colace.     If you have any discomfort because of the need to have a bowel movement, you may add milk of magnesia or magnesium citrate (available at your local pharmacy without a prescription)  at any time. Do not take milk of magnesia or magnesium citrate if you have kidney failure.   If you have loose or watery stools, stop taking the medications.     Take your medicine as directed. Contact your healthcare provider if you think your medicine is not helping or if you have side effects. Tell him or her if you are allergic to any medicine. Keep a list of the medicines, vitamins, and herbs you take. Include the amounts, and when and why you take them. Bring the list of the pill bottles to follow-up visits. Carry your medicine list with you in case of an emergency.    Activity:   Rest as needed. Get up and move around as directed to help prevent blood clots. Start with short walks and slowly increase the distance every day.     Stairs are okay to use. Use two feet on each stair to go up and down during the first several days to weeks after surgery.     Do not lift objects heavier than 10 pounds for 6 weeks whether you have small, laparoscopic incisions or you have a large, laparotomy incision.    Avoid strenuous activity for 2 weeks.     You may shower as soon as you return home. You can use soapy water surrounding the incision and let the water run over it. Pat the incision dry after your shower (see wound care section below)     Do not strain during bowel movements. High-fiber foods and extra liquids can help you prevent constipation. Examples of high-fiber foods are fruit and bran. Prune juice and water are good liquids to drink.      Do not have sex, use tampons, or douche for up to 8 weeks.   Do not go into pools or hot tubs for up to 8 weeks and/or until you have been cleared by your doctor.      It is generally safe to drive if you feel strong enough and your reaction time is not compromised and when no longer taking prescription/opioid pain medication    Ask when you may return to work and to other regular activities.    Wound care:   Care for your abdominal incisions as directed. Carefully wash  around the wound with soap and water. If you have Hibiclens or medicated soap that you were instructed to use before surgery, you may use that to wash with for up to 2 days after surgery.  If not, any mild non-scented, non-abrasive soap is safe.  It is okay to let the soap and water run over your incision. Do not scrub your incision. Dry the area and put on new, clean bandages as directed. Change your bandages when they get wet or dirty.     If you have surgical glue over your incision, this will start to flake off 7-10 days postoperatively. When this occurs, you can peel off the remaining glue.    Surgical glue reactions are common. If this occurs, please take a dose of Benadryl 25-50 mg every 4-6 hours. The maximum dose of Benadryl is 300mg/day. You may also apply over-the-counter hydrocortisone around the incision area (not on top of the incision).     Check your incision every day for redness, swelling, or pus.     Deep breathing:   Take deep breaths and cough 10 times each hour. This will decrease your risk of a lung infection as this will open your airway. Take a deep breath and hold it for as long as you can. Let the air out and then cough strongly.     You may be given an incentive spirometer to help you take deep breaths. Put the plastic piece in your mouth and take a slow, deep breath, then let the air out and cough.     Get support:   This surgery may be life-changing for you and your family. You will no longer be able to get pregnant. Sudden changes in the levels of your hormones may occur and cause mood swings and depression. You may feel angry, sad, frightened, or cry frequently and unexpectedly. These feelings are normal. Talk to your healthcare provider about where you can get support. You can also ask if hormone replacement medicine is right for you. Write down your questions so you remember to ask them during your visits.

## 2025-06-11 NOTE — RESTORATIVE TECHNICIAN NOTE
Restorative Technician Note      Patient Name: Ewa Hernandes     Restorative Tech Visit Date: 06/11/25  Note Type: Mobility  Patient Position Upon Consult: Bedside chair  Activity Performed: Ambulated  Assistive Device: Roller walker  Patient Position at End of Consult: Bedside chair; All needs within reach            ns

## 2025-06-11 NOTE — CASE MANAGEMENT
Case Management Discharge Planning Note    Patient name Ewa Hernandes  Location [facility] /E5 -* MRN [id number]  : 1965 Date 2025       Current Admission Date: 2025  Current Admission Diagnosis:Malignant neoplasm of both ovaries (HCC)   Patient Active Problem List    Diagnosis Date Noted    Moderate protein-calorie malnutrition (HCC) 06/10/2025    Ileus (HCC) 2025    Anemia 2025    Neutropenia (HCC) 2025    Drug induced constipation 2025    GERD (gastroesophageal reflux disease) 2025    Malignant neoplasm of both ovaries (HCC) 2025    Mild persistent asthma with (acute) exacerbation 2025    Chemotherapy-induced nausea 01/10/2024    Financial difficulties 10/14/2023    Panic disorder 2023    Age-related cataract of both eyes 10/05/2022    Essential hypertension, benign 2020    Major depressive disorder, recurrent episode, mild (HCC) 2020    Asthma 2020      LOS (days): 7  Geometric Mean LOS (GMLOS) (days):   Days to GMLOS:     OBJECTIVE:  Risk of Unplanned Readmission Score: 14.22         Current admission status: Inpatient   Preferred Pharmacy:   [facility] [place] - [address]  [address] [address]  Phone: [phone] Fax: [phone]    [place] - [address]   [address] [facility] [place] [address]  Phone: [phone] Fax: [phone]    [facility] [place] - [address]  [facility],  [address]  [facility] [facility],  First [address] [facility] [address]  Phone: [phone] Fax: [phone]    Primary Care Provider: Rudolph Shaffer DO    Primary Insurance: JAYDA HILL  Secondary Insurance:     DISCHARGE DETAILS:            Additional Comments: Roller walker delivered to patient's room.

## 2025-06-12 VITALS
WEIGHT: 157.41 LBS | HEART RATE: 101 BPM | TEMPERATURE: 99.3 F | RESPIRATION RATE: 18 BRPM | BODY MASS INDEX: 23.31 KG/M2 | HEIGHT: 69 IN | SYSTOLIC BLOOD PRESSURE: 127 MMHG | OXYGEN SATURATION: 94 % | DIASTOLIC BLOOD PRESSURE: 84 MMHG

## 2025-06-12 LAB
ANION GAP SERPL CALCULATED.3IONS-SCNC: 10 MMOL/L (ref 4–13)
BUN SERPL-MCNC: 7 MG/DL (ref 5–25)
CALCIUM SERPL-MCNC: 8.6 MG/DL (ref 8.4–10.2)
CHLORIDE SERPL-SCNC: 106 MMOL/L (ref 96–108)
CO2 SERPL-SCNC: 24 MMOL/L (ref 21–32)
CREAT SERPL-MCNC: 0.53 MG/DL (ref 0.6–1.3)
ERYTHROCYTE [DISTWIDTH] IN BLOOD BY AUTOMATED COUNT: 17.5 % (ref 11.6–15.1)
GFR SERPL CREATININE-BSD FRML MDRD: 104 ML/MIN/1.73SQ M
GLUCOSE SERPL-MCNC: 75 MG/DL (ref 65–140)
HCT VFR BLD AUTO: 23 % (ref 34.8–46.1)
HGB BLD-MCNC: 7.3 G/DL (ref 11.5–15.4)
MAGNESIUM SERPL-MCNC: 1.7 MG/DL (ref 1.9–2.7)
MCH RBC QN AUTO: 31.9 PG (ref 26.8–34.3)
MCHC RBC AUTO-ENTMCNC: 31.7 G/DL (ref 31.4–37.4)
MCV RBC AUTO: 100 FL (ref 82–98)
PLATELET # BLD AUTO: 692 THOUSANDS/UL (ref 149–390)
PMV BLD AUTO: 9.7 FL (ref 8.9–12.7)
POTASSIUM SERPL-SCNC: 3.6 MMOL/L (ref 3.5–5.3)
RBC # BLD AUTO: 2.29 MILLION/UL (ref 3.81–5.12)
SODIUM SERPL-SCNC: 140 MMOL/L (ref 135–147)
WBC # BLD AUTO: 16.5 THOUSAND/UL (ref 4.31–10.16)

## 2025-06-12 PROCEDURE — 83735 ASSAY OF MAGNESIUM: CPT

## 2025-06-12 PROCEDURE — 80048 BASIC METABOLIC PNL TOTAL CA: CPT

## 2025-06-12 PROCEDURE — NC001 PR NO CHARGE: Performed by: OBSTETRICS & GYNECOLOGY

## 2025-06-12 PROCEDURE — 85027 COMPLETE CBC AUTOMATED: CPT

## 2025-06-12 PROCEDURE — 99024 POSTOP FOLLOW-UP VISIT: CPT | Performed by: OBSTETRICS & GYNECOLOGY

## 2025-06-12 RX ORDER — SIMETHICONE 80 MG
80 TABLET,CHEWABLE ORAL 4 TIMES DAILY PRN
Qty: 30 TABLET | Refills: 0 | Status: SHIPPED | OUTPATIENT
Start: 2025-06-12

## 2025-06-12 RX ORDER — ALBUTEROL SULFATE 90 UG/1
2 INHALANT RESPIRATORY (INHALATION) EVERY 4 HOURS PRN
Qty: 6.7 G | Refills: 0 | Status: SHIPPED | OUTPATIENT
Start: 2025-06-12

## 2025-06-12 RX ORDER — POTASSIUM CHLORIDE 1500 MG/1
40 TABLET, EXTENDED RELEASE ORAL ONCE
Status: COMPLETED | OUTPATIENT
Start: 2025-06-12 | End: 2025-06-12

## 2025-06-12 RX ORDER — PANTOPRAZOLE SODIUM 20 MG/1
20 TABLET, DELAYED RELEASE ORAL
Qty: 30 TABLET | Refills: 0 | Status: SHIPPED | OUTPATIENT
Start: 2025-06-12

## 2025-06-12 RX ORDER — MAGNESIUM SULFATE HEPTAHYDRATE 40 MG/ML
2 INJECTION, SOLUTION INTRAVENOUS ONCE
Status: COMPLETED | OUTPATIENT
Start: 2025-06-12 | End: 2025-06-12

## 2025-06-12 RX ORDER — ACETAMINOPHEN 10 MG/ML
1000 INJECTION, SOLUTION INTRAVENOUS ONCE
Status: COMPLETED | OUTPATIENT
Start: 2025-06-12 | End: 2025-06-12

## 2025-06-12 RX ORDER — CALCIUM CARBONATE 500 MG/1
1000 TABLET, CHEWABLE ORAL 3 TIMES DAILY PRN
Qty: 30 TABLET | Refills: 0 | Status: SHIPPED | OUTPATIENT
Start: 2025-06-12

## 2025-06-12 RX ORDER — ACETAMINOPHEN 325 MG/1
975 TABLET ORAL EVERY 8 HOURS SCHEDULED
Qty: 30 TABLET | Refills: 0 | Status: SHIPPED | OUTPATIENT
Start: 2025-06-12

## 2025-06-12 RX ADMIN — ONDANSETRON 4 MG: 2 INJECTION INTRAMUSCULAR; INTRAVENOUS at 08:37

## 2025-06-12 RX ADMIN — PANTOPRAZOLE SODIUM 20 MG: 40 TABLET, DELAYED RELEASE ORAL at 05:49

## 2025-06-12 RX ADMIN — KETOROLAC TROMETHAMINE 15 MG: 30 INJECTION, SOLUTION INTRAMUSCULAR; INTRAVENOUS at 01:17

## 2025-06-12 RX ADMIN — ENOXAPARIN SODIUM 40 MG: 40 INJECTION SUBCUTANEOUS at 08:31

## 2025-06-12 RX ADMIN — FLUOXETINE HYDROCHLORIDE 40 MG: 20 CAPSULE ORAL at 08:31

## 2025-06-12 RX ADMIN — ACETAMINOPHEN 1000 MG: 10 INJECTION INTRAVENOUS at 01:16

## 2025-06-12 RX ADMIN — FLUTICASONE FUROATE AND VILANTEROL TRIFENATATE 1 PUFF: 100; 25 POWDER RESPIRATORY (INHALATION) at 08:31

## 2025-06-12 RX ADMIN — ONDANSETRON 4 MG: 2 INJECTION INTRAMUSCULAR; INTRAVENOUS at 02:20

## 2025-06-12 RX ADMIN — PANTOPRAZOLE SODIUM 20 MG: 40 TABLET, DELAYED RELEASE ORAL at 15:32

## 2025-06-12 RX ADMIN — MAGNESIUM SULFATE HEPTAHYDRATE 2 G: 40 INJECTION, SOLUTION INTRAVENOUS at 07:31

## 2025-06-12 RX ADMIN — ACETAMINOPHEN 975 MG: 325 TABLET, FILM COATED ORAL at 13:26

## 2025-06-12 RX ADMIN — POTASSIUM CHLORIDE 40 MEQ: 1500 TABLET, EXTENDED RELEASE ORAL at 07:31

## 2025-06-12 NOTE — PROGRESS NOTES
Progress Note - GYN Oncology   Name: Ewa Hernandes 59 y.o. female I MRN: [id number]  Unit/Bed#: E5 -01 I Date of Admission: 6/4/2025   Date of Service: 6/12/2025 I Hospital Day: 8     Assessment & Plan  Malignant neoplasm of both ovaries (HCC)  58yo POD#8 s/p ex lap, SARAY, BSO, LAR, splenectomy, and debulking for high-grade serous ovarian cancer s/p neoadjuvant chemotherapy. Post-operative course complicated by nausea, vomiting, and abdominal distention concerning for evolving ileus. Initially had resolved, but pt had 1 new episode of vomiting on AM of 6/11     - continue clear liquid diet  - continue oral analgesia  - avoiding NSAIDs in the setting of LAR  - f/u AM labs; replete electrolytes as needed  - s/p post-splenectomy vaccines  Ileus (HCC)  Imaging with fluid filled bowels and clinically with evolving ileus, resolved  Essential hypertension, benign  - continue to monitor BP  - resume home amlodipine first if medications needed postoperatively  Major depressive disorder, recurrent episode, mild (HCC)  Stable mood. No SI/HI  - continue home fluoxetine 40mg qd  - continue home alprazolam PRN  GERD (gastroesophageal reflux disease)  - Protonix 20mg BID  Asthma  - continue home albuterol PRN   Anemia  Hb 7.3 from 8.4    GYNECOLOGY  Subjective   Ewa reports she is overall feeling a bit worse than she has been over the past few days. She has a low appetite and did not eat lunch or dinner. She denies nausea or vomiting. Her pain is well controlled. She is voiding without difficulty. She reports >2 loose stools during the day yesterday. She denies cough or shortness of breath.    Objective :  Temp:  [98 °F (36.7 °C)-99.4 °F (37.4 °C)] 98.5 °F (36.9 °C)  HR:  [] 96  BP: (116-144)/(72-78) 144/77  Resp:  [18] 18  SpO2:  [91 %-96 %] 91 %  O2 Device: None (Room air)    Physical Exam  Vitals reviewed.   Constitutional:       General: She is not in acute distress.     Appearance: She is well-developed.    HENT:      Head: Normocephalic and atraumatic.     Cardiovascular:      Rate and Rhythm: Normal rate.   Pulmonary:      Effort: Pulmonary effort is normal. No respiratory distress.   Abdominal:      General: There is no distension.      Palpations: Abdomen is soft.      Tenderness: There is no abdominal tenderness.      Comments: Midline vertical incision clean/dry/intact     Neurological:      Mental Status: She is alert.     Psychiatric:         Behavior: Behavior normal.       Isabel Enriquez MD  OBGYN PGY-1  06/12/25  6:19 AM

## 2025-06-12 NOTE — ASSESSMENT & PLAN NOTE
58yo POD#8 s/p ex lap, SARAY, BSO, LAR, splenectomy, and debulking for high-grade serous ovarian cancer s/p neoadjuvant chemotherapy. Post-operative course complicated by nausea, vomiting, and abdominal distention concerning for evolving ileus. Initially had resolved, but pt had 1 new episode of vomiting on AM of 6/11     - continue clear liquid diet  - continue oral analgesia  - avoiding NSAIDs in the setting of LAR  - f/u AM labs; replete electrolytes as needed  - s/p post-splenectomy vaccines

## 2025-06-12 NOTE — UTILIZATION REVIEW
Continued Stay Review    Date: 06/12/25                          Current Patient Class: Inpatient  Current Level of Care: Med/Surg    HPI:59 y.o. female initially admitted on 6/4,   Current Diagnosis: malignant neoplasm of b/l ovaries, POD 8 ex lap, SARAY, BSO, LAR, splenectomy, and debulking - post-op complicated by n/v and ileus    Assessment/Plan: Episode of emesis. Notes feeling worse over the past few days, poor appetite. Notes not eating lunch or dinner. Pain well-controlled. Midline incision CDI. Plan: continue clear liquids, analgesics, Trend labs, replete electrolytes as needed. Continue PTA meds. Hgb 7.3 today from 8.4. see detailed med list below.     Medications:   Scheduled Medications:  acetaminophen, 975 mg, Oral, Q8H ADELE  enoxaparin, 40 mg, Subcutaneous, Q24H ADELE  FLUoxetine, 40 mg, Oral, Daily  Fluticasone Furoate-Vilanterol, 1 puff, Inhalation, Daily  pantoprazole, 20 mg, Oral, BID AC    Continuous IV Infusions: none    PRN Meds:  albuterol, 2 puff, Inhalation, Q4H PRN  ALPRAZolam, 0.5 mg, Oral, TID PRN  calcium carbonate, 1,000 mg, Oral, TID PRN  diphenhydrAMINE, 25 mg, Intravenous, Q6H PRN  HYDROmorphone, 1 mg, Intravenous, Q2H PRN  metoclopramide, 5 mg, Intravenous, Q6H PRN; 6/11 x2  ondansetron, 4 mg, Intravenous, Q6H PRN; 6/11 x2, 6/12 x2  oxyCODONE, 10 mg, Oral, Q4H PRN; 6/11 x2  oxyCODONE, 5 mg, Oral, Q4H PRN  simethicone, 80 mg, Oral, 4x Daily PRN    acetaminophen (Ofirmev) injection 1,000 mg  Dose: 1,000 mg Freq: Once Route: IV   Start: 06/12/25 0015 End: 06/12/25 0131  haemophilus B vaccine, tetanus toxoid conjugate (ActHIB) IM injection 0.5 mL  Dose: 0.5 mL Freq: Once Route: IM  Start: 06/11/25 0630 End: 06/11/25 0806  Mening ACY&W-135 Tetanus Conj (MenQuadfi) IM Injection 0.5 mL  Dose: 0.5 mL Freq: Once Route: IM  Start: 06/11/25 0630 End: 06/11/25 0804  pneumococcal 20-josh conj vacc (PREVNAR 20) IM Injection 0.5 mL  Dose: 0.5 mL Freq: Once Route: IM  Start: 06/11/25 0630 End: 06/11/25  0803  HYDROmorphone (DILAUDID) injection 1 mg  Dose: 1 mg Freq: Once Route: IV  Start: 06/11/25 2000 End: 06/11/25 2037  ketorolac (TORADOL) injection 15 mg  Dose: 15 mg Freq: Once Route: IV  Start: 06/12/25 0100 End: 06/12/25 0117  magnesium sulfate 2 g/50 mL IVPB (premix) 2 g  Dose: 2 g Freq: Once Route: IV   Start: 06/12/25 0715 End: 06/12/25 0931  potassium chloride (Klor-Con M20) CR tablet 40 mEq  Dose: 40 mEq Freq: Once Route: PO  Start: 06/12/25 0730 End: 06/12/25 0731      Discharge Plan: TBD      Vital Signs (last 3 days)       Date/Time Temp Pulse Resp BP MAP (mmHg) SpO2 O2 Device Patient Position - Orthostatic VS Carmen Coma Scale Score Pain    06/12/25 11:24:44 100.2 °F (37.9 °C) 102 16 127/75 92 96 % -- -- -- --    06/12/25 0936 98.7 °F (37.1 °C) 99 18 128/68 -- 97 % -- -- -- --    06/12/25 0845 -- -- -- -- -- 96 % None (Room air) -- -- --    06/12/25 0839 97.9 °F (36.6 °C) 102 18 127/73 -- 98 % -- -- -- --    06/12/25 0731 98.4 °F (36.9 °C) 95 18 135/77 96 97 % None (Room air) Lying -- No Pain    06/12/25 03:03:26 98.5 °F (36.9 °C) 96 18 144/77 99 91 % None (Room air) Lying -- --    06/12/25 0117 -- -- -- -- -- -- -- -- 15 6    06/11/25 23:27:18 99.4 °F (37.4 °C) 102 18 124/72 89 94 % None (Room air) Lying -- --    06/11/25 2037 -- -- -- -- -- -- -- -- -- 6 06/11/25 19:24:49 98.4 °F (36.9 °C) 102 18 136/78 97 93 % -- -- -- 6 06/11/25 15:36:43 -- 92 -- 116/78 91 95 % -- -- -- --    06/11/25 15:35:39 -- -- -- 116/78 91 -- -- -- -- --    06/11/25 1124 -- -- -- -- -- -- -- -- -- 6 06/11/25 0900 -- -- -- -- -- -- -- -- 15 5    06/11/25 07:38:40 98 °F (36.7 °C) 87 18 127/76 93 96 % None (Room air) Sitting -- --    06/11/25 0600 -- -- -- -- -- -- -- -- -- 7 06/11/25 02:57:52 98.7 °F (37.1 °C) 87 -- 147/78 101 96 % None (Room air) Lying -- --    06/10/25 23:13:47 98.1 °F (36.7 °C) 103 18 149/82 104 93 % None (Room air) Lying -- --    06/10/25 2306 -- -- -- -- -- -- -- -- -- 7    06/10/25 2214 --  -- -- -- -- -- -- -- -- 5    06/10/25 2000 -- -- -- -- -- -- -- -- 15 --    06/10/25 1903 -- -- -- -- -- -- -- -- -- 5    06/10/25 19:01:20 98 °F (36.7 °C) 101 -- 128/79 95 94 % -- -- -- --    06/10/25 1545 -- -- -- -- -- -- -- -- -- 6    06/10/25 15:34:21 98.3 °F (36.8 °C) 83 18 111/68 82 92 % -- -- -- --    06/10/25 1433 -- -- -- -- -- -- -- -- -- 7    06/10/25 1135 -- -- -- -- -- -- -- -- -- 7    06/10/25 1134 -- -- -- -- -- -- -- -- -- 7    06/10/25 0951 -- -- -- -- -- -- -- -- -- 7    06/10/25 0758 -- -- -- -- -- -- -- -- -- 7    06/10/25 0746 -- -- -- -- -- -- -- -- -- 7    06/10/25 07:20:45 98.7 °F (37.1 °C) 92 18 122/75 91 93 % -- -- -- --    06/09/25 23:14:46 98 °F (36.7 °C) 84 -- 115/68 84 94 % -- -- -- 3    06/09/25 2100 -- -- -- -- -- -- -- -- 15 --    06/09/25 1858 -- -- -- -- -- -- -- -- -- 7 06/09/25 1800 -- -- -- -- -- -- -- -- -- 7 06/09/25 1447 -- -- -- -- -- -- -- -- -- 7 06/09/25 14:45:09 98.4 °F (36.9 °C) 85 16 116/68 84 92 % None (Room air) Sitting -- --    06/09/25 12:00:24 98.3 °F (36.8 °C) 89 16 109/64 79 93 % None (Room air) Lying -- --    06/09/25 10:09:43 -- 86 -- -- -- 96 % None (Room air) -- -- 6    06/09/25 0730 -- -- -- -- -- 93 % None (Room air) -- -- --    06/09/25 0728 -- -- -- -- -- -- -- -- -- 7    06/09/25 07:19:07 98.4 °F (36.9 °C) 87 18 112/91 98 96 % -- Lying -- --    06/09/25 0659 -- -- -- -- -- -- -- -- -- 7               Pertinent Labs/Diagnostic Results:   Radiology:  XR abdomen obstruction series   Final Interpretation by Josue Mercedes MD (06/11 4170)      Nonobstructive bowel gas pattern.   Surgical drain in the left upper quadrant. The other surgical drain has been removed.   Minimal residual free air along the left side of the abdomen similar to the prior CT.   Minimal residual left pleural fluid         Workstation performed: [id number]         CTA chest abdomen pelvis w wo contrast   Final Interpretation by Rosario Sawant MD (06/06 2656)   No  acute vascular pathology in the chest, abdomen or pelvis.      No hematoma or evidence of active bleeding.      Expected postoperative changes in the abdomen and pelvis.      Other nonemergent findings above.            Computerized Assisted Algorithm (CAA) may have aided analysis of applicable images.      Workstation performed: [id number]                  Results from last 7 days   Lab Units 06/12/25  0606 06/11/25  0558 06/10/25  0834 06/09/25  0452 06/08/25  0419 06/06/25  1203 06/06/25  0502   WBC Thousand/uL 16.50* 12.24* 11.91* 12.89* 16.48*   < > 16.81*   HEMOGLOBIN g/dL 7.3* 8.4* 8.3* 7.4* 8.2*   < > 8.1*   HEMATOCRIT % 23.0* 26.3* 26.0* 23.3* 25.2*   < > 24.9*   PLATELETS Thousands/uL 692* 597* 551* 421* 373   < > 254   TOTAL NEUT ABS Thousands/µL  --   --   --   --   --   --  14.14*    < > = values in this interval not displayed.         Results from last 7 days   Lab Units 06/12/25  0606 06/11/25  0558 06/10/25  0834 06/09/25  0452 06/08/25  0419   SODIUM mmol/L 140 138 137 136 138   POTASSIUM mmol/L 3.6 3.8 3.7 3.9 4.2   CHLORIDE mmol/L 106 105 103 104 105   CO2 mmol/L 24 23 27 22 21   ANION GAP mmol/L 10 10 7 10 12   BUN mg/dL 7 7 7 10 15   CREATININE mg/dL 0.53* 0.53* 0.54* 0.52* 0.63   EGFR ml/min/1.73sq m 104 104 104 105 99   CALCIUM mg/dL 8.6 8.6 9.0 8.4 8.9   MAGNESIUM mg/dL 1.7* 2.0 1.9 1.6* 1.7*             Results from last 7 days   Lab Units 06/12/25  0606 06/11/25  0558 06/10/25  0834 06/09/25  0452 06/08/25  0419 06/07/25  0507 06/06/25  1203 06/06/25  0502   GLUCOSE RANDOM mg/dL 75 89 94 76 52* 71 89 95        Network Utilization Review Department  ATTENTION: Please call with any questions or concerns to [phone] and carefully listen to the prompts so that you are directed to the right person. All voicemails are confidential.   For Discharge needs, contact Care Management DC Support Team at [phone] opt. 2  Send all requests for admission clinical reviews, approved or denied  determinations and any other requests to dedicated fax number below belonging to the campus where the patient is receiving treatment. List of dedicated fax numbers for the Facilities:  FACILITY NAME UR FAX NUMBER   ADMISSION DENIALS (Administrative/Medical Necessity) [phone]   DISCHARGE SUPPORT TEAM (NETWORK) [phone]   PARENT CHILD HEALTH (Maternity/NICU/Pediatrics) [phone]   [facility] [phone]   [facility] [phone]   [facility] [phone]   [facility] [phone]   [facility] [phone]   [facility] [phone]   [facility] [phone]   [facility] [phone]   [facility] [phone]   [facility] [phone]   [facility] [phone]   [facility] [phone]

## 2025-06-12 NOTE — PLAN OF CARE
Problem: PAIN - ADULT  Goal: Verbalizes/displays adequate comfort level or baseline comfort level  Description: Interventions:  - Encourage patient to monitor pain and request assistance  - Assess pain using appropriate pain scale  - Administer analgesics as ordered based on type and severity of pain and evaluate response  - Implement non-pharmacological measures as appropriate and evaluate response  - Consider cultural and social influences on pain and pain management  - Notify physician/advanced practitioner if interventions unsuccessful or patient reports new pain  - Educate patient/family on pain management process including their role and importance of  reporting pain   - Provide non-pharmacologic/complimentary pain relief interventions  Outcome: Progressing     Problem: INFECTION - ADULT  Goal: Absence or prevention of progression during hospitalization  Description: INTERVENTIONS:  - Assess and monitor for signs and symptoms of infection  - Monitor lab/diagnostic results  - Monitor all insertion sites, i.e. indwelling lines, tubes, and drains  - Monitor endotracheal if appropriate and nasal secretions for changes in amount and color  - Fishtail appropriate cooling/warming therapies per order  - Administer medications as ordered  - Instruct and encourage patient and family to use good hand hygiene technique  - Identify and instruct in appropriate isolation precautions for identified infection/condition  Outcome: Progressing     Problem: SAFETY ADULT  Goal: Patient will remain free of falls  Description: INTERVENTIONS:  - Educate patient/family on patient safety including physical limitations  - Instruct patient to call for assistance with activity   - Consider consulting OT/PT to assist with strengthening/mobility based on AM PAC & JH-HLM score  - Consult OT/PT to assist with strengthening/mobility   - Keep Call bell within reach  - Keep bed low and locked with side rails adjusted as appropriate  - Keep  care items and personal belongings within reach  - Initiate and maintain comfort rounds  - Make Fall Risk Sign visible to staff  - Offer Toileting every 2 Hours, in advance of need  - Obtain necessary fall risk management equipment  - Apply yellow socks and bracelet for high fall risk patients  - Consider moving patient to room near nurses station  Outcome: Progressing  Goal: Maintain or return to baseline ADL function  Description: INTERVENTIONS:  -  Assess patient's ability to carry out ADLs; assess patient's baseline for ADL function and identify physical deficits which impact ability to perform ADLs (bathing, care of mouth/teeth, toileting, grooming, dressing, etc.)  - Assess/evaluate cause of self-care deficits   - Assess range of motion  - Assess patient's mobility; develop plan if impaired  - Assess patient's need for assistive devices and provide as appropriate  - Encourage maximum independence but intervene and supervise when necessary  - Involve family in performance of ADLs  - Assess for home care needs following discharge   - Consider OT consult to assist with ADL evaluation and planning for discharge  - Provide patient education as appropriate  - Monitor functional capacity and physical performance, use of AM PAC & -HLM   - Monitor gait, balance and fatigue with ambulation    Outcome: Progressing  Goal: Maintains/Returns to pre admission functional level  Description: INTERVENTIONS:  - Perform AM-PAC 6 Click Basic Mobility/ Daily Activity assessment daily.  - Set and communicate daily mobility goal to care team and patient/family/caregiver.   - Collaborate with rehabilitation services on mobility goals if consulted  - Perform Range of Motion 3 times a day.  - Reposition patient every 2 hours.  - Dangle patient 3 times a day  - Stand patient 3 times a day  - Ambulate patient 3 times a day  - Out of bed to chair 3 times a day   - Out of bed for meals 3 times a day  - Out of bed for toileting  - Record  patient progress and toleration of activity level   Outcome: Progressing     Problem: DISCHARGE PLANNING  Goal: Discharge to home or other facility with appropriate resources  Description: INTERVENTIONS:  - Identify barriers to discharge w/patient and caregiver  - Arrange for needed discharge resources and transportation as appropriate  - Identify discharge learning needs (meds, wound care, etc.)  - Arrange for interpretive services to assist at discharge as needed  - Refer to Case Management Department for coordinating discharge planning if the patient needs post-hospital services based on physician/advanced practitioner order or complex needs related to functional status, cognitive ability, or social support system  Outcome: Progressing     Problem: Knowledge Deficit  Goal: Patient/family/caregiver demonstrates understanding of disease process, treatment plan, medications, and discharge instructions  Description: Complete learning assessment and assess knowledge base.  Interventions:  - Provide teaching at level of understanding  - Provide teaching via preferred learning methods  Outcome: Progressing     Problem: RESPIRATORY - ADULT  Goal: Achieves optimal ventilation and oxygenation  Description: INTERVENTIONS:  - Assess for changes in respiratory status  - Assess for changes in mentation and behavior  - Position to facilitate oxygenation and minimize respiratory effort  - Oxygen administered by appropriate delivery if ordered  - Initiate smoking cessation education as indicated  - Encourage broncho-pulmonary hygiene including cough, deep breathe, Incentive Spirometry  - Assess the need for suctioning and aspirate as needed  - Assess and instruct to report SOB or any respiratory difficulty  - Respiratory Therapy support as indicated  Outcome: Progressing     Problem: GASTROINTESTINAL - ADULT  Goal: Minimal or absence of nausea and/or vomiting  Description: INTERVENTIONS:  - Administer IV fluids if ordered to  ensure adequate hydration  - Maintain NPO status until nausea and vomiting are resolved  - Nasogastric tube if ordered  - Administer ordered antiemetic medications as needed  - Provide nonpharmacologic comfort measures as appropriate  - Advance diet as tolerated, if ordered  - Consider nutrition services referral to assist patient with adequate nutrition and appropriate food choices  Outcome: Progressing     Problem: GENITOURINARY - ADULT  Goal: Absence of urinary retention  Description: INTERVENTIONS:  - Assess patient’s ability to void and empty bladder  - Monitor I/O  - Bladder scan as needed  - Discuss with physician/AP medications to alleviate retention as needed  - Discuss catheterization for long term situations as appropriate  Outcome: Progressing  Goal: Urinary catheter remains patent  Description: INTERVENTIONS:  - Assess patency of urinary catheter  - If patient has a chronic mendes, consider changing catheter if non-functioning  - Follow guidelines for intermittent irrigation of non-functioning urinary catheter  Outcome: Progressing     Problem: Prexisting or High Potential for Compromised Skin Integrity  Goal: Skin integrity is maintained or improved  Description: INTERVENTIONS:  - Identify patients at risk for skin breakdown  - Assess and monitor skin integrity including under and around medical devices   - Assess and monitor nutrition and hydration status  - Monitor labs  - Assess for incontinence   - Turn and reposition patient  - Assist with mobility/ambulation  - Relieve pressure over tamra prominences   - Avoid friction and shearing  - Provide appropriate hygiene as needed including keeping skin clean and dry  - Evaluate need for skin moisturizer/barrier cream  - Collaborate with interdisciplinary team  - Patient/family teaching  - Consider wound care consult    Assess:  - Review Nader scale daily  - Clean and moisturize skin every shift  - Inspect skin when repositioning, toileting, and assisting  with ADLS  - Assess under medical devices   - Assess extremities for adequate circulation and sensation     Bed Management:  - Have minimal linens on bed & keep smooth, unwrinkled  - Change linens as needed when moist or perspiring  - Avoid sitting or lying in one position for more than 2 hours while in bed?Keep HOB at 30 degrees   - Toileting:  - Offer bedside commode  - Assess for incontinence every shift  - Use incontinent care products after each incontinent episode     Activity:  - Mobilize patient 3 times a day  - Encourage activity and walks on unit  - Encourage or provide ROM exercises   - Turn and reposition patient every 2 Hours  - Use appropriate equipment to lift or move patient in bed  - Instruct/ Assist with weight shifting every hour when out of bed in chair  - Consider limitation of chair time 1 hour intervals    Skin Care:  - Avoid use of baby powder, tape, friction and shearing, hot water or constrictive clothing  - Relieve pressure over bony prominences   - Do not massage red bony areas    Next Steps:  - Teach patient strategies to minimize risks   - Consider consults to  interdisciplinary teams   Outcome: Progressing     Problem: Nutrition/Hydration-ADULT  Goal: Nutrient/Hydration intake appropriate for improving, restoring or maintaining nutritional needs  Description: Monitor and assess patient's nutrition/hydration status for malnutrition. Collaborate with interdisciplinary team and initiate plan and interventions as ordered.  Monitor patient's weight and dietary intake as ordered or per policy. Utilize nutrition screening tool and intervene as necessary. Determine patient's food preferences and provide high-protein, high-caloric foods as appropriate.     INTERVENTIONS:  - Monitor oral intake, urinary output, labs, and treatment plans  - Assess nutrition and hydration status and recommend course of action  - Evaluate amount of meals eaten  - Assist patient with eating if necessary   - Allow  adequate time for meals  - Recommend/ encourage appropriate diets, oral nutritional supplements, and vitamin/mineral supplements  - Order, calculate, and assess calorie counts as needed  - Recommend, monitor, and adjust tube feedings and TPN/PPN based on assessed needs  - Assess need for intravenous fluids  - Provide specific nutrition/hydration education as appropriate  - Include patient/family/caregiver in decisions related to nutrition  Outcome: Progressing

## 2025-06-12 NOTE — PLAN OF CARE
Problem: PAIN - ADULT  Goal: Verbalizes/displays adequate comfort level or baseline comfort level  Description: Interventions:  - Encourage patient to monitor pain and request assistance  - Assess pain using appropriate pain scale  - Administer analgesics as ordered based on type and severity of pain and evaluate response  - Implement non-pharmacological measures as appropriate and evaluate response  - Consider cultural and social influences on pain and pain management  - Notify physician/advanced practitioner if interventions unsuccessful or patient reports new pain  - Educate patient/family on pain management process including their role and importance of  reporting pain   - Provide non-pharmacologic/complimentary pain relief interventions  Outcome: Progressing     Problem: INFECTION - ADULT  Goal: Absence or prevention of progression during hospitalization  Description: INTERVENTIONS:  - Assess and monitor for signs and symptoms of infection  - Monitor lab/diagnostic results  - Monitor all insertion sites, i.e. indwelling lines, tubes, and drains  - Monitor endotracheal if appropriate and nasal secretions for changes in amount and color  - [place] appropriate cooling/warming therapies per order  - Administer medications as ordered  - Instruct and encourage patient and family to use good hand hygiene technique  - Identify and instruct in appropriate isolation precautions for identified infection/condition  Outcome: Progressing     Problem: SAFETY ADULT  Goal: Patient will remain free of falls  Description: INTERVENTIONS:  - Educate patient/family on patient safety including physical limitations  - Instruct patient to call for assistance with activity   - Consider consulting OT/PT to assist with strengthening/mobility based on AM PAC & JH-HLM score  - Consult OT/PT to assist with strengthening/mobility   - Keep Call bell within reach  - Keep bed low and locked with side rails adjusted as appropriate  - Keep  care items and personal belongings within reach  - Initiate and maintain comfort rounds  - Make Fall Risk Sign visible to staff  - Offer Toileting every 2 Hours, in advance of need  - Obtain necessary fall risk management equipment  - Apply yellow socks and bracelet for high fall risk patients  - Consider moving patient to room near nurses station  Outcome: Progressing  Goal: Maintain or return to baseline ADL function  Description: INTERVENTIONS:  -  Assess patient's ability to carry out ADLs; assess patient's baseline for ADL function and identify physical deficits which impact ability to perform ADLs (bathing, care of mouth/teeth, toileting, grooming, dressing, etc.)  - Assess/evaluate cause of self-care deficits   - Assess range of motion  - Assess patient's mobility; develop plan if impaired  - Assess patient's need for assistive devices and provide as appropriate  - Encourage maximum independence but intervene and supervise when necessary  - Involve family in performance of ADLs  - Assess for home care needs following discharge   - Consider OT consult to assist with ADL evaluation and planning for discharge  - Provide patient education as appropriate  - Monitor functional capacity and physical performance, use of AM PAC & -HLM   - Monitor gait, balance and fatigue with ambulation    Outcome: Progressing  Goal: Maintains/Returns to pre admission functional level  Description: INTERVENTIONS:  - Perform AM-PAC 6 Click Basic Mobility/ Daily Activity assessment daily.  - Set and communicate daily mobility goal to care team and patient/family/caregiver.   - Collaborate with rehabilitation services on mobility goals if consulted  - Perform Range of Motion 3 times a day.  - Reposition patient every 2 hours.  - Dangle patient 3 times a day  - Stand patient 3 times a day  - Ambulate patient 3 times a day  - Out of bed to chair 3 times a day   - Out of bed for meals 3 times a day  - Out of bed for toileting  - Record  patient progress and toleration of activity level   Outcome: Progressing     Problem: DISCHARGE PLANNING  Goal: Discharge to home or other facility with appropriate resources  Description: INTERVENTIONS:  - Identify barriers to discharge w/patient and caregiver  - Arrange for needed discharge resources and transportation as appropriate  - Identify discharge learning needs (meds, wound care, etc.)  - Arrange for interpretive services to assist at discharge as needed  - Refer to Case Management Department for coordinating discharge planning if the patient needs post-hospital services based on physician/advanced practitioner order or complex needs related to functional status, cognitive ability, or social support system  Outcome: Progressing     Problem: Knowledge Deficit  Goal: Patient/family/caregiver demonstrates understanding of disease process, treatment plan, medications, and discharge instructions  Description: Complete learning assessment and assess knowledge base.  Interventions:  - Provide teaching at level of understanding  - Provide teaching via preferred learning methods  Outcome: Progressing     Problem: RESPIRATORY - ADULT  Goal: Achieves optimal ventilation and oxygenation  Description: INTERVENTIONS:  - Assess for changes in respiratory status  - Assess for changes in mentation and behavior  - Position to facilitate oxygenation and minimize respiratory effort  - Oxygen administered by appropriate delivery if ordered  - Initiate smoking cessation education as indicated  - Encourage broncho-pulmonary hygiene including cough, deep breathe, Incentive Spirometry  - Assess the need for suctioning and aspirate as needed  - Assess and instruct to report SOB or any respiratory difficulty  - Respiratory Therapy support as indicated  Outcome: Progressing     Problem: GASTROINTESTINAL - ADULT  Goal: Minimal or absence of nausea and/or vomiting  Description: INTERVENTIONS:  - Administer IV fluids if ordered to  ensure adequate hydration  - Maintain NPO status until nausea and vomiting are resolved  - Nasogastric tube if ordered  - Administer ordered antiemetic medications as needed  - Provide nonpharmacologic comfort measures as appropriate  - Advance diet as tolerated, if ordered  - Consider nutrition services referral to assist patient with adequate nutrition and appropriate food choices  Outcome: Progressing     Problem: GENITOURINARY - ADULT  Goal: Absence of urinary retention  Description: INTERVENTIONS:  - Assess patient’s ability to void and empty bladder  - Monitor I/O  - Bladder scan as needed  - Discuss with physician/AP medications to alleviate retention as needed  - Discuss catheterization for long term situations as appropriate  Outcome: Progressing  Goal: Urinary catheter remains patent  Description: INTERVENTIONS:  - Assess patency of urinary catheter  - If patient has a chronic mendes, consider changing catheter if non-functioning  - Follow guidelines for intermittent irrigation of non-functioning urinary catheter  Outcome: Progressing     Problem: Prexisting or High Potential for Compromised Skin Integrity  Goal: Skin integrity is maintained or improved  Description: INTERVENTIONS:  - Identify patients at risk for skin breakdown  - Assess and monitor skin integrity including under and around medical devices   - Assess and monitor nutrition and hydration status  - Monitor labs  - Assess for incontinence   - Turn and reposition patient  - Assist with mobility/ambulation  - Relieve pressure over tamra prominences   - Avoid friction and shearing  - Provide appropriate hygiene as needed including keeping skin clean and dry  - Evaluate need for skin moisturizer/barrier cream  - Collaborate with interdisciplinary team  - Patient/family teaching  - Consider wound care consult    Assess:  - Review Nader scale daily  - Clean and moisturize skin every shift  - Inspect skin when repositioning, toileting, and assisting  with ADLS  - Assess under medical devices   - Assess extremities for adequate circulation and sensation     Bed Management:  - Have minimal linens on bed & keep smooth, unwrinkled  - Change linens as needed when moist or perspiring  - Avoid sitting or lying in one position for more than 2 hours while in bed?Keep HOB at 30 degrees   - Toileting:  - Offer bedside commode  - Assess for incontinence every shift  - Use incontinent care products after each incontinent episode     Activity:  - Mobilize patient 3 times a day  - Encourage activity and walks on unit  - Encourage or provide ROM exercises   - Turn and reposition patient every 2 Hours  - Use appropriate equipment to lift or move patient in bed  - Instruct/ Assist with weight shifting every hour when out of bed in chair  - Consider limitation of chair time 1 hour intervals    Skin Care:  - Avoid use of baby powder, tape, friction and shearing, hot water or constrictive clothing  - Relieve pressure over bony prominences   - Do not massage red bony areas    Next Steps:  - Teach patient strategies to minimize risks   - Consider consults to  interdisciplinary teams   Outcome: Progressing     Problem: Nutrition/Hydration-ADULT  Goal: Nutrient/Hydration intake appropriate for improving, restoring or maintaining nutritional needs  Description: Monitor and assess patient's nutrition/hydration status for malnutrition. Collaborate with interdisciplinary team and initiate plan and interventions as ordered.  Monitor patient's weight and dietary intake as ordered or per policy. Utilize nutrition screening tool and intervene as necessary. Determine patient's food preferences and provide high-protein, high-caloric foods as appropriate.     INTERVENTIONS:  - Monitor oral intake, urinary output, labs, and treatment plans  - Assess nutrition and hydration status and recommend course of action  - Evaluate amount of meals eaten  - Assist patient with eating if necessary   - Allow  adequate time for meals  - Recommend/ encourage appropriate diets, oral nutritional supplements, and vitamin/mineral supplements  - Order, calculate, and assess calorie counts as needed  - Recommend, monitor, and adjust tube feedings and TPN/PPN based on assessed needs  - Assess need for intravenous fluids  - Provide specific nutrition/hydration education as appropriate  - Include patient/family/caregiver in decisions related to nutrition  Outcome: Progressing

## 2025-06-12 NOTE — NURSING NOTE
Discharge instructions were reviewed with the patient and her spouse. This RN also demonstrated how to care for PAIGE drain at home and gave the patient supplies for care. Written instructions for drain care was also included in discharge paperwork. Both the patient and spouse verbalized no questions. IV access was removed and all belongings were confirmed prior to discharge. Patient was transported via wheelchair to [facility] and then to personal vehicle.

## 2025-06-13 ENCOUNTER — TRANSITIONAL CARE MANAGEMENT (OUTPATIENT)
Dept: FAMILY MEDICINE CLINIC | Facility: CLINIC | Age: 60
End: 2025-06-13

## 2025-06-13 NOTE — UTILIZATION REVIEW
NOTIFICATION OF ADMISSION DISCHARGE   This is a Notification of Discharge from [facility]. Please be advised that this patient has been discharge from our facility. Below you will find the admission and discharge date and time including the patient’s disposition.   UTILIZATION REVIEW CONTACT:  Utilization Review Assistants  Network Utilization Review Department  Phone: [phone] x carefully listen to the prompts. All voicemails are confidential.  Email: [email or web address]     ADMISSION INFORMATION  PRESENTATION DATE: 6/4/2025  7:14 AM  OBERVATION ADMISSION DATE: N/A  INPATIENT ADMISSION DATE: 6/4/25  3:37 PM   DISCHARGE DATE: 6/12/2025  5:54 PM   DISPOSITION:Home/Self Care    Network Utilization Review Department  ATTENTION: Please call with any questions or concerns to [phone] and carefully listen to the prompts so that you are directed to the right person. All voicemails are confidential.   For Discharge needs, contact Care Management DC Support Team at [phone] opt. 2  Send all requests for admission clinical reviews, approved or denied determinations and any other requests to dedicated fax number below belonging to the campus where the patient is receiving treatment. List of dedicated fax numbers for the Facilities:  FACILITY NAME UR FAX NUMBER   ADMISSION DENIALS (Administrative/Medical Necessity) [phone]   DISCHARGE SUPPORT TEAM ([facility]) [phone]   PARENT CHILD HEALTH (Maternity/NICU/Pediatrics) [phone]   [facility] [phone]   [facility] [phone]   [facility] [phone]   [facility] [phone]   [facility] [phone]   [facility] [phone]   [facility] [phone]   [facility] [phone]   [facility]  [facility] [phone]   [facility] [phone]   [facility] [phone]   [facility] [phone]

## 2025-06-15 ENCOUNTER — APPOINTMENT (EMERGENCY)
Dept: RADIOLOGY | Facility: HOSPITAL | Age: 60
End: 2025-06-15
Payer: COMMERCIAL

## 2025-06-15 ENCOUNTER — APPOINTMENT (EMERGENCY)
Dept: CT IMAGING | Facility: HOSPITAL | Age: 60
End: 2025-06-15
Payer: COMMERCIAL

## 2025-06-15 ENCOUNTER — HOSPITAL ENCOUNTER (EMERGENCY)
Facility: HOSPITAL | Age: 60
End: 2025-06-16
Attending: EMERGENCY MEDICINE
Payer: COMMERCIAL

## 2025-06-15 VITALS
HEART RATE: 91 BPM | OXYGEN SATURATION: 97 % | TEMPERATURE: 97 F | SYSTOLIC BLOOD PRESSURE: 105 MMHG | RESPIRATION RATE: 22 BRPM | DIASTOLIC BLOOD PRESSURE: 67 MMHG

## 2025-06-15 DIAGNOSIS — K91.89 POSTOPERATIVE ILEUS (HCC): Primary | ICD-10-CM

## 2025-06-15 DIAGNOSIS — E83.42 HYPOMAGNESEMIA: ICD-10-CM

## 2025-06-15 DIAGNOSIS — C56.9 OVARIAN CANCER (HCC): ICD-10-CM

## 2025-06-15 DIAGNOSIS — Z98.890 HISTORY OF MAJOR ABDOMINAL SURGERY: ICD-10-CM

## 2025-06-15 DIAGNOSIS — A41.9 SEVERE SEPSIS (HCC): ICD-10-CM

## 2025-06-15 DIAGNOSIS — K56.7 POSTOPERATIVE ILEUS (HCC): Primary | ICD-10-CM

## 2025-06-15 DIAGNOSIS — R65.20 SEVERE SEPSIS (HCC): ICD-10-CM

## 2025-06-15 DIAGNOSIS — N17.9 AKI (ACUTE KIDNEY INJURY) (HCC): ICD-10-CM

## 2025-06-15 LAB
ALBUMIN SERPL BCG-MCNC: 3.9 G/DL (ref 3.5–5)
ALP SERPL-CCNC: 53 U/L (ref 34–104)
ALT SERPL W P-5'-P-CCNC: 8 U/L (ref 7–52)
ANION GAP SERPL CALCULATED.3IONS-SCNC: 12 MMOL/L (ref 4–13)
APTT PPP: 48 SECONDS (ref 23–34)
AST SERPL W P-5'-P-CCNC: 18 U/L (ref 13–39)
ATRIAL RATE: 113 BPM
BACTERIA UR QL AUTO: ABNORMAL /HPF
BASOPHILS # BLD AUTO: 0.12 THOUSANDS/ÂΜL (ref 0–0.1)
BASOPHILS NFR BLD AUTO: 0 % (ref 0–1)
BILIRUB SERPL-MCNC: 0.43 MG/DL (ref 0.2–1)
BILIRUB UR QL STRIP: NEGATIVE
BUN SERPL-MCNC: 12 MG/DL (ref 5–25)
CALCIUM SERPL-MCNC: 9.5 MG/DL (ref 8.4–10.2)
CARDIAC TROPONIN I PNL SERPL HS: 4 NG/L (ref ?–50)
CHLORIDE SERPL-SCNC: 101 MMOL/L (ref 96–108)
CLARITY UR: ABNORMAL
CO2 SERPL-SCNC: 24 MMOL/L (ref 21–32)
COLOR UR: YELLOW
CREAT SERPL-MCNC: 1.02 MG/DL (ref 0.6–1.3)
EOSINOPHIL # BLD AUTO: 0.2 THOUSAND/ÂΜL (ref 0–0.61)
EOSINOPHIL NFR BLD AUTO: 1 % (ref 0–6)
ERYTHROCYTE [DISTWIDTH] IN BLOOD BY AUTOMATED COUNT: 17.5 % (ref 11.6–15.1)
GFR SERPL CREATININE-BSD FRML MDRD: 60 ML/MIN/1.73SQ M
GLUCOSE SERPL-MCNC: 98 MG/DL (ref 65–140)
GLUCOSE UR STRIP-MCNC: ABNORMAL MG/DL
HCT VFR BLD AUTO: 28.6 % (ref 34.8–46.1)
HGB BLD-MCNC: 9.1 G/DL (ref 11.5–15.4)
HGB UR QL STRIP.AUTO: ABNORMAL
IMM GRANULOCYTES # BLD AUTO: 0.36 THOUSAND/UL (ref 0–0.2)
IMM GRANULOCYTES NFR BLD AUTO: 1 % (ref 0–2)
INR PPP: 1.33 (ref 0.85–1.19)
KETONES UR STRIP-MCNC: NEGATIVE MG/DL
LACTATE SERPL-SCNC: 1.3 MMOL/L (ref 0.5–2)
LACTATE SERPL-SCNC: 2.3 MMOL/L (ref 0.5–2)
LEUKOCYTE ESTERASE UR QL STRIP: ABNORMAL
LIPASE SERPL-CCNC: 119 U/L (ref 11–82)
LYMPHOCYTES # BLD AUTO: 1.09 THOUSANDS/ÂΜL (ref 0.6–4.47)
LYMPHOCYTES NFR BLD AUTO: 3 % (ref 14–44)
MAGNESIUM SERPL-MCNC: 1.6 MG/DL (ref 1.9–2.7)
MCH RBC QN AUTO: 32.3 PG (ref 26.8–34.3)
MCHC RBC AUTO-ENTMCNC: 31.8 G/DL (ref 31.4–37.4)
MCV RBC AUTO: 101 FL (ref 82–98)
MONOCYTES # BLD AUTO: 3.35 THOUSAND/ÂΜL (ref 0.17–1.22)
MONOCYTES NFR BLD AUTO: 10 % (ref 4–12)
NEUTROPHILS # BLD AUTO: 29.51 THOUSANDS/ÂΜL (ref 1.85–7.62)
NEUTS SEG NFR BLD AUTO: 85 % (ref 43–75)
NITRITE UR QL STRIP: NEGATIVE
NON-SQ EPI CELLS URNS QL MICRO: ABNORMAL /HPF
NRBC BLD AUTO-RTO: 0 /100 WBCS
P AXIS: 71 DEGREES
PH UR STRIP.AUTO: 6.5 [PH]
PLATELET # BLD AUTO: 1009 THOUSANDS/UL (ref 149–390)
PMV BLD AUTO: 10.3 FL (ref 8.9–12.7)
POTASSIUM SERPL-SCNC: 3.9 MMOL/L (ref 3.5–5.3)
PR INTERVAL: 138 MS
PROT SERPL-MCNC: 7.7 G/DL (ref 6.4–8.4)
PROT UR STRIP-MCNC: ABNORMAL MG/DL
PROTHROMBIN TIME: 17 SECONDS (ref 12.3–15)
QRS AXIS: 82 DEGREES
QRSD INTERVAL: 76 MS
QT INTERVAL: 314 MS
QTC INTERVAL: 430 MS
RBC # BLD AUTO: 2.82 MILLION/UL (ref 3.81–5.12)
RBC #/AREA URNS AUTO: ABNORMAL /HPF
SODIUM SERPL-SCNC: 137 MMOL/L (ref 135–147)
SP GR UR STRIP.AUTO: <1.005 (ref 1–1.03)
T WAVE AXIS: 69 DEGREES
UROBILINOGEN UR STRIP-ACNC: <2 MG/DL
VENTRICULAR RATE: 113 BPM
WBC # BLD AUTO: 34.63 THOUSAND/UL (ref 4.31–10.16)
WBC #/AREA URNS AUTO: ABNORMAL /HPF

## 2025-06-15 PROCEDURE — 71045 X-RAY EXAM CHEST 1 VIEW: CPT

## 2025-06-15 PROCEDURE — 93010 ELECTROCARDIOGRAM REPORT: CPT | Performed by: INTERNAL MEDICINE

## 2025-06-15 PROCEDURE — 83605 ASSAY OF LACTIC ACID: CPT | Performed by: EMERGENCY MEDICINE

## 2025-06-15 PROCEDURE — 80053 COMPREHEN METABOLIC PANEL: CPT | Performed by: EMERGENCY MEDICINE

## 2025-06-15 PROCEDURE — 83735 ASSAY OF MAGNESIUM: CPT | Performed by: EMERGENCY MEDICINE

## 2025-06-15 PROCEDURE — 85025 COMPLETE CBC W/AUTO DIFF WBC: CPT | Performed by: EMERGENCY MEDICINE

## 2025-06-15 PROCEDURE — 96368 THER/DIAG CONCURRENT INF: CPT

## 2025-06-15 PROCEDURE — 96375 TX/PRO/DX INJ NEW DRUG ADDON: CPT

## 2025-06-15 PROCEDURE — 93005 ELECTROCARDIOGRAM TRACING: CPT

## 2025-06-15 PROCEDURE — 85730 THROMBOPLASTIN TIME PARTIAL: CPT | Performed by: EMERGENCY MEDICINE

## 2025-06-15 PROCEDURE — 99291 CRITICAL CARE FIRST HOUR: CPT | Performed by: EMERGENCY MEDICINE

## 2025-06-15 PROCEDURE — 83690 ASSAY OF LIPASE: CPT | Performed by: EMERGENCY MEDICINE

## 2025-06-15 PROCEDURE — 99285 EMERGENCY DEPT VISIT HI MDM: CPT

## 2025-06-15 PROCEDURE — 96361 HYDRATE IV INFUSION ADD-ON: CPT

## 2025-06-15 PROCEDURE — 96365 THER/PROPH/DIAG IV INF INIT: CPT

## 2025-06-15 PROCEDURE — 36415 COLL VENOUS BLD VENIPUNCTURE: CPT | Performed by: EMERGENCY MEDICINE

## 2025-06-15 PROCEDURE — 81001 URINALYSIS AUTO W/SCOPE: CPT | Performed by: EMERGENCY MEDICINE

## 2025-06-15 PROCEDURE — 74177 CT ABD & PELVIS W/CONTRAST: CPT

## 2025-06-15 PROCEDURE — 85610 PROTHROMBIN TIME: CPT | Performed by: EMERGENCY MEDICINE

## 2025-06-15 PROCEDURE — 84484 ASSAY OF TROPONIN QUANT: CPT | Performed by: EMERGENCY MEDICINE

## 2025-06-15 PROCEDURE — 87086 URINE CULTURE/COLONY COUNT: CPT | Performed by: EMERGENCY MEDICINE

## 2025-06-15 PROCEDURE — 87040 BLOOD CULTURE FOR BACTERIA: CPT | Performed by: EMERGENCY MEDICINE

## 2025-06-15 PROCEDURE — 96366 THER/PROPH/DIAG IV INF ADDON: CPT

## 2025-06-15 RX ORDER — MAGNESIUM SULFATE HEPTAHYDRATE 40 MG/ML
2 INJECTION, SOLUTION INTRAVENOUS ONCE
Status: COMPLETED | OUTPATIENT
Start: 2025-06-15 | End: 2025-06-15

## 2025-06-15 RX ORDER — METRONIDAZOLE 500 MG/100ML
500 INJECTION, SOLUTION INTRAVENOUS EVERY 12 HOURS
Status: DISCONTINUED | OUTPATIENT
Start: 2025-06-15 | End: 2025-06-16 | Stop reason: HOSPADM

## 2025-06-15 RX ORDER — ONDANSETRON 2 MG/ML
4 INJECTION INTRAMUSCULAR; INTRAVENOUS ONCE
Status: COMPLETED | OUTPATIENT
Start: 2025-06-15 | End: 2025-06-15

## 2025-06-15 RX ORDER — MORPHINE SULFATE 4 MG/ML
4 INJECTION, SOLUTION INTRAMUSCULAR; INTRAVENOUS ONCE
Status: COMPLETED | OUTPATIENT
Start: 2025-06-15 | End: 2025-06-15

## 2025-06-15 RX ORDER — CEFEPIME HYDROCHLORIDE 2 G/50ML
2000 INJECTION, SOLUTION INTRAVENOUS ONCE
Status: COMPLETED | OUTPATIENT
Start: 2025-06-15 | End: 2025-06-15

## 2025-06-15 RX ADMIN — MAGNESIUM SULFATE HEPTAHYDRATE 2 G: 40 INJECTION, SOLUTION INTRAVENOUS at 17:40

## 2025-06-15 RX ADMIN — IOHEXOL 90 ML: 350 INJECTION, SOLUTION INTRAVENOUS at 18:45

## 2025-06-15 RX ADMIN — METRONIDAZOLE 500 MG: 500 INJECTION, SOLUTION INTRAVENOUS at 18:51

## 2025-06-15 RX ADMIN — IOHEXOL 50 ML: 240 INJECTION, SOLUTION INTRATHECAL; INTRAVASCULAR; INTRAVENOUS; ORAL at 18:45

## 2025-06-15 RX ADMIN — SODIUM CHLORIDE 1000 ML: 0.9 INJECTION, SOLUTION INTRAVENOUS at 17:04

## 2025-06-15 RX ADMIN — MORPHINE SULFATE 4 MG: 4 INJECTION, SOLUTION INTRAMUSCULAR; INTRAVENOUS at 17:04

## 2025-06-15 RX ADMIN — ONDANSETRON 4 MG: 2 INJECTION INTRAMUSCULAR; INTRAVENOUS at 17:04

## 2025-06-15 RX ADMIN — CEFEPIME HYDROCHLORIDE 2000 MG: 2 INJECTION, SOLUTION INTRAVENOUS at 17:49

## 2025-06-15 NOTE — ED PROVIDER NOTES
Time reflects when diagnosis was documented in both MDM as applicable and the Disposition within this note       Time User Action Codes Description Comment    6/15/2025  5:02 PM Adolfo Ricci Add [R10.84] Generalized abdominal pain     6/15/2025  5:02 PM Adolfo Ricci Add [Z98.890] History of major abdominal surgery     6/15/2025  5:02 PM Adolfo Ricci Add [C56.9] Ovarian cancer (HCC)     6/15/2025  5:24 PM Adolfo Ricci Add [E83.42] Hypomagnesemia     6/15/2025  5:27 PM TiaenhAdolfo phoenix Add [N17.9] ADALI (acute kidney injury) (HCC)     6/15/2025  5:36 PM Adolfo Ricci Add [A41.9,  R65.20] Severe sepsis (HCC)     6/15/2025  8:10 PM Adolfo Ricci Add [K91.89,  K56.7] Postoperative ileus (HCC)     6/15/2025  8:10 PM Adolfo Ricci Modify [Z98.890] History of major abdominal surgery     6/15/2025  8:10 PM Adolfo Ricci Remove [R10.84] Generalized abdominal pain     6/15/2025  8:10 PM Adolfo Ricci Modify [Z98.890] History of major abdominal surgery     6/15/2025  8:10 PM Adolfo Ricci Modify [K91.89,  K56.7] Postoperative ileus (HCC)           ED Disposition       ED Disposition   Transfer to Another Facility-In Network    Condition   --    Date/Time   Sun Pepe 15, 2025  8:01 PM    Comment   Ewa Hernandes should be transferred out to [facility].               Assessment & Plan       Medical Decision Making  59-year-old female presenting for generalized abdominal pain after major abdominal surgery was home last 3 days since admission 6/4 through 6/12.  Differential diagnosis includes bowel obstruction ileus perforated/dehiscence of abdominal viscus or anastomotic site, seroma, abdominal wall cellulitis, abscess, ADALI, pancreatitis, colitis, enteritis and postoperative state without complication.  Will administer analgesia fluids antiemetic check screening labs CT scan.    Workup demonstrating ADALI, SIRS criteria, ileus.  Discussed with gynecology oncology  recommends transfer downtown and admission to their service for eval/management of ileus.    Critical Care Time Statement: Upon my evaluation, this patient had a high probability of imminent or life-threatening deterioration due to sepsis, acute renal failure, electrolyte derangements, which required my direct attention, intervention, and personal management.  I spent a total of 35 minutes directly providing critical care services, including interpretation of complex medical databases, evaluating for the presence of life-threatening injuries or illnesses, management of organ system failure(s) , complex medical decision making (to support/prevent further life-threatening deterioration)., interpretation of hemodynamic data, and titration of vasoactive medications. This time is exclusive of procedures, teaching, treating other patients, family meetings, and any prior time recorded by providers other than myself.       Problems Addressed:  ADALI (acute kidney injury) (HCC): acute illness or injury that poses a threat to life or bodily functions  History of major abdominal surgery: chronic illness or injury with exacerbation, progression, or side effects of treatment  Hypomagnesemia: acute illness or injury with systemic symptoms  Ovarian cancer (HCC): chronic illness or injury with exacerbation, progression, or side effects of treatment  Postoperative ileus (HCC): acute illness or injury with systemic symptoms  Severe sepsis (HCC): complicated acute illness or injury with systemic symptoms that poses a threat to life or bodily functions    Amount and/or Complexity of Data Reviewed  Labs: ordered. Decision-making details documented in ED Course.  Radiology: ordered and independent interpretation performed.  ECG/medicine tests: ordered and independent interpretation performed. Decision-making details documented in ED Course.    Risk  Prescription drug management.  Parenteral controlled substances.  Decision regarding  hospitalization.        ED Course as of 06/15/25 2052   Sun Pepe 15, 2025   1647 EKG interpreted by myself.  EKG dated 6/15/2025 at 1636 demonstrates sinus tachycardia 113 bpm, normal NH, QRS, QTc durations, no STEMI.   1734 WBC(!): 34.63  Patient now has 2 SIRS criteria meeting sepsis as there is some concern for possible intraabdominal infection or uti. Given lactic >2 patient meets criteria for severe sepsis. Will start abx   1942 Case d/w Dr Penaloza via [place] for recs. Added GynOnc Dr Little who accepts for transfer to [facility] for GynOnc eval.        Medications   metroNIDAZOLE (FLAGYL) IVPB (premix) 500 mg 100 mL (0 mg Intravenous Stopped 6/15/25 2045)   sodium chloride 0.9 % bolus 1,000 mL (0 mL Intravenous Stopped 6/15/25 1850)   morphine injection 4 mg (4 mg Intravenous Given 6/15/25 1704)   ondansetron (ZOFRAN) injection 4 mg (4 mg Intravenous Given 6/15/25 1704)   magnesium sulfate 2 g/50 mL IVPB (premix) 2 g (0 g Intravenous Stopped 6/15/25 2045)   cefepime (MAXIPIME) IVPB (premix in dextrose) 2,000 mg 50 mL (0 mg Intravenous Stopped 6/15/25 1846)   iohexol (OMNIPAQUE) 350 MG/ML injection (MULTI-DOSE) 90 mL (90 mL Intravenous Given 6/15/25 1845)   iohexol (OMNIPAQUE) 240 MG/ML solution 50 mL (50 mL Oral Given 6/15/25 1845)       ED Risk Strat Scores                    No data recorded        SBIRT 20yo+      Flowsheet Row Most Recent Value   Initial Alcohol Screen: US AUDIT-C     1. How often do you have a drink containing alcohol? 0 Filed at: 06/15/2025 1611   2. How many drinks containing alcohol do you have on a typical day you are drinking?  0 Filed at: 06/15/2025 1611   3a. Male UNDER 65: How often do you have five or more drinks on one occasion? 0 Filed at: 06/15/2025 1611   3b. FEMALE Any Age, or MALE 65+: How often do you have 4 or more drinks on one occassion? 0 Filed at: 06/15/2025 1611   Audit-C Score 0 Filed at: 06/15/2025 1611   CORINE: How many times in the past year have you...    Used an  illegal drug or used a prescription medication for non-medical reasons? Never Filed at: 06/15/2025 1611                            History of Present Illness       Chief Complaint   Patient presents with    Abdominal Pain     Pt reports having a hysterectomy and cancer removal surgery on 6/4. Pt reports increase abd pain and pain where the drain is from the surgery. Pt reports nausea and chills.        Past Medical History[1]   Past Surgical History[2]   Family History[3]   Social History[4]   E-Cigarette/Vaping    E-Cigarette Use Never User       E-Cigarette/Vaping Substances    Nicotine No     THC No     CBD No     Flavoring No     Other No     Unknown No       I have reviewed and agree with the history as documented.     59-year-old female past medical history significant for HTN, asthma, ovarian cancer with metastasis to abdomen who recently underwent admission and surgery 6/4/2025 through 6/12/2025 (ex lap, radical hysterectomy, bilateral salpingo-oophorectomy, splenectomy, bowel resection) complicated by ileus, urinary retention ultimately discharged home on 12th, 3 days ago presents to the ER from home for evaluation of generalized abdominal pain poor p.o. intake.    Patient states that her symptoms have gradually worsened since being home.  She has diffuse/general abdominal pain decreased appetite nausea without vomiting.  Is having bowel movements at home which are nonbloody.  No falls.  Drain with serous drainage.  No abdominal distention.  No chest pain or shortness of breath.  Has been taking oxycodone and Tylenol for pain without significant relief.      Abdominal Pain  Associated symptoms: fatigue and nausea    Associated symptoms: no chest pain, no chills, no constipation, no cough, no diarrhea, no dysuria, no fever, no hematuria, no shortness of breath, no sore throat and no vomiting        Review of Systems   Constitutional:  Positive for activity change, appetite change and fatigue. Negative for  chills and fever.   HENT:  Negative for ear pain and sore throat.    Eyes:  Negative for pain and visual disturbance.   Respiratory:  Negative for cough and shortness of breath.    Cardiovascular:  Negative for chest pain and palpitations.   Gastrointestinal:  Positive for abdominal pain and nausea. Negative for abdominal distention, blood in stool, constipation, diarrhea and vomiting.   Genitourinary:  Negative for dysuria and hematuria.   Musculoskeletal:  Negative for arthralgias and back pain.   Skin:  Negative for color change and rash.   Neurological:  Negative for seizures and syncope.   All other systems reviewed and are negative.          Objective       ED Triage Vitals [06/15/25 1609]   Temperature Pulse Blood Pressure Respirations SpO2 Patient Position - Orthostatic VS   99 °F (37.2 °C) (!) 114 101/58 18 96 % Lying      Temp Source Heart Rate Source BP Location FiO2 (%) Pain Score    Temporal Monitor Right arm -- 10 - Worst Possible Pain      Vitals      Date and Time Temp Pulse SpO2 Resp BP Pain Score FACES Pain Rating User   06/15/25 2045 -- 97 95 % -- -- -- --    06/15/25 2030 -- 97 96 % -- -- -- --    06/15/25 2015 -- 100 96 % -- -- -- --    06/15/25 2000 -- 99 96 % -- -- -- --    06/15/25 1945 -- 98 96 % -- -- -- --    06/15/25 1930 -- 97 97 % -- -- -- --    06/15/25 1915 -- 99 96 % -- -- -- --    06/15/25 1900 -- 93 98 % -- -- -- --    06/15/25 1800 -- 94 99 % 22 102/64 -- --    06/15/25 1745 98.1 °F (36.7 °C) 92 98 % 24 103/61 -- -- CD   06/15/25 1704 -- -- -- -- -- 10 - Worst Possible Pain -- AK   06/15/25 1700 -- 101 97 % 20 108/61 -- -- AK   06/15/25 1609 99 °F (37.2 °C) 114 96 % 18 101/58 10 - Worst Possible Pain -- CD            Physical Exam  Vitals and nursing note reviewed. Exam conducted with a chaperone present.   Constitutional:       General: She is not in acute distress.     Appearance: She is well-developed. She is ill-appearing.   HENT:      Head: Normocephalic and  atraumatic.     Eyes:      Conjunctiva/sclera: Conjunctivae normal.       Cardiovascular:      Rate and Rhythm: Normal rate and regular rhythm.      Heart sounds: No murmur heard.  Pulmonary:      Effort: Pulmonary effort is normal. No respiratory distress.      Breath sounds: Normal breath sounds.   Abdominal:      General: A surgical scar is present. There is no distension.      Palpations: Abdomen is soft.      Tenderness: There is generalized abdominal tenderness. There is guarding.      Comments: Midline ex lap incision clean, dry, intact.  Left lower quadrant PAIGE drain with scant serous drainage percutaneous suture in place no surrounding cellulitis or percutaneous drainage surrounding.  Diffuse abdominal tenderness all quadrants.  No abdominal distention.     Musculoskeletal:         General: No swelling.      Cervical back: Neck supple.     Skin:     General: Skin is warm and dry.      Capillary Refill: Capillary refill takes less than 2 seconds.      Coloration: Skin is pale. Skin is not jaundiced.      Findings: No erythema.     Neurological:      Mental Status: She is alert.     Psychiatric:         Mood and Affect: Mood normal.         Results Reviewed       Procedure Component Value Units Date/Time    Lactic acid 2 Hours [[id number]]  (Normal) Collected: 06/15/25 1914    Lab Status: Final result Specimen: Blood from Arm, Right Updated: 06/15/25 1934     LACTIC ACID 1.3 mmol/L     Narrative:      Result may be elevated if tourniquet was used during collection.    Urine Microscopic [[id number]]  (Abnormal) Collected: 06/15/25 1850    Lab Status: Final result Specimen: Urine, Clean Catch Updated: 06/15/25 1906     RBC, UA 10-20 /hpf      WBC, UA 10-20 /hpf      Epithelial Cells Occasional /hpf      Bacteria, UA Occasional /hpf     Urine culture [[id number]] Collected: 06/15/25 1850    Lab Status: In process Specimen: Urine, Clean Catch Updated: 06/15/25 1906    UA w Reflex to Microscopic w Reflex to Culture  [[id number]]  (Abnormal) Collected: 06/15/25 1850    Lab Status: Final result Specimen: Urine, Clean Catch Updated: 06/15/25 1905     Color, UA Yellow     Clarity, UA Hazy     Specific Gravity, UA <1.005     pH, UA 6.5     Leukocytes, UA Large     Nitrite, UA Negative     Protein, UA 30 (1+) mg/dl      Glucose, UA 30 (3/100%) mg/dl      Ketones, UA Negative mg/dl      Urobilinogen, UA <2.0 mg/dl      Bilirubin, UA Negative     Occult Blood, UA Large    CBC and differential [[id number]]  (Abnormal) Collected: 06/15/25 1647    Lab Status: Final result Specimen: Blood from Arm, Left Updated: 06/15/25 1734     WBC 34.63 Thousand/uL      RBC 2.82 Million/uL      Hemoglobin 9.1 g/dL      Hematocrit 28.6 %       fL      MCH 32.3 pg      MCHC 31.8 g/dL      RDW 17.5 %      MPV 10.3 fL      Platelets 1,009 Thousands/uL      nRBC 0 /100 WBCs      Segmented % 85 %      Immature Grans % 1 %      Lymphocytes % 3 %      Monocytes % 10 %      Eosinophils Relative 1 %      Basophils Relative 0 %      Absolute Neutrophils 29.51 Thousands/µL      Absolute Immature Grans 0.36 Thousand/uL      Absolute Lymphocytes 1.09 Thousands/µL      Absolute Monocytes 3.35 Thousand/µL      Eosinophils Absolute 0.20 Thousand/µL      Basophils Absolute 0.12 Thousands/µL     Narrative:      This is an appended report.  These results have been appended to a previously verified report.    HS Troponin 0hr (reflex protocol) [[id number]]  (Normal) Collected: 06/15/25 1647    Lab Status: Final result Specimen: Blood from Arm, Left Updated: 06/15/25 1728     hs TnI 0hr 4 ng/L     Comprehensive metabolic panel [[id number]] Collected: 06/15/25 1647    Lab Status: Final result Specimen: Blood from Arm, Left Updated: 06/15/25 1722     Sodium 137 mmol/L      Potassium 3.9 mmol/L      Chloride 101 mmol/L      CO2 24 mmol/L      ANION GAP 12 mmol/L      BUN 12 mg/dL      Creatinine 1.02 mg/dL      Glucose 98 mg/dL      Calcium 9.5 mg/dL      AST 18 U/L      ALT  8 U/L      Alkaline Phosphatase 53 U/L      Total Protein 7.7 g/dL      Albumin 3.9 g/dL      Total Bilirubin 0.43 mg/dL      eGFR 60 ml/min/1.73sq m     Narrative:      National Kidney Disease Foundation guidelines for Chronic Kidney Disease (CKD):     Stage 1 with normal or high GFR (GFR > 90 mL/min/1.73 square meters)    Stage 2 Mild CKD (GFR = 60-89 mL/min/1.73 square meters)    Stage 3A Moderate CKD (GFR = 45-59 mL/min/1.73 square meters)    Stage 3B Moderate CKD (GFR = 30-44 mL/min/1.73 square meters)    Stage 4 Severe CKD (GFR = 15-29 mL/min/1.73 square meters)    Stage 5 End Stage CKD (GFR <15 mL/min/1.73 square meters)  Note: GFR calculation is accurate only with a steady state creatinine    Lipase [[id number]]  (Abnormal) Collected: 06/15/25 1647    Lab Status: Final result Specimen: Blood from Arm, Left Updated: 06/15/25 1722     Lipase 119 u/L     Magnesium [[id number]]  (Abnormal) Collected: 06/15/25 1647    Lab Status: Final result Specimen: Blood from Arm, Left Updated: 06/15/25 1722     Magnesium 1.6 mg/dL     Lactic acid, plasma (w/reflex if result > 2.0) [[id number]]  (Abnormal) Collected: 06/15/25 1647    Lab Status: Final result Specimen: Blood from Arm, Left Updated: 06/15/25 1720     LACTIC ACID 2.3 mmol/L     Narrative:      Result may be elevated if tourniquet was used during collection.    Protime-INR [[id number]]  (Abnormal) Collected: 06/15/25 1647    Lab Status: Final result Specimen: Blood from Arm, Left Updated: 06/15/25 1717     Protime 17.0 seconds      INR 1.33    Narrative:      INR Therapeutic Range    Indication                                             INR Range      Atrial Fibrillation                                               2.0-3.0  Hypercoagulable State                                    2.0.2.3  Left Ventricular Asist Device                            2.0-3.0  Mechanical Heart Valve                                  -    Aortic(with afib, MI, embolism, HF, LA  enlargement,    and/or coagulopathy)                                     2.0-3.0 (2.5-3.5)     Mitral                                                             2.5-3.5  Prosthetic/Bioprosthetic Heart Valve               2.0-3.0  Venous thromboembolism (VTE: VT, PE        2.0-3.0    APTT [[id number]]  (Abnormal) Collected: 06/15/25 1647    Lab Status: Final result Specimen: Blood from Arm, Left Updated: 06/15/25 1717     PTT 48 seconds     Blood culture #2 [[id number]] Collected: 06/15/25 1647    Lab Status: In process Specimen: Blood from Arm, Left Updated: 06/15/25 1659    Blood culture #1 [[id number]] Collected: 06/15/25 1647    Lab Status: In process Specimen: Blood from Arm, Right Updated: 06/15/25 1659            CT abdomen pelvis with contrast   Final Interpretation by Arjun Randolph MD (06/15 1933)      1. Generalized dilated large and small bowel in a pattern most suggestive of ileus. No findings to suggest obstruction   2. Small amount of ascites, and mild diffuse bowel wall thickening slightly increased since previous examination of unclear etiology. Correlate for any clinical concern of peritonitis.   3. No evidence of discrete/organized intra-abdominal abscess or abdominal wall collection. No free air            Workstation performed: [id number]         XR chest 1 view portable   ED Interpretation by Adolfo Ricci DO (06/15 1727)   1 view x-ray of the chest interpreted by myself pending official radiology report.  No acute cardiopulmonary process seen.          Procedures    ED Medication and Procedure Management   Prior to Admission Medications   Prescriptions Last Dose Informant Patient Reported? Taking?   ALPRAZolam (XANAX) 0.5 mg tablet 6/14/2025 Self No Yes   Sig: Take 1 tablet (0.5 mg total) by mouth 3 (three) times a day as needed (panic attack)   FLUoxetine (PROzac) 40 MG capsule 6/15/2025 Self No Yes   Sig: Take 1 capsule by mouth once daily   acetaminophen (TYLENOL) 325 mg tablet  6/15/2025  No Yes   Sig: Take 3 tablets (975 mg total) by mouth every 8 (eight) hours   albuterol (PROVENTIL HFA,VENTOLIN HFA) 90 mcg/act inhaler More than a month  No No   Sig: Inhale 2 puffs every 4 (four) hours as needed for wheezing   amLODIPine (NORVASC) 10 mg tablet 6/15/2025 Self No Yes   Sig: Take 1 tablet by mouth once daily   apixaban (Eliquis) 2.5 mg 6/15/2025  No Yes   Sig: Take 1 tablet (2.5 mg total) by mouth 2 (two) times a day for 28 days Gyn onc surgical prophylaxis   calcium carbonate (TUMS) 500 mg chewable tablet 6/15/2025  No Yes   Sig: Chew 2 tablets (1,000 mg total) 3 (three) times a day as needed for indigestion or heartburn   lisinopril (ZESTRIL) 10 mg tablet 6/15/2025 Self No Yes   Sig: Take 1 tablet by mouth once daily   ondansetron (ZOFRAN) 8 mg tablet 6/15/2025 Self No Yes   Sig: Take 1 tablet (8 mg total) by mouth every 8 (eight) hours as needed for nausea or vomiting   oxyCODONE (ROXICODONE) 5 immediate release tablet   No No   Sig: Take 1 tablet (5 mg total) by mouth every 6 (six) hours as needed for severe pain for up to 10 days Max Daily Amount: 20 mg   pantoprazole (PROTONIX) 20 mg tablet 6/15/2025  No Yes   Sig: Take 1 tablet (20 mg total) by mouth 2 (two) times a day before meals   simethicone (MYLICON) 80 mg chewable tablet 6/15/2025  No Yes   Sig: Chew 1 tablet (80 mg total) 4 (four) times a day as needed for flatulence      Facility-Administered Medications: None     Patient's Medications   Discharge Prescriptions    No medications on file     No discharge procedures on file.  ED SEPSIS DOCUMENTATION   Time reflects when diagnosis was documented in both MDM as applicable and the Disposition within this note       Time User Action Codes Description Comment    6/15/2025  5:02 PM Adolfo Ricci [R10.84] Generalized abdominal pain     6/15/2025  5:02 PM Adolfo Ricci [Z98.890] History of major abdominal surgery     6/15/2025  5:02 PM Adolfo Ricci Add  [C56.9] Ovarian cancer (HCC)     6/15/2025  5:24 PM Adolfo Ricci Add [E83.42] Hypomagnesemia     6/15/2025  5:27 PM Adolfo Ricci Add [N17.9] ADALI (acute kidney injury) (HCC)     6/15/2025  5:36 PM TiaenhAdolfo phoenix Add [A41.9,  R65.20] Severe sepsis (HCC)     6/15/2025  8:10 PM Adolfo Ricci Add [K91.89,  K56.7] Postoperative ileus (HCC)     6/15/2025  8:10 PM Adolfo Ricci Modify [Z98.890] History of major abdominal surgery     6/15/2025  8:10 PM Adolfo Ricci Remove [R10.84] Generalized abdominal pain     6/15/2025  8:10 PM Adolfo Ricci Modify [Z98.890] History of major abdominal surgery     6/15/2025  8:10 PM Adolfo Ricci Modify [K91.89,  K56.7] Postoperative ileus (HCC)                    [1]   Past Medical History:  Diagnosis Date    Anxiety     Asthma     Cancer (HCC)     Ovarian    Depression     Eczema     Hypertension     Inflammatory bowel disease    [2]   Past Surgical History:  Procedure Laterality Date    BREAST CYST EXCISION Right     benign    CATARACT EXTRACTION, BILATERAL Bilateral     CHOLECYSTECTOMY      EYE SURGERY      IR BIOPSY LYMPH NODE  04/28/2020    IR BIOPSY OMENTUM  2/6/2025    IR PARACENTESIS  2/6/2025    IR PARACENTESIS  2/17/2025    NM EXPLORATORY LAPAROTOMY CELIOTOMY W/WO BIOPSY SPX N/A 6/4/2025    Procedure: EXAM UNDER ANESTHESIA, EXPLORATORY LAPAROTOMY, RADICAL HYSTERECTOMY, BILATERAL SALPINGO-OOPHORECTOMY, PELVIC PERITONEXTOMY WITH EN BLOC LOW ANTERIOR RECTOSIGMOID RESECTION, APPENDECTOMY, SPLENIC FLEXURE MOBILIZATION, GASTROCOLING OMENTECTOMY, INDICATED SPLENECTOMY, RESECTION SMALL BOWEL MESENTERIC NODULES;  Surgeon: Augusto De Souza MD;  Location: [facility];  Service: Gynecology Oncol   [3]   Family History  Problem Relation Name Age of Onset    Breast cancer Mother Mother         in her 70's    Heart disease Mother Mother     Glaucoma Mother Mother     Parkinsonism Father      No Known Problems Sister tapan     No Known Problems  Daughter sujit     No Known Problems Maternal Grandmother      No Known Problems Maternal Grandfather      No Known Problems Paternal Grandmother      No Known Problems Paternal Grandfather     [4]   Social History  Tobacco Use    Smoking status: Former     Current packs/day: 0.00     Average packs/day: 0.5 packs/day for 29.0 years (14.5 ttl pk-yrs)     Types: Cigarettes     Start date: 1982     Quit date:      Years since quittin.4     Passive exposure: Past    Smokeless tobacco: Never   Vaping Use    Vaping status: Never Used   Substance Use Topics    Alcohol use: Not Currently     Alcohol/week: 3.0 standard drinks of alcohol    Drug use: Never        Adolfo Ricci DO  06/15/25 2052

## 2025-06-16 ENCOUNTER — HOSPITAL ENCOUNTER (INPATIENT)
Facility: HOSPITAL | Age: 60
LOS: 4 days | Discharge: HOME | End: 2025-06-20
Attending: OBSTETRICS & GYNECOLOGY | Admitting: OBSTETRICS & GYNECOLOGY
Payer: COMMERCIAL

## 2025-06-16 ENCOUNTER — DOCUMENTATION (OUTPATIENT)
Dept: GYNECOLOGIC ONCOLOGY | Facility: CLINIC | Age: 60
End: 2025-06-16

## 2025-06-16 DIAGNOSIS — K21.9 GASTROESOPHAGEAL REFLUX DISEASE WITHOUT ESOPHAGITIS: Primary | ICD-10-CM

## 2025-06-16 PROBLEM — N17.9 AKI (ACUTE KIDNEY INJURY) (HCC): Status: ACTIVE | Noted: 2025-06-16

## 2025-06-16 LAB
ANION GAP SERPL CALCULATED.3IONS-SCNC: 9 MMOL/L (ref 4–13)
BASOPHILS # BLD AUTO: 0.12 THOUSANDS/ÂΜL (ref 0–0.1)
BASOPHILS NFR BLD AUTO: 0 % (ref 0–1)
BUN SERPL-MCNC: 13 MG/DL (ref 5–25)
CALCIUM SERPL-MCNC: 8.5 MG/DL (ref 8.4–10.2)
CHLORIDE SERPL-SCNC: 104 MMOL/L (ref 96–108)
CO2 SERPL-SCNC: 22 MMOL/L (ref 21–32)
CREAT SERPL-MCNC: 0.77 MG/DL (ref 0.6–1.3)
EOSINOPHIL # BLD AUTO: 1.82 THOUSAND/ÂΜL (ref 0–0.61)
EOSINOPHIL NFR BLD AUTO: 6 % (ref 0–6)
ERYTHROCYTE [DISTWIDTH] IN BLOOD BY AUTOMATED COUNT: 17.4 % (ref 11.6–15.1)
GFR SERPL CREATININE-BSD FRML MDRD: 85 ML/MIN/1.73SQ M
GLUCOSE P FAST SERPL-MCNC: 105 MG/DL (ref 65–99)
GLUCOSE SERPL-MCNC: 105 MG/DL (ref 65–140)
HCT VFR BLD AUTO: 22.8 % (ref 34.8–46.1)
HGB BLD-MCNC: 7.5 G/DL (ref 11.5–15.4)
IMM GRANULOCYTES # BLD AUTO: 0.19 THOUSAND/UL (ref 0–0.2)
IMM GRANULOCYTES NFR BLD AUTO: 1 % (ref 0–2)
LYMPHOCYTES # BLD AUTO: 1.32 THOUSANDS/ÂΜL (ref 0.6–4.47)
LYMPHOCYTES NFR BLD AUTO: 4 % (ref 14–44)
MAGNESIUM SERPL-MCNC: 2 MG/DL (ref 1.9–2.7)
MCH RBC QN AUTO: 32.1 PG (ref 26.8–34.3)
MCHC RBC AUTO-ENTMCNC: 32.9 G/DL (ref 31.4–37.4)
MCV RBC AUTO: 97 FL (ref 82–98)
MONOCYTES # BLD AUTO: 3.27 THOUSAND/ÂΜL (ref 0.17–1.22)
MONOCYTES NFR BLD AUTO: 11 % (ref 4–12)
NEUTROPHILS # BLD AUTO: 23.25 THOUSANDS/ÂΜL (ref 1.85–7.62)
NEUTS SEG NFR BLD AUTO: 78 % (ref 43–75)
NRBC BLD AUTO-RTO: 0 /100 WBCS
PLATELET # BLD AUTO: 975 THOUSANDS/UL (ref 149–390)
PMV BLD AUTO: 10.1 FL (ref 8.9–12.7)
POTASSIUM SERPL-SCNC: 3.5 MMOL/L (ref 3.5–5.3)
RBC # BLD AUTO: 2.34 MILLION/UL (ref 3.81–5.12)
SODIUM SERPL-SCNC: 135 MMOL/L (ref 135–147)
WBC # BLD AUTO: 29.97 THOUSAND/UL (ref 4.31–10.16)

## 2025-06-16 PROCEDURE — 0D9670Z DRAINAGE OF STOMACH WITH DRAINAGE DEVICE, VIA NATURAL OR ARTIFICIAL OPENING: ICD-10-PCS | Performed by: OBSTETRICS & GYNECOLOGY

## 2025-06-16 PROCEDURE — 83735 ASSAY OF MAGNESIUM: CPT

## 2025-06-16 PROCEDURE — 85025 COMPLETE CBC W/AUTO DIFF WBC: CPT

## 2025-06-16 PROCEDURE — 80048 BASIC METABOLIC PNL TOTAL CA: CPT

## 2025-06-16 PROCEDURE — 99024 POSTOP FOLLOW-UP VISIT: CPT | Performed by: OBSTETRICS & GYNECOLOGY

## 2025-06-16 RX ORDER — HYDROMORPHONE HCL IN WATER/PF 6 MG/30 ML
0.2 PATIENT CONTROLLED ANALGESIA SYRINGE INTRAVENOUS EVERY 4 HOURS PRN
Status: DISCONTINUED | OUTPATIENT
Start: 2025-06-16 | End: 2025-06-19

## 2025-06-16 RX ORDER — PANTOPRAZOLE SODIUM 40 MG/10ML
40 INJECTION, POWDER, LYOPHILIZED, FOR SOLUTION INTRAVENOUS
Status: DISCONTINUED | OUTPATIENT
Start: 2025-06-16 | End: 2025-06-20

## 2025-06-16 RX ORDER — ENOXAPARIN SODIUM 100 MG/ML
40 INJECTION SUBCUTANEOUS
Status: DISCONTINUED | OUTPATIENT
Start: 2025-06-16 | End: 2025-06-20 | Stop reason: HOSPADM

## 2025-06-16 RX ORDER — LISINOPRIL 10 MG/1
10 TABLET ORAL DAILY
Status: DISCONTINUED | OUTPATIENT
Start: 2025-06-16 | End: 2025-06-20 | Stop reason: HOSPADM

## 2025-06-16 RX ORDER — DEXTROSE MONOHYDRATE, SODIUM CHLORIDE, AND POTASSIUM CHLORIDE 50; 1.49; 4.5 G/1000ML; G/1000ML; G/1000ML
125 INJECTION, SOLUTION INTRAVENOUS CONTINUOUS
Status: DISCONTINUED | OUTPATIENT
Start: 2025-06-16 | End: 2025-06-16

## 2025-06-16 RX ORDER — ACETAMINOPHEN 10 MG/ML
1000 INJECTION, SOLUTION INTRAVENOUS EVERY 6 HOURS SCHEDULED
Status: DISCONTINUED | OUTPATIENT
Start: 2025-06-16 | End: 2025-06-18

## 2025-06-16 RX ORDER — ONDANSETRON 2 MG/ML
4 INJECTION INTRAMUSCULAR; INTRAVENOUS EVERY 6 HOURS PRN
Status: DISCONTINUED | OUTPATIENT
Start: 2025-06-16 | End: 2025-06-20 | Stop reason: HOSPADM

## 2025-06-16 RX ORDER — DEXTROSE MONOHYDRATE, SODIUM CHLORIDE, AND POTASSIUM CHLORIDE 50; 1.49; 4.5 G/1000ML; G/1000ML; G/1000ML
125 INJECTION, SOLUTION INTRAVENOUS CONTINUOUS
Status: ACTIVE | OUTPATIENT
Start: 2025-06-16 | End: 2025-06-16

## 2025-06-16 RX ORDER — DEXTROSE MONOHYDRATE, SODIUM CHLORIDE 2.5; .45 G/100ML; G/100ML
125 INJECTION, SOLUTION INTRAVENOUS CONTINUOUS
Status: DISCONTINUED | OUTPATIENT
Start: 2025-06-16 | End: 2025-06-16

## 2025-06-16 RX ORDER — ALBUTEROL SULFATE 90 UG/1
2 INHALANT RESPIRATORY (INHALATION) EVERY 4 HOURS PRN
Status: DISCONTINUED | OUTPATIENT
Start: 2025-06-16 | End: 2025-06-20 | Stop reason: HOSPADM

## 2025-06-16 RX ORDER — DEXTROSE MONOHYDRATE AND SODIUM CHLORIDE 5; .9 G/100ML; G/100ML
125 INJECTION, SOLUTION INTRAVENOUS CONTINUOUS
Status: DISCONTINUED | OUTPATIENT
Start: 2025-06-16 | End: 2025-06-18

## 2025-06-16 RX ORDER — HYDROMORPHONE HCL/PF 1 MG/ML
0.5 SYRINGE (ML) INJECTION EVERY 4 HOURS PRN
Status: DISCONTINUED | OUTPATIENT
Start: 2025-06-16 | End: 2025-06-19

## 2025-06-16 RX ORDER — AMLODIPINE BESYLATE 10 MG/1
10 TABLET ORAL DAILY
Status: DISCONTINUED | OUTPATIENT
Start: 2025-06-16 | End: 2025-06-20 | Stop reason: HOSPADM

## 2025-06-16 RX ADMIN — ACETAMINOPHEN 1000 MG: 10 INJECTION INTRAVENOUS at 09:15

## 2025-06-16 RX ADMIN — DEXTROSE, SODIUM CHLORIDE, AND POTASSIUM CHLORIDE 125 ML/HR: 5; .45; .15 INJECTION INTRAVENOUS at 02:15

## 2025-06-16 RX ADMIN — ACETAMINOPHEN 1000 MG: 10 INJECTION INTRAVENOUS at 19:59

## 2025-06-16 RX ADMIN — ENOXAPARIN SODIUM 40 MG: 100 INJECTION SUBCUTANEOUS at 08:21

## 2025-06-16 RX ADMIN — PANTOPRAZOLE SODIUM 40 MG: 40 INJECTION, POWDER, LYOPHILIZED, FOR SOLUTION INTRAVENOUS at 08:19

## 2025-06-16 RX ADMIN — ACETAMINOPHEN 1000 MG: 10 INJECTION INTRAVENOUS at 14:05

## 2025-06-16 RX ADMIN — DEXTROSE, SODIUM CHLORIDE, AND POTASSIUM CHLORIDE 125 ML/HR: 5; .45; .15 INJECTION INTRAVENOUS at 03:14

## 2025-06-16 RX ADMIN — DEXTROSE AND SODIUM CHLORIDE 125 ML/HR: 5; .9 INJECTION, SOLUTION INTRAVENOUS at 09:15

## 2025-06-16 RX ADMIN — ACETAMINOPHEN 1000 MG: 10 INJECTION INTRAVENOUS at 02:28

## 2025-06-16 RX ADMIN — DEXTROSE AND SODIUM CHLORIDE 125 ML/HR: 5; .9 INJECTION, SOLUTION INTRAVENOUS at 17:22

## 2025-06-16 RX ADMIN — HYDROMORPHONE HYDROCHLORIDE 0.2 MG: 0.2 INJECTION, SOLUTION INTRAMUSCULAR; INTRAVENOUS; SUBCUTANEOUS at 02:28

## 2025-06-16 NOTE — ASSESSMENT & PLAN NOTE
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  Her postoperative course was complicated by nausea and vomiting with associated abdominal distention which was concerning for an ileus.  This had resolved and patient was discharged on 6/12.  See plan under \"Ileus (HCC)\"  Left lower quadrant PAIGE drain with minimal amount of serous output  "

## 2025-06-16 NOTE — PROGRESS NOTES
Multidisciplinary Gynecologic Oncology Tumor Case Review       Physician Recommended Plan     Ewa Hernandes is a 59 y.o. female     Diagnosis: stage IIIC high-grade serous ovarian cancer     Patient was discussed at the Multidisciplinary Gynecologic Oncology Case review on 6/16/25. The group recommended to consider treatment with: adjuvant chemotherapy with Taxol, carboplatin, and Avastin.     Follow-up appointment with Dr. De Souza on 7/1/25.     NCCN guidelines were available for review.     The final treatment plan will be left to the discretion of the patient and the treating physician.       DISCLAIMERS:    TO THE TREATING PHYSICIAN:  This conference is a meeting of clinicians from various specialty areas who evaluate and discuss patients for whom a multidisciplinary treatment approach is being considered. Please note that the above opinion was a consensus of the conference attendees and is intended only to assist in quality care of the discussed patient.  The responsibility for follow up on the input given during the conference, along with any final decisions regarding plan of care, is that of the patient and the patient's provider. Accordingly, appointments have only been recommended based on this information and have NOT been scheduled unless otherwise noted.      TO THE PATIENT:  This summary is a brief record of major aspects of your cancer treatment. You may choose to share a copy with any of your doctors or nurses. However, this is not a detailed or comprehensive record of your care.

## 2025-06-16 NOTE — PLAN OF CARE
Problem: PAIN - ADULT  Goal: Verbalizes/displays adequate comfort level or baseline comfort level  Description: Interventions:  - Encourage patient to monitor pain and request assistance  - Assess pain using appropriate pain scale  - Administer analgesics as ordered based on type and severity of pain and evaluate response  - Implement non-pharmacological measures as appropriate and evaluate response  - Consider cultural and social influences on pain and pain management  - Notify physician/advanced practitioner if interventions unsuccessful or patient reports new pain  - Educate patient/family on pain management process including their role and importance of  reporting pain   - Provide non-pharmacologic/complimentary pain relief interventions  Outcome: Progressing     Problem: SAFETY ADULT  Goal: Patient will remain free of falls  Description: INTERVENTIONS:  - Educate patient/family on patient safety including physical limitations  - Instruct patient to call for assistance with activity   - Consider consulting OT/PT to assist with strengthening/mobility based on AM PAC & JH-HLM score  - Consult OT/PT to assist with strengthening/mobility   - Keep Call bell within reach  - Keep bed low and locked with side rails adjusted as appropriate  - Keep care items and personal belongings within reach  - Initiate and maintain comfort rounds  - Make Fall Risk Sign visible to staff  - Offer Toileting every 2 Hours, in advance of need  - Initiate/Maintain bed/chair alarm  - Obtain necessary fall risk management equipment  - Apply yellow socks and bracelet for high fall risk patients  - Consider moving patient to room near nurses station  Outcome: Progressing  Goal: Maintain or return to baseline ADL function  Description: INTERVENTIONS:  -  Assess patient's ability to carry out ADLs; assess patient's baseline for ADL function and identify physical deficits which impact ability to perform ADLs (bathing, care of mouth/teeth,  toileting, grooming, dressing, etc.)  - Assess/evaluate cause of self-care deficits   - Assess range of motion  - Assess patient's mobility; develop plan if impaired  - Assess patient's need for assistive devices and provide as appropriate  - Encourage maximum independence but intervene and supervise when necessary  - Involve family in performance of ADLs  - Assess for home care needs following discharge   - Consider OT consult to assist with ADL evaluation and planning for discharge  - Provide patient education as appropriate  - Monitor functional capacity and physical performance, use of AM PAC & -HLM   - Monitor gait, balance and fatigue with ambulation    Outcome: Progressing  Goal: Maintains/Returns to pre admission functional level  Description: INTERVENTIONS:  - Perform AM-PAC 6 Click Basic Mobility/ Daily Activity assessment daily.  - Set and communicate daily mobility goal to care team and patient/family/caregiver.   - Collaborate with rehabilitation services on mobility goals if consulted  - Perform Range of Motion 3 times a day.  - Reposition patient every 3 hours.  - Dangle patient 3 times a day  - Stand patient 3 times a day  - Ambulate patient 3 times a day  - Out of bed to chair 3 times a day   - Out of bed for meals 3 times a day  - Out of bed for toileting  - Record patient progress and toleration of activity level   Outcome: Progressing     Problem: GASTROINTESTINAL - ADULT  Goal: Minimal or absence of nausea and/or vomiting  Description: INTERVENTIONS:  - Administer IV fluids if ordered to ensure adequate hydration  - Maintain NPO status until nausea and vomiting are resolved  - Nasogastric tube if ordered  - Administer ordered antiemetic medications as needed  - Provide nonpharmacologic comfort measures as appropriate  - Advance diet as tolerated, if ordered  - Consider nutrition services referral to assist patient with adequate nutrition and appropriate food choices  Outcome:  Progressing  Goal: Maintains or returns to baseline bowel function  Description: INTERVENTIONS:  - Assess bowel function  - Encourage oral fluids to ensure adequate hydration  - Administer IV fluids if ordered to ensure adequate hydration  - Administer ordered medications as needed  - Encourage mobilization and activity  - Consider nutritional services referral to assist patient with adequate nutrition and appropriate food choices  Outcome: Progressing  Goal: Maintains adequate nutritional intake  Description: INTERVENTIONS:  - Monitor percentage of each meal consumed  - Identify factors contributing to decreased intake, treat as appropriate  - Assist with meals as needed  - Monitor I&O, weight, and lab values if indicated  - Obtain nutrition services referral as needed  Outcome: Progressing     Problem: SKIN/TISSUE INTEGRITY - ADULT  Goal: Incision(s), wounds(s) or drain site(s) healing without S/S of infection  Description: INTERVENTIONS  - Assess and document dressing, incision, wound bed, drain sites and surrounding tissue  - Provide patient and family education  - Perform skin care/dressing changes  Outcome: Progressing

## 2025-06-16 NOTE — ASSESSMENT & PLAN NOTE
Creatinine noted to rise from 0.53 on 6/12 to 1.02 on 6/15.  This is suspected to be due to dehydration at this time.  She reports poor oral intake.  - Management with IV fluids  -Continue to trend creatinine  - Will reach out to nephrology if no improvement

## 2025-06-16 NOTE — ASSESSMENT & PLAN NOTE
Patient is postop day #12 from SARAY, BSO, LAR, splenectomy, and debulking for high-grade serous ovarian cancer s/p neoadjuvant chemotherapy.  Her postoperative course was originally complicated by suspected ileus.  She presented to [facility] ED on 6/15 with severe abdominal pain.  Reports she has not had a bowel movement or passed flatus since 6/12 and has nausea without vomiting.  She has not been eating much.  Bowel sounds absent on exam with mild distention.  - CTAP on 6/15 shows generalized dilated large and small bowel in a pattern most suggestive of ileus.  No findings to suggest obstruction.  - Other findings in the ED include a WBC of 34.63, lactic acid down trended from 2.3 to 1.3, and an ADALI.  - Due to her elevated white blood cell count and lactic acid, she met criteria for sepsis and was started on cefepime and Flagyl.  Based on exam findings and clinical scenario, we have a low suspicion for sepsis at this time.  Will hold off on antibiotics and continue to monitor closely.    Plan:  FEN: NPO w/ NGT, D51/2NS @125mL, zofran, monitor and replete electrolytes as needed  Pain: tylenol scheduled, dilauded 0.2 and 0.5 PRN  Avoid NSAIDs in setting of ADALI and LAR   DVT ppx: SCDs, Lovenox 40mg qd   Follow-up blood cultures  Continue to follow white blood cell trend and fever curve

## 2025-06-16 NOTE — H&P
For questions/concerns on this patient, please reach out to the following:  [facility]- GynOn Resident       H&P Exam - Gynecology Oncology  Ewa Hernandes 59 y.o. female MRN: [id number]  Unit/Bed#: E2 -01 Encounter: [id number]        Assessment & Plan  Ileus (HCC)  Patient is postop day #12 from SARAY, BSO, LAR, splenectomy, and debulking for high-grade serous ovarian cancer s/p neoadjuvant chemotherapy.  Her postoperative course was originally complicated by suspected ileus.  She presented to [facility] ED on 6/15 with severe abdominal pain.  Reports she has not had a bowel movement or passed flatus since 6/12 and has nausea without vomiting.  She has not been eating much.  Bowel sounds absent on exam with mild distention.  - CTAP on 6/15 shows generalized dilated large and small bowel in a pattern most suggestive of ileus.  No findings to suggest obstruction.  - Other findings in the ED include a WBC of 34.63, lactic acid down trended from 2.3 to 1.3, and an ADALI.  - Due to her elevated white blood cell count and lactic acid, she met criteria for sepsis and was started on cefepime and Flagyl.  Based on exam findings and clinical scenario, we have a low suspicion for sepsis at this time.  Will hold off on antibiotics and continue to monitor closely.    Plan:  FEN: NPO w/ NGT, D51/2NS @125mL, zofran, monitor and replete electrolytes as needed  Pain: tylenol scheduled, dilauded 0.2 and 0.5 PRN  Avoid NSAIDs in setting of ADALI and LAR   DVT ppx: SCDs, Lovenox 40mg qd   Follow-up blood cultures  Continue to follow white blood cell trend and fever curve  Malignant neoplasm of both ovaries (HCC)  60yo POD#12 from SARAY, BSO, LAR, splenectomy, and debulking for high-grade serous ovarian cancer s/p neoadjuvant chemotherapy.  Her postoperative course was complicated by nausea and vomiting with associated abdominal distention which was concerning for an ileus.  This had resolved and patient was discharged on 6/12.  See plan under  "\"Ileus (HCC)\"  Left lower quadrant PAIGE drain with minimal amount of serous output  ADALI (acute kidney injury) (HCC)  Creatinine noted to rise from 0.53 on 6/12 to 1.02 on 6/15.  This is suspected to be due to dehydration at this time.  She reports poor oral intake.  - Management with IV fluids  -Continue to trend creatinine  - Will reach out to nephrology if no improvement  Essential hypertension, benign  Home amlodipine and lisinopril held in the setting of NPO  Restart when advancing diet  Major depressive disorder, recurrent episode, mild (HCC)  Home Prozac held until able to advance diet  Asthma  Albuterol as needed      History of Present Illness     HPI:  Ewa Hernandes is a 59 y.o. female who is postop day #12 from SARAY, BSO, LAR, splenectomy, and debulking for high-grade serous ovarian cancer.  She presented to [facility] with severe generalized abdominal pain.  She reports that when she was discharged on Thursday, she was feeling well passing flatus and having bowel movements.  She reports this is the last time she has had a bowel movement or passed flatus.  Saturday her abdominal pain started to worsen but was manageable.  On Sunday, it became unbearable.  Her pain is mostly in her left upper quadrant and feels like her stomach is \"flipping\". She reports little p.o. intake and endorses nausea without vomiting.  She reports she has continued to urinate without difficulty.  She denies chest pain, shortness of breath, lower extremity pain, or difficulty ambulating.  Overall feels exhausted.  She does report that she has had subjective chills without fever at home.    Oncology History   Malignant neoplasm of both ovaries (HCC)   2/4/2025 Initial Diagnosis    Malignant neoplasm of both ovaries (HCC)     2/6/2025 -  Cancer Staged    Staging form: Ovary, Fallopian Tube, Primary Peritoneal, AJCC 8th Edition  - Clinical stage from 2/6/2025: FIGO Stage IIIC (cT3c, cNX, cM0) - Signed by Augusto De Souza MD on " 2025  Histopathologic type: Serous cystadenocarcinoma, NOS  Stage prefix: Initial diagnosis  Histologic grade (G): G3  Histologic grading system: 4 grade system       2025 -  Chemotherapy    CARBOplatin (PARAPLATIN) IVPB (GOG AUC DOSING), 650.5 mg, 4 of 7 cycles  Administration: 650.5 mg (2025), 622 mg (2025), 650.5 mg (3/11/2025), 629 mg (2025)  PACLItaxel (TAXOL) chemo IVPB, 175 mg/m2 = 336 mg, 4 of 7 cycles  Administration: 336 mg (2025), 330 mg (2025), 336 mg (3/11/2025), 330 mg (2025)  bevacizumab-awwb (MVASI) IVPB, 1,140 mg (100 % of original dose 15 mg/kg), 3 of 6 cycles  Dose modification: 15 mg/kg (original dose 15 mg/kg, Cycle 1)  Administration: 1,100 mg (2025), 1,100 mg (3/11/2025), 1,100 mg (2025)     Ovarian cancer (HCC) (Resolved)   2025 -  Cancer Staged    Staging form: Ovary, Fallopian Tube, Primary Peritoneal, AJCC 8th Edition  - Clinical stage from 2025: FIGO Stage IIIC (cT3c, cNX, cM0) - Signed by Augusto De Souza MD on 2025  Histopathologic type: Serous cystadenocarcinoma, NOS  Histologic grade (G): G3  Histologic grading system: 4 grade system       2025 Initial Diagnosis    Ovarian cancer (HCC)         Review of Systems  Pertinent items listed in HPI  Historical Information   Past Medical History[1]  Past Surgical History[2]  OB History    Para Term  AB Living   1 1 1      SAB IAB Ectopic Multiple Live Births             # Outcome Date GA Lbr Andrez/2nd Weight Sex Type Anes PTL Lv   1 Term              Family History[3]  Social History   Social History     Substance and Sexual Activity   Alcohol Use Not Currently    Alcohol/week: 3.0 standard drinks of alcohol     Social History     Substance and Sexual Activity   Drug Use Never     Tobacco Use History[4]    Meds/Allergies     Medications Prior to Admission:     acetaminophen (TYLENOL) 325 mg tablet    albuterol (PROVENTIL HFA,VENTOLIN HFA) 90 mcg/act inhaler     "ALPRAZolam (XANAX) 0.5 mg tablet    amLODIPine (NORVASC) 10 mg tablet    apixaban (Eliquis) 2.5 mg    calcium carbonate (TUMS) 500 mg chewable tablet    FLUoxetine (PROzac) 40 MG capsule    lisinopril (ZESTRIL) 10 mg tablet    ondansetron (ZOFRAN) 8 mg tablet    pantoprazole (PROTONIX) 20 mg tablet    simethicone (MYLICON) 80 mg chewable tablet  Allergies[5]    Objective     /71 (BP Location: Right arm)   Pulse 99   Temp 99.4 °F (37.4 °C) (Oral)   Resp 20   Ht 5' 9\" (1.753 m)   Wt 66.9 kg (147 lb 7.8 oz)   SpO2 96%   BMI 21.78 kg/m²     No intake/output data recorded.  No intake/output data recorded.    Lab Results   Component Value Date    WBC 34.63 (H) 06/15/2025    HGB 9.1 (L) 06/15/2025    HCT 28.6 (L) 06/15/2025     (H) 06/15/2025    PLT 1,009 (HH) 06/15/2025       Lab Results   Component Value Date    CALCIUM 9.5 06/15/2025    K 3.9 06/15/2025    CO2 24 06/15/2025     06/15/2025    BUN 12 06/15/2025    CREATININE 1.02 06/15/2025       Physical Exam  Vitals reviewed.   Constitutional:       General: She is not in acute distress.     Appearance: She is ill-appearing.     Cardiovascular:      Rate and Rhythm: Normal rate and regular rhythm.      Heart sounds: Normal heart sounds.   Pulmonary:      Effort: Pulmonary effort is normal. No respiratory distress.      Breath sounds: Normal breath sounds.   Abdominal:      General: Bowel sounds are absent. There is distension (Mild).      Palpations: Abdomen is soft.      Tenderness: There is abdominal tenderness in the right lower quadrant and left upper quadrant.      Comments: Left lower quadrant PAIGE drain with serous output.  Midline vertical incision intact with glue in place     Musculoskeletal:         General: No tenderness.      Right lower leg: No edema.      Left lower leg: No edema.     Skin:     General: Skin is warm and dry.      Coloration: Skin is not jaundiced.     Neurological:      Mental Status: She is alert.     Psychiatric: "         Mood and Affect: Mood normal.         Behavior: Behavior normal.         Imaging: Results Review Statement: I reviewed radiology reports from this admission including: CT abdomen/pelvis.      Code Status: Level 1 - Full Code    Donna Turner MD  2025  1:48 AM          [1]   Past Medical History:  Diagnosis Date    Anxiety     Asthma     Cancer (HCC)     Ovarian    Depression     Eczema     Hypertension     Inflammatory bowel disease    [2]   Past Surgical History:  Procedure Laterality Date    BREAST CYST EXCISION Right     benign    CATARACT EXTRACTION, BILATERAL Bilateral     CHOLECYSTECTOMY      EYE SURGERY      IR BIOPSY LYMPH NODE  2020    IR BIOPSY OMENTUM  2025    IR PARACENTESIS  2025    IR PARACENTESIS  2025    WY EXPLORATORY LAPAROTOMY CELIOTOMY W/WO BIOPSY SPX N/A 2025    Procedure: EXAM UNDER ANESTHESIA, EXPLORATORY LAPAROTOMY, RADICAL HYSTERECTOMY, BILATERAL SALPINGO-OOPHORECTOMY, PELVIC PERITONEXTOMY WITH EN BLOC LOW ANTERIOR RECTOSIGMOID RESECTION, APPENDECTOMY, SPLENIC FLEXURE MOBILIZATION, GASTROCOLING OMENTECTOMY, INDICATED SPLENECTOMY, RESECTION SMALL BOWEL MESENTERIC NODULES;  Surgeon: Augusto De Souza MD;  Location: [facility];  Service: Gynecology Oncol   [3]   Family History  Problem Relation Name Age of Onset    Breast cancer Mother Mother         in her 70's    Heart disease Mother Mother     Glaucoma Mother Mother     Parkinsonism Father      No Known Problems Sister tapan     No Known Problems Daughter sujit     No Known Problems Maternal Grandmother      No Known Problems Maternal Grandfather      No Known Problems Paternal Grandmother      No Known Problems Paternal Grandfather     [4]   Social History  Tobacco Use   Smoking Status Former    Current packs/day: 0.00    Average packs/day: 0.5 packs/day for 29.0 years (14.5 ttl pk-yrs)    Types: Cigarettes    Start date: 1982    Quit date:     Years since quittin.4    Passive exposure:  Past   Smokeless Tobacco Never   [5] No Known Allergies

## 2025-06-16 NOTE — EMTALA/ACUTE CARE TRANSFER
[facility] EMERGENCY DEPARTMENT  [address] [address]  Dept: [phone]      EMTALA TRANSFER CONSENT    NAME Ewa Hernandes                                         1965                              MRN [id number]    I have been informed of my rights regarding examination, treatment, and transfer   by Dr. Adolfo Ricci,     Benefits:  Specialties not available this facility (gynecology oncology)    Risks:  Risks of injury during transport, delays in care due to transport times, decompensated illness during transport.      Consent for Transfer:  I acknowledge that my medical condition has been evaluated and explained to me by the emergency department physician or other qualified medical person and/or my attending physician, who has recommended that I be transferred to the service of    at  . The above potential benefits of such transfer, the potential risks associated with such transfer, and the probable risks of not being transferred have been explained to me, and I fully understand them.  The doctor has explained that, in my case, the benefits of transfer outweigh the risks.  I agree to be transferred.    I authorize the performance of emergency medical procedures and treatments upon me in both transit and upon arrival at the receiving facility.  Additionally, I authorize the release of any and all medical records to the receiving facility and request they be transported with me, if possible.  I understand that the safest mode of transportation during a medical emergency is an ambulance and that the Hospital advocates the use of this mode of transport. Risks of traveling to the receiving facility by car, including absence of medical control, life sustaining equipment, such as oxygen, and medical personnel has been explained to me and I fully understand them.    (RONNIE CORRECT BOX BELOW)  [x  ]  I consent to the stated transfer and to be transported by  ambulance/helicopter.  [  ]  I consent to the stated transfer, but refuse transportation by ambulance and accept full responsibility for my transportation by car.  I understand the risks of non-ambulance transfers and I exonerate the Hospital and its staff from any deterioration in my condition that results from this refusal.    X___________________________________________    DATE  06/15/25  TIME________  Signature of patient or legally responsible individual signing on patient behalf           RELATIONSHIP TO PATIENT_________________________          Provider Certification    NAME Ewa Hernandes                                         1965                              MRN [id number]    A medical screening exam was performed on the above named patient.  Based on the examination:    Condition Necessitating Transfer The primary encounter diagnosis was Generalized abdominal pain. Diagnoses of History of major abdominal surgery, Ovarian cancer (HCC), Hypomagnesemia, ADALI (acute kidney injury) (HCC), and Severe sepsis (HCC) were also pertinent to this visit.    Patient Condition:  Stable    Reason for Transfer:  Postoperative ileus needs evaluation by GynOnc not available at this facility    Transfer Requirements: Facility   [facility]  Space available and qualified personnel available for treatment as acknowledged by  PACS  Agreed to accept transfer and to provide appropriate medical treatment as acknowledged by Dr Little          Appropriate medical records of the examination and treatment of the patient are provided at the time of transfer   STAFF INITIAL WHEN COMPLETED _______  Transfer will be performed by qualified personnel from    and appropriate transfer equipment as required, including the use of necessary and appropriate life support measures.    Provider Certification: I have examined the patient and explained the following risks and benefits of being transferred/refusing transfer to the  patient/family:         Based on these reasonable risks and benefits to the patient and/or the unborn child(toby), and based upon the information available at the time of the patient’s examination, I certify that the medical benefits reasonably to be expected from the provision of appropriate medical treatments at another medical facility outweigh the increasing risks, if any, to the individual’s medical condition, and in the case of labor to the unborn child, from effecting the transfer.    X____________________________________________ DATE 06/15/25        TIME_______      ORIGINAL - SEND TO MEDICAL RECORDS   COPY - SEND WITH PATIENT DURING TRANSFER

## 2025-06-17 ENCOUNTER — HOSPITAL ENCOUNTER (OUTPATIENT)
Dept: INFUSION CENTER | Facility: HOSPITAL | Age: 60
Discharge: HOME | End: 2025-06-17
Attending: OBSTETRICS & GYNECOLOGY

## 2025-06-17 LAB
ANION GAP SERPL CALCULATED.3IONS-SCNC: 6 MMOL/L (ref 4–13)
BACTERIA UR CULT: NORMAL
BUN SERPL-MCNC: 6 MG/DL (ref 5–25)
CALCIUM SERPL-MCNC: 8.5 MG/DL (ref 8.4–10.2)
CHLORIDE SERPL-SCNC: 110 MMOL/L (ref 96–108)
CO2 SERPL-SCNC: 23 MMOL/L (ref 21–32)
CREAT SERPL-MCNC: 0.56 MG/DL (ref 0.6–1.3)
ERYTHROCYTE [DISTWIDTH] IN BLOOD BY AUTOMATED COUNT: 17.8 % (ref 11.6–15.1)
GFR SERPL CREATININE-BSD FRML MDRD: 103 ML/MIN/1.73SQ M
GLUCOSE P FAST SERPL-MCNC: 95 MG/DL (ref 65–99)
GLUCOSE SERPL-MCNC: 95 MG/DL (ref 65–140)
HCT VFR BLD AUTO: 22.5 % (ref 34.8–46.1)
HGB BLD-MCNC: 7.2 G/DL (ref 11.5–15.4)
MAGNESIUM SERPL-MCNC: 1.7 MG/DL (ref 1.9–2.7)
MCH RBC QN AUTO: 32 PG (ref 26.8–34.3)
MCHC RBC AUTO-ENTMCNC: 32 G/DL (ref 31.4–37.4)
MCV RBC AUTO: 100 FL (ref 82–98)
PLATELET # BLD AUTO: 947 THOUSANDS/UL (ref 149–390)
PMV BLD AUTO: 10.2 FL (ref 8.9–12.7)
POTASSIUM SERPL-SCNC: 3.6 MMOL/L (ref 3.5–5.3)
RBC # BLD AUTO: 2.25 MILLION/UL (ref 3.81–5.12)
SODIUM SERPL-SCNC: 139 MMOL/L (ref 135–147)
WBC # BLD AUTO: 19.05 THOUSAND/UL (ref 4.31–10.16)

## 2025-06-17 PROCEDURE — 83735 ASSAY OF MAGNESIUM: CPT

## 2025-06-17 PROCEDURE — 80048 BASIC METABOLIC PNL TOTAL CA: CPT

## 2025-06-17 PROCEDURE — 99024 POSTOP FOLLOW-UP VISIT: CPT | Performed by: OBSTETRICS & GYNECOLOGY

## 2025-06-17 PROCEDURE — 85027 COMPLETE CBC AUTOMATED: CPT

## 2025-06-17 RX ORDER — POTASSIUM CHLORIDE 14.9 MG/ML
20 INJECTION INTRAVENOUS
Status: COMPLETED | OUTPATIENT
Start: 2025-06-17 | End: 2025-06-17

## 2025-06-17 RX ORDER — MAGNESIUM SULFATE HEPTAHYDRATE 40 MG/ML
2 INJECTION, SOLUTION INTRAVENOUS ONCE
Status: COMPLETED | OUTPATIENT
Start: 2025-06-17 | End: 2025-06-17

## 2025-06-17 RX ADMIN — POTASSIUM CHLORIDE 20 MEQ: 14.9 INJECTION, SOLUTION INTRAVENOUS at 08:51

## 2025-06-17 RX ADMIN — ACETAMINOPHEN 1000 MG: 10 INJECTION INTRAVENOUS at 02:34

## 2025-06-17 RX ADMIN — DEXTROSE AND SODIUM CHLORIDE 125 ML/HR: 5; .9 INJECTION, SOLUTION INTRAVENOUS at 23:36

## 2025-06-17 RX ADMIN — PANTOPRAZOLE SODIUM 40 MG: 40 INJECTION, POWDER, LYOPHILIZED, FOR SOLUTION INTRAVENOUS at 08:51

## 2025-06-17 RX ADMIN — POTASSIUM CHLORIDE 20 MEQ: 14.9 INJECTION, SOLUTION INTRAVENOUS at 13:34

## 2025-06-17 RX ADMIN — HYDROMORPHONE HYDROCHLORIDE 0.5 MG: 1 INJECTION, SOLUTION INTRAMUSCULAR; INTRAVENOUS; SUBCUTANEOUS at 23:40

## 2025-06-17 RX ADMIN — ENOXAPARIN SODIUM 40 MG: 100 INJECTION SUBCUTANEOUS at 08:51

## 2025-06-17 RX ADMIN — DEXTROSE AND SODIUM CHLORIDE 125 ML/HR: 5; .9 INJECTION, SOLUTION INTRAVENOUS at 00:35

## 2025-06-17 RX ADMIN — MAGNESIUM SULFATE HEPTAHYDRATE 2 G: 40 INJECTION, SOLUTION INTRAVENOUS at 08:51

## 2025-06-17 RX ADMIN — ACETAMINOPHEN 1000 MG: 10 INJECTION INTRAVENOUS at 20:21

## 2025-06-17 RX ADMIN — HYDROMORPHONE HYDROCHLORIDE 0.5 MG: 1 INJECTION, SOLUTION INTRAMUSCULAR; INTRAVENOUS; SUBCUTANEOUS at 02:28

## 2025-06-17 NOTE — ASSESSMENT & PLAN NOTE
Patient is postop day #13 from SARAY, BSO, LAR, splenectomy, and debulking for high-grade serous ovarian cancer s/p neoadjuvant chemotherapy.  Her postoperative course was originally complicated by suspected ileus.  She presented to [facility] ED on 6/15 with severe abdominal pain.  Reports she has not had a bowel movement or passed flatus since 6/12 and has nausea without vomiting.  She has not been eating much.  Bowel sounds absent on exam with mild distention.  - CTAP on 6/15 shows generalized dilated large and small bowel in a pattern most suggestive of ileus.  No findings to suggest obstruction.  - Other findings in the ED include a WBC of 34.63, lactic acid down trended from 2.3 to 1.3, and an ADALI.  - Due to her elevated white blood cell count and lactic acid, she met criteria for sepsis and was started on cefepime and Flagyl.  Based on exam findings and clinical scenario, we have a low suspicion for sepsis at this time.  Will hold off on antibiotics and continue to monitor closely.    Plan:  FEN: NPO w/ NGT, D51/2NS @125mL, zofran, monitor and replete electrolytes as needed  Pain: tylenol scheduled, dilauded 0.2 and 0.5 PRN  Avoid NSAIDs in setting of ADALI and LAR   DVT ppx: SCDs, Lovenox 40mg qd   Follow-up blood cultures  Continue to follow white blood cell trend and fever curve    NGT with 50cc out overnight, had multiple BM yesterday, consider clamp trial and potential NGT removal today

## 2025-06-17 NOTE — PLAN OF CARE
Problem: SKIN/TISSUE INTEGRITY - ADULT  Goal: Incision(s), wounds(s) or drain site(s) healing without S/S of infection  Description: INTERVENTIONS  - Assess and document dressing, incision, wound bed, drain sites and surrounding tissue  - Provide patient and family education  - Perform skin care/dressing changes  Outcome: Progressing     Problem: SAFETY ADULT  Goal: Patient will remain free of falls  Description: INTERVENTIONS:  - Educate patient/family on patient safety including physical limitations  - Instruct patient to call for assistance with activity   - Consider consulting OT/PT to assist with strengthening/mobility based on AM PAC & -HLM score  - Consult OT/PT to assist with strengthening/mobility   - Keep Call bell within reach  - Keep bed low and locked with side rails adjusted as appropriate  - Keep care items and personal belongings within reach  - Initiate and maintain comfort rounds  - Make Fall Risk Sign visible to staff  - Offer Toileting every 2 Hours, in advance of need  - Initiate/Maintain bed/chair alarm  - Obtain necessary fall risk management equipment  - Apply yellow socks and bracelet for high fall risk patients  - Consider moving patient to room near nurses station  Outcome: Progressing  Goal: Maintain or return to baseline ADL function  Description: INTERVENTIONS:  -  Assess patient's ability to carry out ADLs; assess patient's baseline for ADL function and identify physical deficits which impact ability to perform ADLs (bathing, care of mouth/teeth, toileting, grooming, dressing, etc.)  - Assess/evaluate cause of self-care deficits   - Assess range of motion  - Assess patient's mobility; develop plan if impaired  - Assess patient's need for assistive devices and provide as appropriate  - Encourage maximum independence but intervene and supervise when necessary  - Involve family in performance of ADLs  - Assess for home care needs following discharge   - Consider OT consult to assist  with ADL evaluation and planning for discharge  - Provide patient education as appropriate  - Monitor functional capacity and physical performance, use of AM PAC & JH-HLM   - Monitor gait, balance and fatigue with ambulation    Outcome: Progressing  Goal: Maintains/Returns to pre admission functional level  Description: INTERVENTIONS:  - Perform AM-PAC 6 Click Basic Mobility/ Daily Activity assessment daily.  - Set and communicate daily mobility goal to care team and patient/family/caregiver.   - Collaborate with rehabilitation services on mobility goals if consulted  - Perform Range of Motion 3 times a day.  - Reposition patient every 3 hours.  - Dangle patient 3 times a day  - Stand patient 3 times a day  - Ambulate patient 3 times a day  - Out of bed to chair 3 times a day   - Out of bed for meals 3 times a day  - Out of bed for toileting  - Record patient progress and toleration of activity level   Outcome: Progressing     Problem: PAIN - ADULT  Goal: Verbalizes/displays adequate comfort level or baseline comfort level  Description: Interventions:  - Encourage patient to monitor pain and request assistance  - Assess pain using appropriate pain scale  - Administer analgesics as ordered based on type and severity of pain and evaluate response  - Implement non-pharmacological measures as appropriate and evaluate response  - Consider cultural and social influences on pain and pain management  - Notify physician/advanced practitioner if interventions unsuccessful or patient reports new pain  - Educate patient/family on pain management process including their role and importance of  reporting pain   - Provide non-pharmacologic/complimentary pain relief interventions  Outcome: Progressing     Problem: GASTROINTESTINAL - ADULT  Goal: Minimal or absence of nausea and/or vomiting  Description: INTERVENTIONS:  - Administer IV fluids if ordered to ensure adequate hydration  - Maintain NPO status until nausea and vomiting  are resolved  - Nasogastric tube if ordered  - Administer ordered antiemetic medications as needed  - Provide nonpharmacologic comfort measures as appropriate  - Advance diet as tolerated, if ordered  - Consider nutrition services referral to assist patient with adequate nutrition and appropriate food choices  Outcome: Progressing  Goal: Maintains or returns to baseline bowel function  Description: INTERVENTIONS:  - Assess bowel function  - Encourage oral fluids to ensure adequate hydration  - Administer IV fluids if ordered to ensure adequate hydration  - Administer ordered medications as needed  - Encourage mobilization and activity  - Consider nutritional services referral to assist patient with adequate nutrition and appropriate food choices  Outcome: Progressing  Goal: Maintains adequate nutritional intake  Description: INTERVENTIONS:  - Monitor percentage of each meal consumed  - Identify factors contributing to decreased intake, treat as appropriate  - Assist with meals as needed  - Monitor I&O, weight, and lab values if indicated  - Obtain nutrition services referral as needed  Outcome: Progressing

## 2025-06-17 NOTE — ASSESSMENT & PLAN NOTE
"60yo POD#13 from SARAY, BSO, LAR, splenectomy, and debulking for high-grade serous ovarian cancer s/p neoadjuvant chemotherapy.  Her postoperative course was complicated by nausea and vomiting with associated abdominal distention which was concerning for an ileus.  This had resolved and patient was discharged on 6/12.  See plan under \"Ileus (HCC)\"  Left lower quadrant PAIGE drain with minimal amount of serous output  "

## 2025-06-17 NOTE — UTILIZATION REVIEW
Initial Clinical Review    TRANSFER FROM [facility]  ED    OBSERVATION   6/16 CHANGED  TO IP ADMISSION 6/17  @   0824  ILEUS  NGT  INTACT    Admission: Date/Time/Statement:   Admission Orders (From admission, onward)       Ordered        06/16/25 0144  Place in Observation  Once                          06/17/25 0824  INPATIENT ADMISSION  Once        Transfer Service: GYN Oncology   Question Answer Comment   Level of Care Med Surg    Estimated length of stay More than 2 Midnights    Certification I certify that inpatient services are medically necessary for this patient for a duration of greater than two midnights. See H&P and MD Progress Notes for additional information about the patient's course of treatment.        06/17/25 0824     Initial Presentation: 59 y.o. female initially presented to ED at  [facility]  with severe generalized abdominal pain. Patient is  POD  #  12 from SARAY, BSO, LAR, splenectomy, and debulking for high-grade serous ovarian cancer. Felt well on  d/c,  +  BM, + flatus.  LBM  day of  discharge,  6/12.  Pain started  Sat  6/14, was unbearable  Sun  6/15.   Feels like  her stomach is flipping.  + nausea  - vomiting.  Feels  exhausted.   + chills   Afebrile.  Transferred to [place] for further care.  Ct abdomen shows ileus, no obstruction.  Labs reveal WBC  34.63,   ADALI and lactic  now  1.3.  Additional PMH  is   Asthma, MDD and essential hypertension.  Admit  Observation with Ileus, ADALI  and plan is  monitor labs, blood cultures, pain control, IVF, NGT, antiemetics as needed, replace electrolytes..    6/17     IP ADMISSION  NGT drained  50 cc overnight.  Multiple BM's  since admission.  May  consider clamping  NGT.    Start cl liq    6/18      L  PAIGE drain removed this am, mod am't  clear drainage noted.   Now  POD  #  14   SARAY   BSO,  LAR, splenectomy.  Continue pain  control as needed.   +  flatus   +  BM    Creatinine  downtrending.  PO intake poor.   Advancing diet to surgical soft.   Continue  IVF.          Scheduled Medications:  acetaminophen, 1,000 mg, Intravenous, Q6H ADELE  [Held by provider] amLODIPine, 10 mg, Oral, Daily  enoxaparin, 40 mg, Subcutaneous, Q24H ADELE  [Held by provider] FLUoxetine, 40 mg, Oral, Daily  [Held by provider] lisinopril, 10 mg, Oral, Daily  magnesium sulfate, 2 g, Intravenous, Once  pantoprazole, 40 mg, Intravenous, Q24H ADELE  potassium chloride, 20 mEq, Intravenous, Q2H      Continuous IV Infusions:  dextrose 5 % and sodium chloride 0.9 %, 125 mL/hr, Intravenous, Continuous      PRN Meds:  albuterol, 2 puff, Inhalation, Q4H PRN  HYDROmorphone, 0.5 mg, Intravenous, Q4H PRN  ( x2  6/17)    HYDROmorphone, 0.2 mg, Intravenous, Q4H PRN  ondansetron, 4 mg, Intravenous, Q6H PRN  phenol, 1 spray, Mouth/Throat, Q2H PRN      ED Triage Vitals [06/16/25 0141]   Temperature Pulse Respirations Blood Pressure SpO2 Pain Score   99.4 °F (37.4 °C) 99 20 120/71 96 % 6     Weight (last 2 days)       Date/Time Weight    06/16/25 0141 66.9 (147.49)            Vital Signs (last 3 days)       Date/Time Temp Pulse Resp BP MAP (mmHg) SpO2 O2 Device Patient Position - Orthostatic VS Pain    06/17/25 0228 -- -- -- -- -- -- -- -- 5    06/16/25 2323 97.7 °F (36.5 °C) 89 18 122/81 95 96 % None (Room air) Lying --    06/16/25 1959 -- -- -- -- -- -- None (Room air) -- No Pain    06/16/25 1512 97.8 °F (36.6 °C) 80 18 106/71 80 96 % None (Room air) Lying --    06/16/25 0827 -- -- -- -- -- -- -- -- No Pain    06/16/25 0728 97.5 °F (36.4 °C) 86 19 110/62 80 95 % None (Room air) Lying --    06/16/25 0228 -- -- -- -- -- -- -- -- 8    06/16/25 0141 99.4 °F (37.4 °C) 99 20 120/71 83 96 % None (Room air) Lying 6              Pertinent Labs/Diagnostic Test Results:   Radiology:    Cardiology:    GI:        Results from last 7 days   Lab Units 06/17/25  0526 06/16/25  0436 06/15/25  1647 06/12/25  0606 06/11/25  0558   WBC Thousand/uL 19.05* 29.97* 34.63* 16.50* 12.24*   HEMOGLOBIN g/dL 7.2* 7.5* 9.1* 7.3*  8.4*   HEMATOCRIT % 22.5* 22.8* 28.6* 23.0* 26.3*   PLATELETS Thousands/uL 947* 975* 1,009* 692* 597*   TOTAL NEUT ABS Thousands/µL  --  23.25* 29.51*  --   --          Results from last 7 days   Lab Units 06/17/25  0526 06/16/25  0436 06/15/25  1647 06/12/25  0606 06/11/25  0558   SODIUM mmol/L 139 135 137 140 138   POTASSIUM mmol/L 3.6 3.5 3.9 3.6 3.8   CHLORIDE mmol/L 110* 104 101 106 105   CO2 mmol/L 23 22 24 24 23   ANION GAP mmol/L 6 9 12 10 10   BUN mg/dL 6 13 12 7 7   CREATININE mg/dL 0.56* 0.77 1.02 0.53* 0.53*   EGFR ml/min/1.73sq m 103 85 60 104 104   CALCIUM mg/dL 8.5 8.5 9.5 8.6 8.6   MAGNESIUM mg/dL 1.7* 2.0 1.6* 1.7* 2.0     Results from last 7 days   Lab Units 06/15/25  1647   AST U/L 18   ALT U/L 8   ALK PHOS U/L 53   TOTAL PROTEIN g/dL 7.7   ALBUMIN g/dL 3.9   TOTAL BILIRUBIN mg/dL 0.43         Results from last 7 days   Lab Units 06/17/25  0526 06/16/25  0436 06/15/25  1647 06/12/25  0606 06/11/25  0558 06/10/25  0834   GLUCOSE RANDOM mg/dL 95 105 98 75 89 94               Results from last 7 days   Lab Units 06/15/25  1647   HS TNI 0HR ng/L 4         Results from last 7 days   Lab Units 06/15/25  1647   PROTIME seconds 17.0*   INR  1.33*   PTT seconds 48*             Results from last 7 days   Lab Units 06/15/25  1914 06/15/25  1647   LACTIC ACID mmol/L 1.3 2.3*           Results from last 7 days   Lab Units 06/15/25  1647   LIPASE u/L 119*                 Results from last 7 days   Lab Units 06/15/25  1850   CLARITY UA  Hazy   COLOR UA  Yellow   SPEC GRAV UA  <1.005*   PH UA  6.5   GLUCOSE UA mg/dl 30 (3/100%)*   KETONES UA mg/dl Negative   BLOOD UA  Large*   PROTEIN UA mg/dl 30 (1+)*   NITRITE UA  Negative   BILIRUBIN UA  Negative   UROBILINOGEN UA (BE) mg/dl <2.0   LEUKOCYTES UA  Large*   WBC UA /hpf 10-20*   RBC UA /hpf 10-20*   BACTERIA UA /hpf Occasional   EPITHELIAL CELLS WET PREP /hpf Occasional                                 Results from last 7 days   Lab Units 06/15/25  1647   BLOOD  CULTURE  No Growth at 24 hrs.  No Growth at 24 hrs.                   Past Medical History[1]  Present on Admission:   Essential hypertension, benign   Major depressive disorder, recurrent episode, mild (HCC)   Asthma   Malignant neoplasm of both ovaries (HCC)   Ileus (HCC)      Admitting Diagnosis: Generalized abdominal pain  Age/Sex: 59 y.o. female    Network Utilization Review Department  ATTENTION: Please call with any questions or concerns to [phone] and carefully listen to the prompts so that you are directed to the right person. All voicemails are confidential.   For Discharge needs, contact Care Management DC Support Team at [phone] opt. 2  Send all requests for admission clinical reviews, approved or denied determinations and any other requests to dedicated fax number below belonging to the campus where the patient is receiving treatment. List of dedicated fax numbers for the Facilities:  FACILITY NAME UR FAX NUMBER   ADMISSION DENIALS (Administrative/Medical Necessity) [phone]   DISCHARGE SUPPORT TEAM (NETWORK) [phone]   PARENT CHILD HEALTH (Maternity/NICU/Pediatrics) [phone]   [facility] [phone]   [facility] [phone]   [facility] [phone]   [facility] [phone]   [facility] [phone]   [facility] [phone]   [facility] [phone]   [facility] [phone]   [facility] [phone]   [facility] [phone]   [facility] [phone]   [facility] [phone]               [1]   Past Medical History:  Diagnosis Date    Anxiety     Asthma     Cancer (HCC)     Ovarian    Depression     Eczema     Hypertension      Inflammatory bowel disease

## 2025-06-17 NOTE — PROGRESS NOTES
Progress Note - OB/GYN   Name: Ewa Hernandes 59 y.o. female I MRN: [id number]  Unit/Bed#: E2 -01 I Date of Admission: 6/16/2025   Date of Service: 6/17/2025 I Hospital Day: 1    Assessment & Plan  Ileus (HCC)  Patient is postop day #13 from SARAY, BSO, LAR, splenectomy, and debulking for high-grade serous ovarian cancer s/p neoadjuvant chemotherapy.  Her postoperative course was originally complicated by suspected ileus.  She presented to [facility] ED on 6/15 with severe abdominal pain.  Reports she has not had a bowel movement or passed flatus since 6/12 and has nausea without vomiting.  She has not been eating much.  Bowel sounds absent on exam with mild distention.  - CTAP on 6/15 shows generalized dilated large and small bowel in a pattern most suggestive of ileus.  No findings to suggest obstruction.  - Other findings in the ED include a WBC of 34.63, lactic acid down trended from 2.3 to 1.3, and an ADALI.  - Due to her elevated white blood cell count and lactic acid, she met criteria for sepsis and was started on cefepime and Flagyl.  Based on exam findings and clinical scenario, we have a low suspicion for sepsis at this time.  Will hold off on antibiotics and continue to monitor closely.    Plan:  FEN: NPO w/ NGT, D51/2NS @125mL, zofran, monitor and replete electrolytes as needed  Pain: tylenol scheduled, dilauded 0.2 and 0.5 PRN  Avoid NSAIDs in setting of ADALI and LAR   DVT ppx: SCDs, Lovenox 40mg qd   Follow-up blood cultures  Continue to follow white blood cell trend and fever curve    NGT with 50cc out overnight, had multiple BM yesterday, consider clamp trial and potential NGT removal today  Malignant neoplasm of both ovaries (HCC)  58yo POD#13 from SARAY, BSO, LAR, splenectomy, and debulking for high-grade serous ovarian cancer s/p neoadjuvant chemotherapy.  Her postoperative course was complicated by nausea and vomiting with associated abdominal distention which was concerning for an ileus.  This had  "resolved and patient was discharged on 6/12.  See plan under \"Ileus (HCC)\"  Left lower quadrant PAIGE drain with minimal amount of serous output  ADALI (acute kidney injury) (HCC)  Creatinine noted to rise from 0.53 on 6/12 to 1.02 on 6/15.  This is suspected to be due to dehydration at this time.  She reports poor oral intake.  - Management with IV fluids  -Continue to trend creatinine  - Will reach out to nephrology if no improvement  Essential hypertension, benign  Home amlodipine and lisinopril held in the setting of NPO  Restart when advancing diet  Major depressive disorder, recurrent episode, mild (HCC)  Home Prozac held until able to advance diet  Asthma  Albuterol as needed    GYNECOLOGY  Subjective   Ewa is feeling well overall this morning. Pain is controlled. She did not have any nausea overnight. No vomiting. She had multiple BM yesterday.     Objective :  Temp:  [97.5 °F (36.4 °C)-97.8 °F (36.6 °C)] 97.7 °F (36.5 °C)  HR:  [80-89] 89  BP: (106-122)/(62-81) 122/81  Resp:  [18-19] 18  SpO2:  [95 %-96 %] 96 %  O2 Device: None (Room air)    Physical Exam  Vitals reviewed.   Constitutional:       General: She is not in acute distress.     Appearance: She is ill-appearing.   HENT:      Head: Normocephalic and atraumatic.     Eyes:      Extraocular Movements: Extraocular movements intact.       Cardiovascular:      Rate and Rhythm: Normal rate and regular rhythm.      Pulses: Normal pulses.   Pulmonary:      Effort: Pulmonary effort is normal. No respiratory distress.   Abdominal:      Palpations: Abdomen is soft.      Tenderness: There is abdominal tenderness. There is no guarding or rebound.      Comments: PAIGE drain with minimal output  Midline incision c/d/i     Musculoskeletal:         General: Normal range of motion.      Cervical back: Normal range of motion.     Skin:     General: Skin is warm and dry.     Neurological:      General: No focal deficit present.      Mental Status: She is alert and " oriented to person, place, and time.     Psychiatric:         Mood and Affect: Mood normal.         Behavior: Behavior normal.           Lab Results: I have reviewed the following results:CBC/BMP:   .     06/17/25  0526   WBC 19.05*   HGB 7.2*   HCT 22.5*   *   SODIUM 139   K 3.6   *   CO2 23   BUN 6   CREATININE 0.56*   GLUC 95   MG 1.7*

## 2025-06-18 ENCOUNTER — HOSPITAL ENCOUNTER (OUTPATIENT)
Dept: INFUSION CENTER | Facility: HOSPITAL | Age: 60
End: 2025-06-18
Attending: OBSTETRICS & GYNECOLOGY

## 2025-06-18 ENCOUNTER — APPOINTMENT (INPATIENT)
Dept: CT IMAGING | Facility: HOSPITAL | Age: 60
End: 2025-06-18
Payer: COMMERCIAL

## 2025-06-18 LAB
ANION GAP SERPL CALCULATED.3IONS-SCNC: 6 MMOL/L (ref 4–13)
BUN SERPL-MCNC: 5 MG/DL (ref 5–25)
CALCIUM SERPL-MCNC: 8.8 MG/DL (ref 8.4–10.2)
CHLORIDE SERPL-SCNC: 111 MMOL/L (ref 96–108)
CO2 SERPL-SCNC: 21 MMOL/L (ref 21–32)
CREAT SERPL-MCNC: 0.56 MG/DL (ref 0.6–1.3)
ERYTHROCYTE [DISTWIDTH] IN BLOOD BY AUTOMATED COUNT: 17.6 % (ref 11.6–15.1)
GFR SERPL CREATININE-BSD FRML MDRD: 103 ML/MIN/1.73SQ M
GLUCOSE SERPL-MCNC: 92 MG/DL (ref 65–140)
HCT VFR BLD AUTO: 22.6 % (ref 34.8–46.1)
HGB BLD-MCNC: 7.4 G/DL (ref 11.5–15.4)
MAGNESIUM SERPL-MCNC: 1.9 MG/DL (ref 1.9–2.7)
MCH RBC QN AUTO: 32.5 PG (ref 26.8–34.3)
MCHC RBC AUTO-ENTMCNC: 32.7 G/DL (ref 31.4–37.4)
MCV RBC AUTO: 99 FL (ref 82–98)
PLATELET # BLD AUTO: 954 THOUSANDS/UL (ref 149–390)
PMV BLD AUTO: 9.8 FL (ref 8.9–12.7)
POTASSIUM SERPL-SCNC: 3.9 MMOL/L (ref 3.5–5.3)
RBC # BLD AUTO: 2.28 MILLION/UL (ref 3.81–5.12)
SODIUM SERPL-SCNC: 138 MMOL/L (ref 135–147)
WBC # BLD AUTO: 15.34 THOUSAND/UL (ref 4.31–10.16)

## 2025-06-18 PROCEDURE — 85027 COMPLETE CBC AUTOMATED: CPT

## 2025-06-18 PROCEDURE — 80048 BASIC METABOLIC PNL TOTAL CA: CPT

## 2025-06-18 PROCEDURE — 74177 CT ABD & PELVIS W/CONTRAST: CPT

## 2025-06-18 PROCEDURE — 83735 ASSAY OF MAGNESIUM: CPT

## 2025-06-18 PROCEDURE — 99024 POSTOP FOLLOW-UP VISIT: CPT | Performed by: OBSTETRICS & GYNECOLOGY

## 2025-06-18 RX ORDER — ACETAMINOPHEN 325 MG/1
975 TABLET ORAL EVERY 6 HOURS
Status: DISCONTINUED | OUTPATIENT
Start: 2025-06-18 | End: 2025-06-20 | Stop reason: HOSPADM

## 2025-06-18 RX ORDER — DEXTROSE MONOHYDRATE AND SODIUM CHLORIDE 5; .9 G/100ML; G/100ML
125 INJECTION, SOLUTION INTRAVENOUS CONTINUOUS
Status: DISCONTINUED | OUTPATIENT
Start: 2025-06-18 | End: 2025-06-19

## 2025-06-18 RX ADMIN — ENOXAPARIN SODIUM 40 MG: 100 INJECTION SUBCUTANEOUS at 08:11

## 2025-06-18 RX ADMIN — ACETAMINOPHEN 975 MG: 325 TABLET, FILM COATED ORAL at 18:14

## 2025-06-18 RX ADMIN — ONDANSETRON 4 MG: 2 INJECTION INTRAMUSCULAR; INTRAVENOUS at 12:55

## 2025-06-18 RX ADMIN — PANTOPRAZOLE SODIUM 40 MG: 40 INJECTION, POWDER, LYOPHILIZED, FOR SOLUTION INTRAVENOUS at 08:11

## 2025-06-18 RX ADMIN — HYDROMORPHONE HYDROCHLORIDE 0.2 MG: 0.2 INJECTION, SOLUTION INTRAMUSCULAR; INTRAVENOUS; SUBCUTANEOUS at 12:45

## 2025-06-18 RX ADMIN — DEXTROSE AND SODIUM CHLORIDE 125 ML/HR: 5; .9 INJECTION, SOLUTION INTRAVENOUS at 14:00

## 2025-06-18 RX ADMIN — HYDROMORPHONE HYDROCHLORIDE 0.5 MG: 1 INJECTION, SOLUTION INTRAMUSCULAR; INTRAVENOUS; SUBCUTANEOUS at 21:33

## 2025-06-18 RX ADMIN — ACETAMINOPHEN 975 MG: 325 TABLET, FILM COATED ORAL at 08:13

## 2025-06-18 RX ADMIN — IOHEXOL 50 ML: 240 INJECTION, SOLUTION INTRATHECAL; INTRAVASCULAR; INTRAVENOUS; ORAL at 16:32

## 2025-06-18 RX ADMIN — IOHEXOL 100 ML: 350 INJECTION, SOLUTION INTRAVENOUS at 16:32

## 2025-06-18 NOTE — ASSESSMENT & PLAN NOTE
Patient is postop day #14 from SARAY, BSO, LAR, splenectomy, and debulking for high-grade serous ovarian cancer s/p neoadjuvant chemotherapy. Her postoperative course was originally complicated by suspected ileus. She presented to [facility] ED on 6/15 with severe abdominal pain.  Reported she had not had a bowel movement or passed flatus since 6/12 and had nausea without vomiting. Bowel sounds absent on exam with mild distention.  - CTAP on 6/15 shows generalized dilated large and small bowel in a pattern most suggestive of ileus. No findings to suggest obstruction.  - Other findings in the ED include a WBC of 34.63, lactic acid down trended from 2.3 to 1.3, and an ADALI  - Due to her elevated white blood cell count and lactic acid, she met criteria for sepsis and was started on cefepime and flagyl, now d/c'd; Blood Cx no growth @48hrs    Plan:  FEN: CLD -> advance to soft surgical diet today, D51NS @125cc/hr, Zofran PRN, monitor and replete electrolytes as needed  Pain: Dilaudid 0.2 and 0.5 PRN  Avoid NSAIDs in setting of ADALI and LAR   DVT Ppx: SCDs, Lovenox 40mg qd

## 2025-06-18 NOTE — PROGRESS NOTES
Progress Note - GYN Oncology   Name: Ewa Hernandes 59 y.o. female I MRN: [id number]  Unit/Bed#: E2 -01 I Date of Admission: 6/16/2025   Date of Service: 6/18/2025 I Hospital Day: 2      58yo POD14 s/p SARAY, BSO, LAR, splenectomy, and debulking for high-grade serous ovarian cancer s/p neoadjuvant chemotherapy re-admitted for ileus and ADALI, improving.  Assessment & Plan  Ileus (HCC)  Patient is postop day #14 from SARAY, BSO, LAR, splenectomy, and debulking for high-grade serous ovarian cancer s/p neoadjuvant chemotherapy. Her postoperative course was originally complicated by suspected ileus. She presented to [facility] ED on 6/15 with severe abdominal pain.  Reported she had not had a bowel movement or passed flatus since 6/12 and had nausea without vomiting. Bowel sounds absent on exam with mild distention.  - CTAP on 6/15 shows generalized dilated large and small bowel in a pattern most suggestive of ileus. No findings to suggest obstruction.  - Other findings in the ED include a WBC of 34.63, lactic acid down trended from 2.3 to 1.3, and an ADALI  - Due to her elevated white blood cell count and lactic acid, she met criteria for sepsis and was started on cefepime and flagyl, now d/c'd; Blood Cx no growth @48hrs    Plan:  FEN: CLD -> advance to soft surgical diet today, D51NS @125cc/hr, Zofran PRN, monitor and replete electrolytes as needed  Pain: Dilaudid 0.2 and 0.5 PRN  Avoid NSAIDs in setting of ADALI and LAR   DVT Ppx: SCDs, Lovenox 40mg qd   Malignant neoplasm of both ovaries (HCC)  58yo POD#14 from SARAY, BSO, LAR, splenectomy, and debulking for high-grade serous ovarian cancer s/p neoadjuvant chemotherapy.   See Ileus Problem above  Left lower quadrant PAIGE drain with minimal amount of serous output; consider removing today  ADALI (acute kidney injury) (HCC)  Creatinine noted to rise from 0.53 on 6/12 to 1.02 on 6/15. This is suspected to be due to dehydration at this time. She reports poor oral intake.  -  Management with IV fluids  - Cr downtrending, 0.56 today  Essential hypertension, benign  Home amlodipine and lisinopril held in the setting of NPO  Restart when advancing diet  Major depressive disorder, recurrent episode, mild (HCC)  Home Prozac held until able to advance diet  Asthma  Albuterol as needed    GYNECOLOGY  Subjective   Ewa reports her pain is moderately well-controlled. She is tolerating clear liquids and denies nausea or vomiting. She is passing flatus and having formed bowel movements.     Objective :  Temp:  [99.1 °F (37.3 °C)] 99.1 °F (37.3 °C)  HR:  [88] 88  BP: (123)/(72) 123/72  Resp:  [18] 18  SpO2:  [96 %] 96 %  O2 Device: None (Room air)    Physical Exam  Vitals reviewed.   Constitutional:       General: She is not in acute distress.     Appearance: She is ill-appearing.   HENT:      Head: Normocephalic and atraumatic.     Cardiovascular:      Rate and Rhythm: Normal rate.   Pulmonary:      Effort: Pulmonary effort is normal. No respiratory distress.   Abdominal:      Palpations: Abdomen is soft.      Tenderness: There is abdominal tenderness (mild). There is no guarding or rebound.      Comments: PAIGE drain with minimal serous output; breakdown of surrounding skin with clear drainage  Midline incision c/d/i     Neurological:      General: No focal deficit present.      Mental Status: She is alert and oriented to person, place, and time.     Psychiatric:         Behavior: Behavior normal.       Isabel Enriquez MD  OBGYN PGY-1  06/18/25  6:00 AM

## 2025-06-18 NOTE — QUICK NOTE
L PAIGE drain removed without difficulty. Moderate amount clear drainage noted on removal. Patient tolerated with moderate pain. Site dressed with gauze and paper tape.    Isabel Enriquez MD  OBGYN PGY-1  06/18/25  7:24 AM

## 2025-06-18 NOTE — PLAN OF CARE
Problem: PAIN - ADULT  Goal: Verbalizes/displays adequate comfort level or baseline comfort level  Description: Interventions:  - Encourage patient to monitor pain and request assistance  - Assess pain using appropriate pain scale  - Administer analgesics as ordered based on type and severity of pain and evaluate response  - Implement non-pharmacological measures as appropriate and evaluate response  - Consider cultural and social influences on pain and pain management  - Notify physician/advanced practitioner if interventions unsuccessful or patient reports new pain  - Educate patient/family on pain management process including their role and importance of  reporting pain   - Provide non-pharmacologic/complimentary pain relief interventions  Outcome: Progressing     Problem: SAFETY ADULT  Goal: Patient will remain free of falls  Description: INTERVENTIONS:  - Educate patient/family on patient safety including physical limitations  - Instruct patient to call for assistance with activity   - Consider consulting OT/PT to assist with strengthening/mobility based on AM PAC & JH-HLM score  - Consult OT/PT to assist with strengthening/mobility   - Keep Call bell within reach  - Keep bed low and locked with side rails adjusted as appropriate  - Keep care items and personal belongings within reach  - Initiate and maintain comfort rounds  - Make Fall Risk Sign visible to staff  - Offer Toileting every 2 Hours, in advance of need  - Initiate/Maintain bed/chair alarm  - Obtain necessary fall risk management equipment  - Apply yellow socks and bracelet for high fall risk patients  - Consider moving patient to room near nurses station  Outcome: Progressing  Goal: Maintain or return to baseline ADL function  Description: INTERVENTIONS:  -  Assess patient's ability to carry out ADLs; assess patient's baseline for ADL function and identify physical deficits which impact ability to perform ADLs (bathing, care of mouth/teeth,  toileting, grooming, dressing, etc.)  - Assess/evaluate cause of self-care deficits   - Assess range of motion  - Assess patient's mobility; develop plan if impaired  - Assess patient's need for assistive devices and provide as appropriate  - Encourage maximum independence but intervene and supervise when necessary  - Involve family in performance of ADLs  - Assess for home care needs following discharge   - Consider OT consult to assist with ADL evaluation and planning for discharge  - Provide patient education as appropriate  - Monitor functional capacity and physical performance, use of AM PAC & -HLM   - Monitor gait, balance and fatigue with ambulation    Outcome: Progressing  Goal: Maintains/Returns to pre admission functional level  Description: INTERVENTIONS:  - Perform AM-PAC 6 Click Basic Mobility/ Daily Activity assessment daily.  - Set and communicate daily mobility goal to care team and patient/family/caregiver.   - Collaborate with rehabilitation services on mobility goals if consulted  - Perform Range of Motion 3 times a day.  - Reposition patient every 3 hours.  - Dangle patient 3 times a day  - Stand patient 3 times a day  - Ambulate patient 3 times a day  - Out of bed to chair 3 times a day   - Out of bed for meals 3 times a day  - Out of bed for toileting  - Record patient progress and toleration of activity level   Outcome: Progressing     Problem: GASTROINTESTINAL - ADULT  Goal: Minimal or absence of nausea and/or vomiting  Description: INTERVENTIONS:  - Administer IV fluids if ordered to ensure adequate hydration  - Maintain NPO status until nausea and vomiting are resolved  - Nasogastric tube if ordered  - Administer ordered antiemetic medications as needed  - Provide nonpharmacologic comfort measures as appropriate  - Advance diet as tolerated, if ordered  - Consider nutrition services referral to assist patient with adequate nutrition and appropriate food choices  Outcome:  Progressing  Goal: Maintains or returns to baseline bowel function  Description: INTERVENTIONS:  - Assess bowel function  - Encourage oral fluids to ensure adequate hydration  - Administer IV fluids if ordered to ensure adequate hydration  - Administer ordered medications as needed  - Encourage mobilization and activity  - Consider nutritional services referral to assist patient with adequate nutrition and appropriate food choices  Outcome: Progressing  Goal: Maintains adequate nutritional intake  Description: INTERVENTIONS:  - Monitor percentage of each meal consumed  - Identify factors contributing to decreased intake, treat as appropriate  - Assist with meals as needed  - Monitor I&O, weight, and lab values if indicated  - Obtain nutrition services referral as needed  Outcome: Progressing     Problem: SKIN/TISSUE INTEGRITY - ADULT  Goal: Incision(s), wounds(s) or drain site(s) healing without S/S of infection  Description: INTERVENTIONS  - Assess and document dressing, incision, wound bed, drain sites and surrounding tissue  - Provide patient and family education  - Perform skin care/dressing changes  Outcome: Progressing     Problem: METABOLIC, FLUID AND ELECTROLYTES - ADULT  Goal: Electrolytes maintained within normal limits  Description: INTERVENTIONS:  - Monitor labs and assess patient for signs and symptoms of electrolyte imbalances  - Administer electrolyte replacement as ordered  - Monitor response to electrolyte replacements, including repeat lab results as appropriate  - Instruct patient on fluid and nutrition as appropriate  Outcome: Progressing     Problem: COPING  Goal: Pt/Family able to verbalize concerns and demonstrate effective coping strategies  Description: INTERVENTIONS:  - Assist patient/family to identify coping skills, available support systems and cultural and spiritual values  - Provide emotional support, including active listening and acknowledgement of concerns of patient and  caregivers  - Reduce environmental stimuli, as able  - Provide patient education  - Assess for spiritual pain/suffering and initiate spiritual care, including notification of Pastoral Care or ilan based community as needed  - Assess effectiveness of coping strategies  Outcome: Progressing

## 2025-06-18 NOTE — PLAN OF CARE
Problem: PAIN - ADULT  Goal: Verbalizes/displays adequate comfort level or baseline comfort level  Description: Interventions:  - Encourage patient to monitor pain and request assistance  - Assess pain using appropriate pain scale  - Administer analgesics as ordered based on type and severity of pain and evaluate response  - Implement non-pharmacological measures as appropriate and evaluate response  - Consider cultural and social influences on pain and pain management  - Notify physician/advanced practitioner if interventions unsuccessful or patient reports new pain  - Educate patient/family on pain management process including their role and importance of  reporting pain   - Provide non-pharmacologic/complimentary pain relief interventions  Outcome: Progressing     Problem: SAFETY ADULT  Goal: Patient will remain free of falls  Description: INTERVENTIONS:  - Educate patient/family on patient safety including physical limitations  - Instruct patient to call for assistance with activity   - Consider consulting OT/PT to assist with strengthening/mobility based on AM PAC & JH-HLM score  - Consult OT/PT to assist with strengthening/mobility   - Keep Call bell within reach  - Keep bed low and locked with side rails adjusted as appropriate  - Keep care items and personal belongings within reach  - Initiate and maintain comfort rounds  - Make Fall Risk Sign visible to staff  - Offer Toileting every 2 Hours, in advance of need  - Initiate/Maintain bed/chair alarm  - Obtain necessary fall risk management equipment  - Apply yellow socks and bracelet for high fall risk patients  - Consider moving patient to room near nurses station  Outcome: Progressing  Goal: Maintains/Returns to pre admission functional level  Description: INTERVENTIONS:  - Perform AM-PAC 6 Click Basic Mobility/ Daily Activity assessment daily.  - Set and communicate daily mobility goal to care team and patient/family/caregiver.   - Collaborate with  rehabilitation services on mobility goals if consulted  - Perform Range of Motion 3 times a day.  - Reposition patient every 3 hours.  - Dangle patient 3 times a day  - Stand patient 3 times a day  - Ambulate patient 3 times a day  - Out of bed to chair 3 times a day   - Out of bed for meals 3 times a day  - Out of bed for toileting  - Record patient progress and toleration of activity level   Outcome: Progressing     Problem: GASTROINTESTINAL - ADULT  Goal: Minimal or absence of nausea and/or vomiting  Description: INTERVENTIONS:  - Administer IV fluids if ordered to ensure adequate hydration  - Maintain NPO status until nausea and vomiting are resolved  - Nasogastric tube if ordered  - Administer ordered antiemetic medications as needed  - Provide nonpharmacologic comfort measures as appropriate  - Advance diet as tolerated, if ordered  - Consider nutrition services referral to assist patient with adequate nutrition and appropriate food choices  Outcome: Progressing  Goal: Maintains or returns to baseline bowel function  Description: INTERVENTIONS:  - Assess bowel function  - Encourage oral fluids to ensure adequate hydration  - Administer IV fluids if ordered to ensure adequate hydration  - Administer ordered medications as needed  - Encourage mobilization and activity  - Consider nutritional services referral to assist patient with adequate nutrition and appropriate food choices  Outcome: Progressing  Goal: Maintains adequate nutritional intake  Description: INTERVENTIONS:  - Monitor percentage of each meal consumed  - Identify factors contributing to decreased intake, treat as appropriate  - Assist with meals as needed  - Monitor I&O, weight, and lab values if indicated  - Obtain nutrition services referral as needed  Outcome: Progressing     Problem: SKIN/TISSUE INTEGRITY - ADULT  Goal: Incision(s), wounds(s) or drain site(s) healing without S/S of infection  Description: INTERVENTIONS  - Assess and document  dressing, incision, wound bed, drain sites and surrounding tissue  - Provide patient and family education  - Perform skin care/dressing changes  Outcome: Progressing     Problem: COPING  Goal: Pt/Family able to verbalize concerns and demonstrate effective coping strategies  Description: INTERVENTIONS:  - Assist patient/family to identify coping skills, available support systems and cultural and spiritual values  - Provide emotional support, including active listening and acknowledgement of concerns of patient and caregivers  - Reduce environmental stimuli, as able  - Provide patient education  - Assess for spiritual pain/suffering and initiate spiritual care, including notification of Pastoral Care or ilan based community as needed  - Assess effectiveness of coping strategies  Outcome: Progressing

## 2025-06-18 NOTE — ASSESSMENT & PLAN NOTE
60yo POD#14 from SARAY, BSO, LAR, splenectomy, and debulking for high-grade serous ovarian cancer s/p neoadjuvant chemotherapy.   See Ileus Problem above  Left lower quadrant PAIGE drain with minimal amount of serous output; consider removing today

## 2025-06-18 NOTE — ASSESSMENT & PLAN NOTE
Creatinine noted to rise from 0.53 on 6/12 to 1.02 on 6/15. This is suspected to be due to dehydration at this time. She reports poor oral intake.  - Management with IV fluids  - Cr downtrending, 0.56 today

## 2025-06-19 ENCOUNTER — DOCUMENTATION (OUTPATIENT)
Dept: GYNECOLOGIC ONCOLOGY | Facility: CLINIC | Age: 60
End: 2025-06-19

## 2025-06-19 LAB
ANION GAP SERPL CALCULATED.3IONS-SCNC: 7 MMOL/L (ref 4–13)
BUN SERPL-MCNC: 2 MG/DL (ref 5–25)
CALCIUM SERPL-MCNC: 8.8 MG/DL (ref 8.4–10.2)
CHLORIDE SERPL-SCNC: 109 MMOL/L (ref 96–108)
CO2 SERPL-SCNC: 23 MMOL/L (ref 21–32)
CREAT SERPL-MCNC: 0.54 MG/DL (ref 0.6–1.3)
ERYTHROCYTE [DISTWIDTH] IN BLOOD BY AUTOMATED COUNT: 17.2 % (ref 11.6–15.1)
GFR SERPL CREATININE-BSD FRML MDRD: 104 ML/MIN/1.73SQ M
GLUCOSE SERPL-MCNC: 100 MG/DL (ref 65–140)
HCT VFR BLD AUTO: 22 % (ref 34.8–46.1)
HGB BLD-MCNC: 7.2 G/DL (ref 11.5–15.4)
MAGNESIUM SERPL-MCNC: 1.6 MG/DL (ref 1.9–2.7)
MCH RBC QN AUTO: 31.9 PG (ref 26.8–34.3)
MCHC RBC AUTO-ENTMCNC: 32.7 G/DL (ref 31.4–37.4)
MCV RBC AUTO: 97 FL (ref 82–98)
PLATELET # BLD AUTO: 949 THOUSANDS/UL (ref 149–390)
PMV BLD AUTO: 9.5 FL (ref 8.9–12.7)
POTASSIUM SERPL-SCNC: 3.6 MMOL/L (ref 3.5–5.3)
RBC # BLD AUTO: 2.26 MILLION/UL (ref 3.81–5.12)
SODIUM SERPL-SCNC: 139 MMOL/L (ref 135–147)
WBC # BLD AUTO: 13.51 THOUSAND/UL (ref 4.31–10.16)

## 2025-06-19 PROCEDURE — 85027 COMPLETE CBC AUTOMATED: CPT

## 2025-06-19 PROCEDURE — 80048 BASIC METABOLIC PNL TOTAL CA: CPT

## 2025-06-19 PROCEDURE — 99024 POSTOP FOLLOW-UP VISIT: CPT | Performed by: OBSTETRICS & GYNECOLOGY

## 2025-06-19 PROCEDURE — 83735 ASSAY OF MAGNESIUM: CPT

## 2025-06-19 RX ORDER — MAGNESIUM SULFATE HEPTAHYDRATE 40 MG/ML
2 INJECTION, SOLUTION INTRAVENOUS ONCE
Status: COMPLETED | OUTPATIENT
Start: 2025-06-19 | End: 2025-06-19

## 2025-06-19 RX ORDER — OXYCODONE HYDROCHLORIDE 10 MG/1
10 TABLET ORAL EVERY 4 HOURS PRN
Refills: 0 | Status: DISCONTINUED | OUTPATIENT
Start: 2025-06-19 | End: 2025-06-20 | Stop reason: HOSPADM

## 2025-06-19 RX ORDER — OXYCODONE HYDROCHLORIDE 5 MG/1
5 TABLET ORAL EVERY 4 HOURS PRN
Refills: 0 | Status: DISCONTINUED | OUTPATIENT
Start: 2025-06-19 | End: 2025-06-20 | Stop reason: HOSPADM

## 2025-06-19 RX ADMIN — DEXTROSE AND SODIUM CHLORIDE 125 ML/HR: 5; .9 INJECTION, SOLUTION INTRAVENOUS at 01:24

## 2025-06-19 RX ADMIN — LISINOPRIL 10 MG: 10 TABLET ORAL at 08:48

## 2025-06-19 RX ADMIN — MAGNESIUM SULFATE HEPTAHYDRATE 2 G: 40 INJECTION, SOLUTION INTRAVENOUS at 17:01

## 2025-06-19 RX ADMIN — HYDROMORPHONE HYDROCHLORIDE 0.5 MG: 1 INJECTION, SOLUTION INTRAMUSCULAR; INTRAVENOUS; SUBCUTANEOUS at 04:24

## 2025-06-19 RX ADMIN — AMLODIPINE BESYLATE 10 MG: 10 TABLET ORAL at 08:48

## 2025-06-19 RX ADMIN — ENOXAPARIN SODIUM 40 MG: 100 INJECTION SUBCUTANEOUS at 08:48

## 2025-06-19 RX ADMIN — ACETAMINOPHEN 975 MG: 325 TABLET, FILM COATED ORAL at 01:24

## 2025-06-19 RX ADMIN — FLUOXETINE HYDROCHLORIDE 40 MG: 20 CAPSULE ORAL at 08:48

## 2025-06-19 RX ADMIN — ACETAMINOPHEN 975 MG: 325 TABLET, FILM COATED ORAL at 18:55

## 2025-06-19 RX ADMIN — PANTOPRAZOLE SODIUM 40 MG: 40 INJECTION, POWDER, LYOPHILIZED, FOR SOLUTION INTRAVENOUS at 08:48

## 2025-06-19 RX ADMIN — ACETAMINOPHEN 975 MG: 325 TABLET, FILM COATED ORAL at 14:08

## 2025-06-19 RX ADMIN — ACETAMINOPHEN 975 MG: 325 TABLET, FILM COATED ORAL at 08:48

## 2025-06-19 NOTE — PROGRESS NOTES
Progress Note - GYN Oncology   Name: Ewa Hernandes 59 y.o. female I MRN: [id number]  Unit/Bed#: E2 -01 I Date of Admission: 6/16/2025   Date of Service: 6/19/2025 I Hospital Day: 3      58yo POD15 s/p SARAY, BSO, LAR, splenectomy, and debulking for high-grade serous ovarian cancer s/p neoadjuvant chemotherapy re-admitted for ileus and ADALI.  Assessment & Plan  Ileus (HCC)  Patient is postop day #15 from SARAY, BSO, LAR, splenectomy, and debulking for high-grade serous ovarian cancer s/p neoadjuvant chemotherapy. Her postoperative course was originally complicated by suspected ileus. She presented to [facility] ED on 6/15 with severe abdominal pain.  Reported she had not had a bowel movement or passed flatus since 6/12 and had nausea without vomiting. Bowel sounds absent on exam with mild distention.  - CTAP on 6/15 shows generalized dilated large and small bowel in a pattern most suggestive of ileus. No findings to suggest obstruction.  - Other findings in the ED include a WBC of 34.63, lactic acid down trended from 2.3 to 1.3, and an ADALI  - Due to her elevated white blood cell count and lactic acid, she met criteria for sepsis and was started on cefepime and flagyl, now d/c'd; Blood Cx no growth @48hrs  - CTAP 6/18 with increased thickening and enhancement of the peritoneum and bladder wall thickening    Plan:  FEN: currently NPO. Was advanced to regular diet yesterday, but vomited x1. Trial CLD today  Zofran PRN, monitor and replete electrolytes as needed  Pain: Dilaudid 0.2 and 0.5 PRN, Tylenol ADELE  Avoid NSAIDs in setting of ADALI and LAR   DVT Ppx: SCDs, Lovenox 40mg qd   Consider urinalysis for CT finding of bladder wall thickening  Malignant neoplasm of both ovaries (HCC)  58yo POD#15 from SARAY, BSO, LAR, splenectomy, and debulking for high-grade serous ovarian cancer s/p neoadjuvant chemotherapy.   See Ileus Problem above  ADALI (acute kidney injury) (HCC)  Creatinine noted to rise from 0.53 on 6/12 to 1.02 on  "6/15. This is suspected to be due to dehydration at this time. She reports poor oral intake.  - Management with IV fluids  - Cr downtrending, 0.56 yesterday; f/u AM labs  Essential hypertension, benign  Home amlodipine and lisinopril held in the setting of NPO  Restart when advancing diet  Major depressive disorder, recurrent episode, mild (HCC)  Home Prozac held until able to advance diet  Asthma  Albuterol as needed    GYNECOLOGY  Subjective   Ewa reports no further episodes of vomiting since yesterday. She reports ongoing \"twisting\" pain just underneath her ribs; pain is relieved by Dilaudid. She is passing flatus and ambulating without difficulty. She endorses increased urination.    Objective :  Temp:  [97.6 °F (36.4 °C)-97.9 °F (36.6 °C)] 97.6 °F (36.4 °C)  HR:  [85-91] 91  BP: (123-139)/(77-86) 139/86  Resp:  [18] 18  SpO2:  [94 %-98 %] 94 %  O2 Device: None (Room air)    Physical Exam  Vitals reviewed.   Constitutional:       General: She is not in acute distress.     Appearance: She is ill-appearing.   HENT:      Head: Normocephalic and atraumatic.     Cardiovascular:      Rate and Rhythm: Normal rate.   Pulmonary:      Effort: Pulmonary effort is normal. No respiratory distress.   Abdominal:      Palpations: Abdomen is soft.      Tenderness: There is abdominal tenderness (mild). There is no guarding or rebound.      Comments: L drain site covered with clean gauze, no strikethrough  Midline incision c/d/i     Neurological:      General: No focal deficit present.      Mental Status: She is alert and oriented to person, place, and time.     Psychiatric:         Behavior: Behavior normal.       Isabel Enriquez MD  OBGYN PGY-1  06/19/25  6:11 AM  "

## 2025-06-19 NOTE — MALNUTRITION/BMI
This medical record reflects one or more clinical indicators suggestive of malnutrition and/or morbid obesity.    Malnutrition Findings:   Adult Malnutrition type: Acute illness  Adult Degree of Malnutrition: Other severe protein calorie malnutrition  Malnutrition Characteristics: Inadequate energy, Weight loss                360 Statement: Severe protein calorie malnutrition in context of acute illness r/t inadequate PO intake,  ileus as evidenced by energy intake less than 50% compared to estimated needs>5 days, 6% wt loss x 2 weeks; Treated with PO diet, pt declined oral supplements    BMI Findings:           Body mass index is 21.78 kg/m².     See Nutrition note dated 6/19/25 for additional details.  Completed nutrition assessment is viewable in the nutrition documentation.

## 2025-06-19 NOTE — ASSESSMENT & PLAN NOTE
58yo POD#15 from SARAY, BSO, LAR, splenectomy, and debulking for high-grade serous ovarian cancer s/p neoadjuvant chemotherapy.   See Ileus Problem above

## 2025-06-19 NOTE — ASSESSMENT & PLAN NOTE
Creatinine noted to rise from 0.53 on 6/12 to 1.02 on 6/15. This is suspected to be due to dehydration at this time. She reports poor oral intake.  - Management with IV fluids  - Cr downtrending, 0.56 yesterday; f/u AM labs

## 2025-06-19 NOTE — ASSESSMENT & PLAN NOTE
Patient is postop day #15 from SARAY, BSO, LAR, splenectomy, and debulking for high-grade serous ovarian cancer s/p neoadjuvant chemotherapy. Her postoperative course was originally complicated by suspected ileus. She presented to [facility] ED on 6/15 with severe abdominal pain.  Reported she had not had a bowel movement or passed flatus since 6/12 and had nausea without vomiting. Bowel sounds absent on exam with mild distention.  - CTAP on 6/15 shows generalized dilated large and small bowel in a pattern most suggestive of ileus. No findings to suggest obstruction.  - Other findings in the ED include a WBC of 34.63, lactic acid down trended from 2.3 to 1.3, and an ADALI  - Due to her elevated white blood cell count and lactic acid, she met criteria for sepsis and was started on cefepime and flagyl, now d/c'd; Blood Cx no growth @48hrs  - CTAP 6/18 with increased thickening and enhancement of the peritoneum and bladder wall thickening    Plan:  FEN: currently NPO. Was advanced to regular diet yesterday, but vomited x1. Trial CLD today  Zofran PRN, monitor and replete electrolytes as needed  Pain: Dilaudid 0.2 and 0.5 PRN, Tylenol ADELE  Avoid NSAIDs in setting of ADALI and LAR   DVT Ppx: SCDs, Lovenox 40mg qd   Consider urinalysis for CT finding of bladder wall thickening

## 2025-06-19 NOTE — PROGRESS NOTES
June 2025 Sunday Monday Tuesday Wednesday Thursday Friday Saturday   1     2     3     4    Admission   7:14 AM   Augusto De Souza MD   [facility] 5   (Discharge: 6/12/2025)    DEBULKING TUMOR/ CYTOREDUCTION  10:10 AM   Augusto De Souza MD   [facility] Main OR 5     6    CTA CHEST ABD PELVIS W WO CON   2:30 PM   AL CT ED 1   [facility] CAT Scan 7                8     9     10     11    X-Ray General   2:00 PM   AL XR 1   [facility] Radiology 12     13     14                15    Admission   4:03 PM   [facility] Emergency Department   (Discharge: 6/16/2025)    X-Ray General   4:30 PM   MI XR PORT  1   [facility] Radiology    CT ABDOMEN PELVIS W CONTRAST   6:30 PM   MI CT ED 1   [facility] CAT Scan 16    Admission   1:36 AM   Augusto De Souza MD   [facility] 2   (Discharge: 6/19/2025) 17     18    CT ABDOMEN PELVIS W CONTRAST   4:25 PM   AL CT ED 1   [facility] CAT Scan 19     20     21                22     23     24     25     26     27     28                29     30                                                     July 2025 Sunday Monday Tuesday Wednesday Thursday Friday Saturday             1    POST OP  10:15 AM   Augusto De Souza MD   [facility] [place] 2     3     Labs + urine 4     5                6     7     8  Chemo #5  Oncology Treatment   8:00 AM   MI INF CHAIR 7   [facility]fusion [facility] 9  Neulasta  Infusion Therapy Plan   1:30 PM   MI INF CHAIR 2   [facility] 10     11     12                13     14     15     16     17     LABS 18     19                20     21    VIRTUAL VISIT   1:25 PM   SOLANGE Cobian   [facility] [place] 22     23     24        LABS + URINE 25     26                27     28     29  CHEMO #6  Oncology  Treatment   8:00 AM   [facility] INF CHAIR 7   [facility] 30  Happy Birthday!  NEULASTA  Infusion Therapy Plan   2:00 PM   [place] INF CHAIR 1   [facility] Infusion [place]    PHYSICAL   6:15 PM   Rudolph Shaffer DO   [facility] 31                                 Treatment Details             August 2025 Sunday Monday Tuesday Wednesday Thursday Friday Saturday                            1     2                3     4     5     6     7  LABS   8     9                10     11     12     13     14  LABS  VIRTUAL VISIT  10:05 AM   SOLANGE Cobian   [facility] [place] 15     16                17     18     19  CHEMO #7  Oncology Treatment   8:00 AM   MI INF CHAIR 2   [facility] [place] 20  NEULASTA  Infusion Therapy Plan  12:30 PM   MI INF CHAIR 6   [facility] 21     22     23                24     25     26     27     28    LABS 29     30                31                                                     Treatment Details             September 2025 Sunday Monday Tuesday Wednesday Thursday Friday Saturday        1     2    Office Visit   9:30 AM   Augusto De Souza MD   [facility] [place] 3     4     LABS 5     6                7     8     9     10     11     12     13                14     15     16     17     18     19     20                21     22     23     24     25     26     27                28     29     30

## 2025-06-19 NOTE — PLAN OF CARE
Problem: PAIN - ADULT  Goal: Verbalizes/displays adequate comfort level or baseline comfort level  Description: Interventions:  - Encourage patient to monitor pain and request assistance  - Assess pain using appropriate pain scale  - Administer analgesics as ordered based on type and severity of pain and evaluate response  - Implement non-pharmacological measures as appropriate and evaluate response  - Consider cultural and social influences on pain and pain management  - Notify physician/advanced practitioner if interventions unsuccessful or patient reports new pain  - Educate patient/family on pain management process including their role and importance of  reporting pain   - Provide non-pharmacologic/complimentary pain relief interventions  Outcome: Progressing     Problem: SAFETY ADULT  Goal: Patient will remain free of falls  Description: INTERVENTIONS:  - Educate patient/family on patient safety including physical limitations  - Instruct patient to call for assistance with activity   - Consider consulting OT/PT to assist with strengthening/mobility based on AM PAC & JH-HLM score  - Consult OT/PT to assist with strengthening/mobility   - Keep Call bell within reach  - Keep bed low and locked with side rails adjusted as appropriate  - Keep care items and personal belongings within reach  - Initiate and maintain comfort rounds  - Make Fall Risk Sign visible to staff  - Offer Toileting every 2 Hours, in advance of need  - Initiate/Maintain bed/chair alarm  - Obtain necessary fall risk management equipment  - Apply yellow socks and bracelet for high fall risk patients  - Consider moving patient to room near nurses station  Outcome: Progressing     Problem: GASTROINTESTINAL - ADULT  Goal: Minimal or absence of nausea and/or vomiting  Description: INTERVENTIONS:  - Administer IV fluids if ordered to ensure adequate hydration  - Maintain NPO status until nausea and vomiting are resolved  - Nasogastric tube if  ordered  - Administer ordered antiemetic medications as needed  - Provide nonpharmacologic comfort measures as appropriate  - Advance diet as tolerated, if ordered  - Consider nutrition services referral to assist patient with adequate nutrition and appropriate food choices  Outcome: Progressing

## 2025-06-20 VITALS
TEMPERATURE: 99 F | OXYGEN SATURATION: 97 % | HEIGHT: 69 IN | WEIGHT: 147.49 LBS | SYSTOLIC BLOOD PRESSURE: 115 MMHG | RESPIRATION RATE: 16 BRPM | BODY MASS INDEX: 21.84 KG/M2 | DIASTOLIC BLOOD PRESSURE: 75 MMHG | HEART RATE: 77 BPM

## 2025-06-20 LAB
ANION GAP SERPL CALCULATED.3IONS-SCNC: 10 MMOL/L (ref 4–13)
BACTERIA BLD CULT: NORMAL
BACTERIA BLD CULT: NORMAL
BACTERIA UR QL AUTO: ABNORMAL /HPF
BILIRUB UR QL STRIP: NEGATIVE
BUN SERPL-MCNC: 6 MG/DL (ref 5–25)
CALCIUM SERPL-MCNC: 9.3 MG/DL (ref 8.4–10.2)
CHLORIDE SERPL-SCNC: 103 MMOL/L (ref 96–108)
CLARITY UR: CLEAR
CO2 SERPL-SCNC: 23 MMOL/L (ref 21–32)
COLOR UR: ABNORMAL
CREAT SERPL-MCNC: 0.45 MG/DL (ref 0.6–1.3)
ERYTHROCYTE [DISTWIDTH] IN BLOOD BY AUTOMATED COUNT: 17.1 % (ref 11.6–15.1)
GFR SERPL CREATININE-BSD FRML MDRD: 110 ML/MIN/1.73SQ M
GLUCOSE SERPL-MCNC: 85 MG/DL (ref 65–140)
GLUCOSE UR STRIP-MCNC: NEGATIVE MG/DL
HCT VFR BLD AUTO: 25.2 % (ref 34.8–46.1)
HGB BLD-MCNC: 8.5 G/DL (ref 11.5–15.4)
HGB UR QL STRIP.AUTO: NEGATIVE
KETONES UR STRIP-MCNC: ABNORMAL MG/DL
LEUKOCYTE ESTERASE UR QL STRIP: ABNORMAL
MAGNESIUM SERPL-MCNC: 2 MG/DL (ref 1.9–2.7)
MCH RBC QN AUTO: 32.1 PG (ref 26.8–34.3)
MCHC RBC AUTO-ENTMCNC: 33.7 G/DL (ref 31.4–37.4)
MCV RBC AUTO: 95 FL (ref 82–98)
MUCOUS THREADS UR QL AUTO: ABNORMAL
NITRITE UR QL STRIP: NEGATIVE
NON-SQ EPI CELLS URNS QL MICRO: ABNORMAL /HPF
PH UR STRIP.AUTO: 6 [PH]
PLATELET # BLD AUTO: 977 THOUSANDS/UL (ref 149–390)
PMV BLD AUTO: 9.8 FL (ref 8.9–12.7)
POTASSIUM SERPL-SCNC: 4 MMOL/L (ref 3.5–5.3)
PROT UR STRIP-MCNC: ABNORMAL MG/DL
RBC # BLD AUTO: 2.65 MILLION/UL (ref 3.81–5.12)
RBC #/AREA URNS AUTO: ABNORMAL /HPF
SODIUM SERPL-SCNC: 136 MMOL/L (ref 135–147)
SP GR UR STRIP.AUTO: 1.02 (ref 1–1.03)
UROBILINOGEN UR STRIP-ACNC: <2 MG/DL
WBC # BLD AUTO: 20.4 THOUSAND/UL (ref 4.31–10.16)
WBC #/AREA URNS AUTO: ABNORMAL /HPF

## 2025-06-20 PROCEDURE — NC001 PR NO CHARGE: Performed by: STUDENT IN AN ORGANIZED HEALTH CARE EDUCATION/TRAINING PROGRAM

## 2025-06-20 PROCEDURE — 80048 BASIC METABOLIC PNL TOTAL CA: CPT

## 2025-06-20 PROCEDURE — 85027 COMPLETE CBC AUTOMATED: CPT

## 2025-06-20 PROCEDURE — 99024 POSTOP FOLLOW-UP VISIT: CPT | Performed by: OBSTETRICS & GYNECOLOGY

## 2025-06-20 PROCEDURE — 81001 URINALYSIS AUTO W/SCOPE: CPT

## 2025-06-20 PROCEDURE — 83735 ASSAY OF MAGNESIUM: CPT

## 2025-06-20 PROCEDURE — NC001 PR NO CHARGE: Performed by: OBSTETRICS & GYNECOLOGY

## 2025-06-20 RX ORDER — PANTOPRAZOLE SODIUM 40 MG/1
40 TABLET, DELAYED RELEASE ORAL
Qty: 60 TABLET | Refills: 0 | Status: SHIPPED | OUTPATIENT
Start: 2025-06-20

## 2025-06-20 RX ORDER — PANTOPRAZOLE SODIUM 40 MG/1
40 TABLET, DELAYED RELEASE ORAL
Status: DISCONTINUED | OUTPATIENT
Start: 2025-06-20 | End: 2025-06-20 | Stop reason: HOSPADM

## 2025-06-20 RX ADMIN — ACETAMINOPHEN 975 MG: 325 TABLET, FILM COATED ORAL at 00:34

## 2025-06-20 RX ADMIN — FLUOXETINE HYDROCHLORIDE 40 MG: 20 CAPSULE ORAL at 09:02

## 2025-06-20 RX ADMIN — AMLODIPINE BESYLATE 10 MG: 10 TABLET ORAL at 09:02

## 2025-06-20 RX ADMIN — ACETAMINOPHEN 975 MG: 325 TABLET, FILM COATED ORAL at 05:47

## 2025-06-20 RX ADMIN — PANTOPRAZOLE SODIUM 40 MG: 40 TABLET, DELAYED RELEASE ORAL at 09:50

## 2025-06-20 RX ADMIN — ENOXAPARIN SODIUM 40 MG: 100 INJECTION SUBCUTANEOUS at 09:02

## 2025-06-20 RX ADMIN — LISINOPRIL 10 MG: 10 TABLET ORAL at 09:02

## 2025-06-20 RX ADMIN — ACETAMINOPHEN 975 MG: 325 TABLET, FILM COATED ORAL at 12:11

## 2025-06-20 NOTE — PROGRESS NOTES
Progress Note - GYN Oncology   Name: Ewa Hernandes 59 y.o. female I MRN: [id number]  Unit/Bed#: E2 -01 I Date of Admission: 6/16/2025   Date of Service: 6/20/2025 I Hospital Day: 4     Assessment & Plan  Ileus (HCC)  Ewa Hernandes POD15 s/p SARAY, BSO, LAR, splenectomy, and debulking for high-grade serous ovarian cancer s/p neoadjuvant chemotherapy re-admitted for ileus. Tolerating regular diet, though with uptrending WBC from 13 -> 20,  bladder wall thickening and urinary frequency, may consider urinalysis. Discharge to home today    - CTAP on 6/15 shows generalized dilated large and small bowel in a pattern most suggestive of ileus. No findings to suggest obstruction.  - Other findings in the ED include a WBC of 34.63, lactic acid down trended from 2.3 to 1.3, and an ADALI  - Due to her elevated white blood cell count and lactic acid, she met criteria for sepsis and was started on cefepime and flagyl, now d/c'd; Blood Cx no growth @48hrs  - CTAP 6/18 with increased thickening and enhancement of the peritoneum and bladder wall thickening, no small bowel obstruction    Plan:  FEN: started on regular diet yesterday, 6/19. Tolerated without nausea and vomiting.  Zofran PRN, monitor and replete electrolytes as needed  Pain: Dilaudid 0.2 and 0.5 PRN, Tylenol ADELE  Avoid NSAIDs in setting of ADALI and LAR   DVT Ppx: SCDs, Lovenox 40mg qd   Consider urinalysis for CT finding of bladder wall thickening  Malignant neoplasm of both ovaries (HCC)  58yo POD#15 from SARAY, BSO, LAR, splenectomy, and debulking for high-grade serous ovarian cancer s/p neoadjuvant chemotherapy.   See Ileus Problem above  ADALI (acute kidney injury) (HCC)  Creatinine noted to rise from 0.53 on 6/12 to 1.02 on 6/15. This is suspected to be due to dehydration at this time. She reports poor oral intake.  - Management with IV fluids  - Cr downtrending, 0.56 yesterday; f/u AM labs  Essential hypertension, benign  Home amlodipine and lisinopril held in the  setting of NPO  Restart now that she is tolerating a regular diet  Major depressive disorder, recurrent episode, mild (HCC)  Restart Prozac  Asthma  Albuterol as needed    GYNECOLOGY  Subjective   No acute events overnight. Tolerated a regular diet without nausea and vomiting. Had a loose bowel movement this morning, passing flatus. Urinary frequency but no burning or itching. Denies abdominal pain. She is ambulating.    Objective :  Temp:  [98.1 °F (36.7 °C)-99.3 °F (37.4 °C)] 99.2 °F (37.3 °C)  HR:  [80-92] 92  BP: (119-128)/(78-81) 128/79  Resp:  [16-18] 18  SpO2:  [95 %-97 %] 97 %  O2 Device: None (Room air)    Physical Exam    Eyes:      Extraocular Movements: Extraocular movements intact.       Cardiovascular:      Rate and Rhythm: Normal rate.   Pulmonary:      Effort: Pulmonary effort is normal. No respiratory distress.   Abdominal:      General: There is no distension.      Palpations: Abdomen is soft.      Tenderness: There is no abdominal tenderness. There is no guarding.      Comments: Incision C/D/I     Skin:     General: Skin is warm and dry.     Neurological:      Mental Status: She is alert. Mental status is at baseline.     Psychiatric:         Behavior: Behavior normal.           Lab Results: I have reviewed the following results:CBC/BMP:   .     06/20/25  0547   WBC 20.40*   HGB 8.5*   HCT 25.2*   *      Ina LOUISEN PGY3

## 2025-06-20 NOTE — ASSESSMENT & PLAN NOTE
Ewa Hernandes POD15 s/p SARAY, BSO, LAR, splenectomy, and debulking for high-grade serous ovarian cancer s/p neoadjuvant chemotherapy re-admitted for ileus. Tolerating regular diet, though with uptrending WBC from 13 -> 20,  bladder wall thickening and urinary frequency, may consider urinalysis. Discharge to home today    - CTAP on 6/15 shows generalized dilated large and small bowel in a pattern most suggestive of ileus. No findings to suggest obstruction.  - Other findings in the ED include a WBC of 34.63, lactic acid down trended from 2.3 to 1.3, and an ADALI  - Due to her elevated white blood cell count and lactic acid, she met criteria for sepsis and was started on cefepime and flagyl, now d/c'd; Blood Cx no growth @48hrs  - CTAP 6/18 with increased thickening and enhancement of the peritoneum and bladder wall thickening, no small bowel obstruction    Plan:  FEN: started on regular diet yesterday, 6/19. Tolerated without nausea and vomiting.  Zofran PRN, monitor and replete electrolytes as needed  Pain: Dilaudid 0.2 and 0.5 PRN, Tylenol ADELE  Avoid NSAIDs in setting of ADALI and LAR   DVT Ppx: SCDs, Lovenox 40mg qd   Consider urinalysis for CT finding of bladder wall thickening

## 2025-06-20 NOTE — DISCHARGE INSTR - AVS FIRST PAGE
[facility] - Gynecologic Oncology  Ap [place], [address] [place] and [place]   [phone]    Hysterectomy Discharge Instructions    A hysterectomy is surgery to remove your uterus. Your ovaries, fallopian tubes, cervix, or part of your vagina may also need to be removed. The organs and tissue that will be removed depends on your medical condition.  After a hysterectomy, you will not be able to become pregnant.  If your ovaries are removed, you will go through menopause if you have not already.    DISCHARGE INSTRUCTIONS:     What to expect at home:   Recovery from surgery is generally 2-4 weeks, but sometimes longer for more strenuous activity. It is normal to be very tired during this time.   If you had laparoscopic/robotic surgery, you may experience gas pain, abdominal swelling, or shoulder pain for 24-72 hours after surgery. This is from the carbon dioxide gas put into your abdomen to better visualize your organs. A warm shower, heating pad, and/or walking may help.    Contact your doctor at the number above if:   You have a fever over 100.4o.  You have nausea or vomiting that does not improve after a light meal.  Your abdominal or pelvic pain gets worse, even after you take medicine.  You feel pain or burning when you urinate, or you have trouble urinating that worsens over time. Some burning in the first 1-2 days after surgery is expected after removal of the bladder catheter during surgery.  You have pus or a foul-smelling odor coming from your vagina.  Your wound is red, swollen, or drainage is persistent, thick/cloudy or foul smelling.  Clear/pink drainage or a minimal amount of bleeding from incisions can be normal after laparoscopic surgery.  Your arm or leg feels warm, tender, red, swollen and/or painful.   You have heavy vaginal bleeding that fills 1 or more sanitary pads in 1 hour. It is normal to have a small amount of vaginal bleeding or discharge after surgery  that would require the use of a light pantiliner. This discharge may last up to 6 weeks. The bleeding and discharge should be light and should have no odor.     CALL 911 OR GO TO THE EMERGENCY ROOM IF YOU HAVE: Any shortness of breath, difficulty breathing, or chest pain.    Pain  Pain after surgery is a challenging part of the healing process. Pain may be present for weeks but should gradually get better.   Please take extra strength acetaminophen (Tylenol) 1000 mg every 6 hours and then alternate with a NSAIDs such as ibuprofen (Advil, Motrin) 600 mg (3 tablets) every 6 hours to help decrease swelling, pain, and fever.   NSAIDs can cause stomach bleeding or kidney problems in certain people. If you take blood thinner medicine, always ask your healthcare provider if NSAIDs are safe for you. Always read the medicine label and follow directions.  The maximum dose of Tylenol is 4000 mg in 24 hours. The maximum dose of Advil/Motrin is 2400mg in 24 hours.   For moderate to severe pain, please take oxycodone 5 mg PO every 4-6 hours as needed or other narcotic that was prescribed by provider.     Anticoagulation    If provided with a prescription for anticoagulation such as Apixaban (Eliquis), please take the prescribed dose until completed.     Constipation  Constipation is common and it is normal to not have a bowel movement for up to one week after surgery. You should still be passing gas despite constipation and should be able to tolerate both liquid and solid food without nausea or vomiting.  Please take polyethylene glycol (Miralax) 17 g (1 measured capful or 1 packet) once a day. If you have not had a bowel movement 3 days after surgery, you may take the Miralax two times a day. The alternatives to Miralax include Senna and Colace.     If you have any discomfort because of the need to have a bowel movement, you may add milk of magnesia or magnesium citrate (available at your local pharmacy without a prescription)  at any time. Do not take milk of magnesia or magnesium citrate if you have kidney failure.   If you have loose or watery stools, stop taking the medications.     Take your medicine as directed. Contact your healthcare provider if you think your medicine is not helping or if you have side effects. Tell him or her if you are allergic to any medicine. Keep a list of the medicines, vitamins, and herbs you take. Include the amounts, and when and why you take them. Bring the list of the pill bottles to follow-up visits. Carry your medicine list with you in case of an emergency.    Activity:   Rest as needed. Get up and move around as directed to help prevent blood clots. Start with short walks and slowly increase the distance every day.     Stairs are okay to use. Use two feet on each stair to go up and down during the first several days to weeks after surgery.     Do not lift objects heavier than 10 pounds for 6 weeks whether you have small, laparoscopic incisions or you have a large, laparotomy incision.    Avoid strenuous activity for 2 weeks.     You may shower as soon as you return home. You can use soapy water surrounding the incision and let the water run over it. Pat the incision dry after your shower (see wound care section below)     Do not strain during bowel movements. High-fiber foods and extra liquids can help you prevent constipation. Examples of high-fiber foods are fruit and bran. Prune juice and water are good liquids to drink.      Do not have sex, use tampons, or douche for up to 8 weeks.   Do not go into pools or hot tubs for up to 8 weeks and/or until you have been cleared by your doctor.      It is generally safe to drive if you feel strong enough and your reaction time is not compromised and when no longer taking prescription/opioid pain medication    Ask when you may return to work and to other regular activities.    Wound care:   Care for your abdominal incisions as directed. Carefully wash  around the wound with soap and water. If you have Hibiclens or medicated soap that you were instructed to use before surgery, you may use that to wash with for up to 2 days after surgery.  If not, any mild non-scented, non-abrasive soap is safe.  It is okay to let the soap and water run over your incision. Do not scrub your incision. Dry the area and put on new, clean bandages as directed. Change your bandages when they get wet or dirty.     If you have surgical glue over your incision, this will start to flake off 7-10 days postoperatively. When this occurs, you can peel off the remaining glue.    Surgical glue reactions are common. If this occurs, please take a dose of Benadryl 25-50 mg every 4-6 hours. The maximum dose of Benadryl is 300mg/day. You may also apply over-the-counter hydrocortisone around the incision area (not on top of the incision).     Check your incision every day for redness, swelling, or pus.     Deep breathing:   Take deep breaths and cough 10 times each hour. This will decrease your risk of a lung infection as this will open your airway. Take a deep breath and hold it for as long as you can. Let the air out and then cough strongly.     You may be given an incentive spirometer to help you take deep breaths. Put the plastic piece in your mouth and take a slow, deep breath, then let the air out and cough.     Get support:   This surgery may be life-changing for you and your family. You will no longer be able to get pregnant. Sudden changes in the levels of your hormones may occur and cause mood swings and depression. You may feel angry, sad, frightened, or cry frequently and unexpectedly. These feelings are normal. Talk to your healthcare provider about where you can get support. You can also ask if hormone replacement medicine is right for you. Write down your questions so you remember to ask them during your visits.

## 2025-06-20 NOTE — DISCHARGE SUMMARY
"Discharge Summary   Ewa Hernandes MRN: [id number]  Unit/Bed#: E2 -01 Encounter: [id number]      Admission Date: 6/16/2025     Discharge Date: 6/20/25    Attending: Jenny Little,*    Principal Diagnosis: Generalized abdominal pain    Hospital course: Patient is a 60 y/o who was readmitted postop day #12 from SARAY, BSO, LAR, splenectomy, and debulking for high-grade serous ovarian cancer s/p neoadjuvant chemotherapy.  Her postoperative course was originally complicated by suspected ileus.  She had NGT placed and was maintained NPO, until the next day when NGT was clamped and removed due to low output. She was advanced to CLD and tolerating regular diet until hospital day 2, when she began having nausea and vomiting again. CTAP demonstrated no bowel obstruction, but bladder thickening consistent with possible infection. On hospital day 3, she was again advanced to regular diet, was tolerating this on hospital day 4. Urinalysis with reflex to culture was sent for uptrending white count and frequency with urination before discharge without concern for infection.    Patient was ambulating, tolerating PO, with appropriate bowel function and voiding at time of discharge.    Lab Results:   Lab Results   Component Value Date    WBC 20.40 (H) 06/20/2025    HGB 8.5 (L) 06/20/2025    HCT 25.2 (L) 06/20/2025    MCV 95 06/20/2025     (H) 06/20/2025     Lab Results   Component Value Date    CALCIUM 9.3 06/20/2025    K 4.0 06/20/2025    CO2 23 06/20/2025     06/20/2025    BUN 6 06/20/2025    CREATININE 0.45 (L) 06/20/2025     No results found for: \"POCGLU\"  Lab Results   Component Value Date    PTT 48 (H) 06/15/2025     Lab Results   Component Value Date    INR 1.33 (H) 06/15/2025    INR 1.12 06/04/2025    INR 1.23 (H) 02/05/2025    PROTIME 17.0 (H) 06/15/2025    PROTIME 14.6 06/04/2025    PROTIME 15.6 (H) 02/05/2025       Complications: none apparent    Condition at discharge: stable     Discharge " instructions/Information to patient and family:   See After Visit Summary for information provided to patient and family.      Provisions for Follow-Up Care:  See After Visit Summary for information related to follow-up care and any pertinent home health orders.      Disposition: See After Visit Summary for discharge disposition information.    Planned Readmission: Yes, pending clinical status    Discharge Medications:  For a complete list of the patient's medications, please refer to her med rec.    Ina Horowitz MD  6/20/2025  6:58 AM

## 2025-06-20 NOTE — PLAN OF CARE
Pt is being D/C to home with . Pt IV removed. D/C and F/U instructions. Post op instructions provided as well. Pharmacy verified. Pt and pt  verbalized understanding. Pt being transported via wheel chair to main front lobby.

## 2025-06-20 NOTE — PLAN OF CARE
Problem: PAIN - ADULT  Goal: Verbalizes/displays adequate comfort level or baseline comfort level  Description: Interventions:  - Encourage patient to monitor pain and request assistance  - Assess pain using appropriate pain scale  - Administer analgesics as ordered based on type and severity of pain and evaluate response  - Implement non-pharmacological measures as appropriate and evaluate response  - Consider cultural and social influences on pain and pain management  - Notify physician/advanced practitioner if interventions unsuccessful or patient reports new pain  - Educate patient/family on pain management process including their role and importance of  reporting pain   - Provide non-pharmacologic/complimentary pain relief interventions  Outcome: Progressing     Problem: GASTROINTESTINAL - ADULT  Goal: Minimal or absence of nausea and/or vomiting  Description: INTERVENTIONS:  - Administer IV fluids if ordered to ensure adequate hydration  - Maintain NPO status until nausea and vomiting are resolved  - Nasogastric tube if ordered  - Administer ordered antiemetic medications as needed  - Provide nonpharmacologic comfort measures as appropriate  - Advance diet as tolerated, if ordered  - Consider nutrition services referral to assist patient with adequate nutrition and appropriate food choices  Outcome: Progressing

## 2025-06-20 NOTE — ASSESSMENT & PLAN NOTE
Home amlodipine and lisinopril held in the setting of NPO  Restart now that she is tolerating a regular diet

## 2025-06-20 NOTE — PLAN OF CARE
Problem: PAIN - ADULT  Goal: Verbalizes/displays adequate comfort level or baseline comfort level  Description: Interventions:  - Encourage patient to monitor pain and request assistance  - Assess pain using appropriate pain scale  - Administer analgesics as ordered based on type and severity of pain and evaluate response  - Implement non-pharmacological measures as appropriate and evaluate response  - Consider cultural and social influences on pain and pain management  - Notify physician/advanced practitioner if interventions unsuccessful or patient reports new pain  - Educate patient/family on pain management process including their role and importance of  reporting pain   - Provide non-pharmacologic/complimentary pain relief interventions  Outcome: Progressing     Problem: SAFETY ADULT  Goal: Patient will remain free of falls  Description: INTERVENTIONS:  - Educate patient/family on patient safety including physical limitations  - Instruct patient to call for assistance with activity   - Consider consulting OT/PT to assist with strengthening/mobility based on AM PAC & JH-HLM score  - Consult OT/PT to assist with strengthening/mobility   - Keep Call bell within reach  - Keep bed low and locked with side rails adjusted as appropriate  - Keep care items and personal belongings within reach  - Initiate and maintain comfort rounds  - Make Fall Risk Sign visible to staff  - Offer Toileting every 2 Hours, in advance of need  - Initiate/Maintain bed/chair alarm  - Obtain necessary fall risk management equipment  - Apply yellow socks and bracelet for high fall risk patients  - Consider moving patient to room near nurses station  Outcome: Progressing     Problem: GASTROINTESTINAL - ADULT  Goal: Minimal or absence of nausea and/or vomiting  Description: INTERVENTIONS:  - Administer IV fluids if ordered to ensure adequate hydration  - Maintain NPO status until nausea and vomiting are resolved  - Nasogastric tube if  ordered  - Administer ordered antiemetic medications as needed  - Provide nonpharmacologic comfort measures as appropriate  - Advance diet as tolerated, if ordered  - Consider nutrition services referral to assist patient with adequate nutrition and appropriate food choices  Outcome: Progressing  Goal: Maintains or returns to baseline bowel function  Description: INTERVENTIONS:  - Assess bowel function  - Encourage oral fluids to ensure adequate hydration  - Administer IV fluids if ordered to ensure adequate hydration  - Administer ordered medications as needed  - Encourage mobilization and activity  - Consider nutritional services referral to assist patient with adequate nutrition and appropriate food choices  Outcome: Progressing  Goal: Maintains adequate nutritional intake  Description: INTERVENTIONS:  - Monitor percentage of each meal consumed  - Identify factors contributing to decreased intake, treat as appropriate  - Assist with meals as needed  - Monitor I&O, weight, and lab values if indicated  - Obtain nutrition services referral as needed  Outcome: Progressing     Problem: SKIN/TISSUE INTEGRITY - ADULT  Goal: Incision(s), wounds(s) or drain site(s) healing without S/S of infection  Description: INTERVENTIONS  - Assess and document dressing, incision, wound bed, drain sites and surrounding tissue  - Provide patient and family education  - Perform skin care/dressing changes  Outcome: Progressing     Problem: METABOLIC, FLUID AND ELECTROLYTES - ADULT  Goal: Electrolytes maintained within normal limits  Description: INTERVENTIONS:  - Monitor labs and assess patient for signs and symptoms of electrolyte imbalances  - Administer electrolyte replacement as ordered  - Monitor response to electrolyte replacements, including repeat lab results as appropriate  - Instruct patient on fluid and nutrition as appropriate  Outcome: Progressing     Problem: COPING  Goal: Pt/Family able to verbalize concerns and demonstrate  effective coping strategies  Description: INTERVENTIONS:  - Assist patient/family to identify coping skills, available support systems and cultural and spiritual values  - Provide emotional support, including active listening and acknowledgement of concerns of patient and caregivers  - Reduce environmental stimuli, as able  - Provide patient education  - Assess for spiritual pain/suffering and initiate spiritual care, including notification of Pastoral Care or ilan based community as needed  - Assess effectiveness of coping strategies  Outcome: Progressing     Problem: Nutrition/Hydration-ADULT  Goal: Nutrient/Hydration intake appropriate for improving, restoring or maintaining nutritional needs  Description: Monitor and assess patient's nutrition/hydration status for malnutrition. Collaborate with interdisciplinary team and initiate plan and interventions as ordered.  Monitor patient's weight and dietary intake as ordered or per policy. Utilize nutrition screening tool and intervene as necessary. Determine patient's food preferences and provide high-protein, high-caloric foods as appropriate.     INTERVENTIONS:  - Monitor oral intake, urinary output, labs, and treatment plans  - Assess nutrition and hydration status and recommend course of action  - Evaluate amount of meals eaten  - Assist patient with eating if necessary   - Allow adequate time for meals  - Recommend/ encourage appropriate diets, oral nutritional supplements, and vitamin/mineral supplements  - Order, calculate, and assess calorie counts as needed  - Recommend, monitor, and adjust tube feedings and TPN/PPN based on assessed needs  - Assess need for intravenous fluids  - Provide specific nutrition/hydration education as appropriate  - Include patient/family/caregiver in decisions related to nutrition  Outcome: Progressing

## 2025-06-23 ENCOUNTER — PATIENT OUTREACH (OUTPATIENT)
Dept: CASE MANAGEMENT | Facility: OTHER | Age: 60
End: 2025-06-23

## 2025-06-23 ENCOUNTER — TRANSITIONAL CARE MANAGEMENT (OUTPATIENT)
Dept: FAMILY MEDICINE CLINIC | Facility: CLINIC | Age: 60
End: 2025-06-23

## 2025-06-23 NOTE — PROGRESS NOTES
Pt was admitted 6/16/25-6/20/25 with generalized abdominal pain.  S/P SARAY, BSO, LAR, Splenectomy and debulking, high grade serous ovarian cancer. Followed by GYN/ONC.  Had NGT which was removed and diet advanced.  Past medical history of HTN, Asthma, GERD, Anemia.  Upcoming appt with GynOnc 7/1/25.  Telephone call to patient, left message on machine with my name, number and request for return call.

## 2025-06-23 NOTE — UTILIZATION REVIEW
NOTIFICATION OF ADMISSION DISCHARGE   This is a Notification of Discharge from [facility]. Please be advised that this patient has been discharge from our facility. Below you will find the admission and discharge date and time including the patient’s disposition.   UTILIZATION REVIEW CONTACT:  Utilization Review Assistants  Network Utilization Review Department  Phone: [phone] x carefully listen to the prompts. All voicemails are confidential.  Email: [email or web address]     ADMISSION INFORMATION  PRESENTATION DATE: 6/16/2025  1:36 AM  OBERVATION ADMISSION DATE: 06/16/2025 0144  INPATIENT ADMISSION DATE: 6/16/25  1:36 AM   DISCHARGE DATE: 6/20/2025  4:24 PM   DISPOSITION:Home/Self Care    Network Utilization Review Department  ATTENTION: Please call with any questions or concerns to [phone] and carefully listen to the prompts so that you are directed to the right person. All voicemails are confidential.   For Discharge needs, contact Care Management DC Support Team at [phone] opt. [phone]  Send all requests for admission clinical reviews, approved or denied determinations and any other requests to dedicated fax number below belonging to the campus where the patient is receiving treatment. List of dedicated fax numbers for the Facilities:  FACILITY NAME UR FAX NUMBER   ADMISSION DENIALS (Administrative/Medical Necessity) [phone]   DISCHARGE SUPPORT TEAM ([facility]) [phone]   PARENT CHILD HEALTH (Maternity/NICU/Pediatrics) [phone]   [facility] [phone]   [facility] [phone]   Critical access hospital [phone]   [facility] [phone]   [facility] [phone]   [facility] [phone]   [facility] [phone]   [facility] [phone]    [facility] [phone]   [facility] [phone]   [facility] [phone]   [facility] [phone]

## 2025-06-25 ENCOUNTER — PATIENT OUTREACH (OUTPATIENT)
Dept: CASE MANAGEMENT | Facility: OTHER | Age: 60
End: 2025-06-25

## 2025-06-25 NOTE — PROGRESS NOTES
2nd attempt to reach patient by phone. Unable to reach letter emailed to pt via [employer]. Pt has been provided my contact information if assistance needed.  Will close program at this time and remove self from care team.

## 2025-06-25 NOTE — LETTER
Date: 06/25/25    Dear Ewa Hernandes,   My name is Elsa; I am a registered nurse care manager working with [facility].   I have not been able to reach you and would like to set a time that I can talk with you over the phone.  My work is to help patients that have complex medical conditions get the care they need. This includes patients who may have been in the hospital or emergency room.    I have enclosed information for you. Please call me with any questions you may have. I look forward to speaking with you.  Sincerely,  Elsa Heaton RN  [phone]  Outpatient Care Manager

## 2025-07-01 ENCOUNTER — OFFICE VISIT (OUTPATIENT)
Dept: GYNECOLOGIC ONCOLOGY | Facility: CLINIC | Age: 60
End: 2025-07-01

## 2025-07-01 ENCOUNTER — TELEPHONE (OUTPATIENT)
Dept: GYNECOLOGIC ONCOLOGY | Facility: CLINIC | Age: 60
End: 2025-07-01

## 2025-07-01 ENCOUNTER — TELEPHONE (OUTPATIENT)
Dept: HEMATOLOGY ONCOLOGY | Facility: CLINIC | Age: 60
End: 2025-07-01

## 2025-07-01 VITALS
BODY MASS INDEX: 20.83 KG/M2 | OXYGEN SATURATION: 99 % | HEIGHT: 69 IN | WEIGHT: 140.6 LBS | TEMPERATURE: 98 F | SYSTOLIC BLOOD PRESSURE: 144 MMHG | HEART RATE: 106 BPM | RESPIRATION RATE: 16 BRPM | DIASTOLIC BLOOD PRESSURE: 88 MMHG

## 2025-07-01 DIAGNOSIS — T45.1X5A CHEMOTHERAPY-INDUCED NEUTROPENIA (HCC): ICD-10-CM

## 2025-07-01 DIAGNOSIS — F33.0 MAJOR DEPRESSIVE DISORDER, RECURRENT EPISODE, MILD (HCC): ICD-10-CM

## 2025-07-01 DIAGNOSIS — T81.89XA NON-HEALING SURGICAL WOUND, INITIAL ENCOUNTER: ICD-10-CM

## 2025-07-01 DIAGNOSIS — D64.81 ANEMIA DUE TO ANTINEOPLASTIC CHEMOTHERAPY: ICD-10-CM

## 2025-07-01 DIAGNOSIS — D70.1 CHEMOTHERAPY-INDUCED NEUTROPENIA (HCC): ICD-10-CM

## 2025-07-01 DIAGNOSIS — T45.1X5A ANEMIA DUE TO ANTINEOPLASTIC CHEMOTHERAPY: ICD-10-CM

## 2025-07-01 DIAGNOSIS — C56.3 MALIGNANT NEOPLASM OF BOTH OVARIES (HCC): Primary | ICD-10-CM

## 2025-07-01 PROBLEM — K56.7 ILEUS (HCC): Status: RESOLVED | Noted: 2025-06-08 | Resolved: 2025-07-01

## 2025-07-01 PROBLEM — N17.9 AKI (ACUTE KIDNEY INJURY) (HCC): Status: RESOLVED | Noted: 2025-06-16 | Resolved: 2025-07-01

## 2025-07-01 PROBLEM — K59.03 DRUG INDUCED CONSTIPATION: Status: RESOLVED | Noted: 2025-03-27 | Resolved: 2025-07-01

## 2025-07-01 PROBLEM — J45.909 ASTHMA: Status: RESOLVED | Noted: 2020-02-27 | Resolved: 2025-07-01

## 2025-07-01 PROCEDURE — 99024 POSTOP FOLLOW-UP VISIT: CPT | Performed by: OBSTETRICS & GYNECOLOGY

## 2025-07-01 NOTE — ASSESSMENT & PLAN NOTE
Patient is healing well after surgery with the exception of superficial wound separation.  She has, as expected, decreased appetite.  Reports normal bowel and bladder function.  No evidence of infection.  No evidence of acute postoperative complications.    Surgical pathology reviewed.  Patient return to the office in 1 week.  We would like to see further healing of the abdominal wall wound prior to reinitiation of chemotherapy.  For cycle #5 we plan to administer carboplatin and paclitaxel alone.  For cycle #6 we will add back bevacizumab and patient would benefit from bevacizumab maintenance subsequently.    Genomic profiling results noted.  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  Will obtain TiVo HRD testing to rule out the possibility of unrecognized HRD which would make her a candidate for maintenance PARP inhibitor.

## 2025-07-01 NOTE — PROGRESS NOTES
Name: Ewa Hernandes      : 1965      MRN: [id number]  Encounter Provider: Augusto De Souza MD  Encounter Date: 2025   Encounter department: [facility] [place]  :  Assessment & Plan  Malignant neoplasm of both ovaries (HCC)         Chemotherapy-induced neutropenia (HCC)         Major depressive disorder, recurrent episode, mild (HCC)         Anemia due to antineoplastic chemotherapy             History of Present Illness {?Quick Links Encounters * My Last Note * Last Note in Specialty * Snapshot * Since Last Visit * History :81582}  Reason for Visit / CC: Post-op        Ewa Hernandes is a 59 y.o. 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         Oncology History   Cancer Staging   Malignant neoplasm of both ovaries (HCC)  Staging form: Ovary, Fallopian Tube, Primary Peritoneal, AJCC 8th Edition  - Clinical stage from 2025: FIGO Stage IIIC (cT3c, cNX, cM0) - Signed by Augusto De Souza MD on 2025  Histopathologic type: Serous cystadenocarcinoma, NOS  Stage prefix: Initial diagnosis  Histologic grade (G): G3  Histologic grading system: 4 grade system  Oncology History   Malignant neoplasm of both ovaries (HCC)   2025 Initial Diagnosis    Malignant neoplasm of both ovaries (HCC)     2025 -  Cancer Staged    Staging form: Ovary, Fallopian Tube, Primary Peritoneal, AJCC 8th Edition  - Clinical stage from 2025: FIGO Stage IIIC (cT3c, cNX, cM0) - Signed by Augusto De Souza MD on 2025  Histopathologic type: Serous cystadenocarcinoma, NOS  Stage prefix: Initial diagnosis  Histologic grade (G): G3  Histologic grading system: 4 grade system       2025 -  Chemotherapy    CARBOplatin (PARAPLATIN) IVPB (GOG AUC DOSING), 650.5 mg, 4 of 7 cycles  Administration: 650.5 mg (2025), 622 mg (2025), 650.5 mg (3/11/2025), 629 mg (2025)  PACLItaxel (TAXOL) chemo IVPB, 175 mg/m2 = 336 mg, 4 of 7  cycles  Administration: 336 mg (2/18/2025), 330 mg (4/1/2025), 336 mg (3/11/2025), 330 mg (4/24/2025)  bevacizumab-awwb (MVASI) IVPB, 1,140 mg (100 % of original dose 15 mg/kg), 3 of 6 cycles  Dose modification: 15 mg/kg (original dose 15 mg/kg, Cycle 1)  Administration: 1,100 mg (2/18/2025), 1,100 mg (3/11/2025), 1,100 mg (4/1/2025)     6/4/2025 Surgery    Procedure(s):  EXAM UNDER ANESTHESIA. EXPLORATORY LAPAROTOMY. RADICAL HYSTERECTOMY. BILATERAL SALPINGO-OOPHORECTOMY. PELVIC PERITONECTOMY WITH EN BLOC LOW ANTERIOR RECTOSIGMOID RESECTION. APPENDECTOMY. SPLENIC FLEXURE MOBILIZATION. GASTROCOLING OMENTECTOMY. INDICATED SPLENECTOMY. RESECTION SMALL BOWEL MESENTERIC NODULES.    Operative Findings:  #1.  Diffuse calcified and fibrinous nodularity covering most peritoneal surfaces in the form of loose adhesions likely representing treatment effect.  #2.  Approximately 12 x 10 cm omental tumor/cake.  #3.  Diffuse induration of several peritoneal surfaces including anterior stomach/lesser curvature, small bowel mesenteric root without appreciable distinct nodularity and obliterated right upper quadrant with dense adhesions from diaphragm to liver surface.  #4.,  Limited pelvic mass involving the appendix, uterus, bilateral tubes and ovaries and rectosigmoid with complete obliteration of the anterior and posterior cul-de-sac.  En bloc resection necessitated low anterior resection and pelvic peritonectomy with en bloc appendectomy.  #5.  Indurated perisplenic tumor which fractured easily upon contact causing bleeding.  In addition, in order to adequately mobilized the splenic flexure for a tension-free anastomosis without further risking hemorrhage indicated splenectomy was performed.  #6.  At the completion of the procedure there was evidence of, as described, diffuse peritoneal thickening which could possibly represent viable tumor.  No individual tumor implants/area of tumor involvement with thickness/diameter greater  "than 5 mm.       Ovarian cancer (HCC) (Resolved)   2/6/2025 -  Cancer Staged    Staging form: Ovary, Fallopian Tube, Primary Peritoneal, AJCC 8th Edition  - Clinical stage from 2/6/2025: FIGO Stage IIIC (cT3c, cNX, cM0) - Signed by Augusto De Souza MD on 2/11/2025  Histopathologic type: Serous cystadenocarcinoma, NOS  Histologic grade (G): G3  Histologic grading system: 4 grade system       2/11/2025 Initial Diagnosis    Ovarian cancer (HCC)             Objective {?Quick Links Trend Vitals * Enter New Vitals * Results Review * Timeline (Adult) * Labs * Imaging * Cardiology * Procedures * Lung Cancer Screening * Surgical eConsent :14072}  /88 (BP Location: Left arm, Patient Position: Sitting, Cuff Size: Large)   Pulse (!) 106   Temp 98 °F (36.7 °C) (Temporal)   Resp 16   Ht 5' 9\" (1.753 m)   Wt 63.8 kg (140 lb 9.6 oz)   SpO2 99%   BMI 20.76 kg/m²     Body mass index is 20.76 kg/m².  Pain Screening:  Pain Score:   9 (Patient experiencing pain at incision site. Patient is also experiencing lower back pain by the sacrum and coccyx region.)  ECOG   ***  Physical Exam ***    Labs: I have reviewed pertinent labs. { AMB ONCOLOGY LABS (Optional):64034}  Lab Results   Component Value Date/Time     286.4 (H) 06/04/2025 08:20 AM     Lab Results   Component Value Date/Time    Potassium 4.0 06/20/2025 05:38 AM    Chloride 103 06/20/2025 05:38 AM    CO2 23 06/20/2025 05:38 AM    BUN 6 06/20/2025 05:38 AM    Creatinine 0.45 (L) 06/20/2025 05:38 AM    Glucose, Fasting 95 06/17/2025 05:26 AM    Calcium 9.3 06/20/2025 05:38 AM    AST 18 06/15/2025 04:47 PM    ALT 8 06/15/2025 04:47 PM    Alkaline Phosphatase 53 06/15/2025 04:47 PM    eGFR 110 06/20/2025 05:38 AM     Lab Results   Component Value Date/Time    WBC 20.40 (H) 06/20/2025 05:47 AM    Hemoglobin 8.5 (L) 06/20/2025 05:47 AM    Hematocrit 25.2 (L) 06/20/2025 05:47 AM    MCV 95 06/20/2025 05:47 AM    Platelets 977 (H) 06/20/2025 05:47 AM     Lab Results " "  Component Value Date/Time    Absolute Neutrophils 23.25 (H) 06/16/2025 04:36 AM        Trend:  Lab Results   Component Value Date     286.4 (H) 06/04/2025     273.5 (H) 05/16/2025     295.5 (H) 05/09/2025     341.1 (H) 05/02/2025     573.4 (H) 04/18/2025     1,131.0 (H) 03/28/2025     3,772.2 (H) 03/07/2025     6,003.0 (H) 02/14/2025     6,483.0 (H) 02/04/2025       {Radiology Results Review (Optional):66023}  {Other Study Results Review (Optional):81984::\"No additional pertinent studies reviewed.\"}    {Administrative / Billing Section (Optional):55487}  "

## 2025-07-01 NOTE — ASSESSMENT & PLAN NOTE
See picture under media.  The wound is well reapproximated but not reepithelialized.  There is a superficial separation with exposed devitalized subcutaneous/deep dermal tissue.  Will treat this with topical collagenase.  I suspect this is in part due to also to malnutrition.  Patient encouraged to start nutritional supplements twice daily.  Will reassess wound next week.  Orders:  •  collagenase (SANTYL) ointment; Apply to wound once to twice a day.  Keep wound covered as instructed.

## 2025-07-01 NOTE — PROGRESS NOTES
Name: Ewa Hernandes      : 1965      MRN: [id number]  Encounter Provider: Augusto De Souza MD  Encounter Date: 2025   Encounter department: [facility] [place]  :  Assessment & Plan  Malignant neoplasm of both ovaries (HCC)  Patient is healing well after surgery with the exception of superficial wound separation.  She has, as expected, decreased appetite.  Reports normal bowel and bladder function.  No evidence of infection.  No evidence of acute postoperative complications.    Surgical pathology reviewed.  Patient return to the office in 1 week.  We would like to see further healing of the abdominal wall wound prior to reinitiation of chemotherapy.  For cycle #5 we plan to administer carboplatin and paclitaxel alone.  For cycle #6 we will add back bevacizumab and patient would benefit from bevacizumab maintenance subsequently.    Genomic profiling results noted.  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  Will obtain Sisteer HRD testing to rule out the possibility of unrecognized HRD which would make her a candidate for maintenance PARP inhibitor.       Chemotherapy-induced neutropenia (HCC)  Consider GM-CSF with chemotherapy once chemotherapy restarts.       Major depressive disorder, recurrent episode, mild (HCC)  Stable mood.       Anemia due to antineoplastic chemotherapy  Continue to monitor counts once chemotherapy restarts.       Non-healing surgical wound, initial encounter  See picture under media.  The wound is well reapproximated but not reepithelialized.  There is a superficial separation with exposed devitalized subcutaneous/deep dermal tissue.  Will treat this with topical collagenase.  I suspect this is in part due to also to malnutrition.  Patient encouraged to start nutritional supplements twice daily.  Will reassess wound next week.  Orders:  •  collagenase (SANTYL) ointment; Apply to wound once to twice a day.  Keep  "wound covered as instructed.        History of Present Illness   HPI  Ewa Hernandes is a 59 y.o. female Paapproaching 4 weeks after secondary cytoreductive surgery which necessitated extensive cytoreduction with splenectomy, low anterior rectosigmoid resection, radical hysterectomy, BSO, pelvic peritonectomy, etc.  Unfortunately, we were unable to complete optimal cytoreduction to normal gross residual disease as there was evidence of tumoral plaque with thickness less than 5 mm throughout mesenteries.  Patient has recovered well.  As expected has decreased appetite and fatigue.  Reports solid bowel movements approximately every 2 days.  No nausea or vomiting.  Normal bladder function.  Reports incisional discomfort and there is evidence of superficial wound separation.      Review of Systems   All other systems reviewed and are negative.         Objective   /88 (BP Location: Left arm, Patient Position: Sitting, Cuff Size: Large)   Pulse (!) 106   Temp 98 °F (36.7 °C) (Temporal)   Resp 16   Ht 5' 9\" (1.753 m)   Wt 63.8 kg (140 lb 9.6 oz)   SpO2 99%   BMI 20.76 kg/m²      Physical Exam  Abdomen: Incision reapproximated with superficial epidermal separation.  See picture under media.  No hernias.  Soft and nontender.  No evidence of erythema or infection.  Ext:  No clubbing, cyanosis or edema.        Augusto De Souza MD, JUAN PABLO, FACOG, FACS  7/1/2025  10:53 AM      "

## 2025-07-02 ENCOUNTER — TELEPHONE (OUTPATIENT)
Age: 60
End: 2025-07-02

## 2025-07-02 NOTE — PROGRESS NOTES
CLARIFY DX RESPONSE NOTE    Malnutrition Findings:  Adult Malnutrition type: Acute illness  Adult Degree of Malnutrition: Other severe protein calorie malnutrition  Malnutrition Characteristics: Inadequate energy, Weight loss            360 Statement: Severe protein calorie malnutrition in context of acute illness  r/t inadequate PO intake, ileus as evidenced by energy intake less than 50%  compared to estimated needs>5 days, 6% wt loss x 2 weeks; Treated with PO diet,  pt declined oral supplements    BMI Findings:        Body mass index is 21.78 kg/m .

## 2025-07-02 NOTE — TELEPHONE ENCOUNTER
PROVIDER: Dr FREEMAN  Pharmacy calling in order to dispense the Santyl they need exact abdominal wound measurements.Measurements not listed on photo in media.  They advised going to the following site:  [email or web address]    Pls call back to the pharmacy listed:          [id number] - [place] - [address]  [address], [place] [address]  Phone: [phone]  Fax: [phone]

## 2025-07-02 NOTE — TELEPHONE ENCOUNTER
I placed a call to the pharmacy and provided a wound size estimate based on the photo uploaded yesterday.

## 2025-07-03 ENCOUNTER — TELEPHONE (OUTPATIENT)
Age: 60
End: 2025-07-03

## 2025-07-03 NOTE — TELEPHONE ENCOUNTER
Received a phone call from patient.  Patient was returning a call from Lisa.  Reviewed note from Lisa.  Patient stated that she cannot afford the cost of the Santyl at this time.  Patient inquiring if she should go to ED at this point.  Please call patient with any further recommendations.

## 2025-07-03 NOTE — TELEPHONE ENCOUNTER
Pt calling in regards to ointment that was ordered and [facility] is stating they didn't receive anything and that also her insurance is questioning it stating its to expensive and how often would pt be using this.    Pt also stated that her chemo was to be cx 7/8 and wants to make sure that is correct. Pt would like a call back thank you

## 2025-07-03 NOTE — TELEPHONE ENCOUNTER
Reviewed most recent appointment note from ELAINE who states her chemo should be held on 7/8 due to delay in wound healing.      Infusion notified to cancel chemo treatment scheduled on 7/8. (All other future remaining appointments to be left intact for now)    Patient has a follow up with Dr FREEMAN on 7/8.  We will wait for further direction after that next appointment as it relates to when we will be restarting chemotherapy.    I spoke with the pharmacy today and they can fill her Santyl through [facility] at a cost of $87.00    Attempted to call patient and unable to leave a VM.  SHOULD THIS PATIENT CALL BACK AND  PLEASE GIVE HER THE  MESSAGE ABOVE.

## 2025-07-07 ENCOUNTER — PATIENT OUTREACH (OUTPATIENT)
Dept: HEMATOLOGY ONCOLOGY | Facility: CLINIC | Age: 60
End: 2025-07-07

## 2025-07-07 NOTE — PROGRESS NOTES
I reached out to Ewa  to complete the Distress Thermometer, review for any barriers to care and to offer any supportive services that may be needed. I left VM with the reason for my call including my direct phone number .

## 2025-07-08 ENCOUNTER — HOSPITAL ENCOUNTER (OUTPATIENT)
Dept: INFUSION CENTER | Facility: HOSPITAL | Age: 60
Discharge: HOME | End: 2025-07-08
Attending: OBSTETRICS & GYNECOLOGY

## 2025-07-08 ENCOUNTER — TELEPHONE (OUTPATIENT)
Dept: INFUSION CENTER | Facility: HOSPITAL | Age: 60
End: 2025-07-08

## 2025-07-08 ENCOUNTER — OFFICE VISIT (OUTPATIENT)
Dept: GYNECOLOGIC ONCOLOGY | Facility: CLINIC | Age: 60
End: 2025-07-08
Payer: COMMERCIAL

## 2025-07-08 VITALS
TEMPERATURE: 98.7 F | HEART RATE: 113 BPM | WEIGHT: 138.4 LBS | RESPIRATION RATE: 16 BRPM | HEIGHT: 69 IN | DIASTOLIC BLOOD PRESSURE: 98 MMHG | BODY MASS INDEX: 20.5 KG/M2 | SYSTOLIC BLOOD PRESSURE: 146 MMHG | OXYGEN SATURATION: 99 %

## 2025-07-08 DIAGNOSIS — D70.1 CHEMOTHERAPY-INDUCED NEUTROPENIA (HCC): ICD-10-CM

## 2025-07-08 DIAGNOSIS — E44.0 MODERATE PROTEIN-CALORIE MALNUTRITION (HCC): ICD-10-CM

## 2025-07-08 DIAGNOSIS — T45.1X5A CHEMOTHERAPY-INDUCED NEUTROPENIA (HCC): ICD-10-CM

## 2025-07-08 DIAGNOSIS — T81.89XA NON-HEALING SURGICAL WOUND, INITIAL ENCOUNTER: ICD-10-CM

## 2025-07-08 DIAGNOSIS — C56.3 MALIGNANT NEOPLASM OF BOTH OVARIES (HCC): Primary | ICD-10-CM

## 2025-07-08 PROCEDURE — 99215 OFFICE O/P EST HI 40 MIN: CPT | Performed by: OBSTETRICS & GYNECOLOGY

## 2025-07-08 NOTE — ASSESSMENT & PLAN NOTE
Cytotoxic doses adjusted.  Consider GM-CSF with subsequent cycles of chemotherapy if/as needed.

## 2025-07-08 NOTE — ASSESSMENT & PLAN NOTE
Superficial epithelial separation.  Most debris has resolved with topical collagenase treatment.  See picture under media tab.  Continue to closely monitor.  Patient will apply collagenase only to devitalized areas and continue to cover with dry dressings and change periodically.  Will continue to monitor.  Continue to hold Avastin until full reepithelialization noted.

## 2025-07-08 NOTE — PROGRESS NOTES
Name: Ewa Hernandes      : 1965      MRN: [id number]  Encounter Provider: Augusto De Souza MD  Encounter Date: 2025   Encounter department: [facility] [place]  :  Assessment & Plan  Malignant neoplasm of both ovaries (HCC)  I have independently obtained history from patient today.    Patient's wound is healing quite nicely.  No evidence of debris.  At this point, I think it is safe to restart with cycle #5 of postoperative adjuvant chemotherapy.  She will continue to receive carboplatin and paclitaxel at previous doses.  We will hold bevacizumab for now.  Consider restarting with subsequent cycles if/as once healing is documented.    Will continue to monitor clinical laboratory parameters prior to each infusion.  After completion of total 7 cycles of adjuvant chemotherapy, we will transition to maintenance with single agent bevacizumab plus minus PARP inhibitor pending myriad HRD testing results given conflicting findings on CARIS.    I will take advantage of this window opportunity as patient will be off of bevacizumab to refer patient to interventional radiology for Port-A-Cath placement.  Hopefully this can be completed within the next 3 weeks.  Orders:  •  IR port placement; Future    Chemotherapy-induced neutropenia (HCC)  Cytotoxic doses adjusted.  Consider GM-CSF with subsequent cycles of chemotherapy if/as needed.       Non-healing surgical wound, initial encounter  Superficial epithelial separation.  Most debris has resolved with topical collagenase treatment.  See picture under media tab.  Continue to closely monitor.  Patient will apply collagenase only to devitalized areas and continue to cover with dry dressings and change periodically.  Will continue to monitor.  Continue to hold Avastin until full reepithelialization noted.       Moderate protein-calorie malnutrition (HCC)  Malnutrition Findings:       Muscle loss along triceps, supraclavicular areas and  temples.  Decreased intake due to cancer/cachexia.                          BMI Findings:           Body mass index is 20.44 kg/m².     Nutritional supplements.  Continue to monitor weight.               History of Present Illness   Reason for Visit / CC: Prechemotherapy visit, wound check   Ewa Hernandes is a 59 y.o. 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  Here for wound evaluation.  At last visit she had evidence of superficial epithelial separation with some debris along incision.  Also evidence of malnutrition as evidenced by decreased intake/cachexia and loss of muscle mass.  She presents today for wound reevaluation and consideration of reinitiation of postoperative adjuvant chemotherapy.  Ongoing alopecia.  Wound continues to heal with some localized pain/discomfort.  No nausea or vomiting.  No other complaints.  She has had no neuropathy throughout treatment.      Pertinent Medical History     Hypertension, reactive airway disease, anxiety disorder, GERD.       Oncology History   Cancer Staging   Malignant neoplasm of both ovaries (HCC)  Staging form: Ovary, Fallopian Tube, Primary Peritoneal, AJCC 8th Edition  - Clinical stage from 2/6/2025: FIGO Stage IIIC (cT3c, cNX, cM0) - Signed by Augusto De Souza MD on 2/11/2025  Histopathologic type: Serous cystadenocarcinoma, NOS  Stage prefix: Initial diagnosis  Histologic grade (G): G3  Histologic grading system: 4 grade system  Oncology History   Malignant neoplasm of both ovaries (HCC)   2/4/2025 Initial Diagnosis    Malignant neoplasm of both ovaries (HCC)     2/6/2025 -  Cancer Staged    Staging form: Ovary, Fallopian Tube, Primary Peritoneal, AJCC 8th Edition  - Clinical stage from 2/6/2025: FIGO Stage IIIC (cT3c, cNX, cM0) - Signed by Augusto De Souza MD on 2/11/2025  Histopathologic type: Serous cystadenocarcinoma, NOS  Stage prefix: Initial  diagnosis  Histologic grade (G): G3  Histologic grading system: 4 grade system       2/18/2025 -  Chemotherapy    CARBOplatin (PARAPLATIN) IVPB (GOG AUC DOSING), 650.5 mg, 4 of 7 cycles  Administration: 650.5 mg (2/18/2025), 622 mg (4/1/2025), 650.5 mg (3/11/2025), 629 mg (4/24/2025)  PACLItaxel (TAXOL) chemo IVPB, 175 mg/m2 = 336 mg, 4 of 7 cycles  Administration: 336 mg (2/18/2025), 330 mg (4/1/2025), 336 mg (3/11/2025), 330 mg (4/24/2025)  bevacizumab-awwb (MVASI) IVPB, 1,140 mg (100 % of original dose 15 mg/kg), 3 of 5 cycles  Dose modification: 15 mg/kg (original dose 15 mg/kg, Cycle 1)  Administration: 1,100 mg (2/18/2025), 1,100 mg (3/11/2025), 1,100 mg (4/1/2025)     6/4/2025 Surgery    Procedure(s):  EXAM UNDER ANESTHESIA. EXPLORATORY LAPAROTOMY. RADICAL HYSTERECTOMY. BILATERAL SALPINGO-OOPHORECTOMY. PELVIC PERITONECTOMY WITH EN BLOC LOW ANTERIOR RECTOSIGMOID RESECTION. APPENDECTOMY. SPLENIC FLEXURE MOBILIZATION. GASTROCOLING OMENTECTOMY. INDICATED SPLENECTOMY. RESECTION SMALL BOWEL MESENTERIC NODULES.    Operative Findings:  #1.  Diffuse calcified and fibrinous nodularity covering most peritoneal surfaces in the form of loose adhesions likely representing treatment effect.  #2.  Approximately 12 x 10 cm omental tumor/cake.  #3.  Diffuse induration of several peritoneal surfaces including anterior stomach/lesser curvature, small bowel mesenteric root without appreciable distinct nodularity and obliterated right upper quadrant with dense adhesions from diaphragm to liver surface.  #4.,  Limited pelvic mass involving the appendix, uterus, bilateral tubes and ovaries and rectosigmoid with complete obliteration of the anterior and posterior cul-de-sac.  En bloc resection necessitated low anterior resection and pelvic peritonectomy with en bloc appendectomy.  #5.  Indurated perisplenic tumor which fractured easily upon contact causing bleeding.  In addition, in order to adequately mobilized the splenic flexure  "for a tension-free anastomosis without further risking hemorrhage indicated splenectomy was performed.  #6.  At the completion of the procedure there was evidence of, as described, diffuse peritoneal thickening which could possibly represent viable tumor.  No individual tumor implants/area of tumor involvement with thickness/diameter greater than 5 mm.       Ovarian cancer (HCC) (Resolved)   2/6/2025 -  Cancer Staged    Staging form: Ovary, Fallopian Tube, Primary Peritoneal, AJCC 8th Edition  - Clinical stage from 2/6/2025: FIGO Stage IIIC (cT3c, cNX, cM0) - Signed by Augusto De Souza MD on 2/11/2025  Histopathologic type: Serous cystadenocarcinoma, NOS  Histologic grade (G): G3  Histologic grading system: 4 grade system       2/11/2025 Initial Diagnosis    Ovarian cancer (HCC)        Review of Systems A complete review of systems is negative other than that noted above in the HPI.       Objective   /98 (BP Location: Left arm, Patient Position: Sitting, Cuff Size: Standard)   Pulse (!) 113   Temp 98.7 °F (37.1 °C) (Temporal)   Resp 16   Ht 5' 9\" (1.753 m)   Wt 62.8 kg (138 lb 6.4 oz)   SpO2 99%   BMI 20.44 kg/m²     Body mass index is 20.44 kg/m².  Pain Screening:  Pain Score: 0-No pain  ECOG ECOG Performance Status: 1 - Restricted in physically strenuous activity but ambulatory and able to carry out work of a light or sedentary nature, e.g., light house work, office work   Physical Exam  Vitals reviewed.   Constitutional:       General: She is not in acute distress.     Appearance: She is not ill-appearing or toxic-appearing.     Cardiovascular:      Rate and Rhythm: Normal rate and regular rhythm.   Pulmonary:      Effort: Pulmonary effort is normal. No respiratory distress.      Breath sounds: No stridor.   Abdominal:      General: There is no distension.      Palpations: Abdomen is soft. There is no mass.      Tenderness: There is no abdominal tenderness.      Hernia: No hernia is present.      " Comments: Wound appears better healed with some persistent superficial epithelial separation but evidence of beefy red granulation tissue and approximately 85-90% of the wound.  Please see picture under media tab.     Musculoskeletal:      Right lower leg: No edema.      Left lower leg: No edema.     Neurological:      Mental Status: She is alert.          Labs: I have reviewed pertinent labs.   Lab Results   Component Value Date/Time     286.4 (H) 06/04/2025 08:20 AM     Lab Results   Component Value Date/Time    Potassium 4.0 06/20/2025 05:38 AM    Chloride 103 06/20/2025 05:38 AM    CO2 23 06/20/2025 05:38 AM    BUN 6 06/20/2025 05:38 AM    Creatinine 0.45 (L) 06/20/2025 05:38 AM    Glucose, Fasting 95 06/17/2025 05:26 AM    Calcium 9.3 06/20/2025 05:38 AM    AST 18 06/15/2025 04:47 PM    ALT 8 06/15/2025 04:47 PM    Alkaline Phosphatase 53 06/15/2025 04:47 PM    eGFR 110 06/20/2025 05:38 AM     Lab Results   Component Value Date/Time    WBC 20.40 (H) 06/20/2025 05:47 AM    Hemoglobin 8.5 (L) 06/20/2025 05:47 AM    Hematocrit 25.2 (L) 06/20/2025 05:47 AM    MCV 95 06/20/2025 05:47 AM    Platelets 977 (H) 06/20/2025 05:47 AM     Lab Results   Component Value Date/Time    Absolute Neutrophils 23.25 (H) 06/16/2025 04:36 AM        Trend:  Lab Results   Component Value Date     286.4 (H) 06/04/2025     273.5 (H) 05/16/2025     295.5 (H) 05/09/2025     341.1 (H) 05/02/2025     573.4 (H) 04/18/2025     1,131.0 (H) 03/28/2025     3,772.2 (H) 03/07/2025     6,003.0 (H) 02/14/2025     6,483.0 (H) 02/04/2025

## 2025-07-08 NOTE — TELEPHONE ENCOUNTER
Per Susie Jo, patient to re-start treatment next week.    Left VM for patient making her aware we have her scheduled at [facility] on 7/15 at 8:30.

## 2025-07-08 NOTE — ASSESSMENT & PLAN NOTE
Malnutrition Findings:       Muscle loss along triceps, supraclavicular areas and temples.  Decreased intake due to cancer/cachexia.                          BMI Findings:           Body mass index is 20.44 kg/m².     Nutritional supplements.  Continue to monitor weight.

## 2025-07-08 NOTE — ASSESSMENT & PLAN NOTE
I have independently obtained history from patient today.    Patient's wound is healing quite nicely.  No evidence of debris.  At this point, I think it is safe to restart with cycle #5 of postoperative adjuvant chemotherapy.  She will continue to receive carboplatin and paclitaxel at previous doses.  We will hold bevacizumab for now.  Consider restarting with subsequent cycles if/as once healing is documented.    Will continue to monitor clinical laboratory parameters prior to each infusion.  After completion of total 7 cycles of adjuvant chemotherapy, we will transition to maintenance with single agent bevacizumab plus minus PARP inhibitor pending myriad HRD testing results given conflicting findings on CARIS.    I will take advantage of this window opportunity as patient will be off of bevacizumab to refer patient to interventional radiology for Port-A-Cath placement.  Hopefully this can be completed within the next 3 weeks.  Orders:  •  IR port placement; Future

## 2025-07-09 ENCOUNTER — PATIENT OUTREACH (OUTPATIENT)
Dept: HEMATOLOGY ONCOLOGY | Facility: CLINIC | Age: 60
End: 2025-07-09

## 2025-07-09 ENCOUNTER — DOCUMENTATION (OUTPATIENT)
Dept: GYNECOLOGIC ONCOLOGY | Facility: CLINIC | Age: 60
End: 2025-07-09

## 2025-07-09 ENCOUNTER — HOSPITAL ENCOUNTER (OUTPATIENT)
Dept: INFUSION CENTER | Facility: HOSPITAL | Age: 60
End: 2025-07-09
Attending: OBSTETRICS & GYNECOLOGY

## 2025-07-09 NOTE — PROGRESS NOTES
I reached out to Ewa  to complete the Distress Thermometer, review for any barriers to care and to offer any supportive services that may be needed. I left VM with the reason for my call including my direct phone number   2nd attempt for out reach .

## 2025-07-09 NOTE — PROGRESS NOTES
July 2025 Sunday Monday Tuesday Wednesday Thursday Friday Saturday             1    POST OP  10:15 AM   Augusto De Souza MD   [facility] [place] 2     3     4     5                6     7     8    POST OP   8:45 AM   Augusto De Souza MD   [facility] [place] 9     10       11        LABS 12                13     14     15  Chemo #5  Oncology Treatment   8:30 AM   [facility] INF CHAIR 5   [facility] [place] 16  Neulasta  Infusion Therapy Plan   1:30 PM   MI INF CHAIR 3   [facility] 17     18         LABS 19                20     21     22     23     24          LABS  25    IR PORT PLACEMENT     [facility] IR   [facility] 26                27     28     29     30  Happy Birthday!   31    VIRTUAL VISIT   8:25 AM   SOLANGE Cobian   [facility] [place]         LABS                        Treatment Details             August 2025 Sunday Monday Tuesday Wednesday Thursday Friday Saturday                            1     LABS 2                3     4     5  CHEMO #6  Oncology Treatment   8:00 AM   MI INF CHAIR 7   [facility] 6  NEULASTA  Infusion Therapy Plan   2:00 PM   MI INF CHAIR 6   [facility] 7     8         LABS 9                10     11     12     13     14     15     LABS    16                17     18     19     20     21    VIRTUAL VISIT   7:45 AM   SOLANGE Cobian   [facility] [place] 22         LABS  23                24     25     26  CHEMO #7  Oncology Treatment   8:30 AM   MI INF CHAIR 5   [facility] 27   NEULASTA  Infusion Therapy Plan   2:30 PM   MI INF CHAIR 3   [facility] 28     29       LABS 30                31                                                      Treatment Details                 September 2025 Sunday Monday Tuesday Wednesday Thursday Friday Saturday        1     2     3     4     5     LABS 6                7     8     9    Office Visit  10:15 AM   Augusto De Souza MD   [facility] [place] 10     11     12     13                14     15     16     17     18     19     20                21     22     23     24     25     26     27                28     29     30

## 2025-07-10 ENCOUNTER — LAB REQUISITION (OUTPATIENT)
Dept: LAB | Facility: HOSPITAL | Age: 60
End: 2025-07-10

## 2025-07-10 DIAGNOSIS — C56.3 MALIGNANT NEOPLASM OF BILATERAL OVARIES (HCC): ICD-10-CM

## 2025-07-11 ENCOUNTER — APPOINTMENT (OUTPATIENT)
Dept: LAB | Facility: HOSPITAL | Age: 60
End: 2025-07-11
Payer: COMMERCIAL

## 2025-07-11 DIAGNOSIS — N83.201 CYSTS OF BOTH OVARIES: ICD-10-CM

## 2025-07-11 DIAGNOSIS — N83.202 CYSTS OF BOTH OVARIES: ICD-10-CM

## 2025-07-11 LAB
ALBUMIN SERPL BCG-MCNC: 4.1 G/DL (ref 3.5–5)
ALP SERPL-CCNC: 78 U/L (ref 34–104)
ALT SERPL W P-5'-P-CCNC: 5 U/L (ref 7–52)
ANION GAP SERPL CALCULATED.3IONS-SCNC: 12 MMOL/L (ref 4–13)
AST SERPL W P-5'-P-CCNC: 14 U/L (ref 13–39)
BASOPHILS # BLD AUTO: 0.02 THOUSANDS/ÂΜL (ref 0–0.1)
BASOPHILS NFR BLD AUTO: 0 % (ref 0–1)
BILIRUB SERPL-MCNC: 0.22 MG/DL (ref 0.2–1)
BUN SERPL-MCNC: 14 MG/DL (ref 5–25)
CALCIUM SERPL-MCNC: 10 MG/DL (ref 8.4–10.2)
CHLORIDE SERPL-SCNC: 102 MMOL/L (ref 96–108)
CO2 SERPL-SCNC: 26 MMOL/L (ref 21–32)
CREAT SERPL-MCNC: 0.8 MG/DL (ref 0.6–1.3)
CREAT UR-MCNC: 184.4 MG/DL
EOSINOPHIL # BLD AUTO: 0.4 THOUSAND/ÂΜL (ref 0–0.61)
EOSINOPHIL NFR BLD AUTO: 4 % (ref 0–6)
ERYTHROCYTE [DISTWIDTH] IN BLOOD BY AUTOMATED COUNT: 16.1 % (ref 11.6–15.1)
GFR SERPL CREATININE-BSD FRML MDRD: 81 ML/MIN/1.73SQ M
GLUCOSE SERPL-MCNC: 110 MG/DL (ref 65–140)
HCT VFR BLD AUTO: 29.1 % (ref 34.8–46.1)
HGB BLD-MCNC: 9 G/DL (ref 11.5–15.4)
IMM GRANULOCYTES # BLD AUTO: 0.02 THOUSAND/UL (ref 0–0.2)
IMM GRANULOCYTES NFR BLD AUTO: 0 % (ref 0–2)
LYMPHOCYTES # BLD AUTO: 2.48 THOUSANDS/ÂΜL (ref 0.6–4.47)
LYMPHOCYTES NFR BLD AUTO: 24 % (ref 14–44)
MAGNESIUM SERPL-MCNC: 1.8 MG/DL (ref 1.9–2.7)
MCH RBC QN AUTO: 30.6 PG (ref 26.8–34.3)
MCHC RBC AUTO-ENTMCNC: 30.9 G/DL (ref 31.4–37.4)
MCV RBC AUTO: 99 FL (ref 82–98)
MONOCYTES # BLD AUTO: 0.92 THOUSAND/ÂΜL (ref 0.17–1.22)
MONOCYTES NFR BLD AUTO: 9 % (ref 4–12)
NEUTROPHILS # BLD AUTO: 6.57 THOUSANDS/ÂΜL (ref 1.85–7.62)
NEUTS SEG NFR BLD AUTO: 63 % (ref 43–75)
NRBC BLD AUTO-RTO: 0 /100 WBCS
PLATELET # BLD AUTO: 794 THOUSANDS/UL (ref 149–390)
PMV BLD AUTO: 9.4 FL (ref 8.9–12.7)
POTASSIUM SERPL-SCNC: 3.6 MMOL/L (ref 3.5–5.3)
PROT SERPL-MCNC: 8.3 G/DL (ref 6.4–8.4)
PROT UR-MCNC: 55 MG/DL
PROT/CREAT UR: 0.3 MG/G{CREAT}
RBC # BLD AUTO: 2.94 MILLION/UL (ref 3.81–5.12)
SODIUM SERPL-SCNC: 140 MMOL/L (ref 135–147)
WBC # BLD AUTO: 10.41 THOUSAND/UL (ref 4.31–10.16)

## 2025-07-11 PROCEDURE — 85025 COMPLETE CBC W/AUTO DIFF WBC: CPT

## 2025-07-11 PROCEDURE — 36415 COLL VENOUS BLD VENIPUNCTURE: CPT

## 2025-07-11 PROCEDURE — 83735 ASSAY OF MAGNESIUM: CPT

## 2025-07-11 PROCEDURE — 86304 IMMUNOASSAY TUMOR CA 125: CPT

## 2025-07-11 PROCEDURE — 80053 COMPREHEN METABOLIC PANEL: CPT

## 2025-07-11 PROCEDURE — 84156 ASSAY OF PROTEIN URINE: CPT

## 2025-07-11 PROCEDURE — 82570 ASSAY OF URINE CREATININE: CPT

## 2025-07-12 LAB — CANCER AG125 SERPL-ACNC: 116.9 U/ML (ref 0–35)

## 2025-07-13 RX ORDER — SODIUM CHLORIDE 9 MG/ML
20 INJECTION, SOLUTION INTRAVENOUS ONCE
Status: CANCELLED | OUTPATIENT
Start: 2025-07-15

## 2025-07-13 RX ORDER — PALONOSETRON 0.05 MG/ML
0.25 INJECTION, SOLUTION INTRAVENOUS ONCE
Status: CANCELLED | OUTPATIENT
Start: 2025-07-15

## 2025-07-14 ENCOUNTER — TELEPHONE (OUTPATIENT)
Dept: INTERVENTIONAL RADIOLOGY/VASCULAR | Facility: HOSPITAL | Age: 60
End: 2025-07-14

## 2025-07-14 NOTE — PROGRESS NOTES
Left message going over the pre-procedure instructions for her port placement on 7/25/25 at the [facility]. Provided call back number for questions.

## 2025-07-15 ENCOUNTER — HOSPITAL ENCOUNTER (OUTPATIENT)
Dept: INFUSION CENTER | Facility: HOSPITAL | Age: 60
Discharge: HOME | End: 2025-07-15
Attending: OBSTETRICS & GYNECOLOGY
Payer: COMMERCIAL

## 2025-07-15 VITALS
TEMPERATURE: 97.5 F | OXYGEN SATURATION: 99 % | BODY MASS INDEX: 20.73 KG/M2 | RESPIRATION RATE: 16 BRPM | WEIGHT: 139.99 LBS | SYSTOLIC BLOOD PRESSURE: 93 MMHG | HEIGHT: 69 IN | DIASTOLIC BLOOD PRESSURE: 65 MMHG | HEART RATE: 98 BPM

## 2025-07-15 DIAGNOSIS — C56.3 MALIGNANT NEOPLASM OF BOTH OVARIES (HCC): Primary | ICD-10-CM

## 2025-07-15 DIAGNOSIS — K21.9 GASTROESOPHAGEAL REFLUX DISEASE WITHOUT ESOPHAGITIS: ICD-10-CM

## 2025-07-15 PROCEDURE — 96375 TX/PRO/DX INJ NEW DRUG ADDON: CPT

## 2025-07-15 PROCEDURE — 96417 CHEMO IV INFUS EACH ADDL SEQ: CPT

## 2025-07-15 PROCEDURE — 96415 CHEMO IV INFUSION ADDL HR: CPT

## 2025-07-15 PROCEDURE — 96367 TX/PROPH/DG ADDL SEQ IV INF: CPT

## 2025-07-15 PROCEDURE — 96413 CHEMO IV INFUSION 1 HR: CPT

## 2025-07-15 RX ORDER — PALONOSETRON 0.05 MG/ML
0.25 INJECTION, SOLUTION INTRAVENOUS ONCE
Status: COMPLETED | OUTPATIENT
Start: 2025-07-15 | End: 2025-07-15

## 2025-07-15 RX ORDER — SODIUM CHLORIDE 9 MG/ML
20 INJECTION, SOLUTION INTRAVENOUS ONCE
Status: COMPLETED | OUTPATIENT
Start: 2025-07-15 | End: 2025-07-15

## 2025-07-15 RX ADMIN — DIPHENHYDRAMINE HYDROCHLORIDE 25 MG: 50 INJECTION, SOLUTION INTRAMUSCULAR; INTRAVENOUS at 08:45

## 2025-07-15 RX ADMIN — FOSAPREPITANT 150 MG: 150 INJECTION, POWDER, LYOPHILIZED, FOR SOLUTION INTRAVENOUS at 09:58

## 2025-07-15 RX ADMIN — FAMOTIDINE 20 MG: 10 INJECTION, SOLUTION INTRAVENOUS at 09:35

## 2025-07-15 RX ADMIN — PALONOSETRON HYDROCHLORIDE 0.25 MG: 0.25 INJECTION INTRAVENOUS at 10:38

## 2025-07-15 RX ADMIN — SODIUM CHLORIDE 20 ML/HR: 0.9 INJECTION, SOLUTION INTRAVENOUS at 08:47

## 2025-07-15 RX ADMIN — PACLITAXEL 300 MG: 6 INJECTION, SOLUTION INTRAVENOUS at 10:54

## 2025-07-15 RX ADMIN — DEXAMETHASONE SODIUM PHOSPHATE 20 MG: 10 INJECTION, SOLUTION INTRAMUSCULAR; INTRAVENOUS at 09:07

## 2025-07-15 RX ADMIN — CARBOPLATIN 509 MG: 10 INJECTION, SOLUTION INTRAVENOUS at 13:49

## 2025-07-16 ENCOUNTER — HOSPITAL ENCOUNTER (OUTPATIENT)
Dept: INFUSION CENTER | Facility: HOSPITAL | Age: 60
Discharge: HOME | End: 2025-07-16
Attending: OBSTETRICS & GYNECOLOGY
Payer: COMMERCIAL

## 2025-07-16 VITALS — TEMPERATURE: 98.3 F

## 2025-07-16 DIAGNOSIS — I10 ESSENTIAL HYPERTENSION, BENIGN: ICD-10-CM

## 2025-07-16 DIAGNOSIS — C56.3 MALIGNANT NEOPLASM OF BOTH OVARIES (HCC): Primary | ICD-10-CM

## 2025-07-16 PROCEDURE — 96372 THER/PROPH/DIAG INJ SC/IM: CPT

## 2025-07-16 RX ORDER — LISINOPRIL 10 MG/1
10 TABLET ORAL DAILY
Qty: 30 TABLET | Refills: 0 | Status: SHIPPED | OUTPATIENT
Start: 2025-07-16

## 2025-07-16 RX ADMIN — PEGFILGRASTIM-JMDB 6 MG: 6 INJECTION SUBCUTANEOUS at 15:01

## 2025-07-16 NOTE — PROGRESS NOTES
Pt tolerated Fulphila injection in DELISA well without any complications.      Ewa Hernandes is aware of future appt on 8/5/25 at 8AM.      AVS declined by Ewa Hernandes.

## 2025-07-18 ENCOUNTER — PATIENT OUTREACH (OUTPATIENT)
Dept: HEMATOLOGY ONCOLOGY | Facility: CLINIC | Age: 60
End: 2025-07-18

## 2025-07-18 ENCOUNTER — APPOINTMENT (OUTPATIENT)
Dept: LAB | Facility: HOSPITAL | Age: 60
End: 2025-07-18
Payer: COMMERCIAL

## 2025-07-18 DIAGNOSIS — N83.201 CYSTS OF BOTH OVARIES: ICD-10-CM

## 2025-07-18 DIAGNOSIS — N83.202 CYSTS OF BOTH OVARIES: ICD-10-CM

## 2025-07-18 LAB
ALBUMIN SERPL BCG-MCNC: 4 G/DL (ref 3.5–5)
ALP SERPL-CCNC: 87 U/L (ref 34–104)
ALT SERPL W P-5'-P-CCNC: 6 U/L (ref 7–52)
ANION GAP SERPL CALCULATED.3IONS-SCNC: 13 MMOL/L (ref 4–13)
ANISOCYTOSIS BLD QL SMEAR: PRESENT
AST SERPL W P-5'-P-CCNC: 15 U/L (ref 13–39)
BASOPHILS # BLD MANUAL: 0 THOUSAND/UL (ref 0–0.1)
BASOPHILS NFR MAR MANUAL: 0 % (ref 0–1)
BILIRUB SERPL-MCNC: 0.46 MG/DL (ref 0.2–1)
BUN SERPL-MCNC: 15 MG/DL (ref 5–25)
CALCIUM SERPL-MCNC: 10 MG/DL (ref 8.4–10.2)
CHLORIDE SERPL-SCNC: 101 MMOL/L (ref 96–108)
CO2 SERPL-SCNC: 21 MMOL/L (ref 21–32)
CREAT SERPL-MCNC: 0.87 MG/DL (ref 0.6–1.3)
EOSINOPHIL # BLD MANUAL: 0.44 THOUSAND/UL (ref 0–0.4)
EOSINOPHIL NFR BLD MANUAL: 1 % (ref 0–6)
ERYTHROCYTE [DISTWIDTH] IN BLOOD BY AUTOMATED COUNT: 16 % (ref 11.6–15.1)
GFR SERPL CREATININE-BSD FRML MDRD: 73 ML/MIN/1.73SQ M
GLUCOSE SERPL-MCNC: 112 MG/DL (ref 65–140)
HCT VFR BLD AUTO: 30 % (ref 34.8–46.1)
HGB BLD-MCNC: 9.2 G/DL (ref 11.5–15.4)
LYMPHOCYTES # BLD AUTO: 1.75 THOUSAND/UL (ref 0.6–4.47)
LYMPHOCYTES # BLD AUTO: 4 % (ref 14–44)
MACROCYTES BLD QL AUTO: PRESENT
MAGNESIUM SERPL-MCNC: 1.7 MG/DL (ref 1.9–2.7)
MCH RBC QN AUTO: 30.8 PG (ref 26.8–34.3)
MCHC RBC AUTO-ENTMCNC: 30.7 G/DL (ref 31.4–37.4)
MCV RBC AUTO: 100 FL (ref 82–98)
MONOCYTES # BLD AUTO: 0.87 THOUSAND/UL (ref 0–1.22)
MONOCYTES NFR BLD: 2 % (ref 4–12)
NEUTROPHILS # BLD MANUAL: 40.64 THOUSAND/UL (ref 1.85–7.62)
NEUTS SEG NFR BLD AUTO: 93 % (ref 43–75)
PLATELET # BLD AUTO: 496 THOUSANDS/UL (ref 149–390)
PLATELET BLD QL SMEAR: ABNORMAL
PMV BLD AUTO: 10.3 FL (ref 8.9–12.7)
POTASSIUM SERPL-SCNC: 3.8 MMOL/L (ref 3.5–5.3)
PROT SERPL-MCNC: 7.9 G/DL (ref 6.4–8.4)
RBC # BLD AUTO: 2.99 MILLION/UL (ref 3.81–5.12)
RBC MORPH BLD: PRESENT
SODIUM SERPL-SCNC: 135 MMOL/L (ref 135–147)
WBC # BLD AUTO: 43.7 THOUSAND/UL (ref 4.31–10.16)

## 2025-07-18 PROCEDURE — 85027 COMPLETE CBC AUTOMATED: CPT

## 2025-07-18 PROCEDURE — 36415 COLL VENOUS BLD VENIPUNCTURE: CPT

## 2025-07-18 PROCEDURE — 85007 BL SMEAR W/DIFF WBC COUNT: CPT

## 2025-07-18 PROCEDURE — 83735 ASSAY OF MAGNESIUM: CPT

## 2025-07-18 PROCEDURE — 80053 COMPREHEN METABOLIC PANEL: CPT

## 2025-07-18 NOTE — PROGRESS NOTES
I reached out to Ewa  to complete the Distress Thermometer, review for any barriers to care and to offer any supportive services that may be needed. I left VM with the reason for my call including my direct phone number     3rd attempt made for out reach .

## 2025-07-21 LAB — SCAN RESULT: NORMAL

## 2025-07-22 RX ORDER — PANTOPRAZOLE SODIUM 40 MG/1
40 TABLET, DELAYED RELEASE ORAL
Qty: 60 TABLET | Refills: 0 | Status: SHIPPED | OUTPATIENT
Start: 2025-07-22

## 2025-07-24 ENCOUNTER — APPOINTMENT (OUTPATIENT)
Dept: LAB | Facility: HOSPITAL | Age: 60
End: 2025-07-24
Payer: COMMERCIAL

## 2025-07-24 DIAGNOSIS — N83.202 CYSTS OF BOTH OVARIES: ICD-10-CM

## 2025-07-24 DIAGNOSIS — N83.201 CYSTS OF BOTH OVARIES: ICD-10-CM

## 2025-07-24 LAB
ALBUMIN SERPL BCG-MCNC: 4 G/DL (ref 3.5–5)
ALP SERPL-CCNC: 103 U/L (ref 34–104)
ALT SERPL W P-5'-P-CCNC: 6 U/L (ref 7–52)
ANION GAP SERPL CALCULATED.3IONS-SCNC: 11 MMOL/L (ref 4–13)
AST SERPL W P-5'-P-CCNC: 13 U/L (ref 13–39)
BASOPHILS # BLD AUTO: 0.04 THOUSANDS/ÂΜL (ref 0–0.1)
BASOPHILS NFR BLD AUTO: 0 % (ref 0–1)
BILIRUB SERPL-MCNC: 0.2 MG/DL (ref 0.2–1)
BUN SERPL-MCNC: 12 MG/DL (ref 5–25)
CALCIUM SERPL-MCNC: 9.8 MG/DL (ref 8.4–10.2)
CHLORIDE SERPL-SCNC: 99 MMOL/L (ref 96–108)
CO2 SERPL-SCNC: 25 MMOL/L (ref 21–32)
CREAT SERPL-MCNC: 0.74 MG/DL (ref 0.6–1.3)
EOSINOPHIL # BLD AUTO: 0.05 THOUSAND/ÂΜL (ref 0–0.61)
EOSINOPHIL NFR BLD AUTO: 0 % (ref 0–6)
ERYTHROCYTE [DISTWIDTH] IN BLOOD BY AUTOMATED COUNT: 16.2 % (ref 11.6–15.1)
GFR SERPL CREATININE-BSD FRML MDRD: 89 ML/MIN/1.73SQ M
GLUCOSE SERPL-MCNC: 100 MG/DL (ref 65–140)
HCT VFR BLD AUTO: 26.3 % (ref 34.8–46.1)
HGB BLD-MCNC: 8.3 G/DL (ref 11.5–15.4)
IMM GRANULOCYTES # BLD AUTO: 0.2 THOUSAND/UL (ref 0–0.2)
IMM GRANULOCYTES NFR BLD AUTO: 1 % (ref 0–2)
LYMPHOCYTES # BLD AUTO: 2.27 THOUSANDS/ÂΜL (ref 0.6–4.47)
LYMPHOCYTES NFR BLD AUTO: 15 % (ref 14–44)
MAGNESIUM SERPL-MCNC: 1.4 MG/DL (ref 1.9–2.7)
MCH RBC QN AUTO: 30.9 PG (ref 26.8–34.3)
MCHC RBC AUTO-ENTMCNC: 31.6 G/DL (ref 31.4–37.4)
MCV RBC AUTO: 98 FL (ref 82–98)
MONOCYTES # BLD AUTO: 1.18 THOUSAND/ÂΜL (ref 0.17–1.22)
MONOCYTES NFR BLD AUTO: 8 % (ref 4–12)
NEUTROPHILS # BLD AUTO: 11.67 THOUSANDS/ÂΜL (ref 1.85–7.62)
NEUTS SEG NFR BLD AUTO: 76 % (ref 43–75)
NRBC BLD AUTO-RTO: 0 /100 WBCS
PLATELET # BLD AUTO: 427 THOUSANDS/UL (ref 149–390)
PMV BLD AUTO: 10 FL (ref 8.9–12.7)
POTASSIUM SERPL-SCNC: 3.5 MMOL/L (ref 3.5–5.3)
PROT SERPL-MCNC: 7.8 G/DL (ref 6.4–8.4)
RBC # BLD AUTO: 2.69 MILLION/UL (ref 3.81–5.12)
SODIUM SERPL-SCNC: 135 MMOL/L (ref 135–147)
WBC # BLD AUTO: 15.41 THOUSAND/UL (ref 4.31–10.16)

## 2025-07-24 PROCEDURE — 85025 COMPLETE CBC W/AUTO DIFF WBC: CPT

## 2025-07-24 PROCEDURE — 36415 COLL VENOUS BLD VENIPUNCTURE: CPT

## 2025-07-24 PROCEDURE — 80053 COMPREHEN METABOLIC PANEL: CPT

## 2025-07-24 PROCEDURE — 83735 ASSAY OF MAGNESIUM: CPT

## 2025-07-25 ENCOUNTER — HOSPITAL ENCOUNTER (OUTPATIENT)
Dept: INTERVENTIONAL RADIOLOGY/VASCULAR | Facility: HOSPITAL | Age: 60
Discharge: HOME | End: 2025-07-25
Attending: OBSTETRICS & GYNECOLOGY

## 2025-07-25 VITALS
OXYGEN SATURATION: 100 % | HEIGHT: 69 IN | SYSTOLIC BLOOD PRESSURE: 104 MMHG | HEART RATE: 90 BPM | BODY MASS INDEX: 20.59 KG/M2 | WEIGHT: 139 LBS | RESPIRATION RATE: 16 BRPM | TEMPERATURE: 99.3 F | DIASTOLIC BLOOD PRESSURE: 63 MMHG

## 2025-07-25 DIAGNOSIS — C56.3 MALIGNANT NEOPLASM OF BOTH OVARIES (HCC): ICD-10-CM

## 2025-07-25 RX ORDER — SODIUM CHLORIDE 9 MG/ML
30 INJECTION, SOLUTION INTRAVENOUS CONTINUOUS
Status: CANCELLED | OUTPATIENT
Start: 2025-07-25

## 2025-07-25 RX ORDER — CEFAZOLIN SODIUM 2 G/50ML
2000 SOLUTION INTRAVENOUS ONCE
Status: DISCONTINUED | OUTPATIENT
Start: 2025-07-25 | End: 2025-07-29 | Stop reason: HOSPADM

## 2025-07-25 RX ORDER — SODIUM CHLORIDE 9 MG/ML
30 INJECTION, SOLUTION INTRAVENOUS CONTINUOUS
Status: DISCONTINUED | OUTPATIENT
Start: 2025-07-25 | End: 2025-07-29 | Stop reason: HOSPADM

## 2025-07-25 RX ORDER — CEFAZOLIN SODIUM 2 G/50ML
2000 SOLUTION INTRAVENOUS ONCE
Status: CANCELLED | OUTPATIENT
Start: 2025-07-25 | End: 2025-07-25

## 2025-07-31 ENCOUNTER — TELEMEDICINE (OUTPATIENT)
Dept: GYNECOLOGIC ONCOLOGY | Facility: CLINIC | Age: 60
End: 2025-07-31

## 2025-07-31 ENCOUNTER — APPOINTMENT (OUTPATIENT)
Dept: LAB | Facility: HOSPITAL | Age: 60
End: 2025-07-31
Payer: COMMERCIAL

## 2025-07-31 DIAGNOSIS — C56.3 MALIGNANT NEOPLASM OF BOTH OVARIES (HCC): Primary | ICD-10-CM

## 2025-07-31 DIAGNOSIS — N83.201 CYSTS OF BOTH OVARIES: ICD-10-CM

## 2025-07-31 DIAGNOSIS — N83.202 CYSTS OF BOTH OVARIES: ICD-10-CM

## 2025-07-31 LAB
ALBUMIN SERPL BCG-MCNC: 3.9 G/DL (ref 3.5–5)
ALP SERPL-CCNC: 82 U/L (ref 34–104)
ALT SERPL W P-5'-P-CCNC: 4 U/L (ref 7–52)
ANION GAP SERPL CALCULATED.3IONS-SCNC: 12 MMOL/L (ref 4–13)
AST SERPL W P-5'-P-CCNC: 11 U/L (ref 13–39)
BASOPHILS # BLD AUTO: 0.03 THOUSANDS/ÂΜL (ref 0–0.1)
BASOPHILS NFR BLD AUTO: 0 % (ref 0–1)
BILIRUB SERPL-MCNC: 0.21 MG/DL (ref 0.2–1)
BUN SERPL-MCNC: 12 MG/DL (ref 5–25)
CALCIUM SERPL-MCNC: 9.6 MG/DL (ref 8.4–10.2)
CANCER AG125 SERPL-ACNC: 80.5 U/ML (ref 0–35)
CHLORIDE SERPL-SCNC: 98 MMOL/L (ref 96–108)
CO2 SERPL-SCNC: 24 MMOL/L (ref 21–32)
CREAT SERPL-MCNC: 0.7 MG/DL (ref 0.6–1.3)
CREAT UR-MCNC: 147.5 MG/DL
EOSINOPHIL # BLD AUTO: 0.02 THOUSAND/ÂΜL (ref 0–0.61)
EOSINOPHIL NFR BLD AUTO: 0 % (ref 0–6)
ERYTHROCYTE [DISTWIDTH] IN BLOOD BY AUTOMATED COUNT: 16.1 % (ref 11.6–15.1)
GFR SERPL CREATININE-BSD FRML MDRD: 95 ML/MIN/1.73SQ M
GLUCOSE SERPL-MCNC: 106 MG/DL (ref 65–140)
HCT VFR BLD AUTO: 25.8 % (ref 34.8–46.1)
HGB BLD-MCNC: 8.1 G/DL (ref 11.5–15.4)
IMM GRANULOCYTES # BLD AUTO: 0.05 THOUSAND/UL (ref 0–0.2)
IMM GRANULOCYTES NFR BLD AUTO: 0 % (ref 0–2)
LYMPHOCYTES # BLD AUTO: 1.96 THOUSANDS/ÂΜL (ref 0.6–4.47)
LYMPHOCYTES NFR BLD AUTO: 17 % (ref 14–44)
MAGNESIUM SERPL-MCNC: 1.5 MG/DL (ref 1.9–2.7)
MCH RBC QN AUTO: 30.1 PG (ref 26.8–34.3)
MCHC RBC AUTO-ENTMCNC: 31.4 G/DL (ref 31.4–37.4)
MCV RBC AUTO: 96 FL (ref 82–98)
MONOCYTES # BLD AUTO: 0.69 THOUSAND/ÂΜL (ref 0.17–1.22)
MONOCYTES NFR BLD AUTO: 6 % (ref 4–12)
NEUTROPHILS # BLD AUTO: 8.95 THOUSANDS/ÂΜL (ref 1.85–7.62)
NEUTS SEG NFR BLD AUTO: 77 % (ref 43–75)
NRBC BLD AUTO-RTO: 0 /100 WBCS
PLATELET # BLD AUTO: 479 THOUSANDS/UL (ref 149–390)
PMV BLD AUTO: 9.5 FL (ref 8.9–12.7)
POTASSIUM SERPL-SCNC: 3.7 MMOL/L (ref 3.5–5.3)
PROT SERPL-MCNC: 7.9 G/DL (ref 6.4–8.4)
PROT UR-MCNC: 47.9 MG/DL
PROT/CREAT UR: 0.3 MG/G{CREAT}
RBC # BLD AUTO: 2.69 MILLION/UL (ref 3.81–5.12)
SODIUM SERPL-SCNC: 134 MMOL/L (ref 135–147)
WBC # BLD AUTO: 11.7 THOUSAND/UL (ref 4.31–10.16)

## 2025-07-31 PROCEDURE — 36415 COLL VENOUS BLD VENIPUNCTURE: CPT

## 2025-07-31 PROCEDURE — 99024 POSTOP FOLLOW-UP VISIT: CPT | Performed by: NURSE PRACTITIONER

## 2025-07-31 PROCEDURE — 85025 COMPLETE CBC W/AUTO DIFF WBC: CPT

## 2025-07-31 PROCEDURE — 86304 IMMUNOASSAY TUMOR CA 125: CPT

## 2025-07-31 PROCEDURE — 84156 ASSAY OF PROTEIN URINE: CPT

## 2025-07-31 PROCEDURE — 82570 ASSAY OF URINE CREATININE: CPT

## 2025-07-31 PROCEDURE — 83735 ASSAY OF MAGNESIUM: CPT

## 2025-07-31 PROCEDURE — 80053 COMPREHEN METABOLIC PANEL: CPT

## 2025-08-01 ENCOUNTER — HOSPITAL ENCOUNTER (OUTPATIENT)
Dept: INTERVENTIONAL RADIOLOGY/VASCULAR | Facility: HOSPITAL | Age: 60
Discharge: HOME | End: 2025-08-01
Attending: OBSTETRICS & GYNECOLOGY
Payer: COMMERCIAL

## 2025-08-01 VITALS
HEIGHT: 69 IN | OXYGEN SATURATION: 100 % | DIASTOLIC BLOOD PRESSURE: 56 MMHG | SYSTOLIC BLOOD PRESSURE: 101 MMHG | BODY MASS INDEX: 19.99 KG/M2 | TEMPERATURE: 98.6 F | HEART RATE: 95 BPM | RESPIRATION RATE: 18 BRPM | WEIGHT: 135 LBS

## 2025-08-01 PROCEDURE — 36561 INSERT TUNNELED CV CATH: CPT

## 2025-08-01 PROCEDURE — 99152 MOD SED SAME PHYS/QHP 5/>YRS: CPT

## 2025-08-01 PROCEDURE — C1788 PORT, INDWELLING, IMP: HCPCS

## 2025-08-01 PROCEDURE — 99153 MOD SED SAME PHYS/QHP EA: CPT

## 2025-08-01 PROCEDURE — 77001 FLUOROGUIDE FOR VEIN DEVICE: CPT | Performed by: RADIOLOGY

## 2025-08-01 PROCEDURE — 77001 FLUOROGUIDE FOR VEIN DEVICE: CPT

## 2025-08-01 PROCEDURE — 76937 US GUIDE VASCULAR ACCESS: CPT | Performed by: RADIOLOGY

## 2025-08-01 PROCEDURE — 99152 MOD SED SAME PHYS/QHP 5/>YRS: CPT | Performed by: RADIOLOGY

## 2025-08-01 PROCEDURE — 36561 INSERT TUNNELED CV CATH: CPT | Performed by: RADIOLOGY

## 2025-08-01 RX ORDER — FENTANYL CITRATE 50 UG/ML
INJECTION, SOLUTION INTRAMUSCULAR; INTRAVENOUS AS NEEDED
Status: COMPLETED | OUTPATIENT
Start: 2025-08-01 | End: 2025-08-01

## 2025-08-01 RX ORDER — CEFAZOLIN SODIUM 2 G/50ML
2000 SOLUTION INTRAVENOUS ONCE
Status: COMPLETED | OUTPATIENT
Start: 2025-08-01 | End: 2025-08-01

## 2025-08-01 RX ORDER — SODIUM CHLORIDE 9 MG/ML
30 INJECTION, SOLUTION INTRAVENOUS CONTINUOUS
Status: DISCONTINUED | OUTPATIENT
Start: 2025-08-01 | End: 2025-08-05 | Stop reason: HOSPADM

## 2025-08-01 RX ORDER — LIDOCAINE WITH 8.4% SOD BICARB 0.9%(10ML)
SYRINGE (ML) INJECTION AS NEEDED
Status: COMPLETED | OUTPATIENT
Start: 2025-08-01 | End: 2025-08-01

## 2025-08-01 RX ORDER — MIDAZOLAM HYDROCHLORIDE 2 MG/2ML
INJECTION, SOLUTION INTRAMUSCULAR; INTRAVENOUS AS NEEDED
Status: COMPLETED | OUTPATIENT
Start: 2025-08-01 | End: 2025-08-01

## 2025-08-01 RX ORDER — LIDOCAINE HYDROCHLORIDE AND EPINEPHRINE 10; 10 MG/ML; UG/ML
INJECTION, SOLUTION INFILTRATION; PERINEURAL AS NEEDED
Status: COMPLETED | OUTPATIENT
Start: 2025-08-01 | End: 2025-08-01

## 2025-08-01 RX ADMIN — LIDOCAINE HYDROCHLORIDE,EPINEPHRINE BITARTRATE 20 ML: 10; .01 INJECTION, SOLUTION INFILTRATION; PERINEURAL at 09:11

## 2025-08-01 RX ADMIN — MIDAZOLAM 1 MG: 1 INJECTION INTRAMUSCULAR; INTRAVENOUS at 09:10

## 2025-08-01 RX ADMIN — Medication 10 ML: at 09:03

## 2025-08-01 RX ADMIN — SODIUM CHLORIDE 30 ML/HR: 0.9 INJECTION, SOLUTION INTRAVENOUS at 08:13

## 2025-08-01 RX ADMIN — MIDAZOLAM 1 MG: 1 INJECTION INTRAMUSCULAR; INTRAVENOUS at 08:49

## 2025-08-01 RX ADMIN — Medication 10 ML: at 09:07

## 2025-08-01 RX ADMIN — CEFAZOLIN SODIUM 2000 MG: 2 SOLUTION INTRAVENOUS at 08:20

## 2025-08-01 RX ADMIN — FENTANYL CITRATE 50 MCG: 50 INJECTION, SOLUTION INTRAMUSCULAR; INTRAVENOUS at 08:49

## 2025-08-01 RX ADMIN — FENTANYL CITRATE 50 MCG: 50 INJECTION, SOLUTION INTRAMUSCULAR; INTRAVENOUS at 09:10

## 2025-08-01 RX ADMIN — MIDAZOLAM 1 MG: 1 INJECTION INTRAMUSCULAR; INTRAVENOUS at 08:59

## 2025-08-01 RX ADMIN — FENTANYL CITRATE 50 MCG: 50 INJECTION, SOLUTION INTRAMUSCULAR; INTRAVENOUS at 08:59

## 2025-08-03 RX ORDER — MAGNESIUM SULFATE HEPTAHYDRATE 40 MG/ML
4 INJECTION, SOLUTION INTRAVENOUS ONCE
Status: CANCELLED
Start: 2025-08-05 | End: 2025-08-05

## 2025-08-03 RX ORDER — SODIUM CHLORIDE 9 MG/ML
20 INJECTION, SOLUTION INTRAVENOUS ONCE
Status: CANCELLED | OUTPATIENT
Start: 2025-08-05

## 2025-08-03 RX ORDER — PALONOSETRON 0.05 MG/ML
0.25 INJECTION, SOLUTION INTRAVENOUS ONCE
Status: CANCELLED | OUTPATIENT
Start: 2025-08-05

## 2025-08-05 ENCOUNTER — HOSPITAL ENCOUNTER (OUTPATIENT)
Dept: INFUSION CENTER | Facility: HOSPITAL | Age: 60
Discharge: HOME | End: 2025-08-05
Attending: OBSTETRICS & GYNECOLOGY
Payer: COMMERCIAL

## 2025-08-06 ENCOUNTER — HOSPITAL ENCOUNTER (OUTPATIENT)
Dept: INFUSION CENTER | Facility: HOSPITAL | Age: 60
Discharge: HOME | End: 2025-08-06
Attending: OBSTETRICS & GYNECOLOGY
Payer: COMMERCIAL

## 2025-08-08 ENCOUNTER — APPOINTMENT (OUTPATIENT)
Dept: LAB | Facility: HOSPITAL | Age: 60
End: 2025-08-08
Payer: COMMERCIAL

## 2025-08-11 ENCOUNTER — PATIENT OUTREACH (OUTPATIENT)
Dept: CASE MANAGEMENT | Facility: HOSPITAL | Age: 60
End: 2025-08-11

## 2025-08-15 ENCOUNTER — APPOINTMENT (OUTPATIENT)
Dept: LAB | Facility: HOSPITAL | Age: 60
End: 2025-08-15
Payer: COMMERCIAL

## 2025-08-19 DIAGNOSIS — R11.0 CHEMOTHERAPY-INDUCED NAUSEA: ICD-10-CM

## 2025-08-19 DIAGNOSIS — T45.1X5A CHEMOTHERAPY-INDUCED NAUSEA: ICD-10-CM

## 2025-08-19 DIAGNOSIS — R19.00 PELVIC MASS: ICD-10-CM

## 2025-08-19 RX ORDER — ONDANSETRON 8 MG/1
8 TABLET, FILM COATED ORAL EVERY 8 HOURS PRN
Qty: 30 TABLET | Refills: 0 | Status: SHIPPED | OUTPATIENT
Start: 2025-08-19

## 2025-08-21 ENCOUNTER — TELEMEDICINE (OUTPATIENT)
Dept: GYNECOLOGIC ONCOLOGY | Facility: CLINIC | Age: 60
End: 2025-08-21
Payer: COMMERCIAL

## 2025-08-21 DIAGNOSIS — T45.1X5A CHEMOTHERAPY-INDUCED NAUSEA: ICD-10-CM

## 2025-08-21 DIAGNOSIS — R11.0 CHEMOTHERAPY-INDUCED NAUSEA: ICD-10-CM

## 2025-08-21 DIAGNOSIS — C56.3 MALIGNANT NEOPLASM OF BOTH OVARIES (HCC): Primary | ICD-10-CM

## 2025-08-21 PROBLEM — T81.89XA NON-HEALING SURGICAL WOUND: Status: RESOLVED | Noted: 2025-07-01 | Resolved: 2025-08-21

## 2025-08-21 PROCEDURE — 99214 OFFICE O/P EST MOD 30 MIN: CPT | Performed by: NURSE PRACTITIONER

## 2025-08-21 RX ORDER — LORAZEPAM 1 MG/1
1 TABLET ORAL EVERY 6 HOURS PRN
Qty: 36 TABLET | Refills: 0 | Status: SHIPPED | OUTPATIENT
Start: 2025-08-21

## (undated) DEVICE — TOWEL SURG XR DETECT GREEN STRL RFD

## (undated) DEVICE — WOUND RETRACTOR AND PROTECTOR: Brand: ALEXIS O WOUND PROTECTOR-RETRACTOR

## (undated) DEVICE — GLOVE PI ULTRA TOUCH SZ.8.5

## (undated) DEVICE — DRAPE FLUID WARMER (BIRD BATH)

## (undated) DEVICE — REM POLYHESIVE ADULT PATIENT RETURN ELECTRODE: Brand: VALLEYLAB

## (undated) DEVICE — SUT VICRYL 3-0 SH C/R 18 IN J864D

## (undated) DEVICE — CHLORAPREP HI-LITE 26ML ORANGE

## (undated) DEVICE — SUT PROLENE 2-0 SH 36 IN 8523H

## (undated) DEVICE — MEDI-VAC YANKAUER SUCTION HANDLE W/BULBOUS AND CONTROL VENT: Brand: CARDINAL HEALTH

## (undated) DEVICE — GLOVE INDICATOR PI UNDERGLOVE SZ 8.5 BLUE

## (undated) DEVICE — JACKSON-PRATT 100CC BULB RESERVOIR: Brand: CARDINAL HEALTH

## (undated) DEVICE — CELLULAR STAPLER WITH TRI-STAPLE TECHNOLOGY: Brand: EEA

## (undated) DEVICE — LAPAROTOMY SPONGE - RF AND X-RAY DETECTABLE PRE-WASHED: Brand: SITUATE

## (undated) DEVICE — SUT ETHILON 2-0 FS 18 IN 664H

## (undated) DEVICE — LAPAROTOMY DRAPE WITH POUCHES: Brand: CONVERTORS

## (undated) DEVICE — ECHELON CONTOUR GST RELOAD GREEN: Brand: ECHELON

## (undated) DEVICE — SCD SEQUENTIAL COMPRESSION COMFORT SLEEVE MEDIUM KNEE LENGTH: Brand: KENDALL SCD

## (undated) DEVICE — SUT VICRYL 0 CT-1 CR/8 27 IN JJ41G

## (undated) DEVICE — 3000CC GUARDIAN II: Brand: GUARDIAN

## (undated) DEVICE — SUT PLAIN 3-0 CTX 27 IN 873H

## (undated) DEVICE — DRAPE EQUIPMENT RF WAND

## (undated) DEVICE — SURGICEL NU-KNIT 3 X 4

## (undated) DEVICE — SUT PDS II 1 TP-1 96 IN Z880G

## (undated) DEVICE — ECHELON CONTOUR W/ BLUE RELOAD: Brand: ECHELON

## (undated) DEVICE — JP CHANNEL DRAIN 19FR, FULL FLUTES: Brand: JACKSON-PRATT

## (undated) DEVICE — SUT VICRYL 0 CT-1 36 IN J946H

## (undated) DEVICE — SUT VICRYL 0 REEL 54 IN J287G

## (undated) DEVICE — LIGHT GLOVE GREEN

## (undated) DEVICE — INTENDED FOR TISSUE SEPARATION, AND OTHER PROCEDURES THAT REQUIRE A SHARP SURGICAL BLADE TO PUNCTURE OR CUT.: Brand: BARD-PARKER SAFETY BLADES SIZE 15, STERILE

## (undated) DEVICE — SOLUTION BOWL: Brand: KENDALL

## (undated) DEVICE — CAUTERY TIP POLISHER: Brand: DEVON

## (undated) DEVICE — INTENDED FOR TISSUE SEPARATION, AND OTHER PROCEDURES THAT REQUIRE A SHARP SURGICAL BLADE TO PUNCTURE OR CUT.: Brand: BARD-PARKER SAFETY BLADES SIZE 10, STERILE

## (undated) DEVICE — BETHLEHEM UNIVERSAL LAPAROTOMY: Brand: CARDINAL HEALTH

## (undated) DEVICE — SPECIMEN CONTAINER STERILE PEEL PACK

## (undated) DEVICE — CURVED, LARGE JAW, OPEN SEALER/DIVIDER NANO-COATED: Brand: LIGASURE IMPACT

## (undated) DEVICE — SUT STRATAFIX SPIRAL PLUS 3-0 PS-2 30 X 30 CM SXMP2B408

## (undated) DEVICE — TRAY FOLEY 16FR URIMETER SILICONE SURESTEP

## (undated) DEVICE — 3M™ TEGADERM™ TRANSPARENT FILM DRESSING FRAME STYLE, 1627, 4 IN X 10 IN (10 CM X 25 CM), 20/CT 4CT/CASE: Brand: 3M™ TEGADERM™

## (undated) DEVICE — SUT SILK 0 SH 30 IN K834H

## (undated) DEVICE — PENCILETTE PUSH BUTTON COATED